# Patient Record
Sex: FEMALE | Race: BLACK OR AFRICAN AMERICAN | NOT HISPANIC OR LATINO | Employment: OTHER | URBAN - METROPOLITAN AREA
[De-identification: names, ages, dates, MRNs, and addresses within clinical notes are randomized per-mention and may not be internally consistent; named-entity substitution may affect disease eponyms.]

---

## 2017-02-18 ENCOUNTER — HOSPITAL ENCOUNTER (EMERGENCY)
Facility: OTHER | Age: 52
Discharge: HOME | End: 2017-02-18
Attending: EMERGENCY MEDICINE
Payer: MEDICARE

## 2017-02-18 VITALS
BODY MASS INDEX: 33.75 KG/M2 | SYSTOLIC BLOOD PRESSURE: 157 MMHG | WEIGHT: 210 LBS | OXYGEN SATURATION: 97 % | HEIGHT: 66 IN | RESPIRATION RATE: 12 BRPM | DIASTOLIC BLOOD PRESSURE: 94 MMHG | TEMPERATURE: 98 F | HEART RATE: 87 BPM

## 2017-02-18 DIAGNOSIS — M54.50 CHRONIC BILATERAL LOW BACK PAIN WITHOUT SCIATICA: ICD-10-CM

## 2017-02-18 DIAGNOSIS — M79.605 PAIN IN BOTH LOWER EXTREMITIES: Primary | ICD-10-CM

## 2017-02-18 DIAGNOSIS — M79.604 PAIN IN BOTH LOWER EXTREMITIES: Primary | ICD-10-CM

## 2017-02-18 DIAGNOSIS — G89.29 CHRONIC BILATERAL LOW BACK PAIN WITHOUT SCIATICA: ICD-10-CM

## 2017-02-18 PROCEDURE — 96372 THER/PROPH/DIAG INJ SC/IM: CPT

## 2017-02-18 PROCEDURE — 99283 EMERGENCY DEPT VISIT LOW MDM: CPT | Mod: 25

## 2017-02-18 PROCEDURE — 63600175 PHARM REV CODE 636 W HCPCS: Performed by: PHYSICIAN ASSISTANT

## 2017-02-18 RX ORDER — KETOROLAC TROMETHAMINE 30 MG/ML
30 INJECTION, SOLUTION INTRAMUSCULAR; INTRAVENOUS
Status: COMPLETED | OUTPATIENT
Start: 2017-02-18 | End: 2017-02-18

## 2017-02-18 RX ADMIN — KETOROLAC TROMETHAMINE 30 MG: 30 INJECTION, SOLUTION INTRAMUSCULAR at 05:02

## 2017-02-18 NOTE — ED AVS SNAPSHOT
[facility]  [address]  [facility] [address]               Trina Marie Collette   2017  4:33 PM   ED    Description:  Female : 1965   Department:  [facility]           Your Care was Coordinated By:     Provider Role From To    Charlie Mccauley MD Attending Provider 17 [phone] --    Courtney Kumari PA-C Physician Assistant 17 [id number] --      Reason for Visit     Leg Pain           Diagnoses this Visit        Comments    Pain in both lower extremities    -  Primary     Chronic bilateral low back pain without sciatica           ED Disposition     None           To Do List           Follow-up Information     Follow up with Bruce Baird MD In 2 days.    Specialty:  Internal Medicine    Contact information:    [address] [facility] [address]  [phone]          Follow up with Lisa Merino MD. Schedule an appointment as soon as possible for a visit in 1 day.    Specialties:  Pain Medicine, Interventional Pain Medicine    Contact information:    [address] [facility]  [address]  [facility] [id number] [phone]          Follow up with [facility].    Specialty:  Emergency Medicine    Why:  If symptoms worsen    Contact information:    [address] [facility] [address] [phone]      [facility]     [facility] Nurse Care Line -  Assistance  Registered nurses in the [facility] provide clinical advisement, health education, appointment booking, and other advisory services.  Call for this free service at [phone].             Medications           Message regarding Medications     Verify the changes and/or additions to your medication regime listed below are the same as discussed with your clinician today.  If any of these changes or additions are incorrect, please notify your healthcare provider.        These medications were administered today        Dose  "Freq    ketorolac injection 30 mg 30 mg ED 1 Time    Sig: Inject 30 mg into the muscle ED 1 Time.    Class: Normal    Route: Intramuscular           Verify that the below list of medications is an accurate representation of the medications you are currently taking.  If none reported, the list may be blank. If incorrect, please contact your healthcare provider. Carry this list with you in case of emergency.           Current Medications     busPIRone (BUSPAR) 30 MG Tab     clotrimazole-betamethasone 1-0.05% (LOTRISONE) cream Apply topically 2 (two) times daily.    cyclobenzaprine (FLEXERIL) 10 MG tablet TAKE 1 TABLET (10 MG TOTAL) BY MOUTH 2 (TWO) TIMES DAILY.    etodolac (LODINE) 500 MG tablet Take 1 tablet (500 mg total) by mouth 2 (two) times daily.    hydrOXYzine pamoate (VISTARIL) 25 MG Cap Take 25 mg by mouth once daily.    sertraline (ZOLOFT) 100 MG tablet Take 100 mg by mouth once daily.    ziprasidone (GEODON) 40 MG Cap Take 40 mg by mouth once daily.    ziprasidone (GEODON) 80 MG capsule Take 80 mg by mouth once daily.    zolpidem (AMBIEN) 10 mg Tab TAKE ONE TABLET BY MOUTH AT BEDTIME AS NEEDED           Clinical Reference Information           Your Vitals Were     BP Pulse Temp Resp Height Weight    157/94 (BP Location: Left arm, Patient Position: Sitting) 87 98 °F (36.7 °C) (Oral) 12 5' 6" (1.676 m) 95.3 kg (210 lb)    Last Period SpO2 BMI          01/18/2017 97% 33.89 kg/m2        Allergies as of 2/18/2017     No Known Allergies      Immunizations Administered on Date of Encounter - 2/18/2017     None      ED Micro, Lab, POCT     None      ED Imaging Orders     None        Discharge Instructions       TAKE YOUR HOME ETODOLAC AS NEEDED FOR PAIN    Discharge References/Attachments     BACK PAIN (ACUTE OR CHRONIC) (ENGLISH)    BACK PAIN (LOW): SELF-CARE (ENGLISH)    MYALGIAS (ENGLISH)      Smoking Cessation     If you would like to quit smoking:   You may be eligible for free services if you are a " [place] resident and started smoking cigarettes before September 1, 1988.  Call the Smoking Cessation Trust (SCT) toll free at (625) 255-2122 or (448) 427-9761.   Call 1-800-QUIT-NOW if you do not meet the above criteria.             [facility] complies with applicable Federal civil rights laws and does not discriminate on the basis of race, color, national origin, age, disability, or sex.        Language Assistance Services     ATTENTION: Language assistance services are available, free of charge. Please call [phone].      ATENCIÓN: Si habla español, tiene a espinal disposición servicios gratuitos de asistencia lingüística. Llame al [phone].     CHÚ Ý: N?u b?n nói Ti?ng Vi?t, có các d?ch v? h? tr? ngôn ng? mi?n phí dành cho b?n. G?i s? [phone].

## 2017-02-18 NOTE — ED PROVIDER NOTES
Encounter Date: 2/18/2017       History     Chief Complaint   Patient presents with    Leg Pain     Patient c/o bilateral leg pain for 3 days.  Patient denies any trauma.  Patient stated it is worse at night.      Review of patient's allergies indicates:  No Known Allergies  HPI Comments: Patient is a 52 y.o. female with a past medical history of depression, anxiety, bipolar 1, presenting to the emergency department with complaints of generalized body pain, back pain and bilateral leg pain.  The patient reports that yesterday her symptoms worsened in severity, and she now rates them at a 10/10.  She states that she has generalized body aches, and pain on her back that radiates into her legs.  She denies numbness, tingling, weakness.  She denies loss of urinary bowel control.  She denies recent injury or trauma.  She does admit that she has been seen by pain management in the past for similar pain, but states that her pain has never been this severe.  She states that she typically takes Flexeril for, reports her last dose was yesterday evening.  She denies taking any other medication for symptoms today thus far.  She denies recent surgery, cancer, hormone use, SOB, CP, IVDU.     The history is provided by the patient.     Past Medical History   Diagnosis Date    Anxiety     Bipolar 1 disorder     Depression      No past medical history pertinent negatives.  History reviewed. No pertinent past surgical history.  History reviewed. No pertinent family history.  Social History   Substance Use Topics    Smoking status: Current Every Day Smoker    Smokeless tobacco: None    Alcohol use Yes     Review of Systems   Constitutional: Negative for activity change, appetite change, chills, fatigue and fever.   HENT: Negative for congestion, rhinorrhea, sinus pressure, sneezing, sore throat and trouble swallowing.    Eyes: Negative for photophobia and visual disturbance.   Respiratory: Negative for cough, chest tightness,  shortness of breath and wheezing.    Cardiovascular: Negative for chest pain and palpitations.   Gastrointestinal: Negative for abdominal pain, constipation, diarrhea, nausea and vomiting.   Genitourinary: Negative for dysuria, hematuria and urgency.   Musculoskeletal: Positive for back pain and myalgias. Negative for neck pain and neck stiffness.   Skin: Negative for color change and wound.   Neurological: Negative for dizziness, weakness, light-headedness, numbness and headaches.   Psychiatric/Behavioral: Negative for agitation and confusion.       Physical Exam   Initial Vitals   BP Pulse Resp Temp SpO2   02/18/17 1514 02/18/17 1514 02/18/17 1514 02/18/17 1514 02/18/17 1514   157/94 87 12 98 °F (36.7 °C) 97 %     Physical Exam    Nursing note and vitals reviewed.  Constitutional: She appears well-developed and well-nourished. She is not diaphoretic. She is cooperative.  Non-toxic appearance. She does not have a sickly appearance. She does not appear ill. No distress.   Well appearing,  female, accompanied by her sister who is also a patient in the ED. She is speaking in clear and full sentences, moving all extremities, ambulates without difficulty.    HENT:   Head: Normocephalic and atraumatic.   Right Ear: External ear normal.   Left Ear: External ear normal.   Nose: Nose normal.   Mouth/Throat: Oropharynx is clear and moist.   Eyes: Conjunctivae and EOM are normal.   Neck: Normal range of motion. Neck supple.   Cardiovascular: Normal rate, regular rhythm and normal heart sounds.   Pulmonary/Chest: Breath sounds normal. No respiratory distress. She has no wheezes. She has no rhonchi. She has no rales.   Abdominal: Soft. Bowel sounds are normal. She exhibits no distension. There is no tenderness. There is no rebound and no guarding.   Musculoskeletal: Normal range of motion.   Generalized tenderness to palpation the bilateral paraspinal muscles of the cervical, thoracic, lumbar spine.  Increased  tenderness to palpation of bilateral SI joints.  No palpable deformity, crepitus, step-off.  Ambulatory with normal gait.  Normal range of motion, strength, sensation.  Negative straight leg test bilaterally.  Tenderness to palpation of the bilateral lower extremities with no palpable deformity, crepitus, step-off.  No edema, erythema, warmth, overlying skin changes.   Neurological: She is alert and oriented to person, place, and time. GCS eye subscore is 4. GCS verbal subscore is 5. GCS motor subscore is 6.   Skin: Skin is warm.   Psychiatric: She has a normal mood and affect. Her behavior is normal. Judgment and thought content normal.         ED Course   Procedures  Labs Reviewed - No data to display          Medical Decision Making:   History:   Old Medical Records: I decided to obtain old medical records.  Old Records Summarized: other records.       <> Summary of Records: Reviewed medical records in Epic including previous office visit with Dr. Merino for trigger injections on 11/17/16.  Other:   I have discussed this case with another health care provider.       <> Summary of the Discussion: Dr. Medrano  This note was created using Dragon Medical Dictation. There may be typographical errors secondary to dictation.     Urgent evaluation of a 52 y.o. female with a past medical history of anxiety, bipolar 1, and depression, presenting to the emergency department complaining of acute on chronic back and leg pain. Patient is afebrile, nontoxic appearing and hemodynamically stable.  Physical exam reveals regular rate and rhythm, lungs are clear to auscultation bilaterally.  Tenderness to palpation of the cervical, thoracic, and lumbar spine at the bilateral paraspinal muscles with no palpable deformity, crepitus, step-off.  Generalized tenderness palpation of bilateral lower extremities with no palpable deformity, crepitus, step-off.  Weightbearing and ambulatory. There are no signs of saddle anesthesia,  incontinence, neurologic deficits, fevers, or trauma on history or physical to suggest cauda equina, infectious process, fracture or subluxation. She has no risk factors for DVT, negative Wells criteria. I do believe the patient's back pain musculoskeletal in nature.  The patient will be administered Toradol me emergency department.  I did explain to her the importance of scheduling a follow-up appointment with her pain management provider for further evaluation and management.  I advised the patient to take her home etodolac as needed for her pain. The patient was instructed to follow up with a primary care provider in 2 days or to return to the emergency department for worsening symptoms. The treatment plan was discussed with the patient who demonstrated understanding and comfort with plan. The patient's history, physical exam, and plan of care was discussed with and agreed upon with my supervising physician.     Past Medical History   Diagnosis Date    Anxiety     Bipolar 1 disorder     Depression                      ED Course     Clinical Impression:     1. Pain in both lower extremities    2. Chronic bilateral low back pain without sciatica         Disposition:   Disposition: Discharged  Condition: Stable       Courtney Kumari PA-C  02/18/17 0557

## 2017-02-18 NOTE — ED TRIAGE NOTES
Patient c/o bilateral leg and back pain for 3 days.  Patient denies any trauma.  Patient stated it is worse at night.

## 2017-03-01 ENCOUNTER — TELEPHONE (OUTPATIENT)
Dept: PAIN MEDICINE | Facility: CLINIC | Age: 52
End: 2017-03-01

## 2017-03-01 DIAGNOSIS — M54.9 BACK PAIN, UNSPECIFIED BACK LOCATION, UNSPECIFIED BACK PAIN LATERALITY, UNSPECIFIED CHRONICITY: Primary | ICD-10-CM

## 2017-03-01 NOTE — TELEPHONE ENCOUNTER
----- Message from Gadiel Smith sent at 3/1/2017 10:53 AM CST -----  _x  1st Request  _  2nd Request  _  3rd Request        Who: Elizabeth    Why: Pt stating she needs a referral sent over to [facility] [phone] so she can begin therapy.She's stating when the original referral was sent over she didn't have transportation so they need another one. Please call to discuss if needed.    What Number to Call Back:[phone]    When to Expect a call back: (Before the end of the day)   -- if the call is after 12:00, the call back will be tomorrow.

## 2017-03-17 DIAGNOSIS — M79.10 MYALGIA: ICD-10-CM

## 2017-03-17 DIAGNOSIS — R53.81 PHYSICAL DECONDITIONING: ICD-10-CM

## 2017-03-17 RX ORDER — TIZANIDINE HYDROCHLORIDE 4 MG/1
CAPSULE, GELATIN COATED ORAL
Qty: 45 CAPSULE | Refills: 0 | Status: SHIPPED | OUTPATIENT
Start: 2017-03-17 | End: 2017-04-05 | Stop reason: SDUPTHER

## 2017-03-21 DIAGNOSIS — I87.2 VENOUS INSUFFICIENCY: Primary | ICD-10-CM

## 2017-03-23 ENCOUNTER — HOSPITAL ENCOUNTER (OUTPATIENT)
Dept: VASCULAR SURGERY | Facility: CLINIC | Age: 52
Discharge: HOME | End: 2017-03-23
Attending: SURGERY
Payer: MEDICARE

## 2017-03-23 ENCOUNTER — OFFICE VISIT (OUTPATIENT)
Dept: VASCULAR SURGERY | Facility: CLINIC | Age: 52
End: 2017-03-23
Payer: MEDICARE

## 2017-03-23 VITALS
WEIGHT: 214 LBS | BODY MASS INDEX: 34.39 KG/M2 | HEIGHT: 66 IN | HEART RATE: 72 BPM | SYSTOLIC BLOOD PRESSURE: 130 MMHG | TEMPERATURE: 97 F | DIASTOLIC BLOOD PRESSURE: 87 MMHG

## 2017-03-23 DIAGNOSIS — M25.561 CHRONIC PAIN OF BOTH KNEES: Primary | ICD-10-CM

## 2017-03-23 DIAGNOSIS — M25.562 CHRONIC PAIN OF BOTH KNEES: Primary | ICD-10-CM

## 2017-03-23 DIAGNOSIS — I87.2 VENOUS INSUFFICIENCY: ICD-10-CM

## 2017-03-23 DIAGNOSIS — G89.29 CHRONIC PAIN OF BOTH KNEES: Primary | ICD-10-CM

## 2017-03-23 DIAGNOSIS — M54.50 LUMBAR SPINE PAIN: ICD-10-CM

## 2017-03-23 PROCEDURE — 93970 EXTREMITY STUDY: CPT | Mod: S$GLB,,, | Performed by: SURGERY

## 2017-03-23 PROCEDURE — 99203 OFFICE O/P NEW LOW 30 MIN: CPT | Mod: S$GLB,,, | Performed by: SURGERY

## 2017-03-23 PROCEDURE — 99999 PR PBB SHADOW E&M-EST. PATIENT-LVL III: CPT | Mod: PBBFAC,,, | Performed by: SURGERY

## 2017-03-23 PROCEDURE — 1160F RVW MEDS BY RX/DR IN RCRD: CPT | Mod: S$GLB,,, | Performed by: SURGERY

## 2017-03-24 NOTE — PROGRESS NOTES
Patient ID: Trina Marie Collette is a 52 y.o. female.    I. HISTORY     Chief Complaint: No chief complaint on file.      HPI Trina Marie Collette is a 52 y.o. female here for evaluation of bilateral leg pain. Has been present for 2-3 weeks. Hurts worse at night and when she sleeps. Described as aching, cramping and dull. Also gets cramps in her legs when she walks but she doesn't have to stop and has no limitations of activities. Denies any recent trauma or injury. No leg swelling. Nothing makes the pain better.    No history of DVT or clotting disorders  No prior arterial or venous procedures    Review of Systems   Constitution: Negative.   Eyes: Negative.    Cardiovascular: Negative for chest pain, claudication and leg swelling.   Respiratory: Negative.    Endocrine: Negative.    Hematologic/Lymphatic: Negative.    Skin: Negative for color change and nail changes.   Musculoskeletal: Positive for muscle cramps, myalgias and stiffness.       II. PHYSICAL EXAM     Physical Exam   Constitutional: She is oriented to person, place, and time. She appears well-developed and well-nourished.   HENT:   Head: Normocephalic and atraumatic.   Eyes: Conjunctivae and EOM are normal.   Cardiovascular: Normal rate.    Pulses:       Radial pulses are 2+ on the right side, and 2+ on the left side.        Dorsalis pedis pulses are 2+ on the right side, and 2+ on the left side.   Pulmonary/Chest: Effort normal. No respiratory distress.   Musculoskeletal: Normal range of motion. She exhibits no edema.   Neurological: She is alert and oriented to person, place, and time. No cranial nerve deficit.   Skin: Skin is warm and dry. No rash noted. No erythema.   Psychiatric: She has a normal mood and affect.   Vitals reviewed.    Scattered spider veins on bilateral legs.  No hyperpigmentation or inflammation to suggest chronic venous insufficiency    III. ASSESSMENT & PLAN (MEDICAL DECISION MAKING)     1. Chronic pain of both knees    2.  Lumbar spine pain        Imaging Results:  Venous duplex - No DVT or superficial venous reflux    Assessment/Diagnosis and Plan:  Trina Marie Collette is a 52 y.o. female with chronic bilateral leg pain. She has no venous reflux but duplex and has a normal pulse exam.   Recommend continue light exercise and follow up with PCP if symptoms persist.    See me back as needed.    Christina Cormier MD FACS [facility]  Vascular & Endovascular Surgery

## 2017-04-05 DIAGNOSIS — R53.81 PHYSICAL DECONDITIONING: ICD-10-CM

## 2017-04-05 DIAGNOSIS — M79.10 MYALGIA: ICD-10-CM

## 2017-04-10 RX ORDER — TIZANIDINE HYDROCHLORIDE 4 MG/1
CAPSULE, GELATIN COATED ORAL
Qty: 45 CAPSULE | Refills: 0 | Status: SHIPPED | OUTPATIENT
Start: 2017-04-10 | End: 2017-05-08 | Stop reason: SDUPTHER

## 2017-05-05 ENCOUNTER — TELEPHONE (OUTPATIENT)
Dept: PAIN MEDICINE | Facility: CLINIC | Age: 52
End: 2017-05-05

## 2017-05-05 NOTE — TELEPHONE ENCOUNTER
----- Message from Mayda SANDEPE Isaac sent at 5/5/2017 10:22 AM CDT -----  Contact: pt   _X  1st Request  _  2nd Request  _  3rd Request        Who: COLLETTE,TRINA MARIE [[id number]]    Why: pt is calling in regards to her appt pt states can she still be seen today     What Number to Call Back: [phone].    When to Expect a call back: (Before the end of the day)   -- if the call is after 12:00, the call back will be tomorrow.                    '

## 2017-05-05 NOTE — TELEPHONE ENCOUNTER
----- Message from Mayda SANDEEP Isaac sent at 5/5/2017 10:22 AM CDT -----  Contact: pt   _X  1st Request  _  2nd Request  _  3rd Request        Who: COLLETTE,TRINA MARIE [[id number]]    Why: pt is calling in regards to her appt pt states can she still be seen today     What Number to Call Back: [phone].    When to Expect a call back: (Before the end of the day)   -- if the call is after 12:00, the call back will be tomorrow.                    '

## 2017-05-05 NOTE — TELEPHONE ENCOUNTER
Contacted and spoke to patient, unfortunately unable to accommodate her today as she wanted to see Dr. Merino as he is only in clinic this morning. Patient declined seeing a NP was scheduled for next available.

## 2017-05-08 DIAGNOSIS — M79.10 MYALGIA: ICD-10-CM

## 2017-05-08 DIAGNOSIS — R53.81 PHYSICAL DECONDITIONING: ICD-10-CM

## 2017-05-08 RX ORDER — TIZANIDINE HYDROCHLORIDE 4 MG/1
CAPSULE, GELATIN COATED ORAL
Qty: 45 CAPSULE | Refills: 0 | Status: SHIPPED | OUTPATIENT
Start: 2017-05-08 | End: 2017-08-17

## 2017-05-18 ENCOUNTER — OFFICE VISIT (OUTPATIENT)
Dept: SPINE | Facility: CLINIC | Age: 52
End: 2017-05-18
Attending: ANESTHESIOLOGY
Payer: MEDICARE

## 2017-05-18 VITALS
HEIGHT: 66 IN | SYSTOLIC BLOOD PRESSURE: 132 MMHG | WEIGHT: 214 LBS | HEART RATE: 82 BPM | BODY MASS INDEX: 34.39 KG/M2 | DIASTOLIC BLOOD PRESSURE: 89 MMHG

## 2017-05-18 DIAGNOSIS — R53.81 PHYSICAL DECONDITIONING: Primary | ICD-10-CM

## 2017-05-18 DIAGNOSIS — M79.10 MYALGIA: ICD-10-CM

## 2017-05-18 DIAGNOSIS — M47.816 FACET ARTHROPATHY, LUMBAR: ICD-10-CM

## 2017-05-18 DIAGNOSIS — M25.511 ACUTE PAIN OF RIGHT SHOULDER: ICD-10-CM

## 2017-05-18 PROCEDURE — 99213 OFFICE O/P EST LOW 20 MIN: CPT | Mod: S$GLB,,, | Performed by: ANESTHESIOLOGY

## 2017-05-18 PROCEDURE — 99999 PR PBB SHADOW E&M-EST. PATIENT-LVL III: CPT | Mod: PBBFAC,,, | Performed by: ANESTHESIOLOGY

## 2017-05-18 PROCEDURE — 1160F RVW MEDS BY RX/DR IN RCRD: CPT | Mod: S$GLB,,, | Performed by: ANESTHESIOLOGY

## 2017-05-18 RX ORDER — DICLOFENAC SODIUM 10 MG/G
2 GEL TOPICAL 4 TIMES DAILY
Qty: 1 TUBE | Refills: 2 | Status: SHIPPED | OUTPATIENT
Start: 2017-05-18 | End: 2018-01-18 | Stop reason: SDUPTHER

## 2017-05-18 RX ORDER — CELECOXIB 200 MG/1
CAPSULE ORAL
Qty: 60 CAPSULE | Refills: 1 | Status: SHIPPED | OUTPATIENT
Start: 2017-05-18 | End: 2017-07-16 | Stop reason: SDUPTHER

## 2017-05-18 NOTE — MR AVS SNAPSHOT
[facility]  [address], [address]  [facility] [address]  Phone: [phone]  Fax: [phone]                  Trina Marie Collette   2017 2:00 PM   Office Visit    Description:  Female : 1965   Provider:  Lisa Merino MD   Department:  [facility]           Reason for Visit     Low-back Pain                To Do List           Goals (5 Years of Data)     None       These Medications        Disp Refills Start End    diclofenac sodium 1 % Gel 1 Tube 2 2017    Apply 2 g topically 4 (four) times daily. - Topical    Pharmacy: [facility]/pharmacy #[id number] [place], [place] - [address] [facility] #: [phone]       celecoxib (CELEBREX) 200 MG capsule 60 capsule 1 2017     Take 1 per day for 1 week, if not helpful increase to twice a day, take with food.    Pharmacy: [facility]/pharmacy #[id number] [place], [place] - [address] [facility] Ph #: [phone]         [facility]     [facility] On Call Nurse Care Line -  Assistance  Unless otherwise directed by your provider, please contact [facility], our nurse care line that is available for  assistance.     Registered nurses in the [facility] provide: appointment scheduling, clinical advisement, health education, and other advisory services.  Call: [phone] (toll free)               Medications           Message regarding Medications     Verify the changes and/or additions to your medication regime listed below are the same as discussed with your clinician today.  If any of these changes or additions are incorrect, please notify your healthcare provider.        START taking these NEW medications        Refills    diclofenac sodium 1 % Gel 2    Sig: Apply 2 g topically 4 (four) times daily.    Class: Normal    Route: Topical    celecoxib (CELEBREX) 200 MG capsule 1    Sig: Take 1 per day for 1 week, if not helpful increase to twice a day, take with food.    Class: Normal          "  Verify that the below list of medications is an accurate representation of the medications you are currently taking.  If none reported, the list may be blank. If incorrect, please contact your healthcare provider. Carry this list with you in case of emergency.           Current Medications     amoxicillin (AMOXIL) 500 MG capsule Take 500 mg by mouth 3 (three) times daily.    busPIRone (BUSPAR) 30 MG Tab     clotrimazole-betamethasone 1-0.05% (LOTRISONE) cream Apply topically 2 (two) times daily.    cyclobenzaprine (FLEXERIL) 10 MG tablet Take 1 tablet (10 mg total) by mouth 2 (two) times daily as needed for Muscle spasms.    etodolac (LODINE) 500 MG tablet Take 1 tablet (500 mg total) by mouth 2 (two) times daily.    hydrOXYzine pamoate (VISTARIL) 25 MG Cap Take 25 mg by mouth once daily.    sertraline (ZOLOFT) 100 MG tablet Take 100 mg by mouth once daily.    tizanidine 4 mg Cap TAKE 1 CAPSULE BY MOUTH EVERY 8 (EIGHT) HOURS AS NEEDED.    ziprasidone (GEODON) 40 MG Cap Take 40 mg by mouth once daily.    ziprasidone (GEODON) 80 MG capsule Take 80 mg by mouth once daily.    zolpidem (AMBIEN) 10 mg Tab Take 1 tablet (10 mg total) by mouth nightly as needed.    celecoxib (CELEBREX) 200 MG capsule Take 1 per day for 1 week, if not helpful increase to twice a day, take with food.    diclofenac sodium 1 % Gel Apply 2 g topically 4 (four) times daily.           Clinical Reference Information           Your Vitals Were     BP Pulse Height Weight BMI    132/89 82 5' 6" (1.676 m) 97.1 kg (214 lb) 34.54 kg/m2      Blood Pressure          Most Recent Value    BP  132/89      Allergies as of 5/18/2017     No Known Allergies      Immunizations Administered on Date of Encounter - 5/18/2017     None      Smoking Cessation     If you would like to quit smoking:   You may be eligible for free services if you are a [place] resident and started smoking cigarettes before September 1, 1988.  Call the Smoking Cessation Trust (SCT) " toll free at (252) 913-6635 or (627) 704-0986.   Call 1-800-QUIT-NOW if you do not meet the above criteria.   Contact us via email: [email or web address]   View our website for more information: [email or web address]        Language Assistance Services     ATTENTION: Language assistance services are available, free of charge. Please call [phone].      ATENCIÓN: Si habla griseldaañol, tiene a espinal disposición servicios gratuitos de asistencia lingüística. Llame al [phone].     CHÚ Ý: N?u b?n nói Ti?ng Vi?t, có các d?ch v? h? tr? ngôn ng? mi?n phí dành cho b?n. G?i s? [phone].         [facility] complies with applicable Federal civil rights laws and does not discriminate on the basis of race, color, national origin, age, disability, or sex.

## 2017-05-18 NOTE — PROGRESS NOTES
Subjective:      Patient ID: Trina Marie Collette is a 52 y.o. female.    Chief Complaint: Low-back Pain    Referred by: Self, Aaareferral     Pain Scales  Best: 7/10  Worst: 9/10  Usually: 8/10  Today: 9/10    Low-back Pain         Interventional Pain History  ***    ROS        Objective:      ***    Ortho/SPM Exam      Assessment:       No diagnosis found.      Plan:       There are no diagnoses linked to this encounter.     ***

## 2017-05-18 NOTE — PROGRESS NOTES
Chronic Pain - Follow Up    Referring Physician: Oanh Pierce    Chief Complaint:   Chief Complaint   Patient presents with    Low-back Pain        SUBJECTIVE: Disclaimer: This note has been generated using voice-recognition software. There may be typographical errors that have been missed during proof-reading  Interval History 5/18/2017  Since previous encounter the patient has been doing physical therapy but states that it's been bothering her lower back and she is developing pain in the right shoulder.  She continues to take tizanidine which isn't helpful, also Flexeril hasn't been significant helpful.  She has had lumbar trigger point injections in the past with some relief for both her lower back and pain into her legs.  No other health changes since previous encounter.  Interval history 11/17/2016:  Ms. Collette returns to the clinic today for follow up.  Since previous encounter patient reports low back pain radiating down both her legs has continued.  Patient stated that the Zanaflex did not relieve any pain.  She reports that her pain is worsening.  She reports pain with prolonged sitting.  She does complain of numbness and tingling to both feet.  Since the last visit, she has not been able to start physical therapy, stating she is waiting for insurance approval, she states that she starts next week.  Patient reports no other health changes since her last visit.  Patient reports her pain 9/10 today.     Initial encounter:    Trina Marie Collette presents to the clinic for the evaluation of neck and low back pain. The pain started 2 years ago and symptoms have been worsening.    Brief history:    Pain Description:    The pain is located in the neck area area and radiates to down the back both legs.  The pain appears diffuse in nature    At BEST  9/10     At WORST  9/10 on the WORST day.      On average pain is rated as 9/10.     Today the pain is rated as 9/10    The pain is described as aching,  sharp, shooting, stabbing, throbbing and tingling      Symptoms interfere with daily activity and sleeping.     Exacerbating factors: Sitting, Standing, Laying, Bending, Walking, Night Time, Morning, Lifting and Getting out of bed/chair.      Mitigating factors nothing.     Patient denies night fever/night sweats, urinary incontinence, bowel incontinence, significant weight loss, significant motor weakness and loss of sensations.  Patient denies any suicidal or homicidal ideations    Pain Medications:  Current:  Flexeril  tizanidine    Tried in Past:  NSAIDs -Never  TCA -Never  SNRI -Never  Anti-convulsants -Never the patient is on Geodon for history of schizophrenia  Muscle Relaxants -Flexeril  Opioids-Never    Physical Therapy/Home Exercise: no       report:  Reviewed and consistent with medication use as prescribed.    Pain Procedures: TPI in lower back with improvement    Chiropractor -never  Acupuncture - never  TENS unit -never  Spinal decompression -never  Joint replacement -never    Imaging: X-Ray Lumbar Spine Complete 5 View  Narrative   Procedure: Lumbar spine series.  This is interpreted without the benefit of similar prior studies for comparison.    Findings: AP, lateral, flexion and extension, obliqueand coned down lateral radiographs of the lumbar spine reveal 5 non-rib bearing lumbar vertebral bodies with normal vertebral body heights, pedicles and disk spaces.  There is no malalignment, spondylolysis or spondylolisthesis.  No instability on flexion and extension views.  There is no significant facet arthropathy.  The surrounding soft tissues are normal.   Impression    Normal lumbar spine series          Past Medical History:   Diagnosis Date    Anxiety     Bipolar 1 disorder     Depression      History reviewed. No pertinent surgical history.  Social History     Social History    Marital status:      Spouse name: N/A    Number of children: N/A    Years of education: N/A      Occupational History    Not on file.     Social History Main Topics    Smoking status: Current Every Day Smoker    Smokeless tobacco: Not on file    Alcohol use Yes    Drug use: No    Sexual activity: Yes     Partners: Male     Other Topics Concern    Not on file     Social History Narrative     History reviewed. No pertinent family history.    Review of patient's allergies indicates:  No Known Allergies    Current Outpatient Prescriptions   Medication Sig    amoxicillin (AMOXIL) 500 MG capsule Take 500 mg by mouth 3 (three) times daily.    busPIRone (BUSPAR) 30 MG Tab     clotrimazole-betamethasone 1-0.05% (LOTRISONE) cream Apply topically 2 (two) times daily.    cyclobenzaprine (FLEXERIL) 10 MG tablet Take 1 tablet (10 mg total) by mouth 2 (two) times daily as needed for Muscle spasms.    etodolac (LODINE) 500 MG tablet Take 1 tablet (500 mg total) by mouth 2 (two) times daily.    hydrOXYzine pamoate (VISTARIL) 25 MG Cap Take 25 mg by mouth once daily.    sertraline (ZOLOFT) 100 MG tablet Take 100 mg by mouth once daily.    tizanidine 4 mg Cap TAKE 1 CAPSULE BY MOUTH EVERY 8 (EIGHT) HOURS AS NEEDED.    ziprasidone (GEODON) 40 MG Cap Take 40 mg by mouth once daily.    ziprasidone (GEODON) 80 MG capsule Take 80 mg by mouth once daily.    zolpidem (AMBIEN) 10 mg Tab Take 1 tablet (10 mg total) by mouth nightly as needed.     No current facility-administered medications for this visit.        REVIEW OF SYSTEMS:    GENERAL:  No weight loss, malaise or fevers.  RESPIRATORY:  Negative for cough, wheezing or shortness of breath, patient denies any recent URI.  CARDIOVASCULAR:  Negative for chest pain, leg swelling or palpitations.  GI:  Negative for abdominal discomfort, blood in stools or black stools or change in bowel habits.  MUSCULOSKELETAL:  See HPI.  SKIN:  Negative for lesions, rash, and itching.  PSYCH:  History of schizophrenia on Geodon.  Patient's sleep is disturbed secondary to  "pain.  HEMATOLOGY/LYMPHOLOGY:  Negative for prolonged bleeding, bruising easily or swollen nodes.  Patient is not currently taking any anti-coagulants  ENDO: No history of diabetes or thyroid dysfunction  NEURO:   No history of headaches, syncope, paralysis, seizures or tremors.  All other reviewed and negative other than HPI.    OBJECTIVE:    /89  Pulse 82  Ht 5' 6" (1.676 m)  Wt 97.1 kg (214 lb)  BMI 34.54 kg/m2    PHYSICAL EXAMINATION:    GENERAL: Well appearing, in no acute distress, alert and oriented x3.  PSYCH:  Mood and affect appropriate.  SKIN: Skin color, texture, turgor normal, no rashes or lesions.  HEAD/FACE:  Normocephalic, atraumatic. Cranial nerves grossly intact.  CV: RRR with palpation of the radial artery.  PULM: No evidence of respiratory difficulty, symmetric chest rise.  BACK: Straight leg raising in the sitting position is negative to radicular pain. There is pain with palpation over the facet joints of the lumbar spine bilaterally. There is no pain with flexion.  There is decreased range of motion with extension to 15 degrees, and facet loading maneuvers cause reproducible pain bilaterally.  There is diffuse pain over the paraspinal muscles of the cervical, thoracic, and lumbar spine.  EXTREMITIES: Peripheral joint ROM is full and pain free without obvious instability or laxity in all four extremities. No deformities, edema, or skin discoloration. Good capillary refill.  MUSCULOSKELETAL: No decrease in range of motion of the right shoulder, there is pain with internal and external rotation and pain with palpation over the subacromial bursa.  There is pain with palpation over the sacroiliac joints bilaterally.  There is no pain to palpation over the greater trochanteric bursa bilaterally.  FABERs test is negative.  FADIRs test is negative.   Bilateral lower extremity strength is normal and symmetric.  No atrophy or tone abnormalities are noted.  NEURO: Bilateral lower extremity " coordination and muscle stretch reflexes are physiologic and symmetric.  Plantar response are downgoing. No clonus.  No loss of sensation is noted.  GAIT:  Antalgic, ambulates without assistance       ASSESSMENT: 52 y.o. year old female with low back pain, consistent with     Encounter Diagnoses   Name Primary?    Physical deconditioning Yes    Myalgia     Acute pain of right shoulder     Facet arthropathy, lumbar        PLAN:     -Voltaren gel to apply to lower back and right shoulder  Celebrex 200 mg per day try this for 1 week if the dose is insufficient increase to 200 mg twice a day.    Schedule patient for bilateral facet joint injections in 2 weeks at L4-5 L5-S1, some boxes helping patient can defer injections.    - Theracane for home use    - Continue physical therapy for physical reconditioning.     - follow-up in one month with [facility]    - I encouraged her to discuss with her psychiatrist the possibility of adding Cymbalta for nerve pain.     The above plan and management options were discussed at length with patient. Patient is in agreement with the above and verbalized understanding. It will be communicated with the referring physician via electronic record, fax, or mail.    Lisa Merino  05/18/2017

## 2017-05-22 ENCOUNTER — TELEPHONE (OUTPATIENT)
Dept: PAIN MEDICINE | Facility: CLINIC | Age: 52
End: 2017-05-22

## 2017-05-22 NOTE — TELEPHONE ENCOUNTER
----- Message from Amanda Woods sent at 5/22/2017 11:30 AM CDT -----  Pt. States medication working , she's holding off scheduling procedure at the moment.

## 2017-06-23 ENCOUNTER — TELEPHONE (OUTPATIENT)
Dept: PAIN MEDICINE | Facility: CLINIC | Age: 52
End: 2017-06-23

## 2017-06-23 NOTE — TELEPHONE ENCOUNTER
----- Message from Meenakshi Caballero sent at 6/23/2017 12:22 PM CDT -----  Contact: pt  _  1st Request  _  2nd Request  _  3rd Request        Who: pt    Why: pt feel the need to be seen today for her severe lower back pain. Please call the pt     What Number to Call Back:[phone]    When to Expect a call back: (Before the end of the day)   -- if the call is after 12:00, the call back will be tomorrow.

## 2017-07-17 RX ORDER — CELECOXIB 200 MG/1
CAPSULE ORAL
Qty: 60 CAPSULE | Refills: 1 | Status: SHIPPED | OUTPATIENT
Start: 2017-07-17 | End: 2017-11-18 | Stop reason: SDUPTHER

## 2017-08-17 PROBLEM — E78.5 HYPERLIPIDEMIA: Status: ACTIVE | Noted: 2017-08-17

## 2017-08-21 ENCOUNTER — TELEPHONE (OUTPATIENT)
Dept: PAIN MEDICINE | Facility: CLINIC | Age: 52
End: 2017-08-21

## 2017-08-21 NOTE — TELEPHONE ENCOUNTER
----- Message from Janell Laguerre sent at 8/21/2017  3:49 PM CDT -----  _x  1st Request  _  2nd Request  _  3rd Request        Who: birdie solorio     Why:birdie calling to checking status for orthopedic brace.please call to discuss    What Number to Call Back:[phone]    When to Expect a call back: (With in 24 hours)    Returned call to Birdie, staff stated that she was not available at this time

## 2017-10-05 ENCOUNTER — OFFICE VISIT (OUTPATIENT)
Dept: PAIN MEDICINE | Facility: CLINIC | Age: 52
End: 2017-10-05
Payer: MEDICARE

## 2017-10-05 ENCOUNTER — HOSPITAL ENCOUNTER (OUTPATIENT)
Dept: RADIOLOGY | Facility: OTHER | Age: 52
Discharge: HOME | End: 2017-10-05
Attending: INTERNAL MEDICINE
Payer: MEDICARE

## 2017-10-05 ENCOUNTER — HOSPITAL ENCOUNTER (OUTPATIENT)
Dept: RADIOLOGY | Facility: OTHER | Age: 52
Discharge: HOME | End: 2017-10-05
Attending: NURSE PRACTITIONER
Payer: MEDICARE

## 2017-10-05 VITALS
HEIGHT: 66 IN | DIASTOLIC BLOOD PRESSURE: 95 MMHG | BODY MASS INDEX: 37.21 KG/M2 | SYSTOLIC BLOOD PRESSURE: 134 MMHG | TEMPERATURE: 99 F | HEART RATE: 90 BPM | WEIGHT: 231.5 LBS | RESPIRATION RATE: 16 BRPM

## 2017-10-05 DIAGNOSIS — Z12.31 VISIT FOR SCREENING MAMMOGRAM: ICD-10-CM

## 2017-10-05 DIAGNOSIS — N64.4 BREAST TENDERNESS IN FEMALE: ICD-10-CM

## 2017-10-05 DIAGNOSIS — M47.816 FACET ARTHRITIS, DEGENERATIVE, LUMBAR SPINE: Primary | ICD-10-CM

## 2017-10-05 DIAGNOSIS — M25.511 CHRONIC RIGHT SHOULDER PAIN: ICD-10-CM

## 2017-10-05 DIAGNOSIS — G89.29 CHRONIC RIGHT SHOULDER PAIN: ICD-10-CM

## 2017-10-05 DIAGNOSIS — R53.81 PHYSICAL DECONDITIONING: ICD-10-CM

## 2017-10-05 DIAGNOSIS — M79.10 MYALGIA: ICD-10-CM

## 2017-10-05 PROCEDURE — 73030 X-RAY EXAM OF SHOULDER: CPT | Mod: 26,RT,, | Performed by: RADIOLOGY

## 2017-10-05 PROCEDURE — 73030 X-RAY EXAM OF SHOULDER: CPT | Mod: TC,RT

## 2017-10-05 PROCEDURE — 99214 OFFICE O/P EST MOD 30 MIN: CPT | Mod: S$GLB,,, | Performed by: NURSE PRACTITIONER

## 2017-10-05 PROCEDURE — 77067 SCR MAMMO BI INCL CAD: CPT | Mod: 26,,, | Performed by: RADIOLOGY

## 2017-10-05 PROCEDURE — 99999 PR PBB SHADOW E&M-EST. PATIENT-LVL III: CPT | Mod: PBBFAC,,, | Performed by: NURSE PRACTITIONER

## 2017-10-05 PROCEDURE — 77067 SCR MAMMO BI INCL CAD: CPT | Mod: TC

## 2017-10-05 PROCEDURE — 77063 BREAST TOMOSYNTHESIS BI: CPT | Mod: 26,,, | Performed by: RADIOLOGY

## 2017-10-05 RX ORDER — MELOXICAM 15 MG/1
15 TABLET ORAL DAILY
Qty: 30 TABLET | Refills: 0 | Status: SHIPPED | OUTPATIENT
Start: 2017-10-05 | End: 2017-11-01 | Stop reason: SDUPTHER

## 2017-10-05 NOTE — PROGRESS NOTES
Chronic Pain - Follow Up    Referring Physician: No ref. provider found    Chief Complaint:   Chief Complaint   Patient presents with    Low-back Pain        SUBJECTIVE: Disclaimer: This note has been generated using voice-recognition software. There may be typographical errors that have been missed during proof-reading    Interval History 10/5/2017:  The patient returns to clinic today for follow up. She reports increased pain to her lower back. She also reports right shoulder pain. She previously had relief with physical therapy, Voltaren gel, and Celebrex. She now reports that these are ineffective. She complains of aching pain across her lower back. Her pain is worse with prolonged standing and walking. She reports sitting and rest relieve her pain. She denies any radiating leg pain today. She describes her right shoulder pain as aching as well. She reports that this increases with laying on that side. She denies any other health changes. She denies any bowel or bladder incontinence. Her pain today is 8/10.    Interval history 11/17/2016:  Ms. Collette returns to the clinic today for follow up.  Since previous encounter patient reports low back pain radiating down both her legs has continued.  Patient stated that the Zanaflex did not relieve any pain.  She reports that her pain is worsening.  She reports pain with prolonged sitting.  She does complain of numbness and tingling to both feet.  Since the last visit, she has not been able to start physical therapy, stating she is waiting for insurance approval, she states that she starts next week.  Patient reports no other health changes since her last visit.  Patient reports her pain 9/10 today.     Initial encounter:    Trina Marie Collette presents to the clinic for the evaluation of neck and low back pain. The pain started 2 years ago and symptoms have been worsening.    Brief history:    Pain Description:    The pain is located in the neck area area and  radiates to down the back both legs.  The pain appears diffuse in nature    At BEST  9/10     At WORST  9/10 on the WORST day.      On average pain is rated as 9/10.     Today the pain is rated as 9/10    The pain is described as aching, sharp, shooting, stabbing, throbbing and tingling      Symptoms interfere with daily activity and sleeping.     Exacerbating factors: Sitting, Standing, Laying, Bending, Walking, Night Time, Morning, Lifting and Getting out of bed/chair.      Mitigating factors nothing.     Patient denies night fever/night sweats, urinary incontinence, bowel incontinence, significant weight loss, significant motor weakness and loss of sensations.  Patient denies any suicidal or homicidal ideations    Pain Medications:  Current:  Flexeril    Tried in Past:  NSAIDs -Never  TCA -Never  SNRI -Never  Anti-convulsants -Never the patient is on Geodon for history of schizophrenia  Muscle Relaxants -Flexeril  Opioids-Never    Physical Therapy/Home Exercise: no       report:  Reviewed and consistent with medication use as prescribed.    Pain Procedures: NONE    Chiropractor -never  Acupuncture - never  TENS unit -never  Spinal decompression -never  Joint replacement -never    Imaging:   X-Ray Lumbar Spine Complete 5 View  Narrative   Procedure: Lumbar spine series.  This is interpreted without the benefit of similar prior studies for comparison.    Findings: AP, lateral, flexion and extension, obliqueand coned down lateral radiographs of the lumbar spine reveal 5 non-rib bearing lumbar vertebral bodies with normal vertebral body heights, pedicles and disk spaces.  There is no malalignment, spondylolysis or spondylolisthesis.  No instability on flexion and extension views.  There is no significant facet arthropathy.  The surrounding soft tissues are normal.   Impression    Normal lumbar spine series              CMP  Sodium   Date Value Ref Range Status   08/09/2017 140 135 - 146 mmol/L Final     Potassium    Date Value Ref Range Status   08/09/2017 3.8 3.5 - 5.3 mmol/L Final     Chloride   Date Value Ref Range Status   08/09/2017 105 98 - 110 mmol/L Final     CO2   Date Value Ref Range Status   08/09/2017 26 20 - 31 mmol/L Final     Glucose   Date Value Ref Range Status   08/09/2017 98 65 - 99 mg/dL Final     Comment:                   Fasting reference interval          BUN, Bld   Date Value Ref Range Status   08/09/2017 11 7 - 25 mg/dL Final     Creatinine   Date Value Ref Range Status   08/09/2017 0.50 0.50 - 1.05 mg/dL Final     Comment:     For patients >49 years of age, the reference limit  for Creatinine is approximately 13% higher for people  identified as -American.          Calcium   Date Value Ref Range Status   08/09/2017 9.1 8.6 - 10.4 mg/dL Final     Total Protein   Date Value Ref Range Status   08/09/2017 5.8 (L) 6.1 - 8.1 g/dL Final     Albumin   Date Value Ref Range Status   08/09/2017 3.6 3.6 - 5.1 g/dL Final     Total Bilirubin   Date Value Ref Range Status   08/09/2017 0.3 0.2 - 1.2 mg/dL Final     Alkaline Phosphatase   Date Value Ref Range Status   08/09/2017 73 33 - 130 U/L Final     AST   Date Value Ref Range Status   08/09/2017 12 10 - 35 U/L Final     ALT   Date Value Ref Range Status   08/09/2017 8 6 - 29 U/L Final     eGFR if    Date Value Ref Range Status   08/09/2017 131 > OR = 60 mL/min/1.73m2 Final     eGFR if non    Date Value Ref Range Status   08/09/2017 113 > OR = 60 mL/min/1.73m2 Final         Past Medical History:   Diagnosis Date    Anxiety     Bipolar 1 disorder     Depression      History reviewed. No pertinent surgical history.  Social History     Social History    Marital status:      Spouse name: N/A    Number of children: N/A    Years of education: N/A     Occupational History    Not on file.     Social History Main Topics    Smoking status: Current Every Day Smoker    Smokeless tobacco: Never Used    Alcohol use Yes     Drug use: No    Sexual activity: Yes     Partners: Male     Other Topics Concern    Not on file     Social History Narrative    No narrative on file     History reviewed. No pertinent family history.    No Known Allergies    Current Outpatient Prescriptions   Medication Sig    amoxicillin (AMOXIL) 500 MG capsule Take 500 mg by mouth 3 (three) times daily.    busPIRone (BUSPAR) 30 MG Tab     celecoxib (CELEBREX) 200 MG capsule TAKE 1 CAPSULE BY MOUTH ONCE DAILY FOR 1WEEK, IF NOT HELPFUL INCREASE TO 1 CAP TWICE A DAY WITH FOOD    clotrimazole-betamethasone 1-0.05% (LOTRISONE) cream APPLY TOPICALLY 2 (TWO) TIMES DAILY.    cyclobenzaprine (FLEXERIL) 10 MG tablet Take 1 tablet (10 mg total) by mouth 2 (two) times daily as needed for Muscle spasms.    etodolac (LODINE) 500 MG tablet Take 1 tablet (500 mg total) by mouth 2 (two) times daily.    hydrOXYzine pamoate (VISTARIL) 25 MG Cap Take 25 mg by mouth once daily.    rosuvastatin (CRESTOR) 5 MG tablet Take 1 tablet (5 mg total) by mouth once daily.    sertraline (ZOLOFT) 100 MG tablet Take 100 mg by mouth once daily.    ziprasidone (GEODON) 40 MG Cap Take 40 mg by mouth once daily.    ziprasidone (GEODON) 80 MG capsule Take 80 mg by mouth once daily.    zolpidem (AMBIEN) 10 mg Tab Take 1 tablet (10 mg total) by mouth nightly as needed.    diclofenac sodium 1 % Gel Apply 2 g topically 4 (four) times daily.     No current facility-administered medications for this visit.        REVIEW OF SYSTEMS:    GENERAL:  No weight loss, malaise or fevers.  HEENT:   No recent changes in vision or hearing  NECK:  Negative for lumps, no difficulty with swallowing.  RESPIRATORY:  Negative for cough, wheezing or shortness of breath, patient denies any recent URI.  CARDIOVASCULAR:  Negative for chest pain, leg swelling or palpitations.  GI:  Negative for abdominal discomfort, blood in stools or black stools or change in bowel habits.  MUSCULOSKELETAL:  See HPI.  SKIN:   "Negative for lesions, rash, and itching.  PSYCH:  History of schizophrenia on Geodon.  Patient's sleep is disturbed secondary to pain.  HEMATOLOGY/LYMPHOLOGY:  Negative for prolonged bleeding, bruising easily or swollen nodes.  Patient is not currently taking any anti-coagulants  ENDO: No history of diabetes or thyroid dysfunction  NEURO:   No history of headaches, syncope, paralysis, seizures or tremors.  All other reviewed and negative other than HPI.    OBJECTIVE:    BP (!) 134/95   Pulse 90   Temp 98.5 °F (36.9 °C) (Oral)   Resp 16   Ht 5' 6" (1.676 m)   Wt 105 kg (231 lb 7.7 oz)   LMP 09/25/2017   BMI 37.36 kg/m²     PHYSICAL EXAMINATION:    GENERAL: Well appearing, in no acute distress, alert and oriented x3.  PSYCH:  Mood and affect appropriate.  SKIN: Skin color, texture, turgor normal, no rashes or lesions.  HEAD/FACE:  Normocephalic, atraumatic. Cranial nerves grossly intact.  CV: RRR with palpation of the radial artery.  PULM: No evidence of respiratory difficulty, symmetric chest rise.  BACK: Straight leg raising in the sitting position is negative to radicular pain. There is pain with palpation over the facet joints of the lumbar spine bilaterally. There is no pain with flexion.  Limited extension with pain. There is diffuse pain over the paraspinal muscles of the cervical, thoracic, and lumbar spine.  EXTREMITIES: Peripheral joint ROM is full and pain free without obvious instability or laxity in all four extremities. There is pain with palpation over right AC joint. There is pain with internal rotation of right shoulder. Positive Hawkin's test. No deformities, edema, or skin discoloration. Good capillary refill.  MUSCULOSKELETAL: Hip, and knee provocative maneuvers are negative.  There is pain with palpation over the sacroiliac joints bilaterally.  There is no pain to palpation over the greater trochanteric bursa bilaterally.  FABERs test is negative.  FADIRs test is negative.   Bilateral lower " extremity strength is normal and symmetric.  No atrophy or tone abnormalities are noted.  NEURO: Bilateral lower extremity coordination and muscle stretch reflexes are physiologic and symmetric.  Plantar response are downgoing. No clonus.  No loss of sensation is noted.  GAIT:  Antalgic, ambulates without assistance       ASSESSMENT: 52 y.o. year old female with low back pain, consistent with     Encounter Diagnoses   Name Primary?    Facet arthritis, degenerative, lumbar spine Yes    Chronic right shoulder pain     Physical deconditioning     Myalgia        PLAN:     - Previous imaging was reviewed and discussed with the patient today.    - Schedule for bilateral L4-5 and L5-S1 facet joint injections. The procedure, risks, benefits and options were discussed with patient. There are no contraindications to the procedure. The patient expressed understanding and agreed to proceed.  Consent obtained today.    - Trial Mobic 15 mg daily for pain. Discontinue Celebrex. I encouraged the patient to take with food to prevent GI upset.     - Obtain right shoulder x-ray.     - Depending on imaging, we can consider right glenohumeral injection.     - Counseled patient on the importance of physical activity and constant sleeping habits.     - RTC 2 weeks after above procedure.     The above plan and management options were discussed at length with patient. Patient is in agreement with the above and verbalized understanding.     Cleopatra Hooper NP  10/05/2017

## 2017-10-09 ENCOUNTER — TELEPHONE (OUTPATIENT)
Dept: PAIN MEDICINE | Facility: CLINIC | Age: 52
End: 2017-10-09

## 2017-10-09 NOTE — TELEPHONE ENCOUNTER
Staff requested that the patient give our office a call  to the the time of her prescription needing a refill.    Patient verbalized understanding.

## 2017-10-09 NOTE — TELEPHONE ENCOUNTER
----- Message from Gadiel Smith sent at 10/9/2017  1:07 PM CDT -----      X_  1st Request  _  2nd Request  _  3rd Request    Please refill the medication(s) listed below. Please call the patient when the prescription(s) is ready for  at this phone number  [phone].         Medication #1  meloxicam (MOBIC) 15 MG tablet 30 tablet 0 10/5/2017 11/4/2017 --  Sig - Route: Take 1 tablet (15 mg total) by mouth once daily. - Oral  Class: Normal

## 2017-10-09 NOTE — TELEPHONE ENCOUNTER
----- Message from Stephanie Renee sent at 10/9/2017  2:03 PM CDT -----  Contact: self  Patient states the medication is working, cancelled procedure for 10/18/17 but requested a refill of the last medication prescribed by Ms. Hooper. Patient could not remember the name of the medication.    Please call patient @ [phone].

## 2017-10-09 NOTE — TELEPHONE ENCOUNTER
Patient was contacted by staff to verify the name of the medication in which she was placing a refill request for.    Patient states that her prescription for Meloxicam.    Staff let the patient know that her concerns would be presented to Ms. Cleopatra Hooper

## 2017-11-01 DIAGNOSIS — G89.29 CHRONIC RIGHT SHOULDER PAIN: ICD-10-CM

## 2017-11-01 DIAGNOSIS — M25.511 CHRONIC RIGHT SHOULDER PAIN: ICD-10-CM

## 2017-11-01 DIAGNOSIS — M47.816 FACET ARTHRITIS, DEGENERATIVE, LUMBAR SPINE: ICD-10-CM

## 2017-11-01 RX ORDER — MELOXICAM 15 MG/1
15 TABLET ORAL DAILY
Qty: 30 TABLET | Refills: 0 | Status: SHIPPED | OUTPATIENT
Start: 2017-11-01 | End: 2017-11-30 | Stop reason: SDUPTHER

## 2017-11-20 RX ORDER — CELECOXIB 200 MG/1
CAPSULE ORAL
Qty: 60 CAPSULE | Refills: 0 | Status: SHIPPED | OUTPATIENT
Start: 2017-11-20 | End: 2017-12-28 | Stop reason: SDUPTHER

## 2017-11-30 DIAGNOSIS — G89.29 CHRONIC RIGHT SHOULDER PAIN: ICD-10-CM

## 2017-11-30 DIAGNOSIS — M47.816 FACET ARTHRITIS, DEGENERATIVE, LUMBAR SPINE: ICD-10-CM

## 2017-11-30 DIAGNOSIS — M25.511 CHRONIC RIGHT SHOULDER PAIN: ICD-10-CM

## 2017-11-30 RX ORDER — MELOXICAM 15 MG/1
15 TABLET ORAL DAILY
Qty: 30 TABLET | Refills: 0 | Status: SHIPPED | OUTPATIENT
Start: 2017-11-30 | End: 2017-12-28 | Stop reason: SDUPTHER

## 2017-12-21 PROBLEM — H93.19 TINNITUS: Status: ACTIVE | Noted: 2017-12-21

## 2017-12-21 PROBLEM — T17.308A CHOKING: Status: ACTIVE | Noted: 2017-12-21

## 2017-12-28 DIAGNOSIS — G89.29 CHRONIC RIGHT SHOULDER PAIN: ICD-10-CM

## 2017-12-28 DIAGNOSIS — M47.816 FACET ARTHRITIS, DEGENERATIVE, LUMBAR SPINE: ICD-10-CM

## 2017-12-28 DIAGNOSIS — M25.511 CHRONIC RIGHT SHOULDER PAIN: ICD-10-CM

## 2017-12-29 RX ORDER — CELECOXIB 200 MG/1
CAPSULE ORAL
Qty: 60 CAPSULE | Refills: 0 | Status: SHIPPED | OUTPATIENT
Start: 2017-12-29 | End: 2018-01-30 | Stop reason: SDUPTHER

## 2017-12-29 RX ORDER — MELOXICAM 15 MG/1
15 TABLET ORAL DAILY
Qty: 30 TABLET | Refills: 2 | Status: SHIPPED | OUTPATIENT
Start: 2017-12-29 | End: 2018-03-31 | Stop reason: SDUPTHER

## 2018-01-18 RX ORDER — DICLOFENAC SODIUM 10 MG/G
GEL TOPICAL
Qty: 100 G | Refills: 0 | Status: SHIPPED | OUTPATIENT
Start: 2018-01-18 | End: 2018-02-16 | Stop reason: SDUPTHER

## 2018-01-30 RX ORDER — CELECOXIB 200 MG/1
CAPSULE ORAL
Qty: 60 CAPSULE | Refills: 0 | Status: SHIPPED | OUTPATIENT
Start: 2018-01-30 | End: 2018-03-01 | Stop reason: SDUPTHER

## 2018-02-16 RX ORDER — DICLOFENAC SODIUM 10 MG/G
GEL TOPICAL
Qty: 100 G | Refills: 0 | Status: SHIPPED | OUTPATIENT
Start: 2018-02-16 | End: 2018-03-08 | Stop reason: SDUPTHER

## 2018-03-01 RX ORDER — CELECOXIB 200 MG/1
CAPSULE ORAL
Qty: 60 CAPSULE | Refills: 0 | Status: SHIPPED | OUTPATIENT
Start: 2018-03-01 | End: 2018-04-02 | Stop reason: SDUPTHER

## 2018-03-09 RX ORDER — DICLOFENAC SODIUM 10 MG/G
GEL TOPICAL
Qty: 100 G | Refills: 0 | Status: SHIPPED | OUTPATIENT
Start: 2018-03-09 | End: 2018-04-11 | Stop reason: SDUPTHER

## 2018-03-31 DIAGNOSIS — G89.29 CHRONIC RIGHT SHOULDER PAIN: ICD-10-CM

## 2018-03-31 DIAGNOSIS — M47.816 FACET ARTHRITIS, DEGENERATIVE, LUMBAR SPINE: ICD-10-CM

## 2018-03-31 DIAGNOSIS — M25.511 CHRONIC RIGHT SHOULDER PAIN: ICD-10-CM

## 2018-04-02 RX ORDER — MELOXICAM 15 MG/1
15 TABLET ORAL DAILY
Qty: 30 TABLET | Refills: 2 | Status: SHIPPED | OUTPATIENT
Start: 2018-04-02 | End: 2018-06-27 | Stop reason: SDUPTHER

## 2018-04-03 RX ORDER — CELECOXIB 200 MG/1
CAPSULE ORAL
Qty: 60 CAPSULE | Refills: 0 | Status: SHIPPED | OUTPATIENT
Start: 2018-04-03 | End: 2018-04-30 | Stop reason: SDUPTHER

## 2018-04-30 RX ORDER — CELECOXIB 200 MG/1
CAPSULE ORAL
Qty: 60 CAPSULE | Refills: 0 | Status: SHIPPED | OUTPATIENT
Start: 2018-04-30 | End: 2018-06-01 | Stop reason: SDUPTHER

## 2018-04-30 NOTE — TELEPHONE ENCOUNTER
Staff contacted [facility] regarding the message received. Staff spoke to representative Rema trying to get some clarification on their request.     Rema reported to staff that patient reached out to them and requested a knee, neck and back brace.     She was informed that it has been over 6 month since the office has seen the patient, we will be unable to order these items for the patient.     She would have to come in to discuss the possibilities of these items, otherwise she has to request from her PCP.     Ms. Hodgson verbalized understanding.

## 2018-04-30 NOTE — TELEPHONE ENCOUNTER
----- Message from Ruthann Isaac sent at 4/30/2018  8:46 AM CDT -----  Name of Who is Calling: Ashley ([facility])      What is the request in detail: Ashley states they are still waiting on orders, clinicals and a medical necessities form  for a back, shoulder and knee brace for the pt. Fax to:[phone]      Can the clinic reply by [facility]:   No       What Number [facility] Back [facility]: [phone]

## 2018-05-02 RX ORDER — DICLOFENAC SODIUM 10 MG/G
GEL TOPICAL
Qty: 100 G | Refills: 0 | Status: SHIPPED | OUTPATIENT
Start: 2018-05-02 | End: 2018-06-08 | Stop reason: SDUPTHER

## 2018-06-01 RX ORDER — CELECOXIB 200 MG/1
CAPSULE ORAL
Qty: 60 CAPSULE | Refills: 0 | Status: SHIPPED | OUTPATIENT
Start: 2018-06-01 | End: 2018-06-08

## 2018-06-08 ENCOUNTER — OFFICE VISIT (OUTPATIENT)
Dept: PRIMARY CARE CLINIC | Facility: CLINIC | Age: 53
End: 2018-06-08
Payer: MEDICARE

## 2018-06-08 VITALS
OXYGEN SATURATION: 100 % | BODY MASS INDEX: 39.05 KG/M2 | DIASTOLIC BLOOD PRESSURE: 83 MMHG | RESPIRATION RATE: 18 BRPM | WEIGHT: 243 LBS | SYSTOLIC BLOOD PRESSURE: 132 MMHG | TEMPERATURE: 98 F | HEART RATE: 75 BPM | HEIGHT: 66 IN

## 2018-06-08 DIAGNOSIS — E66.9 OBESITY, UNSPECIFIED CLASSIFICATION, UNSPECIFIED OBESITY TYPE, UNSPECIFIED WHETHER SERIOUS COMORBIDITY PRESENT: ICD-10-CM

## 2018-06-08 DIAGNOSIS — M25.511 CHRONIC RIGHT SHOULDER PAIN: ICD-10-CM

## 2018-06-08 DIAGNOSIS — L98.9 SKIN LESION: ICD-10-CM

## 2018-06-08 DIAGNOSIS — G89.29 CHRONIC RIGHT SHOULDER PAIN: ICD-10-CM

## 2018-06-08 DIAGNOSIS — J98.01 BRONCHOSPASM: ICD-10-CM

## 2018-06-08 DIAGNOSIS — M54.50 LUMBAR SPINE PAIN: ICD-10-CM

## 2018-06-08 DIAGNOSIS — J32.9 CHRONIC SINUSITIS, UNSPECIFIED LOCATION: ICD-10-CM

## 2018-06-08 DIAGNOSIS — F31.9 BIPOLAR 1 DISORDER: ICD-10-CM

## 2018-06-08 DIAGNOSIS — Z72.0 TOBACCO ABUSE: Primary | ICD-10-CM

## 2018-06-08 DIAGNOSIS — F20.2: ICD-10-CM

## 2018-06-08 DIAGNOSIS — Z12.31 VISIT FOR SCREENING MAMMOGRAM: ICD-10-CM

## 2018-06-08 DIAGNOSIS — E78.5 HYPERLIPIDEMIA, UNSPECIFIED HYPERLIPIDEMIA TYPE: ICD-10-CM

## 2018-06-08 DIAGNOSIS — E07.9 THYROID MASS: ICD-10-CM

## 2018-06-08 DIAGNOSIS — H93.19 TINNITUS, UNSPECIFIED LATERALITY: ICD-10-CM

## 2018-06-08 PROCEDURE — 99214 OFFICE O/P EST MOD 30 MIN: CPT | Mod: S$GLB,,, | Performed by: FAMILY MEDICINE

## 2018-06-08 PROCEDURE — 3008F BODY MASS INDEX DOCD: CPT | Mod: CPTII,S$GLB,, | Performed by: FAMILY MEDICINE

## 2018-06-08 PROCEDURE — 99999 PR PBB SHADOW E&M-EST. PATIENT-LVL V: CPT | Mod: PBBFAC,,, | Performed by: FAMILY MEDICINE

## 2018-06-08 RX ORDER — ROSUVASTATIN CALCIUM 5 MG/1
5 TABLET, COATED ORAL DAILY
Qty: 90 TABLET | Refills: 0 | Status: SHIPPED | OUTPATIENT
Start: 2018-06-08 | End: 2018-09-08 | Stop reason: SDUPTHER

## 2018-06-08 RX ORDER — CEPHALEXIN 500 MG/1
500 CAPSULE ORAL EVERY 6 HOURS
Qty: 40 CAPSULE | Refills: 0 | Status: SHIPPED | OUTPATIENT
Start: 2018-06-08 | End: 2018-06-22

## 2018-06-08 RX ORDER — ZOLPIDEM TARTRATE 5 MG/1
5 TABLET ORAL NIGHTLY PRN
Qty: 30 TABLET | Refills: 1 | Status: SHIPPED | OUTPATIENT
Start: 2018-06-08 | End: 2018-08-22 | Stop reason: SDUPTHER

## 2018-06-08 RX ORDER — LORATADINE 10 MG/1
10 TABLET ORAL DAILY
Refills: 0 | COMMUNITY
Start: 2018-06-08 | End: 2018-10-25 | Stop reason: SDUPTHER

## 2018-06-08 RX ORDER — ZOLPIDEM TARTRATE 10 MG/1
TABLET ORAL
Qty: 30 TABLET | Refills: 0 | Status: CANCELLED | OUTPATIENT
Start: 2018-06-08

## 2018-06-08 RX ORDER — IBUPROFEN 600 MG/1
TABLET ORAL
Qty: 100 TABLET | Refills: 5 | Status: SHIPPED | OUTPATIENT
Start: 2018-06-08 | End: 2018-06-22 | Stop reason: ALTCHOICE

## 2018-06-08 RX ORDER — DICLOFENAC SODIUM 10 MG/G
GEL TOPICAL
Qty: 100 G | Refills: 0 | Status: SHIPPED | OUTPATIENT
Start: 2018-06-08 | End: 2018-06-15 | Stop reason: SDUPTHER

## 2018-06-08 RX ORDER — MELOXICAM 15 MG/1
15 TABLET ORAL DAILY
COMMUNITY
End: 2018-06-08

## 2018-06-08 RX ORDER — ALBUTEROL SULFATE 90 UG/1
2 AEROSOL, METERED RESPIRATORY (INHALATION) EVERY 4 HOURS PRN
Qty: 1 INHALER | Refills: 5 | Status: SHIPPED | OUTPATIENT
Start: 2018-06-08 | End: 2018-08-22

## 2018-06-08 NOTE — PROGRESS NOTES
Subjective:       Patient ID: Trina Marie Collette is a 53 y.o. female.    Chief Complaint: Establish Care; Medication Refill; and Sinus Problem    HPI: 53-year-old female needs new PCP and medication refills.  Concerned had a 50 pound weight gain that some of her medications may be causing the weight gain- also needs mammogram.       Having back pain and shoulder pain--no relief with Celebrex.  Back pain last few years--had x-rays--told really bad arthritis--no MRI .  Right shoulder pain started 3 years ago--no specific trauma.  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  Stop doing that 4-5 years ago.  On disability now due to bipolar and schizophrenia    ROS:  Skin: no psoriasis, eczema, skin cancer + skin lesions on face freckling  HEENT: No headache, ocular pain, blurred vision, diplopia, epistaxis, hoarseness change in voice, thyroid trouble --a lot of sinus problems--had nasal spray no early-prefers tablet 2 spray + tinnitus in right ear  Lung: No pneumonia, asthma, Tb, wheezing, SOB, smoking 1/4-1/3 pack per day + occasional wheezing at 90  Heart: No chest pain, ankle edema, palpitations, MI, river murmur, hypertension,+  hyperlipidemia  Abdomen: No nausea, vomiting, diarrhea, constipation, ulcers, hepatitis, gallbladder disease, melena, hematochezia, hematemesis  : no UTI, renal disease, stones   GYN no female problems needs mammogram no Pap smear ×2 years--patient does not want one  MS: no fractures, O/A, lupus, rheumatoid, gout--see history of present illness shoulder and back pain  Neuro: No dizziness, LOC, seizures   No diabetes, no anemia, + anxiety, +Depression, + schizophrenia, + bipolar   Single, 1 child, disabled, lives with son    Objective:   Physical Exam:  General: Well nourished, well developed, no acute distress + overweight  Skin: Freckling on the cheeks  HEENT: Eyes PERRLA, EOM intact, nose patent, throat non-erythematous ears TM clear  NECK: Supple, no  bruits, No JVD, right anterior cervical lymph node  Lungs: Clear, no rales, rhonchi, wheezing slightly decreased breath sounds  Heart: Regular rate and rhythm, no murmurs, gallops, or rubs  Abdomen: flat, bowel sounds positive, no tenderness, or organomegaly  MS: Tenderness in the lumbar spine L1 S1 anterior flexion 20° extension 10° lateral flexion and rotation 10° with tenderness in the right shoulder pain with rotation able abduct adduct the arm well but some discomfort with full range of motion muscle strength intact  Neuro: Alert, CN intact, oriented X 3  Extremities: No cyanosis, clubbing, or edema         Assessment:       1. Tobacco abuse    2. Lumbar spine pain    3. Hyperlipidemia, unspecified hyperlipidemia type    4. Tinnitus, unspecified laterality    5. Thyroid mass    6. Chronic sinusitis, unspecified location    7. Chronic right shoulder pain    8. Obesity, unspecified classification, unspecified obesity type, unspecified whether serious comorbidity present    9. Bronchospasm    10. Skin lesion    11. 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 Visit for screening mammogram        Plan:       Patient needs Pap smear but refuses  Mammogram scheduled  Skin rash on face sent to Dr. Harden dermatology appear to be freckles  History of wheezing at night given albuterol inhaler 2 puffs every 6  Overweight notes sent to psychiatrist consider switching off of Zoloft to another antidepressant also on Geodon has bipolar and schizophrenia weight gain is very likely due to some of these medications  Smoking one third to one fourth pack per day sent to smoking cessation program  Claritin for chronic sinusitis patient does not want no spray and no relief with Flonase did not want to try Nasonex  History of tinnitus told to use background music  Hyperlipidemia needs labs every 6 months  Needs a physical CBC CMP lipid T4 TSH UA chest x-ray EKG as physical see me 2 weeks later may due in 3 months  Refill  six-month supply all noncontrolled substances but DC Celebrex DC Lodine DC Flonase  Trial of ibuprofen 600 3 times a day stop it gets abdominal pain  Swelling right side of the neck probably lymphadenitis treat with Keflex 500 3 times a day and swelling of the neck not better needs to see ENT to evaluate right lymphadenopathy  Talia Ambien 5 mg #30 with 1 refill should last at least 3 months will not refill these over the phone

## 2018-06-11 ENCOUNTER — TELEPHONE (OUTPATIENT)
Dept: PRIMARY CARE CLINIC | Facility: CLINIC | Age: 53
End: 2018-06-11

## 2018-06-11 DIAGNOSIS — E78.5 HYPERLIPIDEMIA, UNSPECIFIED HYPERLIPIDEMIA TYPE: Primary | ICD-10-CM

## 2018-06-11 NOTE — TELEPHONE ENCOUNTER
----- Message from Diane Calvert sent at 6/11/2018  3:05 PM CDT -----  Type: Needs Medical Advice    Who Called:  [facility]  Best Call Back Number: [phone]  Additional Information: Patient is there right now to get some labs but they do not have any orders. Please call to advise.

## 2018-06-11 NOTE — TELEPHONE ENCOUNTER
----- Message from Mike Blank sent at 6/11/2018  9:14 AM CDT -----  Contact: self  Patient need orders for blood work, any questions please call back at [phone] (home)

## 2018-06-15 ENCOUNTER — OFFICE VISIT (OUTPATIENT)
Dept: OTOLARYNGOLOGY | Facility: CLINIC | Age: 53
End: 2018-06-15
Payer: MEDICARE

## 2018-06-15 ENCOUNTER — OFFICE VISIT (OUTPATIENT)
Dept: PAIN MEDICINE | Facility: CLINIC | Age: 53
End: 2018-06-15
Payer: MEDICARE

## 2018-06-15 ENCOUNTER — LAB VISIT (OUTPATIENT)
Dept: LAB | Facility: OTHER | Age: 53
End: 2018-06-15
Attending: SPECIALIST
Payer: MEDICARE

## 2018-06-15 VITALS
DIASTOLIC BLOOD PRESSURE: 84 MMHG | SYSTOLIC BLOOD PRESSURE: 137 MMHG | HEIGHT: 66 IN | HEART RATE: 75 BPM | TEMPERATURE: 98 F | WEIGHT: 241.38 LBS | BODY MASS INDEX: 38.79 KG/M2 | RESPIRATION RATE: 18 BRPM

## 2018-06-15 VITALS — HEIGHT: 66 IN | WEIGHT: 241 LBS | BODY MASS INDEX: 38.73 KG/M2

## 2018-06-15 DIAGNOSIS — J34.2 NASAL SEPTAL DEVIATION: ICD-10-CM

## 2018-06-15 DIAGNOSIS — G89.4 CHRONIC PAIN DISORDER: ICD-10-CM

## 2018-06-15 DIAGNOSIS — H93.A1 PULSATILE TINNITUS OF RIGHT EAR: ICD-10-CM

## 2018-06-15 DIAGNOSIS — M47.816 LUMBAR SPONDYLOSIS: ICD-10-CM

## 2018-06-15 DIAGNOSIS — M79.10 MYALGIA: Primary | ICD-10-CM

## 2018-06-15 DIAGNOSIS — Z13.9 SCREENING FOR CONDITION: ICD-10-CM

## 2018-06-15 DIAGNOSIS — Z13.9 SCREENING FOR CONDITION: Primary | ICD-10-CM

## 2018-06-15 DIAGNOSIS — R51.9 NONINTRACTABLE EPISODIC HEADACHE, UNSPECIFIED HEADACHE TYPE: ICD-10-CM

## 2018-06-15 DIAGNOSIS — M47.816 FACET ARTHRITIS, DEGENERATIVE, LUMBAR SPINE: ICD-10-CM

## 2018-06-15 DIAGNOSIS — R09.82 PND (POST-NASAL DRIP): ICD-10-CM

## 2018-06-15 DIAGNOSIS — R05.9 COUGH: ICD-10-CM

## 2018-06-15 DIAGNOSIS — J30.9 ALLERGIC RHINITIS, UNSPECIFIED SEASONALITY, UNSPECIFIED TRIGGER: ICD-10-CM

## 2018-06-15 DIAGNOSIS — R29.818 NEUROLOGICAL DEFICIT PRESENT: ICD-10-CM

## 2018-06-15 LAB
BUN SERPL-MCNC: 14 MG/DL
CREAT SERPL-MCNC: 0.7 MG/DL
EST. GFR  (AFRICAN AMERICAN): >60 ML/MIN/1.73 M^2
EST. GFR  (NON AFRICAN AMERICAN): >60 ML/MIN/1.73 M^2

## 2018-06-15 PROCEDURE — 84520 ASSAY OF UREA NITROGEN: CPT

## 2018-06-15 PROCEDURE — 99999 PR PBB SHADOW E&M-EST. PATIENT-LVL III: CPT | Mod: PBBFAC,,, | Performed by: NURSE PRACTITIONER

## 2018-06-15 PROCEDURE — 20553 NJX 1/MLT TRIGGER POINTS 3/>: CPT | Mod: S$GLB,,, | Performed by: NURSE PRACTITIONER

## 2018-06-15 PROCEDURE — 36415 COLL VENOUS BLD VENIPUNCTURE: CPT

## 2018-06-15 PROCEDURE — 99204 OFFICE O/P NEW MOD 45 MIN: CPT | Mod: S$GLB,,, | Performed by: SPECIALIST

## 2018-06-15 PROCEDURE — 99213 OFFICE O/P EST LOW 20 MIN: CPT | Mod: 25,S$GLB,, | Performed by: NURSE PRACTITIONER

## 2018-06-15 PROCEDURE — 82565 ASSAY OF CREATININE: CPT

## 2018-06-15 PROCEDURE — 3008F BODY MASS INDEX DOCD: CPT | Mod: CPTII,S$GLB,, | Performed by: SPECIALIST

## 2018-06-15 PROCEDURE — 3008F BODY MASS INDEX DOCD: CPT | Mod: CPTII,S$GLB,, | Performed by: NURSE PRACTITIONER

## 2018-06-15 RX ORDER — GABAPENTIN 300 MG/1
300 CAPSULE ORAL 3 TIMES DAILY
Qty: 90 CAPSULE | Refills: 11 | Status: SHIPPED | OUTPATIENT
Start: 2018-06-15 | End: 2019-07-12

## 2018-06-15 RX ORDER — BUPIVACAINE HYDROCHLORIDE 2.5 MG/ML
9 INJECTION, SOLUTION EPIDURAL; INFILTRATION; INTRACAUDAL
Status: COMPLETED | OUTPATIENT
Start: 2018-06-15 | End: 2018-06-15

## 2018-06-15 RX ORDER — METHYLPREDNISOLONE ACETATE 40 MG/ML
40 INJECTION, SUSPENSION INTRA-ARTICULAR; INTRALESIONAL; INTRAMUSCULAR; SOFT TISSUE ONCE
Status: COMPLETED | OUTPATIENT
Start: 2018-06-15 | End: 2018-06-15

## 2018-06-15 RX ORDER — DICLOFENAC SODIUM 10 MG/G
GEL TOPICAL 3 TIMES DAILY
Qty: 100 G | Refills: 3 | Status: SHIPPED | OUTPATIENT
Start: 2018-06-15 | End: 2018-09-20

## 2018-06-15 RX ORDER — MONTELUKAST SODIUM 10 MG/1
10 TABLET ORAL NIGHTLY
Qty: 30 TABLET | Refills: 11 | Status: SHIPPED | OUTPATIENT
Start: 2018-06-15 | End: 2018-07-15

## 2018-06-15 RX ADMIN — BUPIVACAINE HYDROCHLORIDE 22.5 MG: 2.5 INJECTION, SOLUTION EPIDURAL; INFILTRATION; INTRACAUDAL at 01:06

## 2018-06-15 RX ADMIN — METHYLPREDNISOLONE ACETATE 40 MG: 40 INJECTION, SUSPENSION INTRA-ARTICULAR; INTRALESIONAL; INTRAMUSCULAR; SOFT TISSUE at 01:06

## 2018-06-15 NOTE — PROGRESS NOTES
Chronic Pain - Follow Up    Referring Physician: No ref. provider found    Chief Complaint:   Chief Complaint   Patient presents with    Low-back Pain    Shoulder Pain     Right side        SUBJECTIVE: Disclaimer: This note has been generated using voice-recognition software. There may be typographical errors that have been missed during proof-reading    Interval History 6/15/2018:  The patient returns to clinic today for follow up. She reports increased low back pain that is tight, aching, and constant in nature. She denies any radiating leg pain. Her pain is worse with prolonged activity or walking. She reports that Mobic is no longer providing relief. She continues to report benefit with voltaren gel and would like a refill. She denies any other health changes. Her pain today is 9/10.    Interval History 10/5/2017:  The patient returns to clinic today for follow up. She reports increased pain to her lower back. She also reports right shoulder pain. She previously had relief with physical therapy, Voltaren gel, and Celebrex. She now reports that these are ineffective. She complains of aching pain across her lower back. Her pain is worse with prolonged standing and walking. She reports sitting and rest relieve her pain. She denies any radiating leg pain today. She describes her right shoulder pain as aching as well. She reports that this increases with laying on that side. She denies any other health changes. She denies any bowel or bladder incontinence. Her pain today is 8/10.    Interval history 11/17/2016:  Ms. Collette returns to the clinic today for follow up.  Since previous encounter patient reports low back pain radiating down both her legs has continued.  Patient stated that the Zanaflex did not relieve any pain.  She reports that her pain is worsening.  She reports pain with prolonged sitting.  She does complain of numbness and tingling to both feet.  Since the last visit, she has not been able to  start physical therapy, stating she is waiting for insurance approval, she states that she starts next week.  Patient reports no other health changes since her last visit.  Patient reports her pain 9/10 today.     Initial encounter:    Trina Marie Collette presents to the clinic for the evaluation of neck and low back pain. The pain started 2 years ago and symptoms have been worsening.    Brief history:    Pain Description:    The pain is located in the neck area area and radiates to down the back both legs.  The pain appears diffuse in nature    At BEST  9/10     At WORST  9/10 on the WORST day.      On average pain is rated as 9/10.     Today the pain is rated as 9/10    The pain is described as aching, sharp, shooting, stabbing, throbbing and tingling      Symptoms interfere with daily activity and sleeping.     Exacerbating factors: Sitting, Standing, Laying, Bending, Walking, Night Time, Morning, Lifting and Getting out of bed/chair.      Mitigating factors nothing.     Patient denies night fever/night sweats, urinary incontinence, bowel incontinence, significant weight loss, significant motor weakness and loss of sensations.  Patient denies any suicidal or homicidal ideations    Pain Medications:  Current:  Flexeril  Mobic  Voltaren      Tried in Past:  NSAIDs -Never  TCA -Never  SNRI -Never  Anti-convulsants -Never the patient is on Geodon for history of schizophrenia  Muscle Relaxants -Flexeril  Opioids-Never    Physical Therapy/Home Exercise: no       report:  Reviewed and consistent with medication use as prescribed.    Pain Procedures: NONE    Chiropractor -never  Acupuncture - never  TENS unit -never  Spinal decompression -never  Joint replacement -never    Imaging:   X-Ray Lumbar Spine Complete 5 View  Narrative   Procedure: Lumbar spine series.  This is interpreted without the benefit of similar prior studies for comparison.    Findings: AP, lateral, flexion and extension, obliqueand coned down  lateral radiographs of the lumbar spine reveal 5 non-rib bearing lumbar vertebral bodies with normal vertebral body heights, pedicles and disk spaces.  There is no malalignment, spondylolysis or spondylolisthesis.  No instability on flexion and extension views.  There is no significant facet arthropathy.  The surrounding soft tissues are normal.   Impression    Normal lumbar spine series              CMP  Sodium   Date Value Ref Range Status   08/09/2017 140 135 - 146 mmol/L Final     Potassium   Date Value Ref Range Status   08/09/2017 3.8 3.5 - 5.3 mmol/L Final     Chloride   Date Value Ref Range Status   08/09/2017 105 98 - 110 mmol/L Final     CO2   Date Value Ref Range Status   08/09/2017 26 20 - 31 mmol/L Final     Glucose   Date Value Ref Range Status   08/09/2017 98 65 - 99 mg/dL Final     Comment:                   Fasting reference interval          BUN, Bld   Date Value Ref Range Status   08/09/2017 11 7 - 25 mg/dL Final     Creatinine   Date Value Ref Range Status   08/09/2017 0.50 0.50 - 1.05 mg/dL Final     Comment:     For patients >49 years of age, the reference limit  for Creatinine is approximately 13% higher for people  identified as -American.          Calcium   Date Value Ref Range Status   08/09/2017 9.1 8.6 - 10.4 mg/dL Final     Total Protein   Date Value Ref Range Status   08/09/2017 5.8 (L) 6.1 - 8.1 g/dL Final     Albumin   Date Value Ref Range Status   08/09/2017 3.6 3.6 - 5.1 g/dL Final     Total Bilirubin   Date Value Ref Range Status   08/09/2017 0.3 0.2 - 1.2 mg/dL Final     Alkaline Phosphatase   Date Value Ref Range Status   08/09/2017 73 33 - 130 U/L Final     AST   Date Value Ref Range Status   08/09/2017 12 10 - 35 U/L Final     ALT   Date Value Ref Range Status   08/09/2017 8 6 - 29 U/L Final     eGFR if    Date Value Ref Range Status   08/09/2017 131 > OR = 60 mL/min/1.73m2 Final     eGFR if non    Date Value Ref Range Status   08/09/2017  113 > OR = 60 mL/min/1.73m2 Final         Past Medical History:   Diagnosis Date    Anxiety     Bipolar 1 disorder     Depression      History reviewed. No pertinent surgical history.  Social History     Social History    Marital status:      Spouse name: N/A    Number of children: N/A    Years of education: N/A     Occupational History    Not on file.     Social History Main Topics    Smoking status: Current Every Day Smoker    Smokeless tobacco: Never Used    Alcohol use Yes    Drug use: No    Sexual activity: Yes     Partners: Male     Other Topics Concern    Not on file     Social History Narrative    No narrative on file     History reviewed. No pertinent family history.    No Known Allergies    Current Outpatient Prescriptions   Medication Sig    albuterol 90 mcg/actuation inhaler Inhale 2 puffs into the lungs every 4 (four) hours as needed for Wheezing or Shortness of Breath. Rescue    cephALEXin (KEFLEX) 500 MG capsule Take 1 capsule (500 mg total) by mouth every 6 (six) hours.    clotrimazole-betamethasone 1-0.05% (LOTRISONE) cream APPLY TOPICALLY 2 (TWO) TIMES DAILY.    cyclobenzaprine (FLEXERIL) 10 MG tablet TAKE 1 TABLET (10 MG TOTAL) BY MOUTH 2 (TWO) TIMES DAILY AS NEEDED FOR MUSCLE SPASMS.    diclofenac sodium 1 % Gel APPLY 2 GRAMS TOPICALLY 4 (FOUR) TIMES DAILY.    fluticasone (FLONASE) 50 mcg/actuation nasal spray INSTILL 1 SPRAY INTO EACH NOSTRIL 2 (TWO) TIMES DAILY AS NEEDED.    ibuprofen (ADVIL,MOTRIN) 600 MG tablet 1 by mouth 3 times a day may increase to 4 times a day if needed no other NSAIDs    loratadine (CLARITIN) 10 mg tablet Take 1 tablet (10 mg total) by mouth once daily.    pantoprazole (PROTONIX) 40 MG tablet Take 1 tablet (40 mg total) by mouth once daily.    rosuvastatin (CRESTOR) 5 MG tablet Take 1 tablet (5 mg total) by mouth once daily.    sertraline (ZOLOFT) 100 MG tablet Take 100 mg by mouth once daily.    ziprasidone (GEODON) 40 MG Cap Take 40  "mg by mouth once daily.    ziprasidone (GEODON) 80 MG capsule Take 80 mg by mouth once daily.    zolpidem (AMBIEN) 5 MG Tab Take 1 tablet (5 mg total) by mouth nightly as needed.     No current facility-administered medications for this visit.        REVIEW OF SYSTEMS:    GENERAL:  No weight loss, malaise or fevers.  HEENT:   No recent changes in vision or hearing  NECK:  Negative for lumps, no difficulty with swallowing.  RESPIRATORY:  Negative for cough, wheezing or shortness of breath, patient denies any recent URI.  CARDIOVASCULAR:  Negative for chest pain, leg swelling or palpitations.  GI:  Negative for abdominal discomfort, blood in stools or black stools or change in bowel habits.  MUSCULOSKELETAL:  See HPI.  SKIN:  Negative for lesions, rash, and itching.  PSYCH:  History of schizophrenia on Geodon.  Patient's sleep is disturbed secondary to pain.  HEMATOLOGY/LYMPHOLOGY:  Negative for prolonged bleeding, bruising easily or swollen nodes.  Patient is not currently taking any anti-coagulants  ENDO: No history of diabetes or thyroid dysfunction  NEURO:   No history of headaches, syncope, paralysis, seizures or tremors.  All other reviewed and negative other than HPI.    OBJECTIVE:    /84   Pulse 75   Temp 97.6 °F (36.4 °C)   Resp 18   Ht 5' 6" (1.676 m)   Wt 109.5 kg (241 lb 6.5 oz)   BMI 38.96 kg/m²     PHYSICAL EXAMINATION:    GENERAL: Well appearing, in no acute distress, alert and oriented x3.  PSYCH:  Mood and affect appropriate.  SKIN: Skin color, texture, turgor normal, no rashes or lesions.  HEAD/FACE:  Normocephalic, atraumatic. Cranial nerves grossly intact.  CV: RRR with palpation of the radial artery.  PULM: No evidence of respiratory difficulty, symmetric chest rise.  BACK: Straight leg raising in the sitting position is negative to radicular pain. There is pain with palpation over the facet joints of the lumbar spine bilaterally. There is diffuse pain over the paraspinal muscles of " the lumbar spine. Full ROM with pain on flexion.   EXTREMITIES: Peripheral joint ROM is full and pain free without obvious instability or laxity in all four extremities. There is pain with palpation over right AC joint. There is pain with internal rotation of right shoulder. Positive Hawkin's test. No deformities, edema, or skin discoloration. Good capillary refill.  MUSCULOSKELETAL: Hip, and knee provocative maneuvers are negative.  There is pain with palpation over the sacroiliac joints bilaterally.  There is no pain to palpation over the greater trochanteric bursa bilaterally.  FABERs test is negative.  FADIRs test is negative.   Bilateral lower extremity strength is normal and symmetric.  No atrophy or tone abnormalities are noted.  NEURO: Bilateral lower extremity coordination and muscle stretch reflexes are physiologic and symmetric.  Plantar response are downgoing. No clonus.  No loss of sensation is noted.  GAIT:  Antalgic, ambulates without assistance       ASSESSMENT: 53 y.o. year old female with low back pain, consistent with     Encounter Diagnoses   Name Primary?    Myalgia Yes    Facet arthritis, degenerative, lumbar spine     Lumbar spondylosis     Chronic pain disorder        PLAN:     - Previous imaging was reviewed and discussed with the patient today.    - Trigger point injections as per below.     - If limited relief, consider lumbar facet joint injections.     - Continue voltaren gel. Refill provided today.     - Trial Gabapentin 300 mg TID. Titration instruction provided.     - We discussed physical therapy. She is not interested today.     - Counseled patient on the importance of physical activity and constant sleeping habits.     - RTC in 2 weeks.     The above plan and management options were discussed at length with patient. Patient is in agreement with the above and verbalized understanding.     Cleopatra Hooper NP  06/15/2018     Trigger Point Injection:   The procedure was discussed  with the patient including complications of nerve damage,  bleeding, infection, and failure of pain relief.   Trigger points were identified by palpation and marked. Alcohol prep of sites done. A mixture of 9mL 0.25% bupivacaine +40mg Depo-Medrol was prepared (10 mL total).   A 27-gauge needle was advanced to the point of maximal tenderness, and  1 mL  was injected after negative aspiration. All sites done in the same manner. Patient tolerated the procedure well and without complications. Sites injected included: lumbar paraspinal x3 on the left, lumbar paraspinal x3 on the right (6 total).

## 2018-06-17 NOTE — PROGRESS NOTES
Subjective:       Patient ID: Trina Marie Collette is a 53 y.o. female.    Chief Complaint: Ear Fullness; Neck Swelling; Headache; and Sinus Problem    The patient is referred for evaluation of this several different problems:  1.  She has chronic nasal and sinus congestion, postnasal drip, sore throat and headaches.  Her headaches occur primarily in the forehead and mid brow area.  Typically nasal secretions are clear.  She is not having fever.  She has been taking Flonase and Zyrtec.  In the past she has used Singulair with improvement of symptoms.     2.  Patient does have coughing and paroxysms.  She has a history of asthma for which she uses an albuterol inhaler.  She does not have wheezing.  The cough is producing white or clear sputum.  3.  The patient has developed pulsatile tinnitus in her right ear only over the last few months.  The tinnitus will decrease in intensity or resolve completely when she holds her head in certain positions.  She has not having vertigo.  She does not feel that she is having a hearing loss.  The tinnitus is extremely distracting to her.      Review of Systems   Constitutional: Positive for fatigue. Negative for activity change, appetite change, chills, fever and unexpected weight change.   HENT: Positive for congestion, postnasal drip, rhinorrhea, sinus pain, sinus pressure, sore throat and tinnitus. Negative for ear discharge, ear pain, facial swelling, hearing loss, mouth sores, sneezing, trouble swallowing and voice change.    Eyes: Negative for photophobia, pain, discharge, redness, itching and visual disturbance.   Respiratory: Positive for cough, shortness of breath and wheezing. Negative for apnea and choking.    Cardiovascular: Negative for chest pain and palpitations.   Gastrointestinal: Negative for abdominal distention, abdominal pain, nausea and vomiting.   Musculoskeletal: Negative for arthralgias, myalgias, neck pain and neck stiffness.   Skin: Negative.  Negative  for color change, pallor and rash.   Allergic/Immunologic: Negative for environmental allergies, food allergies and immunocompromised state.   Neurological: Positive for headaches. Negative for dizziness, facial asymmetry, speech difficulty, weakness, light-headedness and numbness.   Hematological: Negative for adenopathy. Does not bruise/bleed easily.   Psychiatric/Behavioral: Negative for confusion, decreased concentration and sleep disturbance.       Objective:      Physical Exam   Constitutional: She is oriented to person, place, and time. She appears well-developed and well-nourished. She is cooperative.   HENT:   Head: Normocephalic.   Right Ear: External ear and ear canal normal. Tympanic membrane is retracted.   Left Ear: External ear and ear canal normal. Tympanic membrane is retracted.   Nose: Mucosal edema (cyanotic, boggy inferior turbinates bilaterally), rhinorrhea (clear mucus bilaterally) and septal deviation ( to the left) present.   Mouth/Throat: Uvula is midline, oropharynx is clear and moist and mucous membranes are normal. No oral lesions.   Right pulsatile tinnitus evaluation-tinnitus is suppressed by turning the head to the right, compression on the right vascular sheath causes complete resolution of tinnitus that returned immediately after release of the compression, no change in tinnitus with head rotated to left or with neck extended or flexes   Eyes: EOM and lids are normal. Pupils are equal, round, and reactive to light. Right eye exhibits no discharge and no exudate. Left eye exhibits no discharge and no exudate. Right conjunctiva is injected. Left conjunctiva is injected.   Neck: Trachea normal and normal range of motion. No muscular tenderness present. Carotid bruit is not present. No tracheal deviation present. No thyroid mass and no thyromegaly present.   Cardiovascular: Normal rate, regular rhythm, normal heart sounds and normal pulses.    Pulmonary/Chest: Effort normal and breath  sounds normal. No stridor. She has no decreased breath sounds. She has no wheezes. She has no rhonchi. She has no rales.   Abdominal: Soft. Bowel sounds are normal. There is no tenderness.   Musculoskeletal: Normal range of motion.   Lymphadenopathy:        Head (right side): No submental, no submandibular, no preauricular, no posterior auricular and no occipital adenopathy present.        Head (left side): No submental, no submandibular, no preauricular, no posterior auricular and no occipital adenopathy present.     She has no cervical adenopathy.   Neurological: She is alert and oriented to person, place, and time. She has normal strength. No cranial nerve deficit or sensory deficit. Gait normal.   Skin: Skin is warm and dry. No petechiae and no rash noted. No cyanosis. Nails show no clubbing.   Psychiatric: She has a normal mood and affect. Her speech is normal and behavior is normal. Judgment and thought content normal. Cognition and memory are normal.       I had a telephone consultation with the radiologist regarding evaluation of the patient's tinnitus.  Clinically it is vascular in nature.  It was decided to pursue a CT angiogram of the head and neck as the best means of evaluating this problem.  The radiologist place the order for the examination  Assessment:       1. Screening for condition    2. Pulsatile tinnitus of right ear    3. Allergic rhinitis, unspecified seasonality, unspecified trigger    4. PND (post-nasal drip)    5. Cough    6. Nasal septal deviation    7. Nonintractable episodic headache, unspecified headache type        Plan:       I will schedule the patient for complete audiological evaluation.  I have ordered a BUN/creatinine creatinine.  I'm advising that she use her Flonase twice daily in addition to her Zyrtec and Singulair.  I will recheck her in 4 weeks, or sooner if there are significant abnormalities on CT angiogram of the neck

## 2018-06-18 ENCOUNTER — TELEPHONE (OUTPATIENT)
Dept: PRIMARY CARE CLINIC | Facility: CLINIC | Age: 53
End: 2018-06-18

## 2018-06-18 NOTE — TELEPHONE ENCOUNTER
----- Message from Ana Luisa Montana sent at 6/18/2018  2:34 PM CDT -----  Contact: Patient  Type: Needs Medical Advice    Who Called:  Elizabeth, patient  Symptoms (please be specific):  N/A  How long has patient had these symptoms:  N/A  Pharmacy name and phone #:    [id number] - [place] - [address]  [address] [address]  Phone: [phone] Fax: [phone]    Best Call Back Number: [phone]  Additional Information: Calling because Rx zolpidem (AMBIEN) 5 MG Tab Is suppose to be 10 mg not 5 mg. Please advise. Thanks.

## 2018-06-27 ENCOUNTER — TELEPHONE (OUTPATIENT)
Dept: OTOLARYNGOLOGY | Facility: CLINIC | Age: 53
End: 2018-06-27

## 2018-06-27 DIAGNOSIS — M25.511 CHRONIC RIGHT SHOULDER PAIN: ICD-10-CM

## 2018-06-27 DIAGNOSIS — G89.29 CHRONIC RIGHT SHOULDER PAIN: ICD-10-CM

## 2018-06-27 DIAGNOSIS — M47.816 FACET ARTHRITIS, DEGENERATIVE, LUMBAR SPINE: ICD-10-CM

## 2018-06-27 RX ORDER — MELOXICAM 15 MG/1
15 TABLET ORAL DAILY
Qty: 30 TABLET | Refills: 2 | Status: SHIPPED | OUTPATIENT
Start: 2018-06-27 | End: 2018-10-13 | Stop reason: SDUPTHER

## 2018-06-27 NOTE — TELEPHONE ENCOUNTER
Called and spoke with pt today an schedule   complete audiological evaluation with Cleopatra Cormier for 7/13/18.

## 2018-06-27 NOTE — TELEPHONE ENCOUNTER
----- Message from Yadira Rodriguez sent at 6/27/2018  8:26 AM CDT -----  Contact: pt            Name of Who is Calling: COLLETTE,TRINA MARIE [[id number]]      What is the request in detail: pt calling to speak with a nurse in regards to test Dr Rg wanted to have set up.. Please advise      Can the clinic reply by [facility]: no      What Number to Call Back if not [facility]: [phone]

## 2018-06-28 ENCOUNTER — TELEPHONE (OUTPATIENT)
Dept: OTOLARYNGOLOGY | Facility: CLINIC | Age: 53
End: 2018-06-28

## 2018-06-28 RX ORDER — CELECOXIB 200 MG/1
CAPSULE ORAL
Qty: 60 CAPSULE | Refills: 0 | Status: SHIPPED | OUTPATIENT
Start: 2018-06-28 | End: 2018-07-26 | Stop reason: SDUPTHER

## 2018-06-28 NOTE — TELEPHONE ENCOUNTER
Spoke with Laura w/ [employer]. Pt auth for CTA still pending. Pt would like to have test done at [facility] at a later date due to transportation issues. Laura will update us on status change.

## 2018-07-10 ENCOUNTER — TELEPHONE (OUTPATIENT)
Dept: OTOLARYNGOLOGY | Facility: CLINIC | Age: 53
End: 2018-07-10

## 2018-07-10 NOTE — TELEPHONE ENCOUNTER
----- Message from Eunice Thompson sent at 7/10/2018 11:44 AM CDT -----            Name of Who is Calling: COLLETTE,TRINA MARIE [[id number]]    What is the request in detail: pt Is calling for the results of her CT and XR taken on 7/9/18    Can the clinic reply by [facility]: no    What Number to Call Back if not in [facility]: [phone]

## 2018-07-12 ENCOUNTER — TELEPHONE (OUTPATIENT)
Dept: OTOLARYNGOLOGY | Facility: CLINIC | Age: 53
End: 2018-07-12

## 2018-07-12 NOTE — TELEPHONE ENCOUNTER
----- Message from DWAYNE Ricardo MD sent at 7/12/2018  1:13 PM CDT -----  Please notify patient that CT findings were mildly abnormal and do not show a cause of the pulsatile tinnitus in her right ear.  I will discuss the results with them at their next visit, which she could move op if she desires to do so.  Send copy of CT scan to PCP

## 2018-07-13 ENCOUNTER — CLINICAL SUPPORT (OUTPATIENT)
Dept: OTOLARYNGOLOGY | Facility: CLINIC | Age: 53
End: 2018-07-13
Payer: MEDICARE

## 2018-07-13 ENCOUNTER — OFFICE VISIT (OUTPATIENT)
Dept: OTOLARYNGOLOGY | Facility: CLINIC | Age: 53
End: 2018-07-13
Payer: MEDICARE

## 2018-07-13 VITALS
BODY MASS INDEX: 39.05 KG/M2 | DIASTOLIC BLOOD PRESSURE: 79 MMHG | HEART RATE: 79 BPM | WEIGHT: 243 LBS | HEIGHT: 66 IN | SYSTOLIC BLOOD PRESSURE: 124 MMHG | TEMPERATURE: 99 F

## 2018-07-13 DIAGNOSIS — H93.11 TINNITUS, RIGHT EAR: ICD-10-CM

## 2018-07-13 DIAGNOSIS — H90.A31 MIXED CONDUCTIVE AND SENSORINEURAL HEARING LOSS OF RIGHT EAR WITH RESTRICTED HEARING OF LEFT EAR: Primary | ICD-10-CM

## 2018-07-13 DIAGNOSIS — H90.A22 SENSORINEURAL HEARING LOSS (SNHL) OF LEFT EAR WITH RESTRICTED HEARING OF RIGHT EAR: ICD-10-CM

## 2018-07-13 DIAGNOSIS — H90.A31 MIXED CONDUCTIVE AND SENSORINEURAL HEARING LOSS OF RIGHT EAR WITH RESTRICTED HEARING OF LEFT EAR: ICD-10-CM

## 2018-07-13 DIAGNOSIS — R51.9 NONINTRACTABLE EPISODIC HEADACHE, UNSPECIFIED HEADACHE TYPE: ICD-10-CM

## 2018-07-13 DIAGNOSIS — J34.2 NASAL SEPTAL DEVIATION: ICD-10-CM

## 2018-07-13 DIAGNOSIS — H93.A1 PULSATILE TINNITUS OF RIGHT EAR: Primary | ICD-10-CM

## 2018-07-13 DIAGNOSIS — J30.9 ALLERGIC RHINITIS, UNSPECIFIED SEASONALITY, UNSPECIFIED TRIGGER: ICD-10-CM

## 2018-07-13 PROCEDURE — 92550 TYMPANOMETRY & REFLEX THRESH: CPT | Mod: S$GLB,,, | Performed by: AUDIOLOGIST-HEARING AID FITTER

## 2018-07-13 PROCEDURE — 3008F BODY MASS INDEX DOCD: CPT | Mod: CPTII,S$GLB,, | Performed by: SPECIALIST

## 2018-07-13 PROCEDURE — 92557 COMPREHENSIVE HEARING TEST: CPT | Mod: S$GLB,,, | Performed by: AUDIOLOGIST-HEARING AID FITTER

## 2018-07-13 PROCEDURE — 99214 OFFICE O/P EST MOD 30 MIN: CPT | Mod: S$GLB,,, | Performed by: SPECIALIST

## 2018-07-13 RX ORDER — BENZOCAINE .13; .15; .5; 2 G/100G; G/100G; G/100G; G/100G
GEL ORAL
Qty: 8.6 G | Refills: 11 | Status: SHIPPED | OUTPATIENT
Start: 2018-07-13 | End: 2019-09-03 | Stop reason: ALTCHOICE

## 2018-07-13 RX ORDER — BENZONATATE 200 MG/1
200 CAPSULE ORAL 3 TIMES DAILY PRN
Qty: 60 CAPSULE | Refills: 5 | Status: SHIPPED | OUTPATIENT
Start: 2018-07-13 | End: 2018-07-23

## 2018-07-13 RX ORDER — AZELASTINE 1 MG/ML
2 SPRAY, METERED NASAL 2 TIMES DAILY
Qty: 30 ML | Refills: 11 | Status: SHIPPED | OUTPATIENT
Start: 2018-07-13 | End: 2018-08-22

## 2018-07-13 RX ORDER — PROMETHAZINE HYDROCHLORIDE AND CODEINE PHOSPHATE 6.25; 1 MG/5ML; MG/5ML
5 SOLUTION ORAL EVERY 4 HOURS PRN
Qty: 240 ML | Refills: 0 | Status: SHIPPED | OUTPATIENT
Start: 2018-07-13 | End: 2018-07-23

## 2018-07-15 NOTE — PROGRESS NOTES
Subjective:       Patient ID: Trina Marie Collette is a 53 y.o. female.    Chief Complaint: Follow-up (with hearing test)    The patient is returning for follow-up visit.  There has been no change in her pulsatile tinnitus in the right ear.  She continues to have headaches, congestion and postnasal drip and coughing.  Nasal secretions are clear.  In the past she has used Flonase, which caused problems regarding its both its taste and odor, Zyrtec and Singulair.  The combination of those 3 has not resulted in significant improvement in her allergy symptoms.  She continues having headaches and blockage in her ears as well as a nonproductive cough.  Paroxysms of coughing have resulted in pain in her right shoulder and upper extremity that have 4th her to go to the emergency room on 2 occasions since her last visit..  Nasal secretions are clear.      Review of Systems   Constitutional: Positive for fatigue. Negative for activity change, appetite change, chills and fever.   HENT: Positive for congestion, postnasal drip, rhinorrhea, sinus pain, sinus pressure and sore throat. Negative for ear discharge, ear pain, facial swelling, hearing loss, mouth sores, sneezing, tinnitus, trouble swallowing and voice change.    Eyes: Negative for photophobia, pain, discharge, redness, itching and visual disturbance.   Respiratory: Positive for cough and shortness of breath. Negative for apnea, choking and wheezing.    Cardiovascular: Negative for chest pain and palpitations.   Gastrointestinal: Negative for abdominal distention, abdominal pain, nausea and vomiting.   Musculoskeletal: Negative for arthralgias, myalgias, neck pain and neck stiffness.   Skin: Negative.  Negative for color change, pallor and rash.   Allergic/Immunologic: Negative for environmental allergies, food allergies and immunocompromised state.   Neurological: Positive for headaches. Negative for dizziness, speech difficulty, weakness and light-headedness.    Hematological: Negative for adenopathy. Does not bruise/bleed easily.   Psychiatric/Behavioral: Negative for agitation, confusion, decreased concentration and sleep disturbance.             Objective:      Physical Exam   Constitutional: She is oriented to person, place, and time. She appears well-developed and well-nourished. She is cooperative.   HENT:   Head: Normocephalic.   Right Ear: External ear and ear canal normal. Tympanic membrane is retracted.   Left Ear: External ear and ear canal normal. Tympanic membrane is retracted.   Nose: Mucosal edema (cyanotic, boggy inferior turbinates bilaterally), rhinorrhea (clear mucus bilaterally) and septal deviation (To the left) present.   Mouth/Throat: Uvula is midline, oropharynx is clear and moist and mucous membranes are normal. No oral lesions.   Eyes: EOM and lids are normal. Pupils are equal, round, and reactive to light. Right eye exhibits no discharge and no exudate. Left eye exhibits no discharge and no exudate. Right conjunctiva is injected. Left conjunctiva is injected.   Neck: Trachea normal and normal range of motion. No muscular tenderness present. No tracheal deviation present. No thyroid mass and no thyromegaly present.   Cardiovascular: Normal rate, regular rhythm, normal heart sounds and normal pulses.    Pulmonary/Chest: Effort normal. No stridor. She has decreased breath sounds. She has no wheezes. She has no rhonchi. She has no rales.   Abdominal: Soft. Bowel sounds are normal. There is no tenderness.   Musculoskeletal: Normal range of motion.   Lymphadenopathy:        Head (right side): No submental, no submandibular, no preauricular, no posterior auricular and no occipital adenopathy present.        Head (left side): No submental, no submandibular, no preauricular, no posterior auricular and no occipital adenopathy present.     She has no cervical adenopathy.   Neurological: She is alert and oriented to person, place, and time. She has normal  strength. No cranial nerve deficit or sensory deficit. Gait normal.   Skin: Skin is warm and dry. No petechiae and no rash noted. No cyanosis. Nails show no clubbing.   Psychiatric: She has a normal mood and affect. Her speech is normal and behavior is normal. Judgment and thought content normal. Cognition and memory are normal.       CT angiogram-no vascular lesions noted on the right side.  Assessment:       1. Pulsatile tinnitus of right ear    2. Mixed conductive and sensorineural hearing loss of right ear with restricted hearing of left ear    3. Allergic rhinitis, unspecified seasonality, unspecified trigger    4. Nasal septal deviation    5. Nonintractable episodic headache, unspecified headache type        Plan:       I am placing the patient on nasal cords an Astelin.  She will use Tessalon Perles and and Phenergan with codeine night to control coughing.  I am referring her to the [facility] for an otology evaluation regarding her pulsatile tinnitus.  I will recheck her in 2 weeks regarding the other issues.

## 2018-07-16 ENCOUNTER — TELEPHONE (OUTPATIENT)
Dept: PAIN MEDICINE | Facility: CLINIC | Age: 53
End: 2018-07-16

## 2018-07-16 DIAGNOSIS — M47.816 FACET ARTHRITIS, DEGENERATIVE, LUMBAR SPINE: Primary | ICD-10-CM

## 2018-07-16 DIAGNOSIS — M47.816 LUMBAR SPONDYLOSIS: ICD-10-CM

## 2018-07-16 DIAGNOSIS — M51.36 DDD (DEGENERATIVE DISC DISEASE), LUMBAR: ICD-10-CM

## 2018-07-16 NOTE — TELEPHONE ENCOUNTER
----- Message from Star Still sent at 7/16/2018  2:34 PM CDT -----  Contact: Trina Collette            Name of Who is Calling: Trina Collette    What is the request in detail: Patient called requesting an order for a back brace      Can the clinic reply by [facility]: Yes      What [facility]: [phone]

## 2018-07-16 NOTE — TELEPHONE ENCOUNTER
Hello, this is staff I've received your request I'm returning your call from the  clinic at [facility]. I just wanted to let you know that I'm working on getting an answer for you by . ( Please add all other notes here if needed.)    Spoke with patient regarding order for back brace, order paced by Cleopatra Hooper at this time

## 2018-07-18 ENCOUNTER — TELEPHONE (OUTPATIENT)
Dept: PAIN MEDICINE | Facility: CLINIC | Age: 53
End: 2018-07-18

## 2018-07-18 NOTE — TELEPHONE ENCOUNTER
----- Message from Gadiel Smith sent at 7/18/2018  3:37 PM CDT -----            Name of Who is Calling:COLLETTE,TRINA MARIE [[id number]]      What is the request in detail: Pt states [facility] never received the request for her back  Brace. [facility] to [facility] for [facility].      Can the clinic reply by [facility]: yes      What Number to Call Back if not [facility][phone]

## 2018-07-18 NOTE — TELEPHONE ENCOUNTER
Contacted patient regarding message.    Patient stated she contacted [employer] and they told her they never received the order for her back brace.    Staff advised patient to contact pre-service department at [phone] regarding this matter.    Patient acknowledged information given and expressed understanding.

## 2018-07-18 NOTE — TELEPHONE ENCOUNTER
Contacted patient regarding message.    Patient stated she would like to know if the order for her brace is in, when she would receive it, and whom she can contact to check status.    Patient was informed the order for brace is already in the system, that staff is unable to give her a time frame when she will be receiving it and is pending authorization from her insurance. Staff also provided patient with [employer] phone # [phone].    Patient requested that staff confirmed status with [employer].   [employer] confirmed status is pending. Staff relayed status information to patient.     Patient acknowledged information given and expressed understanding.

## 2018-07-18 NOTE — TELEPHONE ENCOUNTER
----- Message from Jessica Beavers sent at 7/18/2018  1:35 PM CDT -----  Contact: pt            Name of Who is Calling: pt      What is the request in detail: calling to verify if her brace was ordered and if so, when will she be receiving it or how to get in touch with the company      Can the clinic reply by [facility]: no      What Number to Call Back if not in [facility]: [phone]

## 2018-07-19 ENCOUNTER — TELEPHONE (OUTPATIENT)
Dept: PAIN MEDICINE | Facility: CLINIC | Age: 53
End: 2018-07-19

## 2018-07-19 NOTE — TELEPHONE ENCOUNTER
----- Message from Gadiel Smith sent at 7/19/2018  3:05 PM CDT -----            Name of Who is Calling: COLLETTE,TRINA MARIE [[id number]]      What is the request in detail: Pt states [employer] still hasn't received anything regarding her back brace. Please fax authorization to [employer] @ [phone]. Please call pt to discuss.      Can the clinic reply by [facility]: no      What Number to Call Back if not in [facility]: [phone]

## 2018-07-19 NOTE — TELEPHONE ENCOUNTER
Staff contacted patient and was informed that one o f the nurses spoke to DME rep [employer], who informed them he is just waiting on insurance to approve and once he receives that he will contact her.     Ms. Collette, reports that she just spoke to [facility] and they don't have anything.     She was informed that it does take a while for paperwork to be entered in [employer] system. She may have to wait until kendra contact her.

## 2018-07-23 ENCOUNTER — TELEPHONE (OUTPATIENT)
Dept: PAIN MEDICINE | Facility: CLINIC | Age: 53
End: 2018-07-23

## 2018-07-23 NOTE — TELEPHONE ENCOUNTER
----- Message from Yadira Rodriguez sent at 7/23/2018  1:35 PM CDT -----  Contact: pt  Name of Who is Calling: COLLETTE,TRINA MARIE [[id number]]        What is the request in detail: Pt states [employer] still hasn't received anything regarding her back brace. Please fax authorization to [employer] @ [phone]. Please call pt to discuss.        Can the clinic reply by [facility]: no        What Number to Call Back if not in [facility]: [phone]

## 2018-07-23 NOTE — TELEPHONE ENCOUNTER
Contacted patient regarding message.    Patient was informed that once the order is placed, pre-service handles the authorization process and she may contact them for additional questions about the authorization.    Patient acknowledged information given and expressed understanding.

## 2018-07-24 NOTE — PROGRESS NOTES
"  Lashawn Cabrera, CCC-A  Audiologist - [facility] [address] [address]  [email or web address]  [phone]    Patient: Trina Marie Collette   MRN: [id number]  : 1965  MINOR: 2018       AUDIOLOGICAL EVALUATION    CASE HISTORY:    Trina Marie Collette, 53 y.o., was seen on the above date for an audiological evaluation, per Dr. Ricardo's orders on 6/15/18.  Patient reported a constant "whooshing" type tinnitus in the right ear only that has been present for approximately one year.  She noted that lately she must turn up the volume on the TV.  She denied any family history of hearing loss, history of exposure to loud noise, past hearing evaluations or previous hearing aid use.  No further history significant to hearing loss was reported.    TEST RESULTS:    -Otoscopy was unremarkable for both ears.   -Immittance testing revealed normal middle ear compliance (Type A tympanograms) in both ears.  -Speech reception thresholds (SRT) were obtained at 15 dB HL and 10 dB HL, in the right and left ears, respectively, which was inconsistent with puretone results.   -A word recognition (WR) score of 92% was obtained in the right ear using a presentation level of 55 dB HL.  A left ear word recognition score of 100% correct was obtained using a presentation level of 50 dB HL.     IMPRESSION:   Audiological testing indicated that Trina Marie Collette has a moderate mixed hearing loss in the right ear.  The left ear revealed normal hearing in the low and middle frequencies sloping to a mild high frequency sensorineural hearing loss.    RECOMMENDATIONS:   It is recommended that she:  Follow up medically with a physician to assess the asymmetrical hearing loss and tinnitus.  Receive binaural hearing aids to improve speech understanding.  Continue to receive audiological monitoring annually.  Use precaution and/or hearing protection in noisy environments.    If you should have any " questions or concerns regarding the above information, please do not hesitate to contact me at [phone].      _______________________________  Lashawn Cabrera, CCC-A  Audiologist    enclosure: audiogram

## 2018-07-26 RX ORDER — CELECOXIB 200 MG/1
CAPSULE ORAL
Qty: 60 CAPSULE | Refills: 0 | Status: SHIPPED | OUTPATIENT
Start: 2018-07-26 | End: 2018-08-22

## 2018-07-27 RX ORDER — PROMETHAZINE HYDROCHLORIDE AND CODEINE PHOSPHATE 6.25; 1 MG/5ML; MG/5ML
SOLUTION ORAL
Qty: 240 ML | Refills: 0 | Status: SHIPPED | OUTPATIENT
Start: 2018-07-27 | End: 2018-07-30 | Stop reason: SDUPTHER

## 2018-07-30 ENCOUNTER — OFFICE VISIT (OUTPATIENT)
Dept: OTOLARYNGOLOGY | Facility: CLINIC | Age: 53
End: 2018-07-30
Payer: MEDICARE

## 2018-07-30 ENCOUNTER — TELEPHONE (OUTPATIENT)
Dept: OTOLARYNGOLOGY | Facility: CLINIC | Age: 53
End: 2018-07-30

## 2018-07-30 VITALS
DIASTOLIC BLOOD PRESSURE: 86 MMHG | HEIGHT: 66 IN | TEMPERATURE: 98 F | SYSTOLIC BLOOD PRESSURE: 132 MMHG | BODY MASS INDEX: 39.05 KG/M2 | WEIGHT: 243 LBS | HEART RATE: 83 BPM

## 2018-07-30 DIAGNOSIS — H90.A31 MIXED CONDUCTIVE AND SENSORINEURAL HEARING LOSS OF RIGHT EAR WITH RESTRICTED HEARING OF LEFT EAR: ICD-10-CM

## 2018-07-30 DIAGNOSIS — R05.9 COUGH: ICD-10-CM

## 2018-07-30 DIAGNOSIS — H93.A1 PULSATILE TINNITUS OF RIGHT EAR: ICD-10-CM

## 2018-07-30 DIAGNOSIS — D37.05: ICD-10-CM

## 2018-07-30 DIAGNOSIS — J34.2 NASAL SEPTAL DEVIATION: ICD-10-CM

## 2018-07-30 DIAGNOSIS — R13.10 DYSPHAGIA, UNSPECIFIED TYPE: ICD-10-CM

## 2018-07-30 DIAGNOSIS — J30.9 ALLERGIC RHINITIS, UNSPECIFIED SEASONALITY, UNSPECIFIED TRIGGER: Primary | ICD-10-CM

## 2018-07-30 PROCEDURE — 31575 DIAGNOSTIC LARYNGOSCOPY: CPT | Mod: S$GLB,,, | Performed by: SPECIALIST

## 2018-07-30 PROCEDURE — 99214 OFFICE O/P EST MOD 30 MIN: CPT | Mod: 25,S$GLB,, | Performed by: SPECIALIST

## 2018-07-30 PROCEDURE — 3008F BODY MASS INDEX DOCD: CPT | Mod: CPTII,S$GLB,, | Performed by: SPECIALIST

## 2018-07-30 RX ORDER — IPRATROPIUM BROMIDE 21 UG/1
2 SPRAY, METERED NASAL 2 TIMES DAILY
Qty: 30 ML | Refills: 11 | Status: SHIPPED | OUTPATIENT
Start: 2018-07-30 | End: 2019-11-14 | Stop reason: SDUPTHER

## 2018-07-30 RX ORDER — PROMETHAZINE HYDROCHLORIDE AND CODEINE PHOSPHATE 6.25; 1 MG/5ML; MG/5ML
SOLUTION ORAL
Qty: 240 ML | Refills: 0 | Status: SHIPPED | OUTPATIENT
Start: 2018-07-30 | End: 2018-08-22

## 2018-07-30 NOTE — PROCEDURES
"Laryngoscopy  Date/Time: 7/30/2018 11:46 AM  Performed by: DWAYNE ARMSTRONG  Authorized by: DWAYNE ARMSTRONG     Time out: Immediately prior to procedure a "time out" was called to verify the correct patient, procedure, equipment, support staff and site/side marked as required.    Consent Done?:  Yes (Verbal)  Anesthesia:     Local anesthetic:  Lidocaine 2% and Yan-Synephrine 1/2% (Topical aerosol)    Patient tolerance:  Patient tolerated the procedure well with no immediate complications    Decongestion performed?: Yes    Laryngoscopy:     Areas examined:  Nasal cavities, nasopharynx, oropharynx, hypopharynx, larynx and vocal cords    Laryngoscope size:  4 mm (Flexible nasopharyngoscopy)  Nose External:      No external nasal deformity  Nose Intranasal:      Mucosa no polyps     Mucosa ulcers not present     No mucosa lesions present (Clear mucus in the nasal passages bilaterally)     Septum gross deformity ( deviated to the left)     Enlarged turbinates ( inferior turbinates enlarged bilaterally)  Nasopharynx:      Mucosa lesions ( cystic mass from central roof of nasopharynx filled with white fluid)     Adenoids not present     Posterior choanae patent     Eustachian tube patent  Larynx/hypopharynx:      No epiglottis lesions     No epiglottis edema     No AE folds lesions     No vocal cord polyps     Equal and normal bilateral ( vocal cords move symmetrically, no mucosal lesions noted)     No hypopharynx lesions     No piriform sinus pooling     No piriform sinus lesions     No post cricoid edema     No post cricoid erythema     Dysphagia and cough secondary to postnasal drip, cystic mass nasopharynx        "

## 2018-07-31 NOTE — PROGRESS NOTES
Subjective:       Patient ID: Trina Marie Collette is a 53 y.o. female.    Chief Complaint: Follow-up (with cough and ear check)    The patient is returning for follow-up visit.  They are multiple issues that need discussion::  1.  The hearing loss and tinnitus have not significantly changed.  2.  She is having congestion, postnasal drip and a tickle in her throat that is leading constant coughing.  She has been using Astelin was partial relief.  Tessalon Perles did not help at all.  In the past she has used Singulair with no significant change of symptoms.      Review of Systems   Constitutional: Positive for fatigue. Negative for activity change, appetite change, chills, fever and unexpected weight change.   HENT: Positive for congestion, ear pain, hearing loss, postnasal drip, rhinorrhea, sore throat and tinnitus. Negative for ear discharge, facial swelling, mouth sores, sinus pain, sinus pressure, sneezing, trouble swallowing and voice change.         Constant tickle in the throat   Eyes: Negative for photophobia, pain, discharge, redness, itching and visual disturbance.   Respiratory: Positive for cough. Negative for apnea, choking, shortness of breath and wheezing.    Cardiovascular: Negative for chest pain and palpitations.   Gastrointestinal: Negative for abdominal distention, abdominal pain, nausea and vomiting.   Musculoskeletal: Negative for arthralgias, myalgias, neck pain and neck stiffness.   Skin: Negative.  Negative for color change, pallor and rash.   Allergic/Immunologic: Negative for environmental allergies, food allergies and immunocompromised state.   Neurological: Positive for headaches. Negative for dizziness, facial asymmetry, speech difficulty, weakness, light-headedness and numbness.   Hematological: Negative for adenopathy. Does not bruise/bleed easily.   Psychiatric/Behavioral: Negative for confusion, decreased concentration and sleep disturbance.       Objective:      Physical Exam    Constitutional: She is oriented to person, place, and time. She appears well-developed and well-nourished. She is cooperative.   HENT:   Head: Normocephalic.   Right Ear: External ear and ear canal normal. Tympanic membrane is retracted.   Left Ear: External ear and ear canal normal. Tympanic membrane is retracted.   Nose: Mucosal edema (cyanotic, boggy inferior turbinates bilaterally), rhinorrhea (clear mucus bilaterally) and septal deviation (To the left) present.   Mouth/Throat: Uvula is midline, oropharynx is clear and moist and mucous membranes are normal. No oral lesions.   Eyes: EOM and lids are normal. Pupils are equal, round, and reactive to light. Right eye exhibits no discharge and no exudate. Left eye exhibits no discharge and no exudate. Right conjunctiva is injected. Left conjunctiva is injected.   Neck: Trachea normal and normal range of motion. No muscular tenderness present. No tracheal deviation present. No thyroid mass and no thyromegaly present.   Cardiovascular: Normal rate, regular rhythm, normal heart sounds and normal pulses.    Pulmonary/Chest: Effort normal. No stridor. She has decreased breath sounds. She has no wheezes. She has no rhonchi. She has no rales.   Abdominal: Soft. Bowel sounds are normal. There is no tenderness.   Musculoskeletal: Normal range of motion.   Lymphadenopathy:        Head (right side): No submental, no submandibular, no preauricular, no posterior auricular and no occipital adenopathy present.        Head (left side): No submental, no submandibular, no preauricular, no posterior auricular and no occipital adenopathy present.     She has no cervical adenopathy.   Neurological: She is alert and oriented to person, place, and time. She has normal strength. No cranial nerve deficit or sensory deficit. Gait normal.   Skin: Skin is warm and dry. No petechiae and no rash noted. No cyanosis. Nails show no clubbing.   Psychiatric: She has a normal mood and affect. Her  speech is normal and behavior is normal. Judgment and thought content normal. Cognition and memory are normal.       Assessment:       1. Allergic rhinitis, unspecified seasonality, unspecified trigger    2. Mixed conductive and sensorineural hearing loss of right ear with restricted hearing of left ear    3. Pulsatile tinnitus of right ear    4. Nasal septal deviation    5. 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 Dysphagia, unspecified type    7. Cough        Plan:       I will recheck the patient in 4 weeks.  I am placing on ipratropium bromide spray to assist in management postnasal driip.  I will schedule her for a CT scan of the nasopharynx and temporal bones, if her nasopharyngeal mass persists.

## 2018-08-08 ENCOUNTER — CLINICAL SUPPORT (OUTPATIENT)
Dept: OTOLARYNGOLOGY | Facility: CLINIC | Age: 53
End: 2018-08-08
Payer: MEDICARE

## 2018-08-08 DIAGNOSIS — Z71.89 ENCOUNTER FOR HEARING AID CONSULTATION: Primary | ICD-10-CM

## 2018-08-08 PROCEDURE — 99499 NO LOS: ICD-10-PCS | Mod: S$GLB,,, | Performed by: AUDIOLOGIST-HEARING AID FITTER

## 2018-08-08 PROCEDURE — 99499 UNLISTED E&M SERVICE: CPT | Mod: S$GLB,,, | Performed by: AUDIOLOGIST-HEARING AID FITTER

## 2018-08-15 RX ORDER — ZOLPIDEM TARTRATE 5 MG/1
TABLET ORAL
Qty: 30 TABLET | Refills: 1 | OUTPATIENT
Start: 2018-08-15

## 2018-08-20 ENCOUNTER — TELEPHONE (OUTPATIENT)
Dept: PRIMARY CARE CLINIC | Facility: CLINIC | Age: 53
End: 2018-08-20

## 2018-08-20 NOTE — TELEPHONE ENCOUNTER
----- Message from Autumnelpidio Ferrell sent at 8/20/2018 11:40 AM CDT -----  Patient states that she need to speak to you in reference to recommending a mental doctor for her that will except her insurance.  Please call patient at [phone].

## 2018-08-22 ENCOUNTER — OFFICE VISIT (OUTPATIENT)
Dept: PRIMARY CARE CLINIC | Facility: CLINIC | Age: 53
End: 2018-08-22
Payer: MEDICARE

## 2018-08-22 VITALS
HEART RATE: 76 BPM | RESPIRATION RATE: 18 BRPM | SYSTOLIC BLOOD PRESSURE: 126 MMHG | BODY MASS INDEX: 38.57 KG/M2 | OXYGEN SATURATION: 99 % | WEIGHT: 240 LBS | HEIGHT: 66 IN | DIASTOLIC BLOOD PRESSURE: 82 MMHG

## 2018-08-22 DIAGNOSIS — M47.816 FACET ARTHROPATHY, LUMBAR: ICD-10-CM

## 2018-08-22 DIAGNOSIS — E66.9 OBESITY, UNSPECIFIED CLASSIFICATION, UNSPECIFIED OBESITY TYPE, UNSPECIFIED WHETHER SERIOUS COMORBIDITY PRESENT: ICD-10-CM

## 2018-08-22 DIAGNOSIS — E66.9 OBESITY (BMI 35.0-39.9 WITHOUT COMORBIDITY): ICD-10-CM

## 2018-08-22 DIAGNOSIS — M54.50 LUMBAR SPINE PAIN: ICD-10-CM

## 2018-08-22 DIAGNOSIS — J98.01 BRONCHOSPASM: ICD-10-CM

## 2018-08-22 DIAGNOSIS — R05.3 CHRONIC COUGH: Primary | ICD-10-CM

## 2018-08-22 DIAGNOSIS — H90.A31 MIXED CONDUCTIVE AND SENSORINEURAL HEARING LOSS OF RIGHT EAR WITH RESTRICTED HEARING OF LEFT EAR: ICD-10-CM

## 2018-08-22 DIAGNOSIS — M25.569 CHRONIC KNEE PAIN, UNSPECIFIED LATERALITY: ICD-10-CM

## 2018-08-22 DIAGNOSIS — Z72.0 TOBACCO ABUSE: ICD-10-CM

## 2018-08-22 DIAGNOSIS — Z12.31 VISIT FOR SCREENING MAMMOGRAM: ICD-10-CM

## 2018-08-22 DIAGNOSIS — F31.9 BIPOLAR 1 DISORDER: ICD-10-CM

## 2018-08-22 DIAGNOSIS — G89.29 CHRONIC KNEE PAIN, UNSPECIFIED LATERALITY: ICD-10-CM

## 2018-08-22 DIAGNOSIS — G47.00 INSOMNIA, UNSPECIFIED TYPE: ICD-10-CM

## 2018-08-22 DIAGNOSIS — E78.5 HYPERLIPIDEMIA, UNSPECIFIED HYPERLIPIDEMIA TYPE: ICD-10-CM

## 2018-08-22 DIAGNOSIS — H93.19 TINNITUS, UNSPECIFIED LATERALITY: ICD-10-CM

## 2018-08-22 DIAGNOSIS — N64.4 BREAST TENDERNESS IN FEMALE: ICD-10-CM

## 2018-08-22 DIAGNOSIS — F20.9 SCHIZOPHRENIA, UNSPECIFIED TYPE: ICD-10-CM

## 2018-08-22 PROBLEM — L98.9 SKIN LESION: Status: RESOLVED | Noted: 2018-06-08 | Resolved: 2018-08-22

## 2018-08-22 PROBLEM — T17.308A CHOKING: Status: RESOLVED | Noted: 2017-12-21 | Resolved: 2018-08-22

## 2018-08-22 PROBLEM — J30.9 ALLERGIC RHINITIS: Status: RESOLVED | Noted: 2018-07-13 | Resolved: 2018-08-22

## 2018-08-22 PROBLEM — H93.A1 PULSATILE TINNITUS OF RIGHT EAR: Status: RESOLVED | Noted: 2018-07-13 | Resolved: 2018-08-22

## 2018-08-22 PROCEDURE — 96372 THER/PROPH/DIAG INJ SC/IM: CPT | Mod: S$GLB,,, | Performed by: FAMILY MEDICINE

## 2018-08-22 PROCEDURE — 3008F BODY MASS INDEX DOCD: CPT | Mod: CPTII,S$GLB,, | Performed by: FAMILY MEDICINE

## 2018-08-22 PROCEDURE — 99213 OFFICE O/P EST LOW 20 MIN: CPT | Mod: 25,S$GLB,, | Performed by: FAMILY MEDICINE

## 2018-08-22 PROCEDURE — 99999 PR PBB SHADOW E&M-EST. PATIENT-LVL V: CPT | Mod: PBBFAC,,, | Performed by: FAMILY MEDICINE

## 2018-08-22 PROCEDURE — 99499 UNLISTED E&M SERVICE: CPT | Mod: S$GLB,,, | Performed by: FAMILY MEDICINE

## 2018-08-22 RX ORDER — BETAMETHASONE SODIUM PHOSPHATE AND BETAMETHASONE ACETATE 3; 3 MG/ML; MG/ML
12 INJECTION, SUSPENSION INTRA-ARTICULAR; INTRALESIONAL; INTRAMUSCULAR; SOFT TISSUE
Status: COMPLETED | OUTPATIENT
Start: 2018-08-22 | End: 2018-08-22

## 2018-08-22 RX ORDER — ZOLPIDEM TARTRATE 5 MG/1
5 TABLET ORAL NIGHTLY PRN
Qty: 30 TABLET | Refills: 1 | Status: SHIPPED | OUTPATIENT
Start: 2018-08-22 | End: 2018-09-20 | Stop reason: SDUPTHER

## 2018-08-22 RX ORDER — TRAMADOL HYDROCHLORIDE 50 MG/1
50 TABLET ORAL EVERY 6 HOURS PRN
Qty: 30 TABLET | Refills: 0 | Status: SHIPPED | OUTPATIENT
Start: 2018-08-22 | End: 2018-09-01

## 2018-08-22 RX ORDER — LEVOFLOXACIN 500 MG/1
500 TABLET, FILM COATED ORAL DAILY
Qty: 7 TABLET | Refills: 0 | Status: SHIPPED | OUTPATIENT
Start: 2018-08-22 | End: 2018-08-29

## 2018-08-22 RX ORDER — PROMETHAZINE HYDROCHLORIDE AND CODEINE PHOSPHATE 6.25; 1 MG/5ML; MG/5ML
5 SOLUTION ORAL EVERY 6 HOURS PRN
Qty: 180 ML | Refills: 0 | Status: SHIPPED | OUTPATIENT
Start: 2018-08-22 | End: 2018-09-17 | Stop reason: SDUPTHER

## 2018-08-22 RX ORDER — CYCLOBENZAPRINE HCL 10 MG
10 TABLET ORAL 2 TIMES DAILY PRN
Qty: 60 TABLET | Refills: 0 | Status: SHIPPED | OUTPATIENT
Start: 2018-08-22 | End: 2018-09-19 | Stop reason: SDUPTHER

## 2018-08-22 RX ADMIN — BETAMETHASONE SODIUM PHOSPHATE AND BETAMETHASONE ACETATE 12 MG: 3; 3 INJECTION, SUSPENSION INTRA-ARTICULAR; INTRALESIONAL; INTRAMUSCULAR; SOFT TISSUE at 12:08

## 2018-08-22 NOTE — PROGRESS NOTES
Subjective:       Patient ID: Trina Marie Collette is a 53 y.o. female.    Chief Complaint: Back Pain; Cough; and Medication Refill    HPI: 53-year-old female seen Dr Ricardo --on the [facility] --ENT.  Told that nothing could do about the tinnitus, told was losing her hearing in both ear, patient had a chronic cough and he said he was not sure what was from.  No fever, no runny nose, no sore throat, +cough--when cough shows on the mucous--clear.  Patient using inhaler does help occasionally.  No pneumonia asthma TB--smokes quarter of a pack per day       Back pain--history of back pain times 3-4 years--saw Dr Pedro--on [facility] also--Xray did MRI.  Patient had injections in her back in the office.  They wanted to do same day surgery but patient did not want surgery --he thinks was for a injection in the back.  Was given gabapentin not working and no relief with ibuprofen.       Needs medication refilled       [facility] --no longer taking patient's insurance--on geodan, zoloft,--bipolar schizophrenic     ROS:  Skin: no psoriasis, eczema, skin cancer   HEENT: No headache, ocular pain, blurred vision, diplopia, epistaxis, hoarseness change in voice, thyroid trouble -- tinnitus in right ear, decreased hearing bilaterally  Lung: No pneumonia, asthma, Tb, wheezing, SOB, smoking 1/4-1/3 pack per day + occasional wheezing   Heart: No chest pain, ankle edema, palpitations, MI, river murmur, hypertension,+  hyperlipidemia  Abdomen: No nausea, vomiting, diarrhea, constipation, ulcers, hepatitis, gallbladder disease, melena, hematochezia, hematemesis  : no UTI, renal disease, stones   GYN no female problems patient agrees to mammogram thinks has a lump in her breast--bilat need pelvic  MS: no fractures, O/A, lupus, rheumatoid, gout--pain in the lower back history of facet arthropathy history of right shoulder pain  Neuro: No dizziness, LOC, seizures   No diabetes, no anemia, + anxiety,  +Depression, + schizophrenia, + bipolar   Single, 1 child, disabled, lives with son    Objective:   Physical Exam:  General: Well nourished, well developed, no acute distress + overweight  Skin: Freckling on the cheeks  HEENT: Eyes PERRLA, EOM intact, nose patent, throat non-erythematous ears TM clear  NECK: Supple, no bruits, No JVD, right anterior cervical lymph node  Lungs: Clear, no rales, rhonchi, wheezing slightly decreased breath sounds  Heart: Regular rate and rhythm, no murmurs, gallops, or rubs  Abdomen: flat, bowel sounds positive, no tenderness, or organomegaly  MS: Tenderness in the lumbar spine L1 S1 anterior flexion 20° extension 10° lateral flexion and rotation 10° with tenderness in the right shoulder pain with rotation able abduct adduct the arm well but some discomfort with full range of motion muscle strength intact  Neuro: Alert, CN intact, oriented X 3  Extremities: No cyanosis, clubbing, or edema         Assessment:       1. Chronic cough    2. Lumbar spine pain    3. Chronic knee pain, unspecified laterality    4. Breast tenderness in female    5. Hyperlipidemia, unspecified hyperlipidemia type    6. Tinnitus, unspecified laterality    7. Mixed conductive and sensorineural hearing loss of right ear with restricted hearing of left ear    8. Bronchospasm    9. Obesity, unspecified classification, unspecified obesity type, unspecified whether serious comorbidity present    10. Facet arthropathy, lumbar    11. Insomnia, unspecified type    12. Tobacco abuse    13. Obesity (BMI 35.0-39.9 without comorbidity)    14. Visit for screening mammogram    15. Bipolar 1 disorder    16. Schizophrenia, unspecified type        Plan:       Mammogram scheduled  See Dr Robert pulmonary MD for chronic cough--for treat with  own/Medrol/Levaquin/fitting with codeine History of wheezing at night given albuterol inhaler 2 puffs every 6  Patient needs a new psychiatrist--whole psychiatrist not take people  cell--Needs Zoloft/Geodon told okay Zoloft I do not write Geodon  Smoking one third to one fourth pack per day sent to smoking cessation program  History of tinnitus told to use background music-saw watch ENT told nothing he can do about  Hyperlipidemia needs labs every 6 months  Needs a physical CBC CMP lipid T4 TSH UA chest x-ray EKG as physical see me 2 weeks later may due in 3 months  Back pain stays no relief with ibuprofen/gabapentin--will give  own/Medrol Ultram given chronic pain clinic referral as patient goes to an orthopedist says has injected but no relief  Okay Ambien 5 mg #30 with 1 refill should last at least 3 months will not refill these over the phone     Moist heat, Theragesic Keep appt orgtho but also pain clinic as I do not write chronic  Pain meds

## 2018-09-04 RX ORDER — CELECOXIB 200 MG/1
CAPSULE ORAL
Qty: 60 CAPSULE | Refills: 0 | Status: SHIPPED | OUTPATIENT
Start: 2018-09-04 | End: 2018-10-15 | Stop reason: SDUPTHER

## 2018-09-06 ENCOUNTER — PATIENT MESSAGE (OUTPATIENT)
Dept: OTOLARYNGOLOGY | Facility: CLINIC | Age: 53
End: 2018-09-06

## 2018-09-06 ENCOUNTER — TELEPHONE (OUTPATIENT)
Dept: OTOLARYNGOLOGY | Facility: CLINIC | Age: 53
End: 2018-09-06

## 2018-09-06 ENCOUNTER — TELEPHONE (OUTPATIENT)
Dept: PRIMARY CARE CLINIC | Facility: CLINIC | Age: 53
End: 2018-09-06

## 2018-09-06 NOTE — TELEPHONE ENCOUNTER
----- Message from Yohannes Arvizu sent at 9/6/2018 10:33 AM CDT -----  Hello,    Pt called earlier, calling to schedule an appt from referral from Dr. Brock. 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 Pt said that she was told there is a doctor in the [facility], however I do not know of a Physical Medicine doctor that works at that clinic. Just confirming if the pt needs to schedule with us or if they need to schedule with another dept in [place].    Thank you,    Yohannes Arvizu

## 2018-09-08 DIAGNOSIS — M54.50 LUMBAR SPINE PAIN: ICD-10-CM

## 2018-09-08 DIAGNOSIS — H93.19 TINNITUS, UNSPECIFIED LATERALITY: ICD-10-CM

## 2018-09-08 DIAGNOSIS — E78.5 HYPERLIPIDEMIA, UNSPECIFIED HYPERLIPIDEMIA TYPE: ICD-10-CM

## 2018-09-08 DIAGNOSIS — E07.9 THYROID MASS: ICD-10-CM

## 2018-09-08 RX ORDER — ROSUVASTATIN CALCIUM 5 MG/1
TABLET, COATED ORAL
Qty: 90 TABLET | Refills: 0 | Status: SHIPPED | OUTPATIENT
Start: 2018-09-08 | End: 2018-10-25 | Stop reason: SDUPTHER

## 2018-09-10 ENCOUNTER — TELEPHONE (OUTPATIENT)
Dept: PAIN MEDICINE | Facility: CLINIC | Age: 53
End: 2018-09-10

## 2018-09-10 DIAGNOSIS — G89.29 CHRONIC RIGHT SHOULDER PAIN: ICD-10-CM

## 2018-09-10 DIAGNOSIS — M54.50 LUMBAR SPINE PAIN: Primary | ICD-10-CM

## 2018-09-10 DIAGNOSIS — M25.511 CHRONIC RIGHT SHOULDER PAIN: ICD-10-CM

## 2018-09-10 NOTE — TELEPHONE ENCOUNTER
Spoke with patient regarding her coming appointment. Patient has been seen in pain management at [facility]. Stated that she is currently having continued chronic pain to her lower back and right shoulder. Patient is familiar with IPM and practice. Informed patient that new imaging was being ordered and needed to be obtained prior to her appointment. Patient indicated understanding and will have her imaging done the morning of her appointment. No further issues discussed.

## 2018-09-11 ENCOUNTER — TELEPHONE (OUTPATIENT)
Dept: PAIN MEDICINE | Facility: CLINIC | Age: 53
End: 2018-09-11

## 2018-09-11 NOTE — TELEPHONE ENCOUNTER
Spoke with patient, informed her that there are 2 orders in the system for imaging to be done. Patient  Indicated understanding. No further issues discussed.

## 2018-09-11 NOTE — TELEPHONE ENCOUNTER
R/t pt's call and informed her that orders were put in on yesterday when she spoke with the nurse Oscar. Pt verbalized understanding.

## 2018-09-11 NOTE — TELEPHONE ENCOUNTER
----- Message from Autumnelpidio Ferrell sent at 9/11/2018  1:05 PM CDT -----  Patient states that she has an appointment on tomorrow and need x-ray orders.  Please call patient at [phone].

## 2018-09-13 ENCOUNTER — OFFICE VISIT (OUTPATIENT)
Dept: PAIN MEDICINE | Facility: CLINIC | Age: 53
End: 2018-09-13
Attending: ANESTHESIOLOGY
Payer: MEDICARE

## 2018-09-13 VITALS
DIASTOLIC BLOOD PRESSURE: 82 MMHG | HEART RATE: 81 BPM | WEIGHT: 240.88 LBS | SYSTOLIC BLOOD PRESSURE: 125 MMHG | HEIGHT: 66 IN | BODY MASS INDEX: 38.71 KG/M2

## 2018-09-13 DIAGNOSIS — E66.9 OBESITY (BMI 30-39.9): ICD-10-CM

## 2018-09-13 DIAGNOSIS — M54.50 CHRONIC BILATERAL LOW BACK PAIN WITHOUT SCIATICA: ICD-10-CM

## 2018-09-13 DIAGNOSIS — M47.816 FACET ARTHRITIS, DEGENERATIVE, LUMBAR SPINE: ICD-10-CM

## 2018-09-13 DIAGNOSIS — M79.18 MYOFASCIAL PAIN: Primary | ICD-10-CM

## 2018-09-13 DIAGNOSIS — G89.29 CHRONIC BILATERAL LOW BACK PAIN WITHOUT SCIATICA: ICD-10-CM

## 2018-09-13 DIAGNOSIS — R53.81 PHYSICAL DECONDITIONING: ICD-10-CM

## 2018-09-13 PROCEDURE — 20553 NJX 1/MLT TRIGGER POINTS 3/>: CPT | Mod: S$PBB,,, | Performed by: ANESTHESIOLOGY

## 2018-09-13 PROCEDURE — 3008F BODY MASS INDEX DOCD: CPT | Mod: CPTII,,, | Performed by: ANESTHESIOLOGY

## 2018-09-13 PROCEDURE — 99214 OFFICE O/P EST MOD 30 MIN: CPT | Mod: 25,S$PBB,, | Performed by: ANESTHESIOLOGY

## 2018-09-13 PROCEDURE — 20553 NJX 1/MLT TRIGGER POINTS 3/>: CPT | Mod: PBBFAC,PN | Performed by: ANESTHESIOLOGY

## 2018-09-13 PROCEDURE — 99999 PR PBB SHADOW E&M-EST. PATIENT-LVL III: CPT | Mod: PBBFAC,,, | Performed by: ANESTHESIOLOGY

## 2018-09-13 PROCEDURE — 99213 OFFICE O/P EST LOW 20 MIN: CPT | Mod: PBBFAC,PN | Performed by: ANESTHESIOLOGY

## 2018-09-13 RX ORDER — BUPIVACAINE HYDROCHLORIDE 2.5 MG/ML
9 INJECTION, SOLUTION EPIDURAL; INFILTRATION; INTRACAUDAL ONCE
Status: COMPLETED | OUTPATIENT
Start: 2018-09-13 | End: 2018-09-13

## 2018-09-13 RX ORDER — TRIAMCINOLONE ACETONIDE 40 MG/ML
40 INJECTION, SUSPENSION INTRA-ARTICULAR; INTRAMUSCULAR
Status: COMPLETED | OUTPATIENT
Start: 2018-09-13 | End: 2018-09-13

## 2018-09-13 RX ADMIN — BUPIVACAINE HYDROCHLORIDE 22.5 MG: 2.5 INJECTION, SOLUTION EPIDURAL; INFILTRATION; INTRACAUDAL at 11:09

## 2018-09-13 RX ADMIN — TRIAMCINOLONE ACETONIDE 40 MG: 40 INJECTION, SUSPENSION INTRA-ARTICULAR; INTRAMUSCULAR at 11:09

## 2018-09-13 NOTE — LETTER
September 13, 2018      Davon Brock MD  [address] [place] [address]           [facility]  [address], [facility] [address]  [place] [address]  Phone: [phone]  Fax: [phone]          Patient: Trina Marie Collette   MR Number: [id number]   YOB: 1965   Date of Visit: 9/13/2018       Dear Dr. Davon Brock:    Thank you for referring Trina Collette to me for evaluation. Attached you will find relevant portions of my assessment and plan of care.    If you have questions, please do not hesitate to call me. I look forward to following Trina Collette along with you.    Sincerely,    Holger Perea III, MD    Enclosure  CC:  No Recipients    If you would like to receive this communication electronically, please contact [email or web address] or [phone] to request more information on [employer] Link access.    For providers and/or their staff who would like to refer a patient to [facility], please contact us through our one-stop-shop provider referral line, [facility], at [phone].    If you feel you have received this communication in error or would no longer like to receive these types of communications, please e-mail [email or web address]

## 2018-09-13 NOTE — PROGRESS NOTES
Chronic Pain - New Consult    Referring Physician: Davon Brock MD    Chief Complaint   Patient presents with    Back Pain        SUBJECTIVE:    Trina Marie Collette is a 54 y/o female who presents to the clinic for the evaluation of low back pain. She is an established patient of my colleague Dr. Merino but is new to me. The pain started 3 years ago and symptoms have been worsening. No recent  trauma or inciting event. The pain is located in the bilateral lumbar paraspinal area and radiates up the back. She denies radicular pain. The pain is described as burning, sharp, stabbing, throbbing and tight band and is rated as 10/10. The pain is rated with a score of  8/10 on the BEST day and a score of 10/10 on the WORST day.  Symptoms interfere with daily activity and sleeping. The pain is exacerbated by sitting, standing, bending, walking, getting out of bed/chair, twisting and activity. She has tried heat, ice, Gabapentin, Voltaren gel, Celebrex, and PT which did not help. She had lumbar trigger point injections at [facility] pain clinic which she reports brought moderate relief.  She was also offered lumbar facet injections but declined out of fear for paralysis. The patient reports 4 hours of uninterrupted sleep per night.    Patient denies bowel/bladder incontinence and significant motor weakness.    Physical Therapy/Home Exercise: Yes, tried in the past (over 1 year ago) at [facility]    Pain Disability Index Review:  Last 3 PDI Scores 9/13/2018 6/15/2018 10/5/2017   Pain Disability Index (PDI) 60 63 49       Pain Medications:    - Flexeril 10 mg as needed  - Tramadol 50 mg as needed  - Gabapentin 300 mg as needed     report: Reviewed    Pain Procedures:   Lumbar TPI (6/2018)    Imaging:     Lumbar X-ray (9/13/2018):    FINDINGS:  The alignment is intact.  There is no evidence of lumbar instability with flexion or extension.  Sclerosis is seen in the lower lumbar facet joints.  There is no  gross acute vertebral abnormality.    Right Shoulder X-ray (9/13/2018):    FINDINGS:  There is mild sclerosis noted about the superolateral humeral cortex.  The acromioclavicular joint is intact.  There is no fracture or dislocations.    Past Medical History:   Diagnosis Date    Anxiety     Bipolar 1 disorder     Depression      History reviewed. No pertinent surgical history.  Social History     Socioeconomic History    Marital status:      Spouse name: Not on file    Number of children: Not on file    Years of education: Not on file    Highest education level: Not on file   Social Needs    Financial resource strain: Not on file    Food insecurity - worry: Not on file    Food insecurity - inability: Not on file    Transportation needs - medical: Not on file    Transportation needs - non-medical: Not on file   Occupational History    Not on file   Tobacco Use    Smoking status: Current Every Day Smoker    Smokeless tobacco: Never Used   Substance and Sexual Activity    Alcohol use: Yes    Drug use: No    Sexual activity: Yes     Partners: Male   Other Topics Concern    Not on file   Social History Narrative    Not on file     History reviewed. No pertinent family history.    Review of patient's allergies indicates:  No Known Allergies    Current Outpatient Medications   Medication Sig    albuterol 90 mcg/actuation inhaler Inhale 1-2 puffs into the lungs every 6 (six) hours as needed for Wheezing. Rescue    budesonide (RINOCORT AQUA) 32 mcg/actuation nasal spray ONE SPRAY IN EACH NOSTRIL TWICE DAILY AFTER USING AZELASTIN NASAL SPRAY    celecoxib (CELEBREX) 200 MG capsule TAKE ONE CAPSULE BY MOUTH EVERY DAY X 1 WEEK - MAY INCREASE TO 1 CAP TWICE DAILY WITH FOOD IF NEEDED    clotrimazole-betamethasone 1-0.05% (LOTRISONE) cream APPLY TOPICALLY 2 (TWO) TIMES DAILY.    cyclobenzaprine (FLEXERIL) 10 MG tablet Take 1 tablet (10 mg total) by mouth 2 (two) times daily as needed for Muscle  "spasms.    gabapentin (NEURONTIN) 300 MG capsule Take 1 capsule (300 mg total) by mouth 3 (three) times daily.    ipratropium (ATROVENT) 0.03 % nasal spray 2 sprays by Nasal route 2 (two) times daily. May be used every 6 hr if needed    loratadine (CLARITIN) 10 mg tablet Take 1 tablet (10 mg total) by mouth once daily.    pantoprazole (PROTONIX) 40 MG tablet Take 1 tablet (40 mg total) by mouth once daily.    promethazine-codeine 6.25-10 mg/5 ml (PHENERGAN WITH CODEINE) 6.25-10 mg/5 mL syrup Take 5 mLs by mouth every 6 (six) hours as needed for Cough.    rosuvastatin (CRESTOR) 5 MG tablet TAKE 1 TABLET BY MOUTH EVERY DAY    sertraline (ZOLOFT) 100 MG tablet Take 100 mg by mouth once daily.    ziprasidone (GEODON) 40 MG Cap Take 40 mg by mouth once daily.    ziprasidone (GEODON) 80 MG capsule Take 80 mg by mouth once daily.    zolpidem (AMBIEN) 5 MG Tab Take 1 tablet (5 mg total) by mouth nightly as needed.    diclofenac sodium 1 % Gel Apply topically 3 (three) times daily.     No current facility-administered medications for this visit.        REVIEW OF SYSTEMS:    GENERAL:  No weight loss, malaise or fevers.  HEENT:  Negative for frequent or significant headaches.  NECK:  Negative for pain and significant neck swelling.  RESPIRATORY:  Negative for wheezing or shortness of breath.  CARDIOVASCULAR:  Negative for chest pain or palpitations.  GI:  Negative for abdominal discomfort, blood in stools or black stools or change in bowel habits.  MUSCULOSKELETAL:  See HPI.  SKIN:  Negative for lesions, rash, and itching.  PSYCH:  + sleep disturbance and hx of schizophrenia  HEMATOLOGY/LYMPHOLOGY:  Negative for prolonged bleeding, bruising easily or swollen nodes.  NEURO:   No history of  paralysis, seizures or tremors.  All other reviewed and negative other than HPI.    OBJECTIVE:    /82 (BP Location: Left arm, Patient Position: Sitting)   Pulse 81   Ht 5' 6" (1.676 m)   Wt 109.3 kg (240 lb 14.4 oz)   " BMI 38.88 kg/m²     PHYSICAL EXAMINATION:    General appearance: Well appearing, in no acute distress, alert and oriented x3. Obese.  Psych:  Mood and affect appropriate.  Skin: Skin color, texture, turgor normal, no rashes or lesions, in both upper and lower body.  Head/face:  Normocephalic, atraumatic.   Neck: No pain to palpation over the cervical paraspinous muscles. No pain with neck flexion, extension, or lateral flexion.   Cor: RRR  Pulm: Breathing unlabored.  GI:  Soft and non-tender.  Back: BACK: Straight leg raising in the sitting position is negative to radicular pain. There is pain with palpation over the facet joints of the lumbar spine bilaterally. There is no pain with flexion.  Limited extension with pain elicted. There is diffuse pain over the paraspinal muscles of the thoracic and lumbar spine.  Extremities: No deformities, edema, or skin discoloration.   Musculoskeletal: No atrophy or tone abnormalities are noted.  Neuro: Bilateral  lower extremity coordination and muscle stretch reflexes are physiologic and symmetric. No loss of sensation is noted.  Strength testing:    Right hip flexion: 5/5  Left hip flexion: 5/5  Right knee extension: 5/5  Left knee extension: 5/5  Right knee flexion: 5/5  Left knee flexion: 5/5  Right ankle dorsiflexion: 5/5  Left ankle dorsiflexion: 5/5    Gait: normal.    ASSESSMENT: 53 y.o.  female with chronic axial low back pain, consistent with myofascial pain. Also a likely component of facet arthropathy.     1. Myofascial pain    2. Facet arthritis, degenerative, lumbar spine    3. Chronic bilateral low back pain without sciatica    4. Physical deconditioning    5. Obesity (BMI 30-39.9)          PLAN:     - I have stressed the importance of physical activity and a home exercise plan to help with pain and improve health.  - I believe the patient would benefit from additional physical therapy for her low back pain. She is not interested at this time.  - Lumbar trigger  point injections in clinic today.  - In the future I will consider lumbar facet joint injections.  - Counseled patient regarding the importance of activity modification and weight loss.  - Return to clinic with any new or worsening symptoms, or if symptoms persist.      The above plan and management options were discussed at length with patient. Patient is in agreement with the above and verbalized understanding. It will be communicated with the referring physician via electronic record, fax, or mail.    Holger Perea III  09/13/2018     INFORMED CONSENT: The procedure, risks, benefits and options were discussed with patient. There are no contraindications to the procedure. The patient expressed understanding and agreed to proceed. The personnel performing the procedure was discussed. I verify that I personally obtained consent prior to the start of the procedure and the signed consent can be found on the patient's chart.      PROCEDURE: BILATERAL LUMBAR PARASPINAL MUSCLE TRIGGER POINT INJECTION  The patient was placed in a seated position. The site of pain and procedure were confirmed with the patient prior to starting the procedure. The patient's trigger points were identified and marked. The skin was prepped with alcohol three times.  A 25-gauge 1.5 inch  needle was advanced through the skin and subcutaneous tissues.  Aspiration for blood, air and CSF was negative.  A total of 9 ml of Bupivacaine 0.25% and 40 mg Kenalog was injected throughout all trigger points.  No complications were evident. No specimens collected.    Holger Perea III  09/13/2018

## 2018-09-17 ENCOUNTER — TELEPHONE (OUTPATIENT)
Dept: OTOLARYNGOLOGY | Facility: CLINIC | Age: 53
End: 2018-09-17

## 2018-09-17 ENCOUNTER — PATIENT MESSAGE (OUTPATIENT)
Dept: OTOLARYNGOLOGY | Facility: CLINIC | Age: 53
End: 2018-09-17

## 2018-09-17 RX ORDER — PROMETHAZINE HYDROCHLORIDE AND CODEINE PHOSPHATE 6.25; 1 MG/5ML; MG/5ML
5 SOLUTION ORAL EVERY 6 HOURS PRN
Qty: 240 ML | Refills: 0 | Status: SHIPPED | OUTPATIENT
Start: 2018-09-17 | End: 2018-09-20 | Stop reason: SDUPTHER

## 2018-09-17 NOTE — TELEPHONE ENCOUNTER
This message has been sent to Dr. Ricardo for refill on promethazine-codeine 6.25-10 mg/5 ml (PHENERGAN WITH CODEINE) 6.25-10 mg/5 mL syrup. Per pt states she's out and her cough is back and would like a refill please.

## 2018-09-17 NOTE — TELEPHONE ENCOUNTER
Called pt today letting her know per Dr. Ricardo approved her refill request for promethazine-codeine 6.25-10 mg/5 ml (PHENERGAN WITH CODEINE) 6.25-10 mg/5 mL syrup and faxed to [facility] pharmacy [phone] on file.

## 2018-09-18 ENCOUNTER — TELEPHONE (OUTPATIENT)
Dept: OTOLARYNGOLOGY | Facility: CLINIC | Age: 53
End: 2018-09-18

## 2018-09-18 NOTE — TELEPHONE ENCOUNTER
----- Message from Ruthann Isaac sent at 9/18/2018  9:49 AM CDT -----  Name of Who is Calling: [facility] ([facility])    What is the request in detail: Needs to verify the prescription for promethazine-codeine 6.25-10 mg/5 ml (PHENERGAN WITH CODEINE) 6.25-10 mg/5 mL syrup      Can the clinic reply by [facility]:    No       What Number to Call Back if not in [facility]: [phone]

## 2018-09-19 DIAGNOSIS — E78.5 HYPERLIPIDEMIA, UNSPECIFIED HYPERLIPIDEMIA TYPE: ICD-10-CM

## 2018-09-19 DIAGNOSIS — G89.29 CHRONIC KNEE PAIN, UNSPECIFIED LATERALITY: ICD-10-CM

## 2018-09-19 DIAGNOSIS — N64.4 BREAST TENDERNESS IN FEMALE: ICD-10-CM

## 2018-09-19 DIAGNOSIS — M54.50 LUMBAR SPINE PAIN: ICD-10-CM

## 2018-09-19 DIAGNOSIS — M25.569 CHRONIC KNEE PAIN, UNSPECIFIED LATERALITY: ICD-10-CM

## 2018-09-20 ENCOUNTER — TELEPHONE (OUTPATIENT)
Dept: PRIMARY CARE CLINIC | Facility: CLINIC | Age: 53
End: 2018-09-20

## 2018-09-20 ENCOUNTER — OFFICE VISIT (OUTPATIENT)
Dept: PRIMARY CARE CLINIC | Facility: CLINIC | Age: 53
End: 2018-09-20
Payer: MEDICARE

## 2018-09-20 VITALS
WEIGHT: 238.19 LBS | HEIGHT: 66 IN | SYSTOLIC BLOOD PRESSURE: 139 MMHG | TEMPERATURE: 98 F | BODY MASS INDEX: 38.28 KG/M2 | HEART RATE: 67 BPM | OXYGEN SATURATION: 100 % | DIASTOLIC BLOOD PRESSURE: 89 MMHG | RESPIRATION RATE: 18 BRPM

## 2018-09-20 DIAGNOSIS — R05.9 COUGH: Primary | ICD-10-CM

## 2018-09-20 DIAGNOSIS — M54.50 LUMBAR SPINE PAIN: ICD-10-CM

## 2018-09-20 DIAGNOSIS — G89.29 CHRONIC KNEE PAIN, UNSPECIFIED LATERALITY: ICD-10-CM

## 2018-09-20 DIAGNOSIS — E66.9 OBESITY (BMI 35.0-39.9 WITHOUT COMORBIDITY): ICD-10-CM

## 2018-09-20 DIAGNOSIS — E78.5 HYPERLIPIDEMIA, UNSPECIFIED HYPERLIPIDEMIA TYPE: ICD-10-CM

## 2018-09-20 DIAGNOSIS — M25.569 CHRONIC KNEE PAIN, UNSPECIFIED LATERALITY: ICD-10-CM

## 2018-09-20 DIAGNOSIS — N64.4 BREAST TENDERNESS IN FEMALE: ICD-10-CM

## 2018-09-20 DIAGNOSIS — G47.00 INSOMNIA, UNSPECIFIED TYPE: ICD-10-CM

## 2018-09-20 DIAGNOSIS — Z72.0 TOBACCO ABUSE: ICD-10-CM

## 2018-09-20 PROCEDURE — 99999 PR PBB SHADOW E&M-EST. PATIENT-LVL III: CPT | Mod: PBBFAC,,, | Performed by: FAMILY MEDICINE

## 2018-09-20 PROCEDURE — 3008F BODY MASS INDEX DOCD: CPT | Mod: CPTII,S$PBB,, | Performed by: FAMILY MEDICINE

## 2018-09-20 PROCEDURE — 99213 OFFICE O/P EST LOW 20 MIN: CPT | Mod: PBBFAC,PN | Performed by: FAMILY MEDICINE

## 2018-09-20 PROCEDURE — 99213 OFFICE O/P EST LOW 20 MIN: CPT | Mod: S$PBB,,, | Performed by: FAMILY MEDICINE

## 2018-09-20 RX ORDER — TRAMADOL HYDROCHLORIDE 50 MG/1
50 TABLET ORAL DAILY PRN
Qty: 30 TABLET | Refills: 1 | Status: SHIPPED | OUTPATIENT
Start: 2018-09-20 | End: 2018-12-04 | Stop reason: SDUPTHER

## 2018-09-20 RX ORDER — CYCLOBENZAPRINE HCL 10 MG
10 TABLET ORAL 2 TIMES DAILY PRN
Qty: 60 TABLET | Refills: 5 | Status: SHIPPED | OUTPATIENT
Start: 2018-09-20 | End: 2020-02-17 | Stop reason: SDUPTHER

## 2018-09-20 RX ORDER — ZOLPIDEM TARTRATE 5 MG/1
TABLET ORAL
Qty: 30 TABLET | Refills: 1 | Status: SHIPPED | OUTPATIENT
Start: 2018-09-20 | End: 2018-12-04 | Stop reason: SDUPTHER

## 2018-09-20 RX ORDER — TRAZODONE HYDROCHLORIDE 50 MG/1
50 TABLET ORAL NIGHTLY
Qty: 30 TABLET | Refills: 11 | Status: SHIPPED | OUTPATIENT
Start: 2018-09-20 | End: 2019-03-18

## 2018-09-20 RX ORDER — PROMETHAZINE HYDROCHLORIDE AND CODEINE PHOSPHATE 6.25; 1 MG/5ML; MG/5ML
5 SOLUTION ORAL EVERY 6 HOURS PRN
Qty: 240 ML | Refills: 0 | Status: SHIPPED | OUTPATIENT
Start: 2018-09-20 | End: 2018-10-24 | Stop reason: ALTCHOICE

## 2018-09-20 RX ORDER — TRAMADOL HYDROCHLORIDE 50 MG/1
50 TABLET ORAL DAILY PRN
COMMUNITY
End: 2018-09-20 | Stop reason: SDUPTHER

## 2018-09-20 RX ORDER — CYCLOBENZAPRINE HCL 10 MG
10 TABLET ORAL 2 TIMES DAILY PRN
Qty: 60 TABLET | Refills: 5 | Status: SHIPPED | OUTPATIENT
Start: 2018-09-20 | End: 2018-09-20 | Stop reason: SDUPTHER

## 2018-09-20 NOTE — TELEPHONE ENCOUNTER
----- Message from Ana Luisa Montana sent at 9/20/2018 10:28 AM CDT -----  Contact: Patient  Type:  RX Refill Request    Who Called:  Elizabeth, patient  Refill or New Rx:  Refill  RX Name and Strength:  traMADol (ULTRAM) 50 mg tablet  How is the patient currently taking it? (ex. 1XDay):  Take 1 tablet (50 mg total) by mouth every 6 (six) hours as needed for Pain  Is this a 30 day or 90 day RX:  30  Preferred Pharmacy with phone number:    [facility]/pharmacy #[id number] - [place], [place] - [address]  [address] [address]  Phone: [phone] Fax: [phone]  Local or Mail Order:  Local  Ordering Provider:  Dr Delmy Canas Call Back Number:  [phone]  Additional Information:  Please advise. Thanks.

## 2018-09-20 NOTE — PROGRESS NOTES
Subjective:       Patient ID: Trina Marie Collette is a 53 y.o. female.    Chief Complaint: Medication Refill    HPI:  53-year-old female in for refills--states going out of tail Needs tramadol/Flexeril/Ambien/cough syrup.  No fever, no runny nose, +sore th, +cough, +clear phlegm.  No pneumonia asthma TB.  Patient having difficulty sleeping was on Ambien 10 mg but due to go for mental restrictions row decreased Ambien 5 mg.  Patient told should alternate trazodone with Ambien so effectiveness of the Ambien does not wear off.  Patient has back problems had problems for years--hurts to been lift squat--if stands or walks too long on Ultram and Flexeril.  Not on NSAIDs.      Office visit 08/22/2018-- 53-year-old female seen Dr Ricardo --on [facility] --[facility].  Told that nothing could do about the tinnitus, told was losing her hearing in both ear, patient had a chronic cough and he said he was not sure what was from.  No fever, no runny nose, no sore throat, +cough--when cough shows on the mucous--clear.  Patient using inhaler does help occasionally.  No pneumonia asthma TB--smokes quarter of a pack per day       Back pain--history of back pain times 3-4 years--saw Dr Pedro--on [place] also--Xray did MRI.  Patient had injections in her back in the office.  They wanted to do same day surgery but patient did not want surgery --he thinks was for a injection in the back.  Was given gabapentin not working and no relief with ibuprofen.       Needs medication refilled       [facility] --no longer taking patient's insurance--on geodan, zoloft,--bipolar schizophrenic     ROS:  No significant change                                                                                                                                                                                                                                                                                                                                                                                                                                                                                                                                                                    Skin: no psoriasis, eczema, skin cancer   HEENT: No headache, ocular pain, blurred vision, diplopia, epistaxis, hoarseness change in voice, thyroid trouble -- tinnitus in right ear, decreased hearing bilaterally  Lung: No pneumonia, asthma, Tb, wheezing, SOB, smoking 1/4-1/3 pack per day + occasional wheezing   Heart: No chest pain, ankle edema, palpitations, MI, river murmur, hypertension,+  hyperlipidemia  Abdomen: No nausea, vomiting, diarrhea, constipation, ulcers, hepatitis, gallbladder disease, melena, hematochezia, hematemesis  : no UTI, renal disease, stones   GYN no female problems patient agrees to mammogram thinks has a lump in her breast--bilat need pelvic  MS: no fractures, O/A, lupus, rheumatoid, gout--pain in the lower back history of facet arthropathy history of right shoulder pain  Neuro: No dizziness, LOC, seizures   No diabetes, no anemia, + anxiety, +Depression, + schizophrenia, + bipolar   Single, 1 child, disabled, lives with son    Objective:   Physical Exam:  General: Well nourished, well developed, no acute distress + overweight  Skin: Freckling on the cheeks  HEENT: Eyes PERRLA, EOM intact, nose patent, throat non-erythematous ears TM clear  NECK: Supple, no bruits, No JVD, right anterior cervical lymph node  Lungs: Clear, no rales, rhonchi, wheezing slightly decreased breath sounds  Heart: Regular rate and rhythm, no murmurs, gallops, or rubs  Abdomen: flat, bowel sounds positive, no tenderness, or organomegaly  MS: Tenderness in the lumbar spine L1 S1 anterior flexion 20° extension 10° lateral flexion and rotation 10° with tenderness in the right shoulder pain with rotation able abduct adduct the arm well but some discomfort with full range  of motion muscle strength intact  Neuro: Alert, CN intact, oriented X 3  Extremities: No cyanosis, clubbing, or edema         Assessment:       1. Cough    2. Chronic knee pain, unspecified laterality    3. Lumbar spine pain    4. Breast tenderness in female    5. Hyperlipidemia, unspecified hyperlipidemia type    6. Tobacco abuse    7. Insomnia, unspecified type    8. Obesity (BMI 35.0-39.9 without comorbidity)        Plan:       Mammogram scheduled  See Dr Robert pulmonary MD for chronic cough--Phenergan with codeine History of wheezing at night given albuterol inhaler 2 puffs every 6 p.r.n. wheezing a add Symbicort Advair Breo but needs to see Dr. Robert due to chronic cough possible bronchoscopy  Patient needs a new psychiatrist--whole psychiatrist not take people cell--Needs Zoloft/Geodon told okay Zoloft I do not write Geodon  Smoking one third to one fourth pack per day sent to smoking cessation program  History of tinnitus told to use background music-saw watch ENT told nothing he can do about  Hyperlipidemia needs labs every 6 months  Needs a physical CBC CMP lipid T4 TSH UA chest x-ray EKG as physical see me 2 weeks later may do  in 3 months  Back pain stays no relief with ibuprofen/gabapentin-patient needs to go to chronic pain clinic will give him Ultram ibuprofen Flexeril  Okay Ambien 5 mg #30 with 1 refill told to alternate Ambien with does rule 50 mg q.h.s. to avoid tolerated  Due to chronic pain needs to go to chronic pain clinic for her Ultram and possibly for her nerve medications if not able to get into the guidance center    Moist heat, Theragesic Keep appt orgtho but also pain clinic as I do not write chronic  Pain meds

## 2018-09-20 NOTE — TELEPHONE ENCOUNTER
Spoke with patient notified her that she needs to come in for a visit in order to get a refill. States she will call back to make an appointment. States she needs to see if she can get a ride. Verbally states understanding

## 2018-09-20 NOTE — TELEPHONE ENCOUNTER
----- Message from Cristine Aragon sent at 9/20/2018  4:25 PM CDT -----  Type:  Pharmacy Calling to Clarify an RX    Name of Caller: Lesa  Pharmacy Name:    [facility]pharm[facility] #[id number] - [place] - [address]  [address]  [place] [address]  Phone: [phone] Fax: [phone]  Prescription Name:  traMADol (ULTRAM) 50 mg tablet  What do they need to clarify?:  Diagnosis code for the medication  Best Call Back Number:  [phone]  Additional Information:  Na

## 2018-09-20 NOTE — TELEPHONE ENCOUNTER
Call tell patient labs last done December needs to do lab is in computer okay 6 months refills flexeril

## 2018-09-25 ENCOUNTER — TELEPHONE (OUTPATIENT)
Dept: PRIMARY CARE CLINIC | Facility: CLINIC | Age: 53
End: 2018-09-25

## 2018-09-25 DIAGNOSIS — N64.9 BREAST LESION: Primary | ICD-10-CM

## 2018-09-25 NOTE — TELEPHONE ENCOUNTER
----- Message from Mike Blank sent at 9/25/2018  9:12 AM CDT -----  Contact: self   Patient want to inform office she want to have ultra sound done as well on breast today, any questions please call back at [phone] (home)

## 2018-10-12 ENCOUNTER — PATIENT MESSAGE (OUTPATIENT)
Dept: OTOLARYNGOLOGY | Facility: CLINIC | Age: 53
End: 2018-10-12

## 2018-10-12 ENCOUNTER — PATIENT MESSAGE (OUTPATIENT)
Dept: PRIMARY CARE CLINIC | Facility: CLINIC | Age: 53
End: 2018-10-12

## 2018-10-13 DIAGNOSIS — M25.511 CHRONIC RIGHT SHOULDER PAIN: ICD-10-CM

## 2018-10-13 DIAGNOSIS — G89.29 CHRONIC RIGHT SHOULDER PAIN: ICD-10-CM

## 2018-10-13 DIAGNOSIS — M47.816 FACET ARTHRITIS, DEGENERATIVE, LUMBAR SPINE: ICD-10-CM

## 2018-10-13 RX ORDER — PROMETHAZINE HYDROCHLORIDE AND CODEINE PHOSPHATE 6.25; 1 MG/5ML; MG/5ML
5 SOLUTION ORAL EVERY 6 HOURS PRN
Qty: 240 ML | Refills: 0 | OUTPATIENT
Start: 2018-10-13

## 2018-10-15 ENCOUNTER — OFFICE VISIT (OUTPATIENT)
Dept: PRIMARY CARE CLINIC | Facility: CLINIC | Age: 53
End: 2018-10-15
Payer: MEDICARE

## 2018-10-15 VITALS
TEMPERATURE: 98 F | DIASTOLIC BLOOD PRESSURE: 81 MMHG | WEIGHT: 241 LBS | RESPIRATION RATE: 18 BRPM | OXYGEN SATURATION: 100 % | HEART RATE: 83 BPM | HEIGHT: 66 IN | SYSTOLIC BLOOD PRESSURE: 136 MMHG | BODY MASS INDEX: 38.73 KG/M2

## 2018-10-15 DIAGNOSIS — M25.511 CHRONIC RIGHT SHOULDER PAIN: ICD-10-CM

## 2018-10-15 DIAGNOSIS — E78.5 HYPERLIPIDEMIA, UNSPECIFIED HYPERLIPIDEMIA TYPE: ICD-10-CM

## 2018-10-15 DIAGNOSIS — H93.19 TINNITUS, UNSPECIFIED LATERALITY: ICD-10-CM

## 2018-10-15 DIAGNOSIS — Z72.0 TOBACCO ABUSE: ICD-10-CM

## 2018-10-15 DIAGNOSIS — M79.602 ARM PAIN, ANTERIOR, LEFT: Primary | ICD-10-CM

## 2018-10-15 DIAGNOSIS — F20.9 SCHIZOPHRENIA, UNSPECIFIED TYPE: ICD-10-CM

## 2018-10-15 DIAGNOSIS — J32.9 CHRONIC SINUSITIS, UNSPECIFIED LOCATION: ICD-10-CM

## 2018-10-15 DIAGNOSIS — J98.01 BRONCHOSPASM: ICD-10-CM

## 2018-10-15 DIAGNOSIS — G54.2 CERVICAL NEUROPATHY: ICD-10-CM

## 2018-10-15 DIAGNOSIS — G47.00 INSOMNIA, UNSPECIFIED TYPE: ICD-10-CM

## 2018-10-15 DIAGNOSIS — G89.29 CHRONIC RIGHT SHOULDER PAIN: ICD-10-CM

## 2018-10-15 DIAGNOSIS — E66.9 OBESITY, UNSPECIFIED CLASSIFICATION, UNSPECIFIED OBESITY TYPE, UNSPECIFIED WHETHER SERIOUS COMORBIDITY PRESENT: ICD-10-CM

## 2018-10-15 PROCEDURE — 93010 ELECTROCARDIOGRAM REPORT: CPT | Mod: S$PBB,,, | Performed by: INTERNAL MEDICINE

## 2018-10-15 PROCEDURE — 99215 OFFICE O/P EST HI 40 MIN: CPT | Mod: PBBFAC,25,PN | Performed by: FAMILY MEDICINE

## 2018-10-15 PROCEDURE — 99999 PR PBB SHADOW E&M-EST. PATIENT-LVL V: CPT | Mod: PBBFAC,,, | Performed by: FAMILY MEDICINE

## 2018-10-15 PROCEDURE — 93005 ELECTROCARDIOGRAM TRACING: CPT | Mod: PBBFAC,PN | Performed by: INTERNAL MEDICINE

## 2018-10-15 PROCEDURE — 3008F BODY MASS INDEX DOCD: CPT | Mod: CPTII,,, | Performed by: FAMILY MEDICINE

## 2018-10-15 PROCEDURE — 99213 OFFICE O/P EST LOW 20 MIN: CPT | Mod: S$PBB,,, | Performed by: FAMILY MEDICINE

## 2018-10-15 RX ORDER — PROMETHAZINE HYDROCHLORIDE AND CODEINE PHOSPHATE 6.25; 1 MG/5ML; MG/5ML
5 SOLUTION ORAL EVERY 6 HOURS PRN
Qty: 240 ML | Refills: 0 | Status: CANCELLED | OUTPATIENT
Start: 2018-10-15

## 2018-10-15 RX ORDER — HYDROCODONE BITARTRATE AND ACETAMINOPHEN 5; 325 MG/1; MG/1
1 TABLET ORAL EVERY 6 HOURS PRN
Qty: 30 TABLET | Refills: 0 | Status: SHIPPED | OUTPATIENT
Start: 2018-10-15 | End: 2018-10-24 | Stop reason: ALTCHOICE

## 2018-10-15 RX ORDER — CODEINE PHOSPHATE AND GUAIFENESIN 10; 100 MG/5ML; MG/5ML
5 SOLUTION ORAL EVERY 6 HOURS PRN
Qty: 180 ML | Refills: 0 | Status: SHIPPED | OUTPATIENT
Start: 2018-10-15 | End: 2018-10-24 | Stop reason: ALTCHOICE

## 2018-10-15 RX ORDER — MELOXICAM 15 MG/1
TABLET ORAL
Qty: 30 TABLET | Refills: 2 | Status: SHIPPED | OUTPATIENT
Start: 2018-10-15 | End: 2019-02-20

## 2018-10-15 RX ORDER — CELECOXIB 200 MG/1
CAPSULE ORAL
Qty: 60 CAPSULE | Refills: 0 | Status: SHIPPED | OUTPATIENT
Start: 2018-10-15 | End: 2019-02-20

## 2018-10-15 NOTE — PROGRESS NOTES
Call tell control meds not refilled over the phone Call tell control meds not refilled over the phone   Subjective:       Patient ID: Trina Marie Collette is a 53 y.o. female.    Chief Complaint: Arm Pain (Pt. reports left arm pain x 1.5 week. Denies injury.) and Hospital Follow Up (Went to the ER on 10/7/18. )    HPI:  53-year-old female in for prescription refill and left arm pain that started 1 and half weeks ago.  Patient was seen in the emergency room x2.  Told to see her PCP and check EKG results.  Patient states told that the heart looked okay--the 1st patient had a swelling in the left forearm size of a silver dollar just distal to the antecubital fossa then pain when into the left upper arm biceps and triceps areas and then into the left upper trapezius area. .  No trauma, no excessive activity.  No lupus rheumatoid gout no fracture afraid to use the left arm because it hurts--occasionally numb occasional burning.  Tends to whole left arm next to the side of her body flexed at 90° at the elbow      53-year-old female in for refills--states going out of town  Needs tramadol/Flexeril/Ambien/cough syrup.  No fever, no runny nose, +sore th, +cough, +clear phlegm.  No pneumonia asthma TB.  Patient having difficulty sleeping was on Ambien 10 mg but due to go for mental restrictions row decreased Ambien 5 mg.  Patient told should alternate trazodone with Ambien so effectiveness of the Ambien does not wear off.  Patient has back problems had problems for years--hurts to been lift squat--if stands or walks too long on Ultram and Flexeril.  Not on NSAIDs.      Office visit 08/22/2018-- 53-year-old female seen Dr Ricardo --[facility] --[facility].  Told that nothing could do about the tinnitus, told was losing her hearing in both ear, patient had a chronic cough and he said he was not sure what was from.  No fever, no runny nose, no sore throat, +cough--when cough shows on the mucous--clear.  Patient using inhaler does  help occasionally.  No pneumonia asthma TB--smokes quarter of a pack per day       Back pain--history of back pain times 3-4 years--saw Dr Pedro--on [facility] also--Xray did MRI.  Patient had injections in her back in the office.  They wanted to do same day surgery but patient did not want surgery --he thinks was for a injection in the back.  Was given gabapentin not working and no relief with ibuprofen.       Needs medication refilled       Patient was going to [facility]    ROS:  No significant change                                                                                                                                                                                                                                                                                                                                                                                                                                                                                                                                                                   Skin: no psoriasis, eczema, skin cancer   HEENT: No headache, ocular pain, blurred vision, diplopia, epistaxis, hoarseness change in voice, thyroid trouble -- tinnitus in right ear--same, decreased hearing bilaterally-same  Lung: No pneumonia, asthma, Tb, wheezing, SOB, smoking 1/7  pack per day + occasional wheezing   Heart: No chest pain, ankle edema, palpitations, MI, river murmur, hypertension,+  hyperlipidemia  Abdomen: No nausea, vomiting, diarrhea, constipation, ulcers, hepatitis, gallbladder disease, melena, hematochezia, hematemesis  : no UTI, renal disease, stones   GYN no female problems patient agrees to mammogram just had 1 beginning of September--- needs pelvic--not done in years patient is menopausal  MS: no fractures, O/A, lupus, rheumatoid, gout--pain in the lower back history of facet arthropathy history of right shoulder pain  Neuro: No dizziness, LOC,  seizures   No diabetes, no anemia, + anxiety, +Depression, + schizophrenia, + bipolar --controlled  Single, 1 child, disabled, lives with son    Objective:   Physical Exam:  General: Well nourished, well developed, no acute distress + overweight  Skin: Freckling on the cheeks  HEENT: Eyes PERRLA, EOM intact, nose patent, throat non-erythematous ears TM clear  NECK: Supple, no bruits, No JVD, right anterior cervical lymph node  Lungs: Clear, no rales, rhonchi, wheezing slightly decreased breath sounds  Heart: Regular rate and rhythm, no murmurs, gallops, or rubs  Abdomen: flat, bowel sounds positive, no tenderness, or organomegaly  MS: Tenderness --left forearm tender on the lateral aspect just distal to the antecubital fossa and--also painful in the triceps biceps area and up into the upper trapezius left arm pain with lateral flexion rotation bilaterally  Neuro: Alert, CN intact, oriented X 3  Extremities: No cyanosis, clubbing, or edema         Assessment:       1. Arm pain, anterior, left    2. Tobacco abuse    3. Insomnia, unspecified type    4. Chronic right shoulder pain    5. Obesity, unspecified classification, unspecified obesity type, unspecified whether serious comorbidity present    6. Schizophrenia, unspecified type    7. Tinnitus, unspecified laterality    8. Chronic sinusitis, unspecified location    9. Bronchospasm    10. Hyperlipidemia, unspecified hyperlipidemia type        Plan:       With left forearm, left upper arm, left trapezius pain--venous Doppler left arm/x-ray cervical spine/EKG  Old EKGs shows left atrial enlargement, prolonged QT interval, nonspecific ST wave changes--needs to see Cardiology--carotid ultrasound/echocardiogram/24 hr Holter/possible stress test  See Dr Robert pulmonary MD for chronic cough--Cheratussin AC  History of wheezing at night given albuterol inhaler 2 puffs every 6 p.r.n. wheezing a add Symbicort Advair Breo but needs to see Dr. Robert due to chronic cough possible  bronchoscopy  Patient needs a new psychiatrist--whole psychiatrist not take people cell--Needs Zoloft/Geodon told okay Zoloft I do not write Geodon  History of tinnitus told to use background music-saw watch ENT told nothing he can do about  Hyperlipidemia needs labs every 6 months  Needs a physical CBC CMP lipid T4 TSH UA chest x-ray EKG as physical see me 2 weeks later may do  in 6 months  Back pain stays no relief with ibuprofen/gabapentin-patient needs to go to chronic pain clinic will give him Ultram ibuprofen Flexeril  Okay Ambien 5 mg #30 with 1 refill told to alternate Ambien with does rule 50 mg q.h.s. to avoid tolerated  Due to chronic pain needs to go to chronic pain clinic for her Ultram and possibly for her nerve medications if not able to get into the guidance center    Moist heat, Theragesic Keep appt ortho but also pain clinic as I do not write chronic  Pain meds

## 2018-10-16 ENCOUNTER — TELEPHONE (OUTPATIENT)
Dept: PRIMARY CARE CLINIC | Facility: CLINIC | Age: 53
End: 2018-10-16

## 2018-10-16 DIAGNOSIS — G62.9 NEUROPATHY: Primary | ICD-10-CM

## 2018-10-17 NOTE — TELEPHONE ENCOUNTER
----- Message from Ana Luisa Montana sent at 10/17/2018  1:32 PM CDT -----  Contact: Patient  Type:  Patient Returning Call    Who Called:  Elizabeth, patient  Who Left Message for Patient:  Char  Does the patient know what this is regarding?:  Results  Best Call Back Number:  [phone]  Additional Information:  Missed your call, please call her back. Thanks.

## 2018-10-17 NOTE — TELEPHONE ENCOUNTER
----- Message from Ana Luisa Montana sent at 10/17/2018 10:28 AM CDT -----  Contact: Patient  Type:  Patient Returning Call    Who Called:  Elizabeth, patient  Who Left Message for Patient:  ?  Does the patient know what this is regarding?:  Results  Best Call Back Number:  [phone]  Additional Information:  Missed your call, please call her back. Thanks.

## 2018-10-22 ENCOUNTER — TELEPHONE (OUTPATIENT)
Dept: PRIMARY CARE CLINIC | Facility: CLINIC | Age: 53
End: 2018-10-22

## 2018-10-22 NOTE — TELEPHONE ENCOUNTER
----- Message from Ana Luisa Montana sent at 10/22/2018  9:04 AM CDT -----  Contact: Patient  Type: Needs Medical Advice    Who Called:  Elizabeth, patient  Symptoms (please be specific):  Left arm pain  How long has patient had these symptoms:  2 weeks  Pharmacy name and phone #:    [id number] - [place] - [address]  [address]  [place] [address]  Phone: [phone] Fax: [phone]  Best Call Back Number: [phone]  Additional Information: Calling to inquire about MRI and also the cough medicine she was given is making her nauseated. Please call her. Thanks.

## 2018-10-24 ENCOUNTER — TELEPHONE (OUTPATIENT)
Dept: PAIN MEDICINE | Facility: CLINIC | Age: 53
End: 2018-10-24

## 2018-10-24 ENCOUNTER — TELEPHONE (OUTPATIENT)
Dept: PRIMARY CARE CLINIC | Facility: CLINIC | Age: 53
End: 2018-10-24

## 2018-10-24 ENCOUNTER — OFFICE VISIT (OUTPATIENT)
Dept: PRIMARY CARE CLINIC | Facility: CLINIC | Age: 53
End: 2018-10-24
Payer: MEDICARE

## 2018-10-24 VITALS
WEIGHT: 242 LBS | TEMPERATURE: 98 F | DIASTOLIC BLOOD PRESSURE: 92 MMHG | HEART RATE: 87 BPM | RESPIRATION RATE: 18 BRPM | SYSTOLIC BLOOD PRESSURE: 138 MMHG | HEIGHT: 66 IN | OXYGEN SATURATION: 100 % | BODY MASS INDEX: 38.89 KG/M2

## 2018-10-24 DIAGNOSIS — J32.9 CHRONIC SINUSITIS, UNSPECIFIED LOCATION: ICD-10-CM

## 2018-10-24 DIAGNOSIS — M25.569 CHRONIC KNEE PAIN, UNSPECIFIED LATERALITY: ICD-10-CM

## 2018-10-24 DIAGNOSIS — M54.50 LUMBAR SPINE PAIN: ICD-10-CM

## 2018-10-24 DIAGNOSIS — E07.9 THYROID MASS: ICD-10-CM

## 2018-10-24 DIAGNOSIS — Z00.00 REGULAR CHECK-UP: ICD-10-CM

## 2018-10-24 DIAGNOSIS — E66.9 OBESITY, UNSPECIFIED CLASSIFICATION, UNSPECIFIED OBESITY TYPE, UNSPECIFIED WHETHER SERIOUS COMORBIDITY PRESENT: ICD-10-CM

## 2018-10-24 DIAGNOSIS — G89.4 CHRONIC PAIN DISORDER: ICD-10-CM

## 2018-10-24 DIAGNOSIS — G47.00 INSOMNIA, UNSPECIFIED TYPE: ICD-10-CM

## 2018-10-24 DIAGNOSIS — Z72.0 TOBACCO ABUSE: ICD-10-CM

## 2018-10-24 DIAGNOSIS — E78.5 HYPERLIPIDEMIA, UNSPECIFIED HYPERLIPIDEMIA TYPE: ICD-10-CM

## 2018-10-24 DIAGNOSIS — G62.9 NEUROPATHY: Primary | ICD-10-CM

## 2018-10-24 DIAGNOSIS — F31.9 BIPOLAR 1 DISORDER: ICD-10-CM

## 2018-10-24 DIAGNOSIS — N64.4 BREAST TENDERNESS IN FEMALE: ICD-10-CM

## 2018-10-24 DIAGNOSIS — R05.3 CHRONIC COUGH: ICD-10-CM

## 2018-10-24 DIAGNOSIS — M47.816 FACET ARTHRITIS, DEGENERATIVE, LUMBAR SPINE: ICD-10-CM

## 2018-10-24 DIAGNOSIS — H93.19 TINNITUS, UNSPECIFIED LATERALITY: ICD-10-CM

## 2018-10-24 DIAGNOSIS — R79.89 LFT ELEVATION: ICD-10-CM

## 2018-10-24 DIAGNOSIS — M79.602 LEFT ARM PAIN: ICD-10-CM

## 2018-10-24 DIAGNOSIS — M47.816 LUMBAR SPONDYLOSIS: ICD-10-CM

## 2018-10-24 DIAGNOSIS — T17.308A CHOKING, INITIAL ENCOUNTER: ICD-10-CM

## 2018-10-24 DIAGNOSIS — F20.9 SCHIZOPHRENIA, UNSPECIFIED TYPE: ICD-10-CM

## 2018-10-24 DIAGNOSIS — J98.01 BRONCHOSPASM: ICD-10-CM

## 2018-10-24 DIAGNOSIS — M79.10 MYALGIA: ICD-10-CM

## 2018-10-24 DIAGNOSIS — G89.29 CHRONIC KNEE PAIN, UNSPECIFIED LATERALITY: ICD-10-CM

## 2018-10-24 PROCEDURE — 99213 OFFICE O/P EST LOW 20 MIN: CPT | Mod: PBBFAC,PN | Performed by: FAMILY MEDICINE

## 2018-10-24 PROCEDURE — 3008F BODY MASS INDEX DOCD: CPT | Mod: CPTII,,, | Performed by: FAMILY MEDICINE

## 2018-10-24 PROCEDURE — 99214 OFFICE O/P EST MOD 30 MIN: CPT | Mod: S$PBB,,, | Performed by: FAMILY MEDICINE

## 2018-10-24 PROCEDURE — 99999 PR PBB SHADOW E&M-EST. PATIENT-LVL III: CPT | Mod: PBBFAC,,, | Performed by: FAMILY MEDICINE

## 2018-10-24 NOTE — TELEPHONE ENCOUNTER
----- Message from Ana Luisa Montana sent at 10/24/2018  2:24 PM CDT -----  Contact: Patient  Type:  Patient Returning Call    Who Called:  Elizabeth, patient  Who Left Message for Patient:  ?  Does the patient know what this is regarding?:  ?  Best Call Back Number:  [phone]  Additional Information:  Missed your call, please call her back. Thanks.

## 2018-10-24 NOTE — TELEPHONE ENCOUNTER
----- Message from Ana Luisa Pond sent at 10/24/2018  8:54 AM CDT -----  Calling to inquire about MRI and also the cough medicine she was given is making her nauseated. She is still coughing / sent message on Monday / no response  Please call her [phone] (home)  Thanks.

## 2018-10-24 NOTE — TELEPHONE ENCOUNTER
----- Message from Ana Luisa Montana sent at 10/24/2018  3:51 PM CDT -----  Contact: Patient  Type: Needs Medical Advice    Who Called:  Elizabeth, patient  Symptoms (please be specific):  N/A  How long has patient had these symptoms:  N/A  Pharmacy name and phone #:  N/A  Best Call Back Number: [phone]  Additional Information: Needs to talk to Oscar again. Thanks.

## 2018-10-24 NOTE — TELEPHONE ENCOUNTER
Patient asking for some on her medications to be sent to the mail order ([email or web address]). Patient unsure on which ones when she came in for her visit on 10/24/18. Patient would like us to call [facility] to confirm her medications. Please advise.

## 2018-10-24 NOTE — PROGRESS NOTES
Call tell control meds not refilled over the phone Call tell control meds not refilled over the phone   Subjective:       Patient ID: Trina Marie Collette is a 53 y.o. female.    Chief Complaint: Shoulder Pain and Cough    HPI:  53-year-old female has appt orthopedist -- Dr Brownlee on [address] --patient with left shoulder pain--started 2 or 3 weeks ago, no trauma, no excessive activity--has pain especially at night--has tingling down the whole left arm had a not the left forearm the left upper arm.         Patient had an EKG showing possible left atrial enlargement and ST and T-wave abnormalities consider anterior ischemia when compared to EKG 10/15/2018 no significant change note was sent patient to see --cardiologist for possible echocardiogram and possible stress test and had none in the past       Pt with chronic cough asking for phenergan with xi.        Pt has seen Dr Rg ENT for hoarseness.        Pt goes to pain clinic -- here Dr MARCELLA lundberg gives pt shots if needed.     ROS:  No significant change                                                                                                                                                                                                                                                                                                                                                                                                                                                                                                                                                                   Skin: no psoriasis, eczema, skin cancer   HEENT: No headache, ocular pain, blurred vision, diplopia, epistaxis, hoarseness change in voice, thyroid trouble -- tinnitus in right ear--same, decreased hearing bilaterally-same  Lung: No pneumonia, asthma, Tb, wheezing, SOB, smoking 1/7  pack per day + occasional wheezing   Heart: No chest pain, ankle edema, palpitations,  MI, river murmur, hypertension,+  hyperlipidemia  Abdomen: No nausea, vomiting, diarrhea, constipation, ulcers, hepatitis, gallbladder disease, melena, hematochezia, hematemesis  : no UTI, renal disease, stones   GYN no female problems patient agrees to mammogram just had 1 beginning of September--- needs pelvic--not done in years patient is menopausal  MS: no fractures, O/A, lupus, rheumatoid, gout--pain in the lower back history of facet arthropathy history of right shoulder pain  Neuro: No dizziness, LOC, seizures   No diabetes, no anemia, + anxiety, +Depression, + schizophrenia, + bipolar --controlled  Single, 1 child, disabled, lives with son    Objective:   Physical Exam:  General: Well nourished, well developed, no acute distress + overweight  Skin: Freckling on the cheeks  HEENT: Eyes PERRLA, EOM intact, nose patent, throat non-erythematous ears TM clear  NECK: Supple, no bruits, No JVD, right anterior cervical lymph node  Lungs: Clear, no rales, rhonchi, wheezing slightly decreased breath sounds  Heart: Regular rate and rhythm, no murmurs, gallops, or rubs  Abdomen: flat, bowel sounds positive, no tenderness, or organomegaly  MS: Tenderness --left forearm tender on the lateral aspect just distal to the antecubital fossa and--also painful in the triceps biceps area and up into the upper trapezius left arm pain with lateral flexion rotation bilaterally  Neuro: Alert, CN intact, oriented X 3  Extremities: No cyanosis, clubbing, or edema         Assessment:       1. Neuropathy    2. Left arm pain    3. Chronic cough    4. Tobacco abuse    5. Insomnia, unspecified type    6. Obesity, unspecified classification, unspecified obesity type, unspecified whether serious comorbidity present    7. Bipolar 1 disorder    8. Schizophrenia, unspecified type    9. Chronic sinusitis, unspecified location    10. Bronchospasm    11. Hyperlipidemia, unspecified hyperlipidemia type        Plan:       With left forearm, left  upper arm, left trapezius pain--ultrasound of the arm shows no clot--x-ray of the cervical spine shows DDD C3-4, C4-5, C5-6, moderate facet stenosis right C5-6 left C2-3 see 3-4 C4-5--MRI of the cervical spine was pending into my phone calls--was okay today  Old EKGs shows left atrial enlargement, prolonged QT interval, nonspecific ST wave changes--needs to see Cardiology--carotid ultrasound/echocardiogram/24 hr Holter/possible stress test  See Dr Robert pulmonary MD for chronic cough--Cheratussin AC  History of wheezing at night given albuterol inhaler 2 puffs every 6 p.r.n. wheezing a add Symbicort Advair Breo but needs to see Dr. Robert due to chronic cough possible bronchoscopy  Patient needs a new psychiatrist--whole psychiatrist not take [employer] --Needs Zoloft/Geodon told okay Zoloft I do not write Geodon   Needs new chronic pain doctor who writes pain medication--because I do not right long-term pain medication this is the last time I will refill any pain medication  Needs a physical CBC CMP lipid T4 TSH UA chest x-ray EKG as physical see me 2 weeks later may do  in 6 months   Moist heat, Theragesic Keep appt ortho but also pain clinic as I do not write chronic  Pain meds    After MRI patient may need to see neurosurgeon or may need EMG by neurologist to rule out cervical neuropathy S cause of pain

## 2018-10-25 NOTE — TELEPHONE ENCOUNTER
----- Message from Cristine Aragon sent at 10/25/2018 12:32 PM CDT -----  Type: Needs Medical Advice    Who Called:  Patient    Best Call Back Number: [phone] (home)     Additional Information:[facility], phone # [phone], fax # [phone]

## 2018-10-26 ENCOUNTER — TELEPHONE (OUTPATIENT)
Dept: PRIMARY CARE CLINIC | Facility: CLINIC | Age: 53
End: 2018-10-26

## 2018-10-26 RX ORDER — PANTOPRAZOLE SODIUM 40 MG/1
40 TABLET, DELAYED RELEASE ORAL DAILY
Qty: 90 TABLET | Refills: 1 | Status: SHIPPED | OUTPATIENT
Start: 2018-10-26 | End: 2019-01-07 | Stop reason: SDUPTHER

## 2018-10-26 RX ORDER — SERTRALINE HYDROCHLORIDE 100 MG/1
100 TABLET, FILM COATED ORAL DAILY
Qty: 90 TABLET | Refills: 1 | Status: SHIPPED | OUTPATIENT
Start: 2018-10-26 | End: 2019-05-14 | Stop reason: SDUPTHER

## 2018-10-26 RX ORDER — ROSUVASTATIN CALCIUM 5 MG/1
5 TABLET, COATED ORAL DAILY
Qty: 90 TABLET | Refills: 1 | Status: SHIPPED | OUTPATIENT
Start: 2018-10-26 | End: 2019-01-24

## 2018-10-26 RX ORDER — ALBUTEROL SULFATE 90 UG/1
1-2 AEROSOL, METERED RESPIRATORY (INHALATION) EVERY 6 HOURS PRN
Qty: 3 INHALER | Refills: 1 | Status: SHIPPED | OUTPATIENT
Start: 2018-10-26 | End: 2019-01-07 | Stop reason: SDUPTHER

## 2018-10-26 RX ORDER — LORATADINE 10 MG/1
10 TABLET ORAL DAILY
Qty: 90 TABLET | Refills: 1 | COMMUNITY
Start: 2018-10-26 | End: 2019-04-15

## 2018-10-30 ENCOUNTER — TELEPHONE (OUTPATIENT)
Dept: PRIMARY CARE CLINIC | Facility: CLINIC | Age: 53
End: 2018-10-30

## 2018-10-30 NOTE — TELEPHONE ENCOUNTER
----- Message from Evette Yeboah sent at 10/30/2018  8:15 AM CDT -----  Patient came in states she went to the hospital today for her MRI and was unable to complete. Wants an order for an open MRI

## 2018-10-30 NOTE — TELEPHONE ENCOUNTER
----- Message from Huang Wong sent at 10/30/2018  4:09 PM CDT -----  Contact: pt  Pt is requesting to reschedule their appointment.    Date of current appointment: 10.30.18  Reason for reschedule: pt cant do closed MRI  Additional information: needs to be open MRI    Call back: [phone]   thanks

## 2018-11-08 ENCOUNTER — TELEPHONE (OUTPATIENT)
Dept: PRIMARY CARE CLINIC | Facility: CLINIC | Age: 53
End: 2018-11-08

## 2018-11-08 NOTE — TELEPHONE ENCOUNTER
Called [facility] open [facility] and states exam was done today will take up to 48 hours for results notified patient states understanding

## 2018-11-08 NOTE — TELEPHONE ENCOUNTER
----- Message from Ana Luisa Pond sent at 11/8/2018 12:30 PM CST -----  Pt is requesting results for mri today ... At [facility] / ordered by Dr Brock / call [phone] (home)

## 2018-11-09 ENCOUNTER — TELEPHONE (OUTPATIENT)
Dept: PRIMARY CARE CLINIC | Facility: CLINIC | Age: 53
End: 2018-11-09

## 2018-11-09 NOTE — TELEPHONE ENCOUNTER
Spoke with patient notified her that is takes 48 hours to have MRI read and then faxed to us. Advised her we should have it by Monday. States her understanding

## 2018-11-09 NOTE — PROGRESS NOTES
Patient, Trina Marie Collette (MRN #[id number]), presented with a recorded BMI of 39.06 kg/m^2 and a documented comorbidity(s):  - Hyperlipidemia  to which the severe obesity is a contributing factor. This is consistent with the definition of severe obesity (BMI 35.0-35.9) with comorbidity (ICD-10 E66.01, Z68.35). The patient's severe obesity was monitored, evaluated, addressed and/or treated. This addendum to the medical record is made on 11/08/2018.

## 2018-11-09 NOTE — TELEPHONE ENCOUNTER
----- Message from Ana Luisa Montana sent at 11/9/2018  2:38 PM CST -----  Contact: Patient  Type:  Test Results    Who Called:  Elizabeth patient  Name of Test (Lab/Mammo/Etc):  MRI  Date of Test:  11/08/2018  Ordering Provider:  Dr Brock  Where the test was performed:  [facility]  Best Call Back Number:  [phone]  Additional Information:  Please call her. Thanks.

## 2018-11-12 ENCOUNTER — TELEPHONE (OUTPATIENT)
Dept: PRIMARY CARE CLINIC | Facility: CLINIC | Age: 53
End: 2018-11-12

## 2018-11-12 NOTE — TELEPHONE ENCOUNTER
----- Message from Ana Luisa Montana sent at 11/12/2018  9:51 AM CST -----  Contact: Patient  Type:  Test Results    Who Called:  Elizabeth patient  Name of Test (Lab/Mammo/Etc):  MRI  Date of Test:  11/08/2018  Ordering Provider:  Dr Brock  Where the test was performed:  [facility]  Best Call Back Number:  [phone]  Additional Information:  Please call her. Thanks.

## 2018-11-13 ENCOUNTER — PATIENT MESSAGE (OUTPATIENT)
Dept: PRIMARY CARE CLINIC | Facility: CLINIC | Age: 53
End: 2018-11-13

## 2018-11-13 NOTE — TELEPHONE ENCOUNTER
----- Message from Margret Marquez sent at 11/13/2018 10:21 AM CST -----  Contact: self   Patient has been calling since last week for her MRI results, please call back asap at [phone] (home)

## 2018-11-14 ENCOUNTER — PATIENT MESSAGE (OUTPATIENT)
Dept: PRIMARY CARE CLINIC | Facility: CLINIC | Age: 53
End: 2018-11-14

## 2018-11-15 ENCOUNTER — PATIENT MESSAGE (OUTPATIENT)
Dept: PRIMARY CARE CLINIC | Facility: CLINIC | Age: 53
End: 2018-11-15

## 2018-11-15 ENCOUNTER — TELEPHONE (OUTPATIENT)
Dept: PRIMARY CARE CLINIC | Facility: CLINIC | Age: 53
End: 2018-11-15

## 2018-11-15 NOTE — TELEPHONE ENCOUNTER
----- Message from Margret Marquez sent at 11/15/2018  3:53 PM CST -----  Contact: self   Patient need test results, please call back at [phone] (home)

## 2018-11-16 ENCOUNTER — PATIENT MESSAGE (OUTPATIENT)
Dept: PRIMARY CARE CLINIC | Facility: CLINIC | Age: 53
End: 2018-11-16

## 2018-11-19 ENCOUNTER — TELEPHONE (OUTPATIENT)
Dept: PRIMARY CARE CLINIC | Facility: CLINIC | Age: 53
End: 2018-11-19

## 2018-11-19 DIAGNOSIS — M50.30 DDD (DEGENERATIVE DISC DISEASE), CERVICAL: ICD-10-CM

## 2018-11-19 DIAGNOSIS — M48.02 CERVICAL SPINAL STENOSIS: ICD-10-CM

## 2018-11-20 PROBLEM — M48.02 CERVICAL SPINAL STENOSIS: Status: ACTIVE | Noted: 2018-11-20

## 2018-11-20 PROBLEM — M50.30 DDD (DEGENERATIVE DISC DISEASE), CERVICAL: Status: ACTIVE | Noted: 2018-11-20

## 2018-11-27 ENCOUNTER — TELEPHONE (OUTPATIENT)
Dept: PRIMARY CARE CLINIC | Facility: CLINIC | Age: 53
End: 2018-11-27

## 2018-11-27 DIAGNOSIS — M19.90 ARTHRITIS: Primary | ICD-10-CM

## 2018-11-27 NOTE — TELEPHONE ENCOUNTER
----- Message from Juany Saucedo sent at 11/27/2018  1:37 PM CST -----  Contact: [phone]  Type:  Patient Requesting Referral    Who Called: COLLETTE,TRINA MARIE [[id number]]  Does the patient already have the specialty appointment scheduled?:no  Referral to What Specialty:  rheumatology   Reason for Referral: lower back pain   Does the patient want the referral with a specific physician?:  Chip Pryor  Is the specialist [facility]   Patient Requesting a Call Back?: yes at [phone]

## 2018-12-03 ENCOUNTER — TELEPHONE (OUTPATIENT)
Dept: PRIMARY CARE CLINIC | Facility: CLINIC | Age: 53
End: 2018-12-03

## 2018-12-03 RX ORDER — TRAMADOL HYDROCHLORIDE 50 MG/1
TABLET ORAL
Qty: 30 TABLET | Refills: 1 | OUTPATIENT
Start: 2018-12-03

## 2018-12-03 NOTE — TELEPHONE ENCOUNTER
Called patient notified that medications are not refilled over phone that she would need an appointment fco states understanding and will call back to schedule

## 2018-12-03 NOTE — TELEPHONE ENCOUNTER
----- Message from Ana Luisa Montana sent at 12/3/2018  1:26 PM CST -----  Contact: Radha with [facility] pharmacy phone [phone]  Type:  RX Refill Request    Who Called:  Radha with [facility]  Refill or New Rx:  Refill  RX Name and Strength:  traMADol (ULTRAM) 50 mg tablet  How is the patient currently taking it? (ex. 1XDay):  Take 1 tablet (50 mg total) by mouth daily as needed for Pain  Is this a 30 day or 90 day RX:  30  Preferred Pharmacy with phone number:    [facility] - [place], [place] - [address] AT Portal to Registered [facility] Sites  [address]  [facility] [address]  Phone: [phone] Fax: [phone]  Local or Mail Order:  Local  Ordering Provider:  Dr Delmy Canas Call Back Number:  [phone]  Additional Information:  Please see second request.    Type:  RX Refill Request    Refill or New Rx:  Refill  RX Name and Strength:  zolpidem (AMBIEN) 5 MG Tab  How is the patient currently taking it? (ex. 1XDay):   po qohs alternate with trazodone 50 mg as needed for sleep  Is this a 30 day or 90 day RX:  30  Additional Information:  Please advise. Thanks.

## 2018-12-04 ENCOUNTER — TELEPHONE (OUTPATIENT)
Dept: PRIMARY CARE CLINIC | Facility: CLINIC | Age: 53
End: 2018-12-04

## 2018-12-04 ENCOUNTER — OFFICE VISIT (OUTPATIENT)
Dept: PRIMARY CARE CLINIC | Facility: CLINIC | Age: 53
End: 2018-12-04
Payer: MEDICARE

## 2018-12-04 VITALS
RESPIRATION RATE: 18 BRPM | OXYGEN SATURATION: 100 % | BODY MASS INDEX: 39.21 KG/M2 | SYSTOLIC BLOOD PRESSURE: 148 MMHG | DIASTOLIC BLOOD PRESSURE: 93 MMHG | HEIGHT: 66 IN | WEIGHT: 244 LBS | HEART RATE: 76 BPM

## 2018-12-04 DIAGNOSIS — M50.30 DDD (DEGENERATIVE DISC DISEASE), CERVICAL: ICD-10-CM

## 2018-12-04 DIAGNOSIS — E78.5 HYPERLIPIDEMIA, UNSPECIFIED HYPERLIPIDEMIA TYPE: ICD-10-CM

## 2018-12-04 DIAGNOSIS — J98.01 BRONCHOSPASM: ICD-10-CM

## 2018-12-04 DIAGNOSIS — N95.1 MENOPAUSAL AND FEMALE CLIMACTERIC STATES: ICD-10-CM

## 2018-12-04 DIAGNOSIS — F31.9 BIPOLAR 1 DISORDER: ICD-10-CM

## 2018-12-04 DIAGNOSIS — Z72.0 TOBACCO ABUSE: ICD-10-CM

## 2018-12-04 DIAGNOSIS — J32.9 SINUSITIS, UNSPECIFIED CHRONICITY, UNSPECIFIED LOCATION: Primary | ICD-10-CM

## 2018-12-04 DIAGNOSIS — F20.9 SCHIZOPHRENIA, UNSPECIFIED TYPE: ICD-10-CM

## 2018-12-04 DIAGNOSIS — E66.9 OBESITY (BMI 35.0-39.9 WITHOUT COMORBIDITY): ICD-10-CM

## 2018-12-04 DIAGNOSIS — F32.A DEPRESSION, UNSPECIFIED DEPRESSION TYPE: ICD-10-CM

## 2018-12-04 DIAGNOSIS — F41.9 ANXIETY: ICD-10-CM

## 2018-12-04 DIAGNOSIS — J40 BRONCHITIS: ICD-10-CM

## 2018-12-04 PROCEDURE — 99999 PR PBB SHADOW E&M-EST. PATIENT-LVL III: CPT | Mod: PBBFAC,,, | Performed by: FAMILY MEDICINE

## 2018-12-04 PROCEDURE — 99499 UNLISTED E&M SERVICE: CPT | Mod: S$GLB,,, | Performed by: FAMILY MEDICINE

## 2018-12-04 PROCEDURE — 3008F BODY MASS INDEX DOCD: CPT | Mod: CPTII,S$GLB,, | Performed by: FAMILY MEDICINE

## 2018-12-04 PROCEDURE — 99214 OFFICE O/P EST MOD 30 MIN: CPT | Mod: S$GLB,,, | Performed by: FAMILY MEDICINE

## 2018-12-04 RX ORDER — TRAMADOL HYDROCHLORIDE 50 MG/1
50 TABLET ORAL DAILY PRN
Qty: 30 TABLET | Refills: 1 | Status: SHIPPED | OUTPATIENT
Start: 2018-12-04 | End: 2019-01-24 | Stop reason: SDUPTHER

## 2018-12-04 RX ORDER — METHYLPREDNISOLONE 4 MG/1
TABLET ORAL
Qty: 1 PACKAGE | Refills: 0 | Status: SHIPPED | OUTPATIENT
Start: 2018-12-04 | End: 2018-12-25

## 2018-12-04 RX ORDER — PROMETHAZINE HYDROCHLORIDE AND CODEINE PHOSPHATE 6.25; 1 MG/5ML; MG/5ML
5 SOLUTION ORAL EVERY 4 HOURS PRN
Qty: 180 ML | Refills: 0 | Status: SHIPPED | OUTPATIENT
Start: 2018-12-04 | End: 2018-12-14

## 2018-12-04 RX ORDER — ZOLPIDEM TARTRATE 5 MG/1
TABLET ORAL
Qty: 30 TABLET | Refills: 1 | Status: SHIPPED | OUTPATIENT
Start: 2018-12-04 | End: 2019-01-07 | Stop reason: SDUPTHER

## 2018-12-04 RX ORDER — AZITHROMYCIN 250 MG/1
TABLET, FILM COATED ORAL
Qty: 6 TABLET | Refills: 0 | Status: SHIPPED | OUTPATIENT
Start: 2018-12-04 | End: 2018-12-09

## 2018-12-04 NOTE — TELEPHONE ENCOUNTER
----- Message from Jed Matthews sent at 12/4/2018 10:12 AM CST -----  Type: Needs Medical Advice    Who Called:  Patient    Best Call Back Number: [phone]  Additional Information: Patient calling.  States that she would like an appointment for refills this Friday, 12/07 between 10-11:00am but Epic won't allow me to schedule  Please call to advise

## 2018-12-04 NOTE — PROGRESS NOTES
Subjective:       Patient ID: Trina Marie Collette is a 53 y.o. female.    Chief Complaint: Medication Refill    HPI:  53-year-old female in for medication refills tramadol/Ambien.  Patient wants to discuss MRI results cervical spine--DDD cervical spine with foraminal stenosis and central canal stenosis-- patient could be referred to Dr. Perea for possible epidural steroid injection.  Patient not sure how she injured her neck.  His pain in the cervical spine with occasional radiation numbness down a left shoulder into the left arm into the left hand.       Patient with a cold--no fever, +runny nose, +sore throat, +cough, +phlegm-clear.  No pneumonia asthma TB.  +smoking 1/3 of a pack per day    ROS:  Skin: no psoriasis, eczema, skin cancer  HEENT: No headache, ocular pain, blurred vision, diplopia, epistaxis, hoarseness change in voice, thyroid trouble  Lung: No pneumonia, asthma, Tb, wheezing, SOB, +smoking 1/3 a pack per day  Heart: No chest pain, ankle edema, palpitations, MI, river murmur, hypertension, +hyperlipidemia  Abdomen: No nausea, vomiting, diarrhea, constipation, ulcers, hepatitis, gallbladder disease, melena, hematochezia, hematemesis  : no UTI, renal disease, stones   GYN LMP none in one year then one last month ?  Menopausal, has mammogram routinely  MS: no fractures, O/A, lupus, rheumatoid, gout -see history of present illness cervical DDD with neuropathy  Neuro: No dizziness, LOC, seizures   No diabetes, no anemia, + anxiety, + depression--sees psychiatrist for that get geodon from psych   Single, one son, Disabled due mental , Lives with son  =      Objective:   Physical Exam:  General: Well nourished, well developed, no acute distress +obesity  Skin: No lesions  HEENT: Eyes PERRLA, EOM intact, nose clear d/c , throat +1/4 eryth ears TM clear   NECK: Supple, no bruits, No JVD, no nodes thyroid appears prominent may be due to weight  Lungs: Clear, no rales, rhonchi, wheezing coarse  cough  Heart: Regular rate and rhythm, no murmurs, gallops, or rubs  Abdomen: flat, bowel sounds positive, no tenderness, or organomegaly  MS:  Tenderness cervical spine C3 through 7 bilaterally and left upper trapezius pain occasionally radiates down the left side of the neck to the left upper trapezius down the left arm and left hand good hand grasps good opposition thumb index finger and thumb 5th digit could opposition to flexion extension forearm pain with lateral flexion rotation anterior flexion and extension of the neck and pain with palpation left upper trapezius  Neuro: Alert, CN intact, oriented X 3  Extremities: No cyanosis, clubbing, or edema         Assessment:       1. Sinusitis, unspecified chronicity, unspecified location    2. Bronchitis    3. Bronchospasm    4. Tobacco abuse    5. Hyperlipidemia, unspecified hyperlipidemia type    6. Anxiety    7. Depression, unspecified depression type    8. DDD (degenerative disc disease), cervical    9. Menopausal and female climacteric states    10. Obesity (BMI 35.0-39.9 without comorbidity)    11. Bipolar 1 disorder    12. Schizophrenia, unspecified type        Plan:       Sinusitis, unspecified chronicity, unspecified location    Bronchitis    Bronchospasm    Tobacco abuse    Hyperlipidemia, unspecified hyperlipidemia type    Anxiety    Depression, unspecified depression type    DDD (degenerative disc disease), cervical    Menopausal and female climacteric states    Obesity (BMI 35.0-39.9 without comorbidity)    Bipolar 1 disorder    Schizophrenia, unspecified type    Other orders  -     zolpidem (AMBIEN) 5 MG Tab; 1 po qohs alternate with trazodone 50 mg as needed for sleep  Dispense: 30 tablet; Refill: 1  -     traMADol (ULTRAM) 50 mg tablet; Take 1 tablet (50 mg total) by mouth daily as needed for Pain.  Dispense: 30 tablet; Refill: 1      Celestone /Zithromax/Medrol/Phenergan with codeine  Moist heat /Theragesic,ROM exercises   Patient to see   Brit -pain management--possible epidural steroid injections--may need EMG of the left arm has DDD lumbar spine with foraminal and central canal stenosis  Patient sees Dr. Robert for pulmonary problems intermittently  Patient sees psychiatrist for bipolar schizophrenic gets kourtney   Routine lab in 6 months CBCs CMP lipid T4 TSH  Exercise/decrease weight   Six months refills of all 9 control medications

## 2018-12-09 DIAGNOSIS — M47.816 FACET ARTHRITIS, DEGENERATIVE, LUMBAR SPINE: ICD-10-CM

## 2018-12-09 DIAGNOSIS — G89.29 CHRONIC RIGHT SHOULDER PAIN: ICD-10-CM

## 2018-12-09 DIAGNOSIS — M25.511 CHRONIC RIGHT SHOULDER PAIN: ICD-10-CM

## 2018-12-10 RX ORDER — MELOXICAM 15 MG/1
TABLET ORAL
Qty: 30 TABLET | Refills: 2 | Status: SHIPPED | OUTPATIENT
Start: 2018-12-10 | End: 2019-01-07

## 2018-12-14 DIAGNOSIS — H93.19 TINNITUS, UNSPECIFIED LATERALITY: ICD-10-CM

## 2018-12-14 DIAGNOSIS — E78.5 HYPERLIPIDEMIA, UNSPECIFIED HYPERLIPIDEMIA TYPE: ICD-10-CM

## 2018-12-14 DIAGNOSIS — E07.9 THYROID MASS: ICD-10-CM

## 2018-12-14 DIAGNOSIS — M54.50 LUMBAR SPINE PAIN: ICD-10-CM

## 2018-12-17 RX ORDER — ROSUVASTATIN CALCIUM 5 MG/1
TABLET, COATED ORAL
Qty: 90 TABLET | Refills: 0 | Status: SHIPPED | OUTPATIENT
Start: 2018-12-17 | End: 2019-01-07

## 2018-12-18 NOTE — TELEPHONE ENCOUNTER
Call pt tell did get lab in ER but did not get lipid level OK 3 mo refills give time come in get lab get seen get refills

## 2019-01-02 ENCOUNTER — TELEPHONE (OUTPATIENT)
Dept: PAIN MEDICINE | Facility: CLINIC | Age: 54
End: 2019-01-02

## 2019-01-02 NOTE — TELEPHONE ENCOUNTER
Spoke with patient regarding her appointment with pain management. Patient stated she is experiencing pain to her neck and back. Indicated that she has in her possession a copy of a cervical MRI and the report. Patient was informed of the role and plan care regarding IPM, stated she was not interested in the possibility of having a procedure done to her back due to a fear she has from her mother having a procedure done that left her paralyzed. Patient was provided the contact information for [facility]. Indicated understanding of the information provided to her. No further issues discussed.

## 2019-01-07 ENCOUNTER — OFFICE VISIT (OUTPATIENT)
Dept: PRIMARY CARE CLINIC | Facility: CLINIC | Age: 54
End: 2019-01-07
Payer: MEDICARE

## 2019-01-07 VITALS
RESPIRATION RATE: 18 BRPM | DIASTOLIC BLOOD PRESSURE: 85 MMHG | SYSTOLIC BLOOD PRESSURE: 147 MMHG | BODY MASS INDEX: 39.7 KG/M2 | WEIGHT: 247 LBS | OXYGEN SATURATION: 99 % | HEIGHT: 66 IN | HEART RATE: 84 BPM

## 2019-01-07 DIAGNOSIS — R79.89 LFT ELEVATION: ICD-10-CM

## 2019-01-07 DIAGNOSIS — E78.5 HYPERLIPIDEMIA, UNSPECIFIED HYPERLIPIDEMIA TYPE: ICD-10-CM

## 2019-01-07 DIAGNOSIS — E07.9 THYROID MASS: ICD-10-CM

## 2019-01-07 DIAGNOSIS — M54.50 LUMBAR SPINE PAIN: ICD-10-CM

## 2019-01-07 DIAGNOSIS — M79.10 MYALGIA: ICD-10-CM

## 2019-01-07 DIAGNOSIS — N64.4 BREAST TENDERNESS IN FEMALE: ICD-10-CM

## 2019-01-07 DIAGNOSIS — H93.19 TINNITUS, UNSPECIFIED LATERALITY: ICD-10-CM

## 2019-01-07 DIAGNOSIS — Z00.00 REGULAR CHECK-UP: ICD-10-CM

## 2019-01-07 DIAGNOSIS — G89.29 CHRONIC KNEE PAIN, UNSPECIFIED LATERALITY: ICD-10-CM

## 2019-01-07 DIAGNOSIS — M25.569 CHRONIC KNEE PAIN, UNSPECIFIED LATERALITY: ICD-10-CM

## 2019-01-07 DIAGNOSIS — T17.308A CHOKING, INITIAL ENCOUNTER: ICD-10-CM

## 2019-01-07 DIAGNOSIS — G47.00 INSOMNIA, UNSPECIFIED TYPE: ICD-10-CM

## 2019-01-07 PROCEDURE — 99214 OFFICE O/P EST MOD 30 MIN: CPT | Mod: S$GLB,,, | Performed by: FAMILY MEDICINE

## 2019-01-07 PROCEDURE — 99999 PR PBB SHADOW E&M-EST. PATIENT-LVL IV: CPT | Mod: PBBFAC,,, | Performed by: FAMILY MEDICINE

## 2019-01-07 PROCEDURE — 99499 RISK ADDL DX/OHS AUDIT: ICD-10-PCS | Mod: S$GLB,,, | Performed by: FAMILY MEDICINE

## 2019-01-07 PROCEDURE — 3008F BODY MASS INDEX DOCD: CPT | Mod: CPTII,S$GLB,, | Performed by: FAMILY MEDICINE

## 2019-01-07 PROCEDURE — 99499 UNLISTED E&M SERVICE: CPT | Mod: S$GLB,,, | Performed by: FAMILY MEDICINE

## 2019-01-07 PROCEDURE — 3008F PR BODY MASS INDEX (BMI) DOCUMENTED: ICD-10-PCS | Mod: CPTII,S$GLB,, | Performed by: FAMILY MEDICINE

## 2019-01-07 PROCEDURE — 99214 PR OFFICE/OUTPT VISIT, EST, LEVL IV, 30-39 MIN: ICD-10-PCS | Mod: S$GLB,,, | Performed by: FAMILY MEDICINE

## 2019-01-07 PROCEDURE — 99999 PR PBB SHADOW E&M-EST. PATIENT-LVL IV: ICD-10-PCS | Mod: PBBFAC,,, | Performed by: FAMILY MEDICINE

## 2019-01-07 RX ORDER — PANTOPRAZOLE SODIUM 40 MG/1
40 TABLET, DELAYED RELEASE ORAL DAILY
Qty: 90 TABLET | Refills: 1 | Status: SHIPPED | OUTPATIENT
Start: 2019-01-07 | End: 2019-03-18 | Stop reason: SDUPTHER

## 2019-01-07 RX ORDER — ZOLPIDEM TARTRATE 5 MG/1
TABLET ORAL
Qty: 30 TABLET | Refills: 1 | Status: SHIPPED | OUTPATIENT
Start: 2019-01-07 | End: 2019-03-18 | Stop reason: SDUPTHER

## 2019-01-07 RX ORDER — PROMETHAZINE HYDROCHLORIDE AND CODEINE PHOSPHATE 6.25; 1 MG/5ML; MG/5ML
5 SOLUTION ORAL EVERY 6 HOURS PRN
Qty: 180 ML | Refills: 0 | Status: SHIPPED | OUTPATIENT
Start: 2019-01-07 | End: 2019-02-20

## 2019-01-07 RX ORDER — ALBUTEROL SULFATE 90 UG/1
1-2 AEROSOL, METERED RESPIRATORY (INHALATION) EVERY 6 HOURS PRN
Qty: 3 INHALER | Refills: 1 | Status: SHIPPED | OUTPATIENT
Start: 2019-01-07 | End: 2019-07-12

## 2019-01-07 NOTE — TELEPHONE ENCOUNTER
----- Message from Arianne Parker sent at 1/7/2019 11:29 AM CST -----  Contact: Patient  Type:  Same Day Appointment Request    Caller is requesting a same day appointment.  Caller declined first available appointment listed below.      Name of Caller:  Patient  When is the first available appointment?  01/09/19  Symptoms:  bad cough  Best Call Back Number:  [phone]  Additional Information:   Requesting a call back to confirm appointment

## 2019-01-07 NOTE — TELEPHONE ENCOUNTER
Spoke with patient, appointment scheduled for today with PCP for cough. Patient verbalized understanding.

## 2019-01-07 NOTE — PROGRESS NOTES
Subjective:       Patient ID: Trina Marie Collette is a 53 y.o. female.    Chief Complaint: Nasal Congestion; Medication Refill; and Medication Problem    HPI:  53-year-old female i in for medication refills---/104 repeat blood pressure 147/85.  The patient concerned a lot of family members with pneumonia.  Patient needs cough medicine still coughing.  Needs an asthma inhaler.  Has appoint with Dr. Stahl Friday 1-11-19.     ROS:  Skin: no psoriasis, eczema, skin cancer  HEENT: + headache, ocular pain, blurred vision, diplopia, epistaxis,+ hoarseness change in voice, thyroid trouble  Lung: No pneumonia, asthma, Tb, +wheezing, SOB, +smoking 1/3 a pack per day   Heart: No chest pain, ankle edema, palpitations, MI, river murmur, hypertension, +hyperlipidemia  Abdomen: No nausea, vomiting, diarrhea, constipation, ulcers, hepatitis, gallbladder disease, melena, hematochezia, hematemesis  : no UTI, renal disease, stones   GYN LMP November 2018 last.--1 prior that was almost a year--  Menopausal, has mammogram routinely if bleeding recurs patient may need an ultrasound  MS: no fractures, O/A, lupus, rheumatoid, gout -see history of present illness cervical DDD with neuropathy  Neuro: No dizziness, LOC, seizures   No diabetes, no anemia, + anxiety, + depression--sees psychiatrist for that get geodon from psych   Single, one son, Disabled due mental , Lives with son  =      Objective:   Physical Exam:  General: Well nourished, well developed, no acute distress +obesity  Skin: No lesions  HEENT: Eyes PERRLA, EOM intact, nose clear d/c , throat +1/4 eryth ears TM clear tinnitus in the right ear--patient has some hearing loss working on hearing a  NECK: Supple, no bruits, No JVD, no nodes thyroid appears prominent may be due to weight  Lungs: Clear, no rales, rhonchi, wheezing +coarse cough  Heart: Regular rate and rhythm, no murmurs, gallops, or rubs  Abdomen: flat, bowel sounds positive, no tenderness, or  organomegaly  MS:  Tenderness cervical spine C3 through 7 bilaterally and left upper trapezius pain occasionally radiates down the left side of the neck to the left upper trapezius down the left arm and left hand good hand grasps good opposition thumb index finger and thumb 5th digit could opposition to flexion extension forearm pain with lateral flexion rotation anterior flexion and extension of the neck and pain with palpation left upper trapezius  Neuro: Alert, CN intact, oriented X 3  Extremities: No cyanosis, clubbing, or edema         Assessment:       1. Tinnitus, unspecified laterality    2. Choking, initial encounter    3. Insomnia, unspecified type    4. Thyroid mass    5. Chronic knee pain, unspecified laterality    6. Lumbar spine pain    7. Myalgia    8. Regular check-up    9. Breast tenderness in female    10. LFT elevation    11. Hyperlipidemia, unspecified hyperlipidemia type        Plan:       Tinnitus, unspecified laterality  -     pantoprazole (PROTONIX) 40 MG tablet; Take 1 tablet (40 mg total) by mouth once daily.  Dispense: 90 tablet; Refill: 1    Choking, initial encounter  -     pantoprazole (PROTONIX) 40 MG tablet; Take 1 tablet (40 mg total) by mouth once daily.  Dispense: 90 tablet; Refill: 1    Insomnia, unspecified type  -     pantoprazole (PROTONIX) 40 MG tablet; Take 1 tablet (40 mg total) by mouth once daily.  Dispense: 90 tablet; Refill: 1    Thyroid mass  -     pantoprazole (PROTONIX) 40 MG tablet; Take 1 tablet (40 mg total) by mouth once daily.  Dispense: 90 tablet; Refill: 1    Chronic knee pain, unspecified laterality  -     pantoprazole (PROTONIX) 40 MG tablet; Take 1 tablet (40 mg total) by mouth once daily.  Dispense: 90 tablet; Refill: 1    Lumbar spine pain  -     pantoprazole (PROTONIX) 40 MG tablet; Take 1 tablet (40 mg total) by mouth once daily.  Dispense: 90 tablet; Refill: 1    Myalgia  -     pantoprazole (PROTONIX) 40 MG tablet; Take 1 tablet (40 mg total) by mouth  once daily.  Dispense: 90 tablet; Refill: 1    Regular check-up  -     pantoprazole (PROTONIX) 40 MG tablet; Take 1 tablet (40 mg total) by mouth once daily.  Dispense: 90 tablet; Refill: 1    Breast tenderness in female  -     pantoprazole (PROTONIX) 40 MG tablet; Take 1 tablet (40 mg total) by mouth once daily.  Dispense: 90 tablet; Refill: 1    LFT elevation  -     pantoprazole (PROTONIX) 40 MG tablet; Take 1 tablet (40 mg total) by mouth once daily.  Dispense: 90 tablet; Refill: 1    Hyperlipidemia, unspecified hyperlipidemia type  -     pantoprazole (PROTONIX) 40 MG tablet; Take 1 tablet (40 mg total) by mouth once daily.  Dispense: 90 tablet; Refill: 1    Other orders  -     albuterol (PROVENTIL/VENTOLIN HFA) 90 mcg/actuation inhaler; Inhale 1-2 puffs into the lungs every 6 (six) hours as needed for Wheezing. Rescue  Dispense: 3 Inhaler; Refill: 1      appointment ENT 01/09/2019--evaluate tinnitus in the right ear and hearing loss for possible hearing aids   Phenergan with codeine/albuterol inhalerAmbien 5 mg #30, Tramadol #30---wants patient and pain clinic will get medications from Dr. Stahl  Moist heat /Theragesic,ROM exercises /decrease weight  Patient to see Dr. Senior  -pain management--appointment 01/11/19  Patient sees Dr. Robert for pulmonary problems intermittently  Patient sees psychiatrist for bipolar schizophrenic gets geodan   Routine lab in 6 months CBCs CMP lipid T4 TSH  Exercise/decrease weight

## 2019-01-08 RX ORDER — ROSUVASTATIN CALCIUM 5 MG/1
5 TABLET, COATED ORAL DAILY
Qty: 90 TABLET | Refills: 0 | Status: SHIPPED | OUTPATIENT
Start: 2019-01-08 | End: 2020-02-17 | Stop reason: SDUPTHER

## 2019-01-09 NOTE — TELEPHONE ENCOUNTER
Last true lab  Needs CBC,CMP,lipid,T4,TSH U/A see me 2 weeks later OK 3 mo refills give time get lab get seen get refills no more refil;ls without lab

## 2019-01-23 NOTE — PROGRESS NOTES
Lashawn Cabrera, CCC-A  Audiologist - [facility] [address] [place] [address]  [email or web address]  [phone]    Patient: Trina Marie Collette   MRN: [id number]  : 1965  MINOR: 2018      HEARING AID CONSULT      Hearing aid prices and styles were discussed with patient at length.  She would like to consider her options with her insurance and will call the clinic if she decides to place an order.        _______________________________  Lashawn Cabrera, JACKIE-A  Audiologist

## 2019-01-24 ENCOUNTER — TELEPHONE (OUTPATIENT)
Dept: PRIMARY CARE CLINIC | Facility: CLINIC | Age: 54
End: 2019-01-24

## 2019-01-24 ENCOUNTER — OFFICE VISIT (OUTPATIENT)
Dept: PRIMARY CARE CLINIC | Facility: CLINIC | Age: 54
End: 2019-01-24
Payer: MEDICARE

## 2019-01-24 VITALS
HEIGHT: 63 IN | HEART RATE: 68 BPM | SYSTOLIC BLOOD PRESSURE: 152 MMHG | BODY MASS INDEX: 43.94 KG/M2 | RESPIRATION RATE: 18 BRPM | WEIGHT: 248 LBS | DIASTOLIC BLOOD PRESSURE: 84 MMHG | OXYGEN SATURATION: 99 %

## 2019-01-24 DIAGNOSIS — F20.9 SCHIZOPHRENIA, UNSPECIFIED TYPE: ICD-10-CM

## 2019-01-24 DIAGNOSIS — M48.02 CERVICAL SPINAL STENOSIS: ICD-10-CM

## 2019-01-24 DIAGNOSIS — E78.5 HYPERLIPIDEMIA, UNSPECIFIED HYPERLIPIDEMIA TYPE: ICD-10-CM

## 2019-01-24 DIAGNOSIS — M54.50 LUMBAR SPINE PAIN: ICD-10-CM

## 2019-01-24 DIAGNOSIS — G89.29 CHRONIC RIGHT SHOULDER PAIN: ICD-10-CM

## 2019-01-24 DIAGNOSIS — G89.29 CHRONIC PAIN OF BOTH KNEES: ICD-10-CM

## 2019-01-24 DIAGNOSIS — J40 BRONCHITIS: Primary | ICD-10-CM

## 2019-01-24 DIAGNOSIS — M25.562 CHRONIC PAIN OF BOTH KNEES: ICD-10-CM

## 2019-01-24 DIAGNOSIS — J34.2 NASAL SEPTAL DEVIATION: ICD-10-CM

## 2019-01-24 DIAGNOSIS — J32.9 SINUSITIS, UNSPECIFIED CHRONICITY, UNSPECIFIED LOCATION: ICD-10-CM

## 2019-01-24 DIAGNOSIS — F31.9 BIPOLAR 1 DISORDER: ICD-10-CM

## 2019-01-24 DIAGNOSIS — M50.30 DDD (DEGENERATIVE DISC DISEASE), CERVICAL: ICD-10-CM

## 2019-01-24 DIAGNOSIS — M25.511 CHRONIC RIGHT SHOULDER PAIN: ICD-10-CM

## 2019-01-24 DIAGNOSIS — Z72.0 TOBACCO ABUSE: ICD-10-CM

## 2019-01-24 DIAGNOSIS — J98.01 BRONCHOSPASM: ICD-10-CM

## 2019-01-24 DIAGNOSIS — M25.561 CHRONIC PAIN OF BOTH KNEES: ICD-10-CM

## 2019-01-24 PROCEDURE — 3008F PR BODY MASS INDEX (BMI) DOCUMENTED: ICD-10-PCS | Mod: CPTII,S$GLB,, | Performed by: FAMILY MEDICINE

## 2019-01-24 PROCEDURE — 99499 UNLISTED E&M SERVICE: CPT | Mod: S$GLB,,, | Performed by: FAMILY MEDICINE

## 2019-01-24 PROCEDURE — 3008F BODY MASS INDEX DOCD: CPT | Mod: CPTII,S$GLB,, | Performed by: FAMILY MEDICINE

## 2019-01-24 PROCEDURE — 99999 PR PBB SHADOW E&M-EST. PATIENT-LVL IV: CPT | Mod: PBBFAC,,, | Performed by: FAMILY MEDICINE

## 2019-01-24 PROCEDURE — 99499 RISK ADDL DX/OHS AUDIT: ICD-10-PCS | Mod: S$GLB,,, | Performed by: FAMILY MEDICINE

## 2019-01-24 PROCEDURE — 99214 OFFICE O/P EST MOD 30 MIN: CPT | Mod: 25,S$GLB,, | Performed by: FAMILY MEDICINE

## 2019-01-24 PROCEDURE — 99214 PR OFFICE/OUTPT VISIT, EST, LEVL IV, 30-39 MIN: ICD-10-PCS | Mod: 25,S$GLB,, | Performed by: FAMILY MEDICINE

## 2019-01-24 PROCEDURE — 99999 PR PBB SHADOW E&M-EST. PATIENT-LVL IV: ICD-10-PCS | Mod: PBBFAC,,, | Performed by: FAMILY MEDICINE

## 2019-01-24 PROCEDURE — 96372 THER/PROPH/DIAG INJ SC/IM: CPT | Mod: S$GLB,,, | Performed by: FAMILY MEDICINE

## 2019-01-24 PROCEDURE — 96372 PR INJECTION,THERAP/PROPH/DIAG2ST, IM OR SUBCUT: ICD-10-PCS | Mod: S$GLB,,, | Performed by: FAMILY MEDICINE

## 2019-01-24 RX ORDER — METHYLPREDNISOLONE 4 MG/1
TABLET ORAL
Qty: 1 PACKAGE | Refills: 0 | Status: SHIPPED | OUTPATIENT
Start: 2019-01-24 | End: 2019-02-20

## 2019-01-24 RX ORDER — PROMETHAZINE HYDROCHLORIDE AND CODEINE PHOSPHATE 6.25; 1 MG/5ML; MG/5ML
5 SOLUTION ORAL EVERY 6 HOURS PRN
Qty: 180 ML | Refills: 0 | Status: SHIPPED | OUTPATIENT
Start: 2019-01-24 | End: 2019-02-20

## 2019-01-24 RX ORDER — BETAMETHASONE SODIUM PHOSPHATE AND BETAMETHASONE ACETATE 3; 3 MG/ML; MG/ML
12 INJECTION, SUSPENSION INTRA-ARTICULAR; INTRALESIONAL; INTRAMUSCULAR; SOFT TISSUE
Status: COMPLETED | OUTPATIENT
Start: 2019-01-24 | End: 2019-01-24

## 2019-01-24 RX ORDER — AZITHROMYCIN 250 MG/1
TABLET, FILM COATED ORAL
Qty: 6 TABLET | Refills: 0 | Status: SHIPPED | OUTPATIENT
Start: 2019-01-24 | End: 2019-01-28

## 2019-01-24 RX ORDER — TRAMADOL HYDROCHLORIDE 50 MG/1
50 TABLET ORAL DAILY PRN
Qty: 30 TABLET | Refills: 1 | Status: SHIPPED | OUTPATIENT
Start: 2019-01-24 | End: 2019-02-20

## 2019-01-24 RX ORDER — PROMETHAZINE HYDROCHLORIDE AND CODEINE PHOSPHATE 6.25; 1 MG/5ML; MG/5ML
5 SOLUTION ORAL EVERY 6 HOURS PRN
Qty: 180 ML | Refills: 0 | Status: CANCELLED | OUTPATIENT
Start: 2019-01-24

## 2019-01-24 RX ORDER — LOSARTAN POTASSIUM 50 MG/1
50 TABLET ORAL DAILY
Qty: 90 TABLET | Refills: 3 | Status: SHIPPED | OUTPATIENT
Start: 2019-01-24 | End: 2019-05-29

## 2019-01-24 RX ADMIN — BETAMETHASONE SODIUM PHOSPHATE AND BETAMETHASONE ACETATE 12 MG: 3; 3 INJECTION, SUSPENSION INTRA-ARTICULAR; INTRALESIONAL; INTRAMUSCULAR; SOFT TISSUE at 11:01

## 2019-01-24 NOTE — PROGRESS NOTES
Subjective:       Patient ID: Trina Marie Collette is a 54 y.o. female.    Chief Complaint: Medication Refill and Hypertension    HPI:  54-year-old female in for medication refills follow-up hypertension blood pressure 152/84 had been checking blood pressure at home and it was high       .Pt with coughing spells hard sleep.  No fever, +runny nose, +sore throat, +cough, positive phlegm-clear.  No pneumonia asthma TB. + smoking quarter pack per day has 1 family member that is sick.  ROS:  Skin: no psoriasis, eczema, skin cancer  HEENT: No headache, ocular pain, blurred vision, diplopia, epistaxis, hoarseness change in voice, thyroid trouble--history of tinnitus/chronic sinus problem/nasal septal deviation/hearing loss  Lung: No pneumonia, asthma, Tb, wheezing, SOB, smoking quarter of a pack history of bronchospasm in the past uses albuterol  Heart: No chest pain, ankle edema, palpitations, MI, river murmur, +hypertension,+ hyperlipidemia-no stent bypass arrhythmia  Abdomen: No nausea, vomiting, diarrhea, constipation, ulcers, hepatitis, gallbladder disease, melena, hematochezia, hematemesis  : no UTI, renal disease, stones  GYN neg last mammogram was in lot October   MS: no fractures, O/A, lupus, rheumatoid, gout--history of chronic pain the back lumbar spine, right knee come right shoulder, history DDD lumbar history cervical spinal stenosis  Neuro: No dizziness, LOC, seizures   No diabetes, no anemia, no anxiety, no depression patient with history of bipolar schizophrenia    Objective:   Physical Exam:  General: Well nourished, well developed, no acute distress +overweight  Skin: No lesions  HEENT: Eyes PERRLA, EOM intact, nose +clear discharge, throat 1/4 erythematous ears TMs clear  NECK: Supple, no bruits, No JVD, no nodes  Lungs: Clear, no rales, rhonchi, wheezing +coarse cough  Heart: Regular rate and rhythm, no murmurs, gallops, or rubs  Abdomen: flat, bowel sounds positive, no tenderness, or  organomegaly  MS:  Tenderness lumbar spine L1-S1 bilaterally anterior flexion 10° extension to degrees lateral flexion rotation 10°, able squat when quarter the way down hard arise due to pain in the right knee some pain in the right shoulder especially with rotation unable raise arm overhead  Neuro: Alert, CN intact, oriented X 3  Extremities: No cyanosis, clubbing, or edema         Assessment:       1. Bronchitis    2. Sinusitis, unspecified chronicity, unspecified location    3. Bronchospasm    4. DDD (degenerative disc disease), cervical    5. Cervical spinal stenosis    6. Bipolar 1 disorder    7. Schizophrenia, unspecified type    8. Nasal septal deviation    9. Hyperlipidemia, unspecified hyperlipidemia type    10. Chronic pain of both knees    11. Chronic right shoulder pain    12. Lumbar spine pain    13. Tobacco abuse        Plan:       Bronchitis    Sinusitis, unspecified chronicity, unspecified location    Bronchospasm    DDD (degenerative disc disease), cervical    Cervical spinal stenosis    Bipolar 1 disorder    Schizophrenia, unspecified type    Nasal septal deviation    Hyperlipidemia, unspecified hyperlipidemia type    Chronic pain of both knees    Chronic right shoulder pain    Lumbar spine pain    Tobacco abuse    Other orders  -     promethazine-codeine 6.25-10 mg/5 ml (PHENERGAN WITH CODEINE) 6.25-10 mg/5 mL syrup; Take 5 mLs by mouth every 6 (six) hours as needed for Cough.  Dispense: 180 mL; Refill: 0  -     traMADol (ULTRAM) 50 mg tablet; Take 1 tablet (50 mg total) by mouth daily as needed for Pain.  Dispense: 30 tablet; Refill: 1      cold-Celestone/Zithromax/Medrol/Phenergan with codeine  Ultram if patient has chronic pain needs to go to chronic pain clinic has problems with DDD lumbar spine/right knee/right shoulder all have chronic pain  Patient needs to decrease weight DC smoking  Patient needs to do lab CBCs CMP lipids T4 TSH UA stool guaiac if not done in over a year and  EKG  Low-sodium diet get arm blood pressure cuff check blood pressure daily return 2 weeks if blood pressure done here to show the blood pressure dropped from 152/84

## 2019-01-24 NOTE — PROGRESS NOTES
Patient, Trina Marie Collette (MRN #[id number]), presented with a recorded BMI of 39.87 kg/m^2 and a documented comorbidity(s):  - Hyperlipidemia  to which the severe obesity is a contributing factor. This is consistent with the definition of severe obesity (BMI 35.0-39.9) with comorbidity (ICD-10 E66.01, Z68.35). The patient's severe obesity was monitored, evaluated, addressed and/or treated. This addendum to the medical record is made on 01/24/2019.

## 2019-01-24 NOTE — TELEPHONE ENCOUNTER
----- Message from Alvarado Olivas sent at 1/24/2019  7:44 AM CST -----  Contact: same  Patient called in and stated she needs to come in now today 1/24/19 for her med refill appt due to transportation.  Patient was offered an appt for 12:30pm today but refused.  Patient call back number is [phone]

## 2019-01-24 NOTE — PROGRESS NOTES
Patient ID verified by name and . NKDA. Celestone 12 mg IM injection given in left ventrogluteal using aseptic technique. Aspirated with no blood noted. Patient tolerated well. Given per physicians order. No adverse reaction noted.

## 2019-01-25 NOTE — PROGRESS NOTES
Patient, Trina Marie Collette (MRN #[id number]), presented with a recorded BMI of 43.93 kg/m^2 consistent with the definition of morbid obesity (ICD-10 E66.01). The patient's morbid obesity was monitored, evaluated, addressed and/or treated. This addendum to the medical record is made on 01/24/2019.

## 2019-02-06 RX ORDER — TRAMADOL HYDROCHLORIDE 50 MG/1
TABLET ORAL
Qty: 30 TABLET | Refills: 1 | OUTPATIENT
Start: 2019-02-06

## 2019-02-20 ENCOUNTER — OFFICE VISIT (OUTPATIENT)
Dept: PRIMARY CARE CLINIC | Facility: CLINIC | Age: 54
End: 2019-02-20
Payer: MEDICARE

## 2019-02-20 VITALS
HEIGHT: 63 IN | BODY MASS INDEX: 43.41 KG/M2 | WEIGHT: 245 LBS | RESPIRATION RATE: 18 BRPM | HEART RATE: 76 BPM | DIASTOLIC BLOOD PRESSURE: 82 MMHG | SYSTOLIC BLOOD PRESSURE: 124 MMHG | OXYGEN SATURATION: 95 %

## 2019-02-20 DIAGNOSIS — K29.70 GASTRITIS, PRESENCE OF BLEEDING UNSPECIFIED, UNSPECIFIED CHRONICITY, UNSPECIFIED GASTRITIS TYPE: ICD-10-CM

## 2019-02-20 DIAGNOSIS — J98.01 BRONCHOSPASM: ICD-10-CM

## 2019-02-20 DIAGNOSIS — E78.5 HYPERLIPIDEMIA, UNSPECIFIED HYPERLIPIDEMIA TYPE: ICD-10-CM

## 2019-02-20 DIAGNOSIS — M48.02 CERVICAL SPINAL STENOSIS: ICD-10-CM

## 2019-02-20 DIAGNOSIS — M25.561 CHRONIC PAIN OF BOTH KNEES: ICD-10-CM

## 2019-02-20 DIAGNOSIS — F20.9 SCHIZOPHRENIA, UNSPECIFIED TYPE: ICD-10-CM

## 2019-02-20 DIAGNOSIS — M50.30 DDD (DEGENERATIVE DISC DISEASE), CERVICAL: ICD-10-CM

## 2019-02-20 DIAGNOSIS — M54.50 LUMBAR SPINE PAIN: ICD-10-CM

## 2019-02-20 DIAGNOSIS — G89.4 CHRONIC PAIN SYNDROME: ICD-10-CM

## 2019-02-20 DIAGNOSIS — M47.816 FACET ARTHROPATHY, LUMBAR: ICD-10-CM

## 2019-02-20 DIAGNOSIS — E66.9 OBESITY (BMI 35.0-39.9 WITHOUT COMORBIDITY): ICD-10-CM

## 2019-02-20 DIAGNOSIS — F31.9 BIPOLAR 1 DISORDER: ICD-10-CM

## 2019-02-20 DIAGNOSIS — M25.562 CHRONIC PAIN OF BOTH KNEES: ICD-10-CM

## 2019-02-20 DIAGNOSIS — J40 BRONCHITIS: Primary | ICD-10-CM

## 2019-02-20 DIAGNOSIS — Z72.0 TOBACCO ABUSE: ICD-10-CM

## 2019-02-20 DIAGNOSIS — G89.29 CHRONIC PAIN OF BOTH KNEES: ICD-10-CM

## 2019-02-20 PROCEDURE — 99499 UNLISTED E&M SERVICE: CPT | Mod: S$GLB,,, | Performed by: FAMILY MEDICINE

## 2019-02-20 PROCEDURE — 99214 PR OFFICE/OUTPT VISIT, EST, LEVL IV, 30-39 MIN: ICD-10-PCS | Mod: S$GLB,,, | Performed by: FAMILY MEDICINE

## 2019-02-20 PROCEDURE — 3008F PR BODY MASS INDEX (BMI) DOCUMENTED: ICD-10-PCS | Mod: CPTII,S$GLB,, | Performed by: FAMILY MEDICINE

## 2019-02-20 PROCEDURE — 3008F BODY MASS INDEX DOCD: CPT | Mod: CPTII,S$GLB,, | Performed by: FAMILY MEDICINE

## 2019-02-20 PROCEDURE — 99999 PR PBB SHADOW E&M-EST. PATIENT-LVL IV: CPT | Mod: PBBFAC,,, | Performed by: FAMILY MEDICINE

## 2019-02-20 PROCEDURE — 99999 PR PBB SHADOW E&M-EST. PATIENT-LVL IV: ICD-10-PCS | Mod: PBBFAC,,, | Performed by: FAMILY MEDICINE

## 2019-02-20 PROCEDURE — 99499 RISK ADDL DX/OHS AUDIT: ICD-10-PCS | Mod: S$GLB,,, | Performed by: FAMILY MEDICINE

## 2019-02-20 PROCEDURE — 99214 OFFICE O/P EST MOD 30 MIN: CPT | Mod: S$GLB,,, | Performed by: FAMILY MEDICINE

## 2019-02-20 RX ORDER — PROMETHAZINE HYDROCHLORIDE AND CODEINE PHOSPHATE 6.25; 1 MG/5ML; MG/5ML
5 SOLUTION ORAL EVERY 6 HOURS PRN
Qty: 180 ML | Refills: 0 | Status: SHIPPED | OUTPATIENT
Start: 2019-02-20 | End: 2019-03-18 | Stop reason: SDUPTHER

## 2019-02-20 RX ORDER — CEFDINIR 300 MG/1
300 CAPSULE ORAL 2 TIMES DAILY
Qty: 20 CAPSULE | Refills: 0 | Status: SHIPPED | OUTPATIENT
Start: 2019-02-20 | End: 2019-03-02

## 2019-02-20 RX ORDER — HYDROCODONE BITARTRATE AND ACETAMINOPHEN 5; 325 MG/1; MG/1
1 TABLET ORAL EVERY 6 HOURS PRN
Qty: 30 TABLET | Refills: 0 | Status: SHIPPED | OUTPATIENT
Start: 2019-02-20 | End: 2019-03-18

## 2019-02-20 NOTE — PROGRESS NOTES
Subjective:       Patient ID: Trina Marie Collette is a 54 y.o. female.    Chief Complaint: Medication Refill    HPI:  54-year-old female needs refills cough medication, Narcos, nexium. Pt on narco for pain neck, back. . Had neck and back pain awhile. Pt was in pain clinic end month Dr Senior-- still goes to pain clinic        Nexium uses for stomach --every times eats food won't go down.      Wheezes alot has inhaler    ROS:  Skin: no psoriasis, eczema, skin cancer  HEENT: No headache, ocular pain, blurred vision, diplopia, epistaxis, hoarseness change in voice, thyroid trouble--history of tinnitus/chronic sinus problem/nasal septal deviation/hearing loss  Lung: No pneumonia, asthma, Tb, wheezing, SOB, smoking quarter of a pack history of bronchospasm in the past uses albuterol  Heart: No chest pain, ankle edema, palpitations, MI, river murmur, +hypertension,+ hyperlipidemia-no stent bypass arrhythmia  Abdomen: No nausea, vomiting, diarrhea, constipation, ulcers, hepatitis, gallbladder disease, melena, hematochezia, hematemesis  : no UTI, renal disease, stones  GYN neg last mammogram was in lot October   MS: no fractures, O/A, lupus, rheumatoid, gout--history of chronic pain the back lumbar spine, right knee come right shoulder, history DDD lumbar history cervical spinal stenosis  Neuro: No dizziness, LOC, seizures   No diabetes, no anemia, no anxiety, no depression patient with history of bipolar schizophrenia    Objective:   Physical Exam:  General: Well nourished, well developed, no acute distress +overweight  Skin: No lesions  HEENT: Eyes PERRLA, EOM intact, nose +clear discharge, throat 1/4 erythematous ears TMs clear  NECK: Supple, no bruits, No JVD, no nodes  Lungs: Clear, no rales, rhonchi, wheezing +coarse cough occas wheeze   Heart: Regular rate and rhythm, no murmurs, gallops, or rubs  Abdomen: flat, bowel sounds positive, no tenderness, or organomegaly  MS:  Tenderness lumbar spine L1-S1 bilaterally  anterior flexion 10° extension to degrees lateral flexion rotation 10°, able squat when quarter the way down hard arise due to pain in the right knee some pain in the right shoulder especially with rotation unable raise arm overhead--no change   Neuro: Alert, CN intact, oriented X 3  Extremities: No cyanosis, clubbing, or edema         Assessment:       1. Bronchitis    2. Gastritis, presence of bleeding unspecified, unspecified chronicity, unspecified gastritis type    3. Chronic pain syndrome    4. Tobacco abuse    5. Lumbar spine pain    6. Chronic pain of both knees    7. Facet arthropathy, lumbar    8. Obesity (BMI 35.0-39.9 without comorbidity)    9. Hyperlipidemia, unspecified hyperlipidemia type    10. Bronchospasm    11. Schizophrenia, unspecified type    12. Bipolar 1 disorder    13. DDD (degenerative disc disease), cervical    14. Cervical spinal stenosis        Plan:       Bronchitis    Gastritis, presence of bleeding unspecified, unspecified chronicity, unspecified gastritis type    Chronic pain syndrome    Tobacco abuse    Lumbar spine pain    Chronic pain of both knees    Facet arthropathy, lumbar    Obesity (BMI 35.0-39.9 without comorbidity)    Hyperlipidemia, unspecified hyperlipidemia type    Bronchospasm    Schizophrenia, unspecified type    Bipolar 1 disorder    DDD (degenerative disc disease), cervical    Cervical spinal stenosis      Omnicef/Phenergan with codeine  Norco/NSAIDs/Flexeril--told will not refill pain medications any longer patient is to see Dr. Stahl and pain clinic or other Pain Clinic of her choice  Patient needs to decrease weight DC smoking--avoid caffeine/NSAIDs/alcohol/stressed/carbonated drinks/smoking--Nexium--if gastritis flares can add Zantac 150 q.a.m. Nexium q.p.m. or Tums 2 p.o. p.c. breakfast lunch and supper--could also had Carafate if needed--appointment Dr. Galeana for gastroscopy  Patient needs to do lab CBCs CMP lipids T4 TSH UA stool guaiac if not done in over a  year and EKG

## 2019-03-18 ENCOUNTER — OFFICE VISIT (OUTPATIENT)
Dept: PRIMARY CARE CLINIC | Facility: CLINIC | Age: 54
End: 2019-03-18
Payer: MEDICARE

## 2019-03-18 VITALS
BODY MASS INDEX: 39.05 KG/M2 | DIASTOLIC BLOOD PRESSURE: 83 MMHG | OXYGEN SATURATION: 100 % | SYSTOLIC BLOOD PRESSURE: 128 MMHG | RESPIRATION RATE: 18 BRPM | HEIGHT: 66 IN | HEART RATE: 60 BPM | WEIGHT: 243 LBS

## 2019-03-18 DIAGNOSIS — F20.9 SCHIZOPHRENIA, UNSPECIFIED TYPE: ICD-10-CM

## 2019-03-18 DIAGNOSIS — E66.9 OBESITY, UNSPECIFIED CLASSIFICATION, UNSPECIFIED OBESITY TYPE, UNSPECIFIED WHETHER SERIOUS COMORBIDITY PRESENT: ICD-10-CM

## 2019-03-18 DIAGNOSIS — J98.01 BRONCHOSPASM: Primary | ICD-10-CM

## 2019-03-18 DIAGNOSIS — E78.5 HYPERLIPIDEMIA, UNSPECIFIED HYPERLIPIDEMIA TYPE: ICD-10-CM

## 2019-03-18 DIAGNOSIS — E07.9 THYROID MASS: ICD-10-CM

## 2019-03-18 DIAGNOSIS — G47.00 INSOMNIA, UNSPECIFIED TYPE: ICD-10-CM

## 2019-03-18 DIAGNOSIS — M25.569 CHRONIC KNEE PAIN, UNSPECIFIED LATERALITY: ICD-10-CM

## 2019-03-18 DIAGNOSIS — F31.9 BIPOLAR 1 DISORDER: ICD-10-CM

## 2019-03-18 DIAGNOSIS — N64.4 BREAST TENDERNESS IN FEMALE: ICD-10-CM

## 2019-03-18 DIAGNOSIS — J40 BRONCHITIS: ICD-10-CM

## 2019-03-18 DIAGNOSIS — M54.50 LUMBAR SPINE PAIN: ICD-10-CM

## 2019-03-18 DIAGNOSIS — Z72.0 TOBACCO ABUSE: ICD-10-CM

## 2019-03-18 DIAGNOSIS — G89.29 CHRONIC KNEE PAIN, UNSPECIFIED LATERALITY: ICD-10-CM

## 2019-03-18 DIAGNOSIS — M50.30 DDD (DEGENERATIVE DISC DISEASE), CERVICAL: ICD-10-CM

## 2019-03-18 PROCEDURE — 99999 PR PBB SHADOW E&M-EST. PATIENT-LVL III: ICD-10-PCS | Mod: PBBFAC,,, | Performed by: FAMILY MEDICINE

## 2019-03-18 PROCEDURE — 3008F PR BODY MASS INDEX (BMI) DOCUMENTED: ICD-10-PCS | Mod: CPTII,S$GLB,, | Performed by: FAMILY MEDICINE

## 2019-03-18 PROCEDURE — 99214 PR OFFICE/OUTPT VISIT, EST, LEVL IV, 30-39 MIN: ICD-10-PCS | Mod: S$GLB,,, | Performed by: FAMILY MEDICINE

## 2019-03-18 PROCEDURE — 99214 OFFICE O/P EST MOD 30 MIN: CPT | Mod: S$GLB,,, | Performed by: FAMILY MEDICINE

## 2019-03-18 PROCEDURE — 99999 PR PBB SHADOW E&M-EST. PATIENT-LVL III: CPT | Mod: PBBFAC,,, | Performed by: FAMILY MEDICINE

## 2019-03-18 PROCEDURE — 3008F BODY MASS INDEX DOCD: CPT | Mod: CPTII,S$GLB,, | Performed by: FAMILY MEDICINE

## 2019-03-18 RX ORDER — PANTOPRAZOLE SODIUM 40 MG/1
40 TABLET, DELAYED RELEASE ORAL DAILY
Qty: 90 TABLET | Refills: 1 | Status: SHIPPED | OUTPATIENT
Start: 2019-03-18 | End: 2020-01-17 | Stop reason: ALTCHOICE

## 2019-03-18 RX ORDER — ZOLPIDEM TARTRATE 5 MG/1
TABLET ORAL
Qty: 30 TABLET | Refills: 1 | Status: SHIPPED | OUTPATIENT
Start: 2019-03-18 | End: 2019-04-15 | Stop reason: SDUPTHER

## 2019-03-18 RX ORDER — PROMETHAZINE HYDROCHLORIDE AND CODEINE PHOSPHATE 6.25; 1 MG/5ML; MG/5ML
5 SOLUTION ORAL EVERY 6 HOURS PRN
Qty: 180 ML | Refills: 0 | Status: SHIPPED | OUTPATIENT
Start: 2019-03-18 | End: 2019-04-15 | Stop reason: SDUPTHER

## 2019-03-18 RX ORDER — AZITHROMYCIN 250 MG/1
TABLET, FILM COATED ORAL
Qty: 6 TABLET | Refills: 0 | Status: SHIPPED | OUTPATIENT
Start: 2019-03-18 | End: 2019-03-22

## 2019-03-18 NOTE — PROGRESS NOTES
Subjective:       Patient ID: Trina Marie Collette is a 54 y.o. female.    Chief Complaint: Insomnia (wants to stop trazodone) and Cough    HPI:  54-year-old female --patient wants to stop trazodone only use Ambien.  Had decreased Ambien from 10 mg to 5 mg.  Patient also has a cough --off and on seen ER had virus --no fever, +runny nose, +sore throat, +cough, +phlegm-clear.  No pneumonia asthma TB + smoking -- not daily --has albuterol uses for bronchospasm p.r.n. usually occurs with cold  ROS:  Skin: no psoriasis, eczema, skin cancer  HEENT: No headache, ocular pain, blurred vision, diplopia, epistaxis, hoarseness change in voice, thyroid trouble--history of tinnitus/chronic sinus problem/nasal septal deviation/hearing loss  Lung: No pneumonia, asthma, Tb, wheezing, SOB, smoking quarter of a pack history of bronchospasm in the past uses albuterol  Heart: No chest pain, ankle edema, palpitations, MI, river murmur, +hypertension,+ hyperlipidemia-no stent bypass arrhythmia  Abdomen: No nausea, vomiting, diarrhea, constipation, ulcers, hepatitis, gallbladder disease, melena, hematochezia, hematemesis  : no UTI, renal disease, stones  GYN neg last mammogram was in October   MS: no fractures, O/A, lupus, rheumatoid, gout--history of chronic pain the back lumbar spine, right knee come right shoulder, history DDD lumbar history cervical spinal stenosis  Neuro: No dizziness, LOC, seizures   No diabetes, no anemia, no anxiety, no depression patient with history of bipolar schizophrenia    Objective:   Physical Exam:  General: Well nourished, well developed, no acute distress +overweight  Skin: No lesions  HEENT: Eyes PERRLA, EOM intact, nose +clear discharge, throat 1/4 erythematous ears TMs clear  NECK: Supple, no bruits, No JVD, no nodes  Lungs: Clear, no rales, rhonchi, wheezing +coarse cough  Heart: Regular rate and rhythm, no murmurs, gallops, or rubs  Abdomen: flat, bowel sounds positive, no tenderness, or  organomegaly  MS:  Tenderness lumbar spine L1-S1 bilaterally anterior flexion 10° extension to degrees lateral flexion rotation 10°, able squat when quarter the way down hard arise due to pain in the right knee some pain in the right shoulder especially with rotation unable raise arm overhead  Neuro: Alert, CN intact, oriented X 3  Extremities: No cyanosis, clubbing, or edema         Assessment:       1. Bronchospasm    2. Bronchitis    3. Insomnia, unspecified type    4. Thyroid mass    5. Lumbar spine pain    6. Chronic knee pain, unspecified laterality    7. Breast tenderness in female    8. Hyperlipidemia, unspecified hyperlipidemia type    9. DDD (degenerative disc disease), cervical    10. Bipolar 1 disorder    11. Schizophrenia, unspecified type    12. Obesity, unspecified classification, unspecified obesity type, unspecified whether serious comorbidity present    13. Tobacco abuse        Plan:       Bronchospasm    Bronchitis    Insomnia, unspecified type  -     pantoprazole (PROTONIX) 40 MG tablet; Take 1 tablet (40 mg total) by mouth once daily.  Dispense: 90 tablet; Refill: 1    Thyroid mass  -     pantoprazole (PROTONIX) 40 MG tablet; Take 1 tablet (40 mg total) by mouth once daily.  Dispense: 90 tablet; Refill: 1    Lumbar spine pain  -     pantoprazole (PROTONIX) 40 MG tablet; Take 1 tablet (40 mg total) by mouth once daily.  Dispense: 90 tablet; Refill: 1    Chronic knee pain, unspecified laterality  -     pantoprazole (PROTONIX) 40 MG tablet; Take 1 tablet (40 mg total) by mouth once daily.  Dispense: 90 tablet; Refill: 1    Breast tenderness in female  -     pantoprazole (PROTONIX) 40 MG tablet; Take 1 tablet (40 mg total) by mouth once daily.  Dispense: 90 tablet; Refill: 1    Hyperlipidemia, unspecified hyperlipidemia type  -     pantoprazole (PROTONIX) 40 MG tablet; Take 1 tablet (40 mg total) by mouth once daily.  Dispense: 90 tablet; Refill: 1    DDD (degenerative disc disease),  cervical    Bipolar 1 disorder    Schizophrenia, unspecified type    Obesity, unspecified classification, unspecified obesity type, unspecified whether serious comorbidity present    Tobacco abuse    Other orders  -     zolpidem (AMBIEN) 5 MG Tab; 1 po qhs  as needed for sleep  Dispense: 30 tablet; Refill: 1  -     azithromycin (Z-LUIS) 250 MG tablet; 2 tabs by mouth day 1, then 1 tab by mouth daily x 4 days  Dispense: 6 tablet; Refill: 0  -     promethazine-codeine 6.25-10 mg/5 ml (PHENERGAN WITH CODEINE) 6.25-10 mg/5 mL syrup; Take 5 mLs by mouth every 6 (six) hours as needed for Cough.  Dispense: 180 mL; Refill: 0      cold-Zithromax/Phenergan with codeine  Appt Dr Galeana for GI evaluation Needs colonoscopy and EGD --history dysphagia  Patient needs to decrease weight DC smoking  OK ambien told take only prn not nightly --is addictive If needs every night will need see psych for help alternative sleeping medication

## 2019-03-20 DIAGNOSIS — Z12.11 COLON CANCER SCREENING: ICD-10-CM

## 2019-04-15 ENCOUNTER — OFFICE VISIT (OUTPATIENT)
Dept: PRIMARY CARE CLINIC | Facility: CLINIC | Age: 54
End: 2019-04-15
Payer: MEDICARE

## 2019-04-15 VITALS
DIASTOLIC BLOOD PRESSURE: 88 MMHG | HEIGHT: 66 IN | OXYGEN SATURATION: 99 % | RESPIRATION RATE: 18 BRPM | SYSTOLIC BLOOD PRESSURE: 137 MMHG | WEIGHT: 242 LBS | BODY MASS INDEX: 38.89 KG/M2 | HEART RATE: 61 BPM

## 2019-04-15 DIAGNOSIS — M50.30 DDD (DEGENERATIVE DISC DISEASE), CERVICAL: Primary | ICD-10-CM

## 2019-04-15 DIAGNOSIS — Z72.0 TOBACCO ABUSE: ICD-10-CM

## 2019-04-15 DIAGNOSIS — F31.9 BIPOLAR 1 DISORDER: ICD-10-CM

## 2019-04-15 DIAGNOSIS — E66.9 OBESITY (BMI 35.0-39.9 WITHOUT COMORBIDITY): ICD-10-CM

## 2019-04-15 DIAGNOSIS — F20.9 SCHIZOPHRENIA, UNSPECIFIED TYPE: ICD-10-CM

## 2019-04-15 DIAGNOSIS — M48.02 CERVICAL SPINAL STENOSIS: ICD-10-CM

## 2019-04-15 DIAGNOSIS — G47.00 INSOMNIA, UNSPECIFIED TYPE: ICD-10-CM

## 2019-04-15 PROCEDURE — 3008F BODY MASS INDEX DOCD: CPT | Mod: CPTII,S$GLB,, | Performed by: FAMILY MEDICINE

## 2019-04-15 PROCEDURE — 3008F PR BODY MASS INDEX (BMI) DOCUMENTED: ICD-10-PCS | Mod: CPTII,S$GLB,, | Performed by: FAMILY MEDICINE

## 2019-04-15 PROCEDURE — 99213 PR OFFICE/OUTPT VISIT, EST, LEVL III, 20-29 MIN: ICD-10-PCS | Mod: S$GLB,,, | Performed by: FAMILY MEDICINE

## 2019-04-15 PROCEDURE — 99999 PR PBB SHADOW E&M-EST. PATIENT-LVL IV: CPT | Mod: PBBFAC,,, | Performed by: FAMILY MEDICINE

## 2019-04-15 PROCEDURE — 99999 PR PBB SHADOW E&M-EST. PATIENT-LVL IV: ICD-10-PCS | Mod: PBBFAC,,, | Performed by: FAMILY MEDICINE

## 2019-04-15 PROCEDURE — 99213 OFFICE O/P EST LOW 20 MIN: CPT | Mod: S$GLB,,, | Performed by: FAMILY MEDICINE

## 2019-04-15 RX ORDER — MONTELUKAST SODIUM 10 MG/1
10 TABLET ORAL DAILY
Qty: 30 TABLET | Refills: 5 | Status: SHIPPED | OUTPATIENT
Start: 2019-04-15 | End: 2019-07-17 | Stop reason: SDUPTHER

## 2019-04-15 RX ORDER — MONTELUKAST SODIUM 10 MG/1
TABLET ORAL
COMMUNITY
Start: 2019-03-09 | End: 2019-04-15 | Stop reason: SDUPTHER

## 2019-04-15 RX ORDER — ZOLPIDEM TARTRATE 5 MG/1
TABLET ORAL
Qty: 30 TABLET | Refills: 1 | Status: SHIPPED | OUTPATIENT
Start: 2019-04-15 | End: 2019-05-14 | Stop reason: SDUPTHER

## 2019-04-15 RX ORDER — PROMETHAZINE HYDROCHLORIDE AND CODEINE PHOSPHATE 6.25; 1 MG/5ML; MG/5ML
5 SOLUTION ORAL EVERY 6 HOURS PRN
Qty: 180 ML | Refills: 0 | Status: SHIPPED | OUTPATIENT
Start: 2019-04-15 | End: 2019-05-14 | Stop reason: SDUPTHER

## 2019-04-15 NOTE — PROGRESS NOTES
Subjective:       Patient ID: Trina Marie Collette is a 54 y.o. female.    Chief Complaint: Medication Refill    HPI:  54-year-old female --patient is still going to pain management--had 2 injections in still in excruciating pain--giving pt Narco 5 mg --Pt states needs refills ambien, promethazine with codiene , singulair . Ambien for sleep not taking trazadone --pt stopped the trazadone . Coughing due to change in weather but it helps .   ROS:  Skin: no psoriasis, eczema, skin cancer  HEENT: No headache, ocular pain, blurred vision, diplopia, epistaxis, hoarseness change in voice, thyroid trouble--history of tinnitus/chronic sinus problem/nasal septal deviation/hearing loss  Lung: No pneumonia, asthma, Tb, wheezing, SOB, smoking quarter of a pack history of bronchospasm in the past uses albuterol  Heart: No chest pain, ankle edema, palpitations, MI, river murmur, +hypertension,+ hyperlipidemia-no stent bypass arrhythmia  Abdomen: No nausea, vomiting, diarrhea, constipation, ulcers, hepatitis, gallbladder disease, melena, hematochezia, hematemesis  : no UTI, renal disease, stones  GYN neg last mammogram was in October   MS: no fractures, O/A, lupus, rheumatoid, gout--history of chronic pain the back lumbar spine, right knee come right shoulder, history DDD lumbar history cervical spinal stenosis  Neuro: No dizziness, LOC, seizures   No diabetes, no anemia, no anxiety, no depression patient with history of bipolar schizophrenia    Objective:   Physical Exam:  General: Well nourished, well developed, no acute distress +overweight  Skin: No lesions  HEENT: Eyes PERRLA, EOM intact, nose +clear discharge, throat 1/4 erythematous ears TMs clear  NECK: Supple, no bruits, No JVD, no nodes  Lungs: Clear, no rales, rhonchi, wheezing +coarse cough  Heart: Regular rate and rhythm, no murmurs, gallops, or rubs  Abdomen: flat, bowel sounds positive, no tenderness, or organomegaly  MS:  Tenderness lumbar spine L1-S1  bilaterally anterior flexion 10° extension to degrees lateral flexion rotation 10°, able squat when quarter the way down hard arise due to pain in the right knee some pain in the right shoulder especially with rotation unable raise arm overhead--no significant change  Neuro: Alert, CN intact, oriented X 3  Extremities: No cyanosis, clubbing, or edema         Assessment:       1. DDD (degenerative disc disease), cervical    2. Cervical spinal stenosis    3. Bipolar 1 disorder    4. Schizophrenia, unspecified type    5. Obesity (BMI 35.0-39.9 without comorbidity)    6. Insomnia, unspecified type    7. Tobacco abuse        Plan:       DDD (degenerative disc disease), cervical  -     Ambulatory Referral to Neurosurgery    Cervical spinal stenosis  -     Ambulatory Referral to Neurosurgery    Bipolar 1 disorder  -     Ambulatory referral to Psychiatry    Schizophrenia, unspecified type  -     Ambulatory referral to Psychiatry    Obesity (BMI 35.0-39.9 without comorbidity)    Insomnia, unspecified type  -     Ambulatory referral to Psychiatry    Tobacco abuse    Other orders  -     zolpidem (AMBIEN) 5 MG Tab; 1 po qhs  as needed for sleep  Dispense: 30 tablet; Refill: 1  -     montelukast (SINGULAIR) 10 mg tablet; Take 1 tablet (10 mg total) by mouth once daily.  Dispense: 30 tablet; Refill: 5  -     promethazine-codeine 6.25-10 mg/5 ml (PHENERGAN WITH CODEINE) 6.25-10 mg/5 mL syrup; Take 5 mLs by mouth every 6 (six) hours as needed for Cough.  Dispense: 180 mL; Refill: 0      continue going to the Pain Clinic--patient was getting steroid injections in the back---states getting no relief--will refer to [facility] for insomnia patient wants Ambien q.h.s.--told if needs an medications sleep every night needs to see psychiatrist and get an alternative medication--tried alternate trazodone with Ambien but states trazodone gives no with  Phenergan with codeine--told if patient can pain  clinic may get kicked out of takes other pain medications--last refill of a codeine cough syrup patient can get Tessalon Perles--Mucinex DM--albuterol inhaler  Appt Dr Galeana for GI evaluation Needs colonoscopy and EGD --history dysphagia  Patient needs to decrease weight DC smoking  OK ambien told take only prn not nightly --is addictive If needs every night will need see psych for help alternative sleeping medication

## 2019-05-14 ENCOUNTER — OFFICE VISIT (OUTPATIENT)
Dept: PRIMARY CARE CLINIC | Facility: CLINIC | Age: 54
End: 2019-05-14
Payer: MEDICARE

## 2019-05-14 VITALS
DIASTOLIC BLOOD PRESSURE: 90 MMHG | OXYGEN SATURATION: 99 % | HEART RATE: 65 BPM | WEIGHT: 242 LBS | BODY MASS INDEX: 38.89 KG/M2 | HEIGHT: 66 IN | RESPIRATION RATE: 18 BRPM | SYSTOLIC BLOOD PRESSURE: 147 MMHG

## 2019-05-14 DIAGNOSIS — Z72.0 TOBACCO ABUSE: Primary | ICD-10-CM

## 2019-05-14 PROCEDURE — 99999 PR PBB SHADOW E&M-EST. PATIENT-LVL III: CPT | Mod: PBBFAC,,, | Performed by: FAMILY MEDICINE

## 2019-05-14 PROCEDURE — 3008F BODY MASS INDEX DOCD: CPT | Mod: CPTII,S$GLB,, | Performed by: FAMILY MEDICINE

## 2019-05-14 PROCEDURE — 99999 PR PBB SHADOW E&M-EST. PATIENT-LVL III: ICD-10-PCS | Mod: PBBFAC,,, | Performed by: FAMILY MEDICINE

## 2019-05-14 PROCEDURE — 3008F PR BODY MASS INDEX (BMI) DOCUMENTED: ICD-10-PCS | Mod: CPTII,S$GLB,, | Performed by: FAMILY MEDICINE

## 2019-05-14 PROCEDURE — 99214 OFFICE O/P EST MOD 30 MIN: CPT | Mod: S$GLB,,, | Performed by: FAMILY MEDICINE

## 2019-05-14 PROCEDURE — 99214 PR OFFICE/OUTPT VISIT, EST, LEVL IV, 30-39 MIN: ICD-10-PCS | Mod: S$GLB,,, | Performed by: FAMILY MEDICINE

## 2019-05-14 RX ORDER — TRAZODONE HYDROCHLORIDE 50 MG/1
TABLET ORAL
Qty: 30 TABLET | Refills: 11 | Status: SHIPPED | OUTPATIENT
Start: 2019-05-14 | End: 2019-09-12 | Stop reason: SDUPTHER

## 2019-05-14 RX ORDER — ZOLPIDEM TARTRATE 5 MG/1
TABLET ORAL
Qty: 30 TABLET | Refills: 1 | Status: SHIPPED | OUTPATIENT
Start: 2019-05-14 | End: 2019-06-14 | Stop reason: SDUPTHER

## 2019-05-14 RX ORDER — SERTRALINE HYDROCHLORIDE 100 MG/1
100 TABLET, FILM COATED ORAL DAILY
Qty: 90 TABLET | Refills: 1 | Status: SHIPPED | OUTPATIENT
Start: 2019-05-14 | End: 2019-11-04 | Stop reason: SDUPTHER

## 2019-05-14 RX ORDER — PROMETHAZINE HYDROCHLORIDE AND CODEINE PHOSPHATE 6.25; 1 MG/5ML; MG/5ML
5 SOLUTION ORAL EVERY 6 HOURS PRN
Qty: 180 ML | Refills: 0 | Status: SHIPPED | OUTPATIENT
Start: 2019-05-14 | End: 2019-05-29

## 2019-05-14 NOTE — PROGRESS NOTES
Subjective:       Patient ID: Trina Marie Collette is a 54 y.o. female.    Chief Complaint: Medication Refill    HPI:  54-year-old female --patient is still going to pain management--patient in for refill.  Blood pressure 147/90 but forgot to take her medication this morning.  Needs Zoloft 100 mg q.d.,Ambien, and cough syrup.       Pt still at pain clinic as needed on Narco 5 mg --in lower back.     ROS:  Skin: no psoriasis, eczema, skin cancer  HEENT: No headache, ocular pain, blurred vision, diplopia, epistaxis, hoarseness change in voice, thyroid trouble--history of tinnitus/chronic sinus problem/nasal septal deviation/hearing loss  Lung: No pneumonia, asthma, Tb, wheezing, SOB, smoking 1/4 ppd  history of bronchospasm in the past uses albuterol  Heart: No chest pain, ankle edema, palpitations, MI, river murmur, +hypertension,+ hyperlipidemia-no stent bypass arrhythmia  Abdomen: No nausea, vomiting, diarrhea, constipation, ulcers, hepatitis, gallbladder disease, melena, hematochezia, hematemesis  : no UTI, renal disease, stones  GYN neg last mammogram was in October   MS: no fractures, O/A, lupus, rheumatoid, gout--history of chronic pain the back lumbar spine, right knee come right shoulder, history DDD lumbar history cervical spinal stenosis  Neuro: No dizziness, LOC, seizures   No diabetes, no anemia, no anxiety, no depression patient with history of bipolar schizophrenia    Objective:   Physical Exam:  General: Well nourished, well developed, no acute distress +overweight  Skin: No lesions  HEENT: Eyes PERRLA, EOM intact, nose +clear discharge, throat 1/4 erythematous ears TMs clear  NECK: Supple, no bruits, No JVD, no nodes  Lungs: Clear, no rales, rhonchi, wheezing +coarse cough  Heart: Regular rate and rhythm, no murmurs, gallops, or rubs  Abdomen: flat, bowel sounds positive, no tenderness, or organomegaly  MS:  Tenderness lumbar spine L1-S1 bilaterally anterior flexion 10° extension to degrees  lateral flexion rotation 10°, able squat when quarter the way down hard arise due to pain in the right knee some pain in the right shoulder especially with rotation unable raise arm overhead--no significant change  Neuro: Alert, CN intact, oriented X 3  Extremities: No cyanosis, clubbing, or edema         Assessment:       1. Tobacco abuse        Plan:       Tobacco abuse  -     Fecal Immunochemical Test (iFOBT); Future; Expected date: 05/14/2019    Other orders  -     zolpidem (AMBIEN) 5 MG Tab; 1 po Q OHS p.r.n. sleep alternate with trazodone so 1 of the other taken every other night as needed for sleep  Dispense: 30 tablet; Refill: 1  -     sertraline (ZOLOFT) 100 MG tablet; Take 1 tablet (100 mg total) by mouth once daily.  Dispense: 90 tablet; Refill: 1  -     promethazine-codeine 6.25-10 mg/5 ml (PHENERGAN WITH CODEINE) 6.25-10 mg/5 mL syrup; Take 5 mLs by mouth every 6 (six) hours as needed for Cough.  Dispense: 180 mL; Refill: 0  -     traZODone (DESYREL) 50 MG tablet; One p.o. Q 0 HS p.r.n. sleep alternate with Ambien so 1 of the other taken every other night  Dispense: 30 tablet; Refill: 11      continue going to the Pain Clinic-for back pain  Hypertension patient needs to come back in 2 weeks on her blood pressure medication be sure blood pressure decreases to 140/90 presently 147/90  Needs to DC smoking/exercise/decrease weight  [facility] patient wants Ambien q.h.s.--told if needs an medications sleep every night needs to see psychiatrist and get an alternative medication--tried alternate trazodone with Ambien but states trazodone gives no with  Phenergan with codeine- for cough   Appt Dr Galeana for GI evaluation Needs colonoscopy and EGD --history dysphagia

## 2019-05-29 ENCOUNTER — OFFICE VISIT (OUTPATIENT)
Dept: PRIMARY CARE CLINIC | Facility: CLINIC | Age: 54
End: 2019-05-29
Payer: MEDICARE

## 2019-05-29 ENCOUNTER — TELEPHONE (OUTPATIENT)
Dept: PRIMARY CARE CLINIC | Facility: CLINIC | Age: 54
End: 2019-05-29

## 2019-05-29 VITALS
BODY MASS INDEX: 38.41 KG/M2 | HEART RATE: 58 BPM | DIASTOLIC BLOOD PRESSURE: 85 MMHG | RESPIRATION RATE: 18 BRPM | HEIGHT: 66 IN | OXYGEN SATURATION: 99 % | SYSTOLIC BLOOD PRESSURE: 164 MMHG | WEIGHT: 239 LBS

## 2019-05-29 DIAGNOSIS — F31.9 BIPOLAR 1 DISORDER: ICD-10-CM

## 2019-05-29 DIAGNOSIS — R13.10 DYSPHAGIA, UNSPECIFIED TYPE: ICD-10-CM

## 2019-05-29 DIAGNOSIS — M89.9 DISORDER OF BONE: ICD-10-CM

## 2019-05-29 DIAGNOSIS — I10 HYPERTENSION, UNSPECIFIED TYPE: Primary | ICD-10-CM

## 2019-05-29 DIAGNOSIS — R05.3 CHRONIC COUGH: ICD-10-CM

## 2019-05-29 DIAGNOSIS — F20.9 SCHIZOPHRENIA, UNSPECIFIED TYPE: ICD-10-CM

## 2019-05-29 DIAGNOSIS — E78.5 HYPERLIPIDEMIA, UNSPECIFIED HYPERLIPIDEMIA TYPE: ICD-10-CM

## 2019-05-29 DIAGNOSIS — Z72.0 TOBACCO ABUSE: ICD-10-CM

## 2019-05-29 DIAGNOSIS — J32.9 CHRONIC SINUSITIS, UNSPECIFIED LOCATION: ICD-10-CM

## 2019-05-29 DIAGNOSIS — K21.9 GASTROESOPHAGEAL REFLUX DISEASE, ESOPHAGITIS PRESENCE NOT SPECIFIED: ICD-10-CM

## 2019-05-29 PROBLEM — E66.9 OBESITY: Status: RESOLVED | Noted: 2018-06-08 | Resolved: 2019-05-29

## 2019-05-29 PROCEDURE — 99499 UNLISTED E&M SERVICE: CPT | Mod: S$GLB,,, | Performed by: FAMILY MEDICINE

## 2019-05-29 PROCEDURE — 99214 OFFICE O/P EST MOD 30 MIN: CPT | Mod: S$GLB,,, | Performed by: FAMILY MEDICINE

## 2019-05-29 PROCEDURE — 99499 RISK ADDL DX/OHS AUDIT: ICD-10-PCS | Mod: S$GLB,,, | Performed by: FAMILY MEDICINE

## 2019-05-29 PROCEDURE — 3008F PR BODY MASS INDEX (BMI) DOCUMENTED: ICD-10-PCS | Mod: CPTII,S$GLB,, | Performed by: FAMILY MEDICINE

## 2019-05-29 PROCEDURE — 99999 PR PBB SHADOW E&M-EST. PATIENT-LVL V: CPT | Mod: PBBFAC,,, | Performed by: FAMILY MEDICINE

## 2019-05-29 PROCEDURE — 99999 PR PBB SHADOW E&M-EST. PATIENT-LVL V: ICD-10-PCS | Mod: PBBFAC,,, | Performed by: FAMILY MEDICINE

## 2019-05-29 PROCEDURE — 3077F SYST BP >= 140 MM HG: CPT | Mod: CPTII,S$GLB,, | Performed by: FAMILY MEDICINE

## 2019-05-29 PROCEDURE — 3077F PR MOST RECENT SYSTOLIC BLOOD PRESSURE >= 140 MM HG: ICD-10-PCS | Mod: CPTII,S$GLB,, | Performed by: FAMILY MEDICINE

## 2019-05-29 PROCEDURE — 3079F PR MOST RECENT DIASTOLIC BLOOD PRESSURE 80-89 MM HG: ICD-10-PCS | Mod: CPTII,S$GLB,, | Performed by: FAMILY MEDICINE

## 2019-05-29 PROCEDURE — 3008F BODY MASS INDEX DOCD: CPT | Mod: CPTII,S$GLB,, | Performed by: FAMILY MEDICINE

## 2019-05-29 PROCEDURE — 3079F DIAST BP 80-89 MM HG: CPT | Mod: CPTII,S$GLB,, | Performed by: FAMILY MEDICINE

## 2019-05-29 PROCEDURE — 99214 PR OFFICE/OUTPT VISIT, EST, LEVL IV, 30-39 MIN: ICD-10-PCS | Mod: S$GLB,,, | Performed by: FAMILY MEDICINE

## 2019-05-29 RX ORDER — PROMETHAZINE HYDROCHLORIDE AND CODEINE PHOSPHATE 6.25; 1 MG/5ML; MG/5ML
5 SOLUTION ORAL EVERY 6 HOURS PRN
Qty: 180 ML | Refills: 0 | Status: SHIPPED | OUTPATIENT
Start: 2019-05-29 | End: 2019-06-28 | Stop reason: SDUPTHER

## 2019-05-29 RX ORDER — OMEPRAZOLE 40 MG/1
40 CAPSULE, DELAYED RELEASE ORAL DAILY
Qty: 30 CAPSULE | Refills: 5 | Status: SHIPPED | OUTPATIENT
Start: 2019-05-29 | End: 2019-10-04 | Stop reason: SDUPTHER

## 2019-05-29 RX ORDER — LOSARTAN POTASSIUM 100 MG/1
100 TABLET ORAL DAILY
Qty: 90 TABLET | Refills: 3 | Status: SHIPPED | OUTPATIENT
Start: 2019-05-29 | End: 2020-05-28

## 2019-05-29 RX ORDER — ZIPRASIDONE HYDROCHLORIDE 80 MG/1
80 CAPSULE ORAL DAILY
Qty: 30 CAPSULE | Refills: 5 | Status: SHIPPED | OUTPATIENT
Start: 2019-05-29 | End: 2022-02-02 | Stop reason: SDUPTHER

## 2019-05-29 RX ORDER — ZIPRASIDONE HYDROCHLORIDE 40 MG/1
40 CAPSULE ORAL DAILY
Qty: 30 CAPSULE | Refills: 5 | Status: SHIPPED | OUTPATIENT
Start: 2019-05-29 | End: 2021-12-21

## 2019-05-29 NOTE — TELEPHONE ENCOUNTER
----- Message from Allison Sullivan sent at 5/29/2019  4:11 PM CDT -----  Contact: [place]  Type:  Pharmacy Calling to Clarify an RX    Name of Caller:  H[place]  Pharmacy Name:  [facility]  Prescription Name:  Ziprasidone  What do they need to clarify?:  Possible interaction with trazadone and tizanidine  Best Call Back Number:  [phone]  Additional Information:

## 2019-05-29 NOTE — PROGRESS NOTES
Subjective:       Patient ID: Trina Marie Collette is a 54 y.o. female.    Chief Complaint: Follow-up    HPI:  54-year-old female --patient is still going to pain management--in for refills --in because pressure will not go down--blood pressure today 164/85.  Patient having difficulty seeing psychiatrist Needs Geodon needs  80 mg no morning 40 mg  in the afternoon.  Patient bipolar--pt not sure if also schizophrenia--pt does hear voices     ROS:  Skin: no psoriasis, eczema, skin cancer  HEENT: No headache, ocular pain, blurred vision, diplopia, epistaxis, hoarseness change in voice, thyroid trouble--history of tinnitus/chronic sinus problem/nasal septal deviation/hearing loss  Lung: No pneumonia, asthma, Tb, wheezing, SOB, smoking 1/4 ppd  history of bronchospasm in the past uses albuterol  Heart: No chest pain, ankle edema, palpitations, MI, river murmur, +hypertension,+ hyperlipidemia-no stent bypass arrhythmia  Abdomen: No nausea, vomiting, diarrhea, constipation, ulcers, hepatitis, gallbladder disease, melena, hematochezia, hematemesis patient complaining of heartburn  : no UTI, renal disease, stones  GYN neg last mammogram was in October   MS: no fractures, O/A, lupus, rheumatoid, gout--history of chronic pain the back lumbar spine, right knee come right shoulder, history DDD lumbar history cervical spinal stenosis  Neuro: No dizziness, LOC, seizures   No diabetes, no anemia, no anxiety, no depression patient with history of bipolar schizophrenia    Objective:   Physical Exam:  General: Well nourished, well developed, no acute distress +overweight  Skin: No lesions  HEENT: Eyes PERRLA, EOM intact, nose +clear , throat nonerythematous ears TMs clear  NECK: Supple, no bruits, No JVD, no nodes  Lungs: Clear, no rales, rhonchi, wheezing +coarse cough  Heart: Regular rate and rhythm, no murmurs, gallops, or rubs  Abdomen: flat, bowel sounds positive, no tenderness, or organomegaly  MS:  Tenderness lumbar spine  L1-S1 bilaterally anterior flexion 10° extension to degrees lateral flexion rotation 10°, able squat when quarter the way down hard arise due to pain in the right knee some pain in the right shoulder especially in the in for scapular area on the right  Neuro: Alert, CN intact, oriented X 3  Extremities: No cyanosis, clubbing, or edema         Assessment:       1. Hypertension, unspecified type    2. Bipolar 1 disorder    3. Schizophrenia, unspecified type    4. Chronic sinusitis, unspecified location    5. Hyperlipidemia, unspecified hyperlipidemia type    6. Tobacco abuse    7. Gastroesophageal reflux disease, esophagitis presence not specified    8. Chronic cough    9. Dysphagia, unspecified type        Plan:       Hypertension, unspecified type    Bipolar 1 disorder  -     Ambulatory Referral to Psychiatry    Schizophrenia, unspecified type  -     Ambulatory Referral to Psychiatry    Chronic sinusitis, unspecified location    Hyperlipidemia, unspecified hyperlipidemia type    Tobacco abuse    Gastroesophageal reflux disease, esophagitis presence not specified  -     Ambulatory Referral to Gastroenterology    Chronic cough    Dysphagia, unspecified type  -     Ambulatory Referral to Gastroenterology    Other orders  -     ziprasidone (GEODON) 40 MG Cap; Take 1 capsule (40 mg total) by mouth once daily.  Dispense: 30 capsule; Refill: 5  -     ziprasidone (GEODON) 80 MG capsule; Take 1 capsule (80 mg total) by mouth once daily.  Dispense: 30 capsule; Refill: 5  -     promethazine-codeine 6.25-10 mg/5 ml (PHENERGAN WITH CODEINE) 6.25-10 mg/5 mL syrup; Take 5 mLs by mouth every 6 (six) hours as needed for Cough.  Dispense: 180 mL; Refill: 0  -     losartan (COZAAR) 100 MG tablet; Take 1 tablet (100 mg total) by mouth once daily.  Dispense: 90 tablet; Refill: 3  -     omeprazole (PRILOSEC) 40 MG capsule; Take 1 capsule (40 mg total) by mouth once daily.  Dispense: 30 capsule; Refill: 5      continue going to the Pain  Clinic-for back pain  Patient unable to get into psychiatrist--bipolar and schizophrenic--told will give 6 months supply of Geodon 80 mg a.m. 40 mg p.m. for 6 months give time to see psychiatrist total I do not routinely right this drug and do not treat bipolar and schizophrenia  Hypertension patient needs to come back in 2 weeks on her blood pressure medication be sure blood pressure decreases to 140/90 presently 164/85--increase Cozaar from 50 mg to 100 mg--return 2 weeks if still high will consider adding HCTZ 12.5 or amlodipine 2.5  Needs to DC smoking/exercise/decrease weight/low-sodium low-fat die  Phenergan with codeine- for cough   Appt Dr Galeana for GI evaluation Needs colonoscopy and EGD --history dysphagia--had omeprazole 40 q.h.s  Needs physical in October CBCs CMP lipid T4 TSH stool guaiac UA chest x-ray EKG is total physical--if EKG abnormal consider cardiac workup  Can do Pap smear next visit--or see OBGYN

## 2019-05-29 NOTE — TELEPHONE ENCOUNTER
Called pharmacy states that Geodon is interacting with trazodone and Zanaflex per Dr Delmy cid Zanaflex notified pharm states understanding

## 2019-06-14 ENCOUNTER — OFFICE VISIT (OUTPATIENT)
Dept: PRIMARY CARE CLINIC | Facility: CLINIC | Age: 54
End: 2019-06-14
Payer: MEDICARE

## 2019-06-14 VITALS
HEIGHT: 66 IN | BODY MASS INDEX: 38.73 KG/M2 | SYSTOLIC BLOOD PRESSURE: 147 MMHG | OXYGEN SATURATION: 100 % | WEIGHT: 241 LBS | RESPIRATION RATE: 18 BRPM | HEART RATE: 73 BPM | DIASTOLIC BLOOD PRESSURE: 90 MMHG

## 2019-06-14 DIAGNOSIS — E78.5 HYPERLIPIDEMIA, UNSPECIFIED HYPERLIPIDEMIA TYPE: ICD-10-CM

## 2019-06-14 DIAGNOSIS — M50.30 DDD (DEGENERATIVE DISC DISEASE), CERVICAL: ICD-10-CM

## 2019-06-14 DIAGNOSIS — Z12.72 VAGINAL PAP SMEAR: Primary | ICD-10-CM

## 2019-06-14 DIAGNOSIS — F20.9 SCHIZOPHRENIA, UNSPECIFIED TYPE: ICD-10-CM

## 2019-06-14 DIAGNOSIS — F31.9 BIPOLAR 1 DISORDER: ICD-10-CM

## 2019-06-14 DIAGNOSIS — Z72.0 TOBACCO ABUSE: ICD-10-CM

## 2019-06-14 DIAGNOSIS — J98.01 BRONCHOSPASM: ICD-10-CM

## 2019-06-14 DIAGNOSIS — I10 ESSENTIAL HYPERTENSION: ICD-10-CM

## 2019-06-14 DIAGNOSIS — G89.4 CHRONIC PAIN SYNDROME: ICD-10-CM

## 2019-06-14 DIAGNOSIS — M54.50 LUMBAR SPINE PAIN: ICD-10-CM

## 2019-06-14 PROCEDURE — 3077F PR MOST RECENT SYSTOLIC BLOOD PRESSURE >= 140 MM HG: ICD-10-PCS | Mod: CPTII,S$GLB,, | Performed by: FAMILY MEDICINE

## 2019-06-14 PROCEDURE — 99214 PR OFFICE/OUTPT VISIT, EST, LEVL IV, 30-39 MIN: ICD-10-PCS | Mod: S$GLB,,, | Performed by: FAMILY MEDICINE

## 2019-06-14 PROCEDURE — 99999 PR PBB SHADOW E&M-EST. PATIENT-LVL III: ICD-10-PCS | Mod: PBBFAC,,, | Performed by: FAMILY MEDICINE

## 2019-06-14 PROCEDURE — 3008F BODY MASS INDEX DOCD: CPT | Mod: CPTII,S$GLB,, | Performed by: FAMILY MEDICINE

## 2019-06-14 PROCEDURE — 99999 PR PBB SHADOW E&M-EST. PATIENT-LVL III: CPT | Mod: PBBFAC,,, | Performed by: FAMILY MEDICINE

## 2019-06-14 PROCEDURE — 99214 OFFICE O/P EST MOD 30 MIN: CPT | Mod: S$GLB,,, | Performed by: FAMILY MEDICINE

## 2019-06-14 PROCEDURE — 3080F DIAST BP >= 90 MM HG: CPT | Mod: CPTII,S$GLB,, | Performed by: FAMILY MEDICINE

## 2019-06-14 PROCEDURE — 3077F SYST BP >= 140 MM HG: CPT | Mod: CPTII,S$GLB,, | Performed by: FAMILY MEDICINE

## 2019-06-14 PROCEDURE — 3008F PR BODY MASS INDEX (BMI) DOCUMENTED: ICD-10-PCS | Mod: CPTII,S$GLB,, | Performed by: FAMILY MEDICINE

## 2019-06-14 PROCEDURE — 3080F PR MOST RECENT DIASTOLIC BLOOD PRESSURE >= 90 MM HG: ICD-10-PCS | Mod: CPTII,S$GLB,, | Performed by: FAMILY MEDICINE

## 2019-06-14 RX ORDER — PROMETHAZINE HYDROCHLORIDE AND CODEINE PHOSPHATE 6.25; 1 MG/5ML; MG/5ML
5 SOLUTION ORAL EVERY 6 HOURS PRN
Qty: 180 ML | Refills: 0 | Status: CANCELLED | OUTPATIENT
Start: 2019-06-14

## 2019-06-14 RX ORDER — CODEINE PHOSPHATE AND GUAIFENESIN 10; 100 MG/5ML; MG/5ML
5 SOLUTION ORAL EVERY 6 HOURS PRN
Qty: 180 ML | Refills: 0 | Status: SHIPPED | OUTPATIENT
Start: 2019-06-14 | End: 2019-06-28

## 2019-06-14 RX ORDER — ZOLPIDEM TARTRATE 5 MG/1
TABLET ORAL
Qty: 30 TABLET | Refills: 1 | Status: SHIPPED | OUTPATIENT
Start: 2019-06-14 | End: 2019-07-25 | Stop reason: SDUPTHER

## 2019-06-14 NOTE — PROGRESS NOTES
Subjective:       Patient ID: Trina Marie Collette is a 54 y.o. female.    Chief Complaint: Medication Refill    HPI:  54-year-old female --in for refills-- needs ambien and cough syrup. Has rash over eyes hyrocortisone cream 1 %.  Blood pressure 147/90--patient states blood pressure medications just adjust patient on Cozaar 100 mg on it for less than a month .  Patient does not have an arm blood pressure cuff at home to check blood pressures daily.        No new soaps or detergents, no new medicines, no new foods, no new clothes , not working in the garden--thinks skin irritation eyelids allergy related       Patient still sees Dr. Stahl in chronic pain clinic for back pain   . ROS:  Skin: no psoriasis, eczema, skin cancer--?  Allergies eyelids   HEENT: No headache, ocular pain, blurred vision, diplopia, epistaxis, hoarseness change in voice, thyroid trouble--history of tinnitus/chronic sinus problem/nasal septal deviation/hearing loss  Lung: No pneumonia, asthma, Tb, wheezing, SOB, smoking 1/4 ppd  history of bronchospasm in the past uses albuterol  Heart: No chest pain, ankle edema, palpitations, MI, river murmur, +hypertension,+ hyperlipidemia-no stent bypass arrhythmia  Abdomen: No nausea, vomiting, diarrhea, constipation, ulcers, hepatitis, gallbladder disease, melena, hematochezia, hematemesis patient complaining of heartburn  : no UTI, renal disease, stones  GYN neg last mammogram was in October   MS: no fractures, O/A, lupus, rheumatoid, gout--history of chronic pain the back lumbar spine, right knee come right shoulder, history DDD lumbar history cervical spinal stenosis  Neuro: No dizziness, LOC, seizures   No diabetes, no anemia, no anxiety, no depression patient with history of bipolar schizophrenia  Single, one child, disabled, lives alone     Objective:   Physical Exam:  General: Well nourished, well developed, no acute distress +overweight  Skin: No lesions  HEENT: Eyes PERRLA, EOM intact, nose  +clear , throat nonerythematous ears TMs clear  NECK: Supple, no bruits, No JVD, no nodes  Lungs: Clear, no rales, rhonchi, wheezing +coarse cough  Heart: Regular rate and rhythm, no murmurs, gallops, or rubs  Abdomen: flat, bowel sounds positive, no tenderness, or organomegaly  MS:  Tenderness lumbar spine L1-S1 bilaterally anterior flexion 10° extension to degrees lateral flexion rotation 10°, able squat when quarter the way down hard arise due to pain in the right knee some pain in the right shoulder especially in the in for scapular area on the right  Neuro: Alert, CN intact, oriented X 3  Extremities: No cyanosis, clubbing, or edema         Assessment:       1. Vaginal Pap smear    2. Essential hypertension    3. Hyperlipidemia, unspecified hyperlipidemia type    4. Bronchospasm    5. Schizophrenia, unspecified type    6. Bipolar 1 disorder    7. Tobacco abuse    8. Lumbar spine pain    9. DDD (degenerative disc disease), cervical    10. Chronic pain syndrome        Plan:       Vaginal Pap smear  -     Ambulatory Referral to Obstetrics / Gynecology    Essential hypertension    Hyperlipidemia, unspecified hyperlipidemia type    Bronchospasm    Schizophrenia, unspecified type    Bipolar 1 disorder    Tobacco abuse    Lumbar spine pain    DDD (degenerative disc disease), cervical    Chronic pain syndrome    Other orders  -     zolpidem (AMBIEN) 5 MG Tab; 1 po Q O[facility] p.r.n. sleep alternate with trazodone so 1 of the other taken every other night as needed for sleep  Dispense: 30 tablet; Refill: 1  -     guaifenesin-codeine 100-10 mg/5 ml (TUSSI-ORGANIDIN NR)  mg/5 mL syrup; Take 5 mLs by mouth every 6 (six) hours as needed.  Dispense: 180 mL; Refill: 0  -     umeclidinium-vilanterol (ANORO ELLIPTA) 62.5-25 mcg/actuation DsDv; Inhale 1 puff into the lungs once daily. Controller  Dispense: 60 each; Refill: 5      continue going to the Pain Clinic-for back pain sees Dr Senior  Hydrocortisone cream apply to the  rash in the periorbital areas b.i.d. p.r.n. Itching-can a add Claritin 10 mg 1 p.o. q.d. over-the-counter  Patient unable to get into psychiatrist--bipolar and schizophrenic--told will give 6 months supply of Geodon 80 mg a.m. 40 mg p.m. for 6 months give time to see psychiatrist total I do not routinely right this drug and do not treat bipolar and schizophrenia  Hypertension patient needs to come back in 2 weeks blood pressure 147/90--just started on Cozaar 1 month ago--if pressure reminds high will change to Hyzaar 100/12.5  Low-sodium diet/exercise/decrease weight  Patient wheezing at night add anoro use this weight about 15 min prior to using Ventolin to decrease use of rescue inhaler  Needs to DC smoking  Cheratussin AC and Ambien   Appt Dr Galeana for GI evaluation Needs colonoscopy and EGD --history dysphagia--had omeprazole 40 q.h.s  Needs physical in October CBCs CMP lipid T4 TSH stool guaiac UA chest x-ray EKG is total physical--if EKG abnormal consider cardiac workup  Can do Pap smear next visit--or see OBGYN

## 2019-06-28 ENCOUNTER — OFFICE VISIT (OUTPATIENT)
Dept: PRIMARY CARE CLINIC | Facility: CLINIC | Age: 54
End: 2019-06-28
Payer: MEDICARE

## 2019-06-28 VITALS
HEART RATE: 70 BPM | SYSTOLIC BLOOD PRESSURE: 140 MMHG | WEIGHT: 239 LBS | OXYGEN SATURATION: 99 % | DIASTOLIC BLOOD PRESSURE: 83 MMHG | RESPIRATION RATE: 18 BRPM | TEMPERATURE: 98 F | BODY MASS INDEX: 38.41 KG/M2 | HEIGHT: 66 IN

## 2019-06-28 DIAGNOSIS — E66.9 OBESITY (BMI 35.0-39.9 WITHOUT COMORBIDITY): ICD-10-CM

## 2019-06-28 DIAGNOSIS — J98.01 BRONCHOSPASM: ICD-10-CM

## 2019-06-28 DIAGNOSIS — E78.5 HYPERLIPIDEMIA, UNSPECIFIED HYPERLIPIDEMIA TYPE: ICD-10-CM

## 2019-06-28 DIAGNOSIS — I10 ESSENTIAL HYPERTENSION: ICD-10-CM

## 2019-06-28 DIAGNOSIS — J40 BRONCHITIS: Primary | ICD-10-CM

## 2019-06-28 DIAGNOSIS — Z72.0 TOBACCO ABUSE: ICD-10-CM

## 2019-06-28 PROCEDURE — 3077F SYST BP >= 140 MM HG: CPT | Mod: CPTII,S$GLB,, | Performed by: FAMILY MEDICINE

## 2019-06-28 PROCEDURE — 3077F PR MOST RECENT SYSTOLIC BLOOD PRESSURE >= 140 MM HG: ICD-10-PCS | Mod: CPTII,S$GLB,, | Performed by: FAMILY MEDICINE

## 2019-06-28 PROCEDURE — 99213 PR OFFICE/OUTPT VISIT, EST, LEVL III, 20-29 MIN: ICD-10-PCS | Mod: S$GLB,,, | Performed by: FAMILY MEDICINE

## 2019-06-28 PROCEDURE — 99999 PR PBB SHADOW E&M-EST. PATIENT-LVL IV: ICD-10-PCS | Mod: PBBFAC,,, | Performed by: FAMILY MEDICINE

## 2019-06-28 PROCEDURE — 3079F DIAST BP 80-89 MM HG: CPT | Mod: CPTII,S$GLB,, | Performed by: FAMILY MEDICINE

## 2019-06-28 PROCEDURE — 3079F PR MOST RECENT DIASTOLIC BLOOD PRESSURE 80-89 MM HG: ICD-10-PCS | Mod: CPTII,S$GLB,, | Performed by: FAMILY MEDICINE

## 2019-06-28 PROCEDURE — 3008F BODY MASS INDEX DOCD: CPT | Mod: CPTII,S$GLB,, | Performed by: FAMILY MEDICINE

## 2019-06-28 PROCEDURE — 99999 PR PBB SHADOW E&M-EST. PATIENT-LVL IV: CPT | Mod: PBBFAC,,, | Performed by: FAMILY MEDICINE

## 2019-06-28 PROCEDURE — 3008F PR BODY MASS INDEX (BMI) DOCUMENTED: ICD-10-PCS | Mod: CPTII,S$GLB,, | Performed by: FAMILY MEDICINE

## 2019-06-28 PROCEDURE — 99213 OFFICE O/P EST LOW 20 MIN: CPT | Mod: S$GLB,,, | Performed by: FAMILY MEDICINE

## 2019-06-28 RX ORDER — PROMETHAZINE HYDROCHLORIDE AND CODEINE PHOSPHATE 6.25; 1 MG/5ML; MG/5ML
5 SOLUTION ORAL EVERY 6 HOURS PRN
Qty: 180 ML | Refills: 0 | Status: SHIPPED | OUTPATIENT
Start: 2019-06-28 | End: 2019-07-12 | Stop reason: SDUPTHER

## 2019-06-28 RX ORDER — AZITHROMYCIN 250 MG/1
TABLET, FILM COATED ORAL
Qty: 6 TABLET | Refills: 0 | Status: SHIPPED | OUTPATIENT
Start: 2019-06-28 | End: 2019-07-02

## 2019-06-28 NOTE — PROGRESS NOTES
Subjective:       Patient ID: Trina Marie Collette is a 54 y.o. female.    Chief Complaint: Medication Refill    HPI:  54-year-old female --needs refill of cough medication--patient getting ready to go to [place] for vacation--no fever, +runny nose, +scratchy itchy throat, +cough, +phlegm-clear.  No pneumonia asthma TB.  Social smoking  . ROS:  Skin: no psoriasis, eczema, skin cancer  HEENT: No headache, ocular pain, blurred vision, diplopia, epistaxis, hoarseness change in voice, thyroid trouble--history of tinnitus/chronic sinus problem/nasal septal deviation/hearing loss  Lung: No pneumonia, asthma, Tb, wheezing, SOB, smoking 1/4 ppd  history of bronchospasm in the past uses albuterol  Heart: No chest pain, ankle edema, palpitations, MI, river murmur, +hypertension blood pressure 140/83 + hyperlipidemia-no stent bypass arrhythmia  Abdomen: No nausea, vomiting, diarrhea, constipation, ulcers, hepatitis, gallbladder disease, melena, hematochezia, hematemesis patient complaining of heartburn  : no UTI, renal disease, stones  GYN neg last mammogram was in October   MS: no fractures, O/A, lupus, rheumatoid, gout--history of chronic pain the back lumbar spine, right knee come right shoulder, history DDD lumbar history cervical spinal stenosis  Neuro: No dizziness, LOC, seizures   No diabetes, no anemia, no anxiety, no depression patient with history of bipolar schizophrenia--doing well with medication goes to Mental Health   Single, one child, disabled, lives alone     Objective:   Physical Exam:  General: Well nourished, well developed, no acute distress +overweight  Skin: No lesions  HEENT: Eyes PERRLA, EOM intact, nose +clear , throat nonerythematous ears TMs clear  NECK: Supple, no bruits, No JVD, no nodes  Lungs: Clear, no rales, rhonchi, wheezing +coarse cough  Heart: Regular rate and rhythm, no murmurs, gallops, or rubs  Abdomen: flat, bowel sounds positive, no tenderness, or organomegaly  MS:  Tenderness  lumbar spine L1-S1 bilaterally anterior flexion 10° extension to degrees lateral flexion rotation 10°, able squat when quarter the way down hard arise due to pain in the right knee some pain in the right shoulder especially in the in for scapular area on the right --no significant change  Neuro: Alert, CN intact, oriented X 3  Extremities: No cyanosis, clubbing, or edema         Assessment:       1. Bronchitis    2. Essential hypertension    3. Hyperlipidemia, unspecified hyperlipidemia type    4. Bronchospasm    5. Obesity (BMI 35.0-39.9 without comorbidity)    6. Tobacco abuse        Plan:       Bronchitis    Essential hypertension    Hyperlipidemia, unspecified hyperlipidemia type    Bronchospasm    Obesity (BMI 35.0-39.9 without comorbidity)    Tobacco abuse      continue going to the Pain Clinic-for back pain sees Dr Nadeen Babcock--Phenergan with codeine--no need for steroids at this time--told to hold antibiotic until sputum changes color is yellow green  Goes to mental health--bipolar and schizophrenic--told will give 6 months supply of Geodon 80 mg a.m. 40 mg p.m. for 6 months give time to see psychiatrist total I do not routinely right this drug and do not treat bipolar and schizophrenia  Hypertension blood pressure well controlled BP  140/83  Low-sodium diet/exercise/decrease weight  Patient wheezing at night add anoro use this weight about 15 min prior to using Ventolin to decrease use of rescue inhaler  Needs to DC smoking  Appt Dr Galeana for GI evaluation Needs colonoscopy and EGD --history dysphagia--had omeprazole 40 q.h.s  Needs physical in October CBCs CMP lipid T4 TSH stool guaiac UA chest x-ray EKG is total physical--if EKG abnormal consider cardiac workup  Can do Pap smear next visit--or see OBGYN

## 2019-07-12 ENCOUNTER — OFFICE VISIT (OUTPATIENT)
Dept: PRIMARY CARE CLINIC | Facility: CLINIC | Age: 54
End: 2019-07-12
Payer: MEDICARE

## 2019-07-12 VITALS
BODY MASS INDEX: 38.33 KG/M2 | HEIGHT: 66 IN | OXYGEN SATURATION: 99 % | WEIGHT: 238.5 LBS | TEMPERATURE: 98 F | SYSTOLIC BLOOD PRESSURE: 143 MMHG | DIASTOLIC BLOOD PRESSURE: 88 MMHG | RESPIRATION RATE: 18 BRPM | HEART RATE: 65 BPM

## 2019-07-12 DIAGNOSIS — J40 BRONCHITIS: Primary | ICD-10-CM

## 2019-07-12 DIAGNOSIS — R05.9 COUGHING: ICD-10-CM

## 2019-07-12 PROCEDURE — 99213 PR OFFICE/OUTPT VISIT, EST, LEVL III, 20-29 MIN: ICD-10-PCS | Mod: S$GLB,,, | Performed by: INTERNAL MEDICINE

## 2019-07-12 PROCEDURE — 3008F PR BODY MASS INDEX (BMI) DOCUMENTED: ICD-10-PCS | Mod: CPTII,S$GLB,, | Performed by: INTERNAL MEDICINE

## 2019-07-12 PROCEDURE — 3077F PR MOST RECENT SYSTOLIC BLOOD PRESSURE >= 140 MM HG: ICD-10-PCS | Mod: CPTII,S$GLB,, | Performed by: INTERNAL MEDICINE

## 2019-07-12 PROCEDURE — 99213 OFFICE O/P EST LOW 20 MIN: CPT | Mod: S$GLB,,, | Performed by: INTERNAL MEDICINE

## 2019-07-12 PROCEDURE — 3008F BODY MASS INDEX DOCD: CPT | Mod: CPTII,S$GLB,, | Performed by: INTERNAL MEDICINE

## 2019-07-12 PROCEDURE — 99999 PR PBB SHADOW E&M-EST. PATIENT-LVL IV: ICD-10-PCS | Mod: PBBFAC,,, | Performed by: INTERNAL MEDICINE

## 2019-07-12 PROCEDURE — 99999 PR PBB SHADOW E&M-EST. PATIENT-LVL IV: CPT | Mod: PBBFAC,,, | Performed by: INTERNAL MEDICINE

## 2019-07-12 PROCEDURE — 3079F PR MOST RECENT DIASTOLIC BLOOD PRESSURE 80-89 MM HG: ICD-10-PCS | Mod: CPTII,S$GLB,, | Performed by: INTERNAL MEDICINE

## 2019-07-12 PROCEDURE — 3079F DIAST BP 80-89 MM HG: CPT | Mod: CPTII,S$GLB,, | Performed by: INTERNAL MEDICINE

## 2019-07-12 PROCEDURE — 3077F SYST BP >= 140 MM HG: CPT | Mod: CPTII,S$GLB,, | Performed by: INTERNAL MEDICINE

## 2019-07-12 RX ORDER — PROMETHAZINE HYDROCHLORIDE AND CODEINE PHOSPHATE 6.25; 1 MG/5ML; MG/5ML
5 SOLUTION ORAL EVERY 6 HOURS PRN
Qty: 180 ML | Refills: 0 | Status: SHIPPED | OUTPATIENT
Start: 2019-07-12 | End: 2019-08-13 | Stop reason: ALTCHOICE

## 2019-07-12 NOTE — PROGRESS NOTES
Subjective:       Patient ID: Trina Marie Collette is a 54 y.o. female.    Chief Complaint: Medication Refill    HPI  Pt is here to refill medications for coughing she has been coughing congestion for several days and barbara Traore coming request refill still has allergy meds at home no fever chill no n/v/d cough nonproductive no sob cp   Review of Systems    Objective:      Physical Exam   Constitutional: She is oriented to person, place, and time. She appears well-developed and well-nourished. No distress.   overweigh   HENT:   Head: Normocephalic and atraumatic.   Right Ear: External ear normal.   Left Ear: External ear normal.   Mouth/Throat: Oropharynx is clear and moist. No oropharyngeal exudate.   Mild nasal congestion   Eyes: Pupils are equal, round, and reactive to light. Conjunctivae and EOM are normal. Right eye exhibits no discharge. Left eye exhibits no discharge.   Neck: Normal range of motion. Neck supple. No thyromegaly present.   Cardiovascular: Normal rate, regular rhythm, normal heart sounds and intact distal pulses. Exam reveals no gallop and no friction rub.   No murmur heard.  Pulmonary/Chest: Effort normal. No respiratory distress. She has no wheezes. She has rales (few rhochi bilaterally). She exhibits no tenderness.   Abdominal: Soft. Bowel sounds are normal. She exhibits no distension. There is no tenderness. There is no rebound and no guarding.   Musculoskeletal: Normal range of motion. She exhibits no edema, tenderness or deformity.   Lymphadenopathy:     She has no cervical adenopathy.   Neurological: She is alert and oriented to person, place, and time.   Skin: Skin is warm and dry. Capillary refill takes less than 2 seconds. No rash noted. No erythema.   Psychiatric: She has a normal mood and affect. Judgment and thought content normal.   Nursing note and vitals reviewed.      Assessment:       1. Bronchitis    2. Coughing        Plan:       Bronchitis  Comments:  continue with other  meds at home  Orders:  -     promethazine-codeine 6.25-10 mg/5 ml (PHENERGAN WITH CODEINE) 6.25-10 mg/5 mL syrup; Take 5 mLs by mouth every 6 (six) hours as needed for Cough.  Dispense: 180 mL; Refill: 0    Coughing  -     promethazine-codeine 6.25-10 mg/5 ml (PHENERGAN WITH CODEINE) 6.25-10 mg/5 mL syrup; Take 5 mLs by mouth every 6 (six) hours as needed for Cough.  Dispense: 180 mL; Refill: 0

## 2019-07-18 RX ORDER — MONTELUKAST SODIUM 10 MG/1
TABLET ORAL
Qty: 90 TABLET | Refills: 1 | Status: SHIPPED | OUTPATIENT
Start: 2019-07-18 | End: 2020-03-24

## 2019-07-25 ENCOUNTER — OFFICE VISIT (OUTPATIENT)
Dept: PRIMARY CARE CLINIC | Facility: CLINIC | Age: 54
End: 2019-07-25
Payer: MEDICARE

## 2019-07-25 ENCOUNTER — OFFICE VISIT (OUTPATIENT)
Dept: PAIN MEDICINE | Facility: CLINIC | Age: 54
End: 2019-07-25
Payer: MEDICARE

## 2019-07-25 VITALS
HEART RATE: 62 BPM | SYSTOLIC BLOOD PRESSURE: 152 MMHG | DIASTOLIC BLOOD PRESSURE: 90 MMHG | BODY MASS INDEX: 38.09 KG/M2 | HEIGHT: 66 IN | RESPIRATION RATE: 19 BRPM | OXYGEN SATURATION: 100 % | TEMPERATURE: 98 F | WEIGHT: 237 LBS

## 2019-07-25 VITALS
SYSTOLIC BLOOD PRESSURE: 139 MMHG | HEART RATE: 67 BPM | WEIGHT: 238.44 LBS | BODY MASS INDEX: 38.32 KG/M2 | DIASTOLIC BLOOD PRESSURE: 87 MMHG | HEIGHT: 66 IN

## 2019-07-25 DIAGNOSIS — I10 ESSENTIAL HYPERTENSION: ICD-10-CM

## 2019-07-25 DIAGNOSIS — E78.5 HYPERLIPIDEMIA, UNSPECIFIED HYPERLIPIDEMIA TYPE: ICD-10-CM

## 2019-07-25 DIAGNOSIS — M51.36 DDD (DEGENERATIVE DISC DISEASE), LUMBAR: Primary | ICD-10-CM

## 2019-07-25 DIAGNOSIS — J40 BRONCHITIS: ICD-10-CM

## 2019-07-25 DIAGNOSIS — F20.9 SCHIZOPHRENIA, UNSPECIFIED TYPE: Primary | ICD-10-CM

## 2019-07-25 DIAGNOSIS — M50.30 DDD (DEGENERATIVE DISC DISEASE), CERVICAL: ICD-10-CM

## 2019-07-25 DIAGNOSIS — G89.4 CHRONIC PAIN SYNDROME: ICD-10-CM

## 2019-07-25 DIAGNOSIS — F31.9 BIPOLAR 1 DISORDER: ICD-10-CM

## 2019-07-25 DIAGNOSIS — M47.816 LUMBAR SPONDYLOSIS: ICD-10-CM

## 2019-07-25 DIAGNOSIS — R05.9 COUGHING: ICD-10-CM

## 2019-07-25 PROCEDURE — 3074F PR MOST RECENT SYSTOLIC BLOOD PRESSURE < 130 MM HG: ICD-10-PCS | Mod: CPTII,S$GLB,, | Performed by: FAMILY MEDICINE

## 2019-07-25 PROCEDURE — 99999 PR PBB SHADOW E&M-EST. PATIENT-LVL III: ICD-10-PCS | Mod: PBBFAC,,, | Performed by: PAIN MEDICINE

## 2019-07-25 PROCEDURE — 99213 OFFICE O/P EST LOW 20 MIN: CPT | Mod: S$GLB,,, | Performed by: FAMILY MEDICINE

## 2019-07-25 PROCEDURE — 3078F DIAST BP <80 MM HG: CPT | Mod: CPTII,S$GLB,, | Performed by: FAMILY MEDICINE

## 2019-07-25 PROCEDURE — 3079F PR MOST RECENT DIASTOLIC BLOOD PRESSURE 80-89 MM HG: ICD-10-PCS | Mod: CPTII,S$GLB,, | Performed by: PAIN MEDICINE

## 2019-07-25 PROCEDURE — 3074F SYST BP LT 130 MM HG: CPT | Mod: CPTII,S$GLB,, | Performed by: FAMILY MEDICINE

## 2019-07-25 PROCEDURE — 3008F PR BODY MASS INDEX (BMI) DOCUMENTED: ICD-10-PCS | Mod: CPTII,S$GLB,, | Performed by: PAIN MEDICINE

## 2019-07-25 PROCEDURE — 99213 PR OFFICE/OUTPT VISIT, EST, LEVL III, 20-29 MIN: ICD-10-PCS | Mod: S$GLB,,, | Performed by: FAMILY MEDICINE

## 2019-07-25 PROCEDURE — 3075F SYST BP GE 130 - 139MM HG: CPT | Mod: CPTII,S$GLB,, | Performed by: PAIN MEDICINE

## 2019-07-25 PROCEDURE — 99214 PR OFFICE/OUTPT VISIT, EST, LEVL IV, 30-39 MIN: ICD-10-PCS | Mod: S$GLB,,, | Performed by: PAIN MEDICINE

## 2019-07-25 PROCEDURE — 99999 PR PBB SHADOW E&M-EST. PATIENT-LVL IV: CPT | Mod: PBBFAC,,, | Performed by: FAMILY MEDICINE

## 2019-07-25 PROCEDURE — 99999 PR PBB SHADOW E&M-EST. PATIENT-LVL III: CPT | Mod: PBBFAC,,, | Performed by: PAIN MEDICINE

## 2019-07-25 PROCEDURE — 3078F PR MOST RECENT DIASTOLIC BLOOD PRESSURE < 80 MM HG: ICD-10-PCS | Mod: CPTII,S$GLB,, | Performed by: FAMILY MEDICINE

## 2019-07-25 PROCEDURE — 3008F BODY MASS INDEX DOCD: CPT | Mod: CPTII,S$GLB,, | Performed by: PAIN MEDICINE

## 2019-07-25 PROCEDURE — 3008F PR BODY MASS INDEX (BMI) DOCUMENTED: ICD-10-PCS | Mod: CPTII,S$GLB,, | Performed by: FAMILY MEDICINE

## 2019-07-25 PROCEDURE — 3079F DIAST BP 80-89 MM HG: CPT | Mod: CPTII,S$GLB,, | Performed by: PAIN MEDICINE

## 2019-07-25 PROCEDURE — 3008F BODY MASS INDEX DOCD: CPT | Mod: CPTII,S$GLB,, | Performed by: FAMILY MEDICINE

## 2019-07-25 PROCEDURE — 3075F PR MOST RECENT SYSTOLIC BLOOD PRESS GE 130-139MM HG: ICD-10-PCS | Mod: CPTII,S$GLB,, | Performed by: PAIN MEDICINE

## 2019-07-25 PROCEDURE — 99999 PR PBB SHADOW E&M-EST. PATIENT-LVL IV: ICD-10-PCS | Mod: PBBFAC,,, | Performed by: FAMILY MEDICINE

## 2019-07-25 PROCEDURE — 99214 OFFICE O/P EST MOD 30 MIN: CPT | Mod: S$GLB,,, | Performed by: PAIN MEDICINE

## 2019-07-25 RX ORDER — ZOLPIDEM TARTRATE 5 MG/1
TABLET ORAL
Qty: 30 TABLET | Refills: 2 | Status: SHIPPED | OUTPATIENT
Start: 2019-07-25 | End: 2019-10-18 | Stop reason: SDUPTHER

## 2019-07-25 RX ORDER — HYDROCODONE BITARTRATE AND ACETAMINOPHEN 5; 325 MG/1; MG/1
1 TABLET ORAL DAILY PRN
Refills: 0 | COMMUNITY
Start: 2019-07-15 | End: 2019-12-10

## 2019-07-25 RX ORDER — PROMETHAZINE HYDROCHLORIDE AND CODEINE PHOSPHATE 6.25; 1 MG/5ML; MG/5ML
5 SOLUTION ORAL EVERY 6 HOURS PRN
Qty: 180 ML | Refills: 0 | Status: CANCELLED | OUTPATIENT
Start: 2019-07-25

## 2019-07-25 RX ORDER — GABAPENTIN 800 MG/1
800 TABLET ORAL 3 TIMES DAILY
Refills: 1 | COMMUNITY
Start: 2019-07-12 | End: 2021-04-06 | Stop reason: SDUPTHER

## 2019-07-25 RX ORDER — CODEINE PHOSPHATE AND GUAIFENESIN 10; 100 MG/5ML; MG/5ML
5 SOLUTION ORAL EVERY 6 HOURS PRN
Qty: 180 ML | Refills: 0 | Status: SHIPPED | OUTPATIENT
Start: 2019-07-25 | End: 2019-09-27 | Stop reason: SDUPTHER

## 2019-07-25 NOTE — PROGRESS NOTES
Subjective:     Patient ID: Trina Marie Collette is a 54 y.o. female    Chief Complaint: Back Pain      Referred by: Self, Aaareferral      HPI:    Initial Encounter (7/25/19):  Trina Marie Collette is a 54 y.o. female who presents today with chronic bilateral low back pain. Pain has been present for years.  No specific inciting event or injury noted.  The pain is located across the lower lumbar region.  The pain does radiate to the bilateral lower extremities in a nondermatomal pattern but does not cross the knee.  She does report some numbness in the lower extremities in a nondermatomal pattern.  She denies any focal weakness or bowel bladder dysfunction.  The pain is constant and worsened with activity.  Patient is still being seen by another pain medicine physician, Dr. Senior.  He has performed 2 epidural steroid injections in the past according to patient. She states that these provided some relief but for only a short period of time.  He has also prescribing her Norco.   This pain is described in detail below.    Physical Therapy:  Yes.  Does not perform home exercise program.    Non-pharmacologic Treatment:  Rest helps         · TENS?  No    Pain Medications:         · Currently taking:  Norco, Flexeril, gabapentin    · Has tried in the past:  NSAIDs, Tylenol    · Has not tried:  TCAs, SNRIs, , topical creams    Blood thinners:  None    Interventional Therapies:    2 previous epidurals done by Dr. Senior    Relevant Surgeries:  None    Affecting sleep?  Yes    Affecting daily activities? yes    Depressive symptoms? no          · SI/HI? No    Work status: Disabled    Pain Scores:    Best:       7/10  Worst:     10/10  Usually:   9/10  Today:    10/10    Review of Systems   Constitutional: Negative for activity change, appetite change, chills, fatigue, fever and unexpected weight change.   HENT: Negative for hearing loss.    Eyes: Negative for visual disturbance.   Respiratory: Negative for chest tightness and  shortness of breath.    Cardiovascular: Negative for chest pain.   Gastrointestinal: Negative for abdominal pain, constipation, diarrhea, nausea and vomiting.   Genitourinary: Negative for difficulty urinating.   Musculoskeletal: Positive for back pain, gait problem and myalgias. Negative for neck pain.   Skin: Negative for rash.   Neurological: Positive for numbness. Negative for dizziness, weakness, light-headedness and headaches.   Psychiatric/Behavioral: Positive for sleep disturbance. Negative for hallucinations and suicidal ideas. The patient is not nervous/anxious.        Past Medical History:   Diagnosis Date    Anxiety     Bipolar 1 disorder     Depression        History reviewed. No pertinent surgical history.    Social History     Socioeconomic History    Marital status:      Spouse name: Not on file    Number of children: Not on file    Years of education: Not on file    Highest education level: Not on file   Occupational History    Not on file   Social Needs    Financial resource strain: Not on file    Food insecurity:     Worry: Not on file     Inability: Not on file    Transportation needs:     Medical: Not on file     Non-medical: Not on file   Tobacco Use    Smoking status: Current Every Day Smoker    Smokeless tobacco: Never Used   Substance and Sexual Activity    Alcohol use: Yes    Drug use: No    Sexual activity: Yes     Partners: Male   Lifestyle    Physical activity:     Days per week: Not on file     Minutes per session: Not on file    Stress: Not on file   Relationships    Social connections:     Talks on phone: Not on file     Gets together: Not on file     Attends Religion service: Not on file     Active member of club or organization: Not on file     Attends meetings of clubs or organizations: Not on file     Relationship status: Not on file   Other Topics Concern    Not on file   Social History Narrative    Not on file       Review of patient's allergies  indicates:  No Known Allergies    Current Outpatient Medications on File Prior to Visit   Medication Sig Dispense Refill    budesonide (RINOCORT AQUA) 32 mcg/actuation nasal spray ONE SPRAY IN EACH NOSTRIL TWICE DAILY AFTER USING AZELASTIN NASAL SPRAY 8.6 g 11    cyclobenzaprine (FLEXERIL) 10 MG tablet Take 1 tablet (10 mg total) by mouth 2 (two) times daily as needed for Muscle spasms. 60 tablet 5    gabapentin (NEURONTIN) 800 MG tablet Take 800 mg by mouth 3 (three) times daily.  1    HYDROcodone-acetaminophen (NORCO) 5-325 mg per tablet Take 1 tablet by mouth daily as needed.  0    ipratropium (ATROVENT) 0.03 % nasal spray 2 sprays by Nasal route 2 (two) times daily. May be used every 6 hr if needed 30 mL 11    losartan (COZAAR) 100 MG tablet Take 1 tablet (100 mg total) by mouth once daily. 90 tablet 3    montelukast (SINGULAIR) 10 mg tablet TAKE 1 TABLET BY MOUTH EVERY DAY 90 tablet 1    omeprazole (PRILOSEC) 40 MG capsule Take 1 capsule (40 mg total) by mouth once daily. 30 capsule 5    pantoprazole (PROTONIX) 40 MG tablet Take 1 tablet (40 mg total) by mouth once daily. 90 tablet 1    promethazine-codeine 6.25-10 mg/5 ml (PHENERGAN WITH CODEINE) 6.25-10 mg/5 mL syrup Take 5 mLs by mouth every 6 (six) hours as needed for Cough. 180 mL 0    rosuvastatin (CRESTOR) 5 MG tablet Take 1 tablet (5 mg total) by mouth once daily. 90 tablet 0    sertraline (ZOLOFT) 100 MG tablet Take 1 tablet (100 mg total) by mouth once daily. 90 tablet 1    traZODone (DESYREL) 50 MG tablet One p.o. Q 0 HS p.r.n. sleep alternate with Ambien so 1 of the other taken every other night 30 tablet 11    umeclidinium-vilanterol (ANORO ELLIPTA) 62.5-25 mcg/actuation DsDv Inhale 1 puff into the lungs once daily. Controller 60 each 5    ziprasidone (GEODON) 40 MG Cap Take 1 capsule (40 mg total) by mouth once daily. 30 capsule 5    ziprasidone (GEODON) 80 MG capsule Take 1 capsule (80 mg total) by mouth once daily. 30 capsule 5  "   zolpidem (AMBIEN) 5 MG Tab 1 po Q [facility] p.r.n. sleep alternate with trazodone so 1 of the other taken every other night as needed for sleep 30 tablet 1    albuterol (PROVENTIL/VENTOLIN HFA) 90 mcg/actuation inhaler Inhale 1-2 puffs into the lungs every 6 (six) hours as needed for Wheezing. Rescue 3 Inhaler 1     No current facility-administered medications on file prior to visit.        Objective:      /87 (BP Location: Left arm, Patient Position: Sitting, BP Method: Medium (Automatic))   Pulse 67   Ht 5' 6" (1.676 m)   Wt 108.2 kg (238 lb 6.8 oz)   LMP 09/25/2017   BMI 38.48 kg/m²     Exam:  GEN:  Well developed, well nourished.  No acute distress.  Normal pain behavior.  HEENT:  No trauma.  Mucous membranes moist.  Nares patent bilaterally.  PSYCH: Normal affect. Thought content appropriate.  CHEST:  Breathing symmetric.  No audible wheezing.  ABD: Soft, non-distended.  SKIN:  Warm, pink, dry.  No rash on exposed areas.    EXT:  No cyanosis, clubbing, or edema.  No color change or changes in nail or hair growth.  NEURO/MUSCULOSKELETAL:  Fully alert, oriented, and appropriate. Speech normal mile. No cranial nerve deficits.   Gait:  Antalgic.  No trendelenburg sign bilaterally.   Motor Strength: 5/5 motor strength throughout lower extremities.   Sensory:  No sensory deficit in the lower extremities.   Reflexes:  2 + and symmetric throughout.  Downgoing Babinski's bilaterally.  No clonus or spasticity.  L-Spine:  Full ROM with pain on flexion and extension. Positive pain with axial/facet loading bilaterally.  Negative SLR bilaterally.    SI Joint/Hip:  Negative SERVANDO bilaterally.  Negative FADIR bilaterally.  Diffusely TTP over lumbar paraspinals        Imaging:  Narrative     EXAMINATION:  XR LUMBAR SPINE AP AND LAT WITH FLEX/EXT    CLINICAL HISTORY:  Low back pain    TECHNIQUE:  AP and lateral views as well as lateral flexion and extension images are performed through the lumbar spine.  The x-rays " were severely underpenetrated due to patient's body habitus.    COMPARISON:  None    FINDINGS:  The alignment is intact.  There is no evidence of lumbar instability with flexion or extension.  Sclerosis is seen in the lower lumbar facet joints.  There is no gross acute vertebral abnormality.   Impression       Limited study.  No gross lumbar abnormality.      Electronically signed by: Jazmyn Hernández MD  Date: 09/13/2018  Time: 09:21         Assessment:       Encounter Diagnoses   Name Primary?    DDD (degenerative disc disease), lumbar Yes    Lumbar spondylosis          Plan:       Elizabeth was seen today for back pain.    Diagnoses and all orders for this visit:    DDD (degenerative disc disease), lumbar    Lumbar spondylosis        Trina Marie Collette is a 54 y.o. female with chronic bilateral low back pain.  Pain does radiate to lower extremities but a nondermatomal pattern with nondermatomal numbness.  No focal neurologic deficits noted on exam.  Some indications of lower lumbar facet pain noted on exam..    1.  Pertinent imaging studies reviewed by me. Imaging results were discussed with patient.  2.  We discussed potentially performing bilateral L3, L4 and L5 medial branch blocks x2 with subsequent RFA if diagnostic.  Patient will decide if she wants to have this procedure done with near if she would prefer for her other pain medicine physician to perform these procedures.  She was consented for these procedures today will call if she decides to proceed.  3.  I advised patient to continue daily home exercise program based off of time spent physical therapy.  4.  We discussed that I do not utilize opioid medications for the treatment of chronic low back pain.  Patient's best understanding and agreement.  5.  Return to clinic p.r.n..

## 2019-07-25 NOTE — PROGRESS NOTES
Subjective:       Patient ID: Trina Marie Collette is a 54 y.o. female.    Chief Complaint: Medication Refill    HPI:  54-year-old female --patient needs medication refills Ambien and cough syrup--Insomnia--OK with ambien--takes qohs alternates with trazadone. Pt wants cough medication. No fever , no runny nose, + sore th due coughing so much. Sratchy noises . Still with tinnitus told nothing he can do   . ROS:  Skin: no psoriasis, eczema, skin cancer  HEENT: No headache, ocular pain, blurred vision, diplopia, epistaxis, hoarseness change in voice, thyroid trouble--history of tinnitus/chronic sinus problem/nasal septal deviation/hearing loss  Lung: No pneumonia, asthma, Tb, wheezing, SOB, smoking 1/4 ppd trying to quit  --history of bronchospasm in the past uses albuterol  Heart: No chest pain, ankle edema, palpitations, MI, river murmur, +hypertension blood pressure 152/90  + hyperlipidemia-no stent bypass arrhythmia  Abdomen: No nausea, vomiting, diarrhea, constipation, ulcers, hepatitis, gallbladder disease, melena, hematochezia, hematemesis patient complaining of heartburn  : no UTI, renal disease, stones  GYN neg last mammogram was in October   MS: no fractures, O/A, lupus, rheumatoid, gout--history of chronic pain the back lumbar spine, right knee come right shoulder, history DDD lumbar history cervical spinal stenosis  Neuro: No dizziness, LOC, seizures   No diabetes, no anemia, no anxiety, no depression patient with history of bipolar schizophrenia--doing well with medication goes to Mental Health   Single, one child, disabled, lives alone     Objective:   Physical Exam:  General: Well nourished, well developed, no acute distress +overweight  Skin: No lesions  HEENT: Eyes PERRLA, EOM intact, nose +clear , throat nonerythematous ears TMs clear  NECK: Supple, no bruits, No JVD, no nodes  Lungs: Clear, no rales, rhonchi, wheezing +coarse cough  Heart: Regular rate and rhythm, no murmurs, gallops, or  rubs  Abdomen: flat, bowel sounds positive, no tenderness, or organomegaly  MS:  Tenderness lumbar spine L1-S1 bilaterally anterior flexion 10° extension to degrees lateral flexion rotation 10°, able squat when quarter the way down hard arise due to pain in the right knee some pain in the right shoulder especially in the in for scapular area on the right --no significant change  Neuro: Alert, CN intact, oriented X 3  Extremities: No cyanosis, clubbing, or edema         Assessment:       1. Schizophrenia, unspecified type    2. Bronchitis    3. Coughing    4. Bipolar 1 disorder    5. Chronic pain syndrome    6. DDD (degenerative disc disease), cervical    7. Essential hypertension    8. Hyperlipidemia, unspecified hyperlipidemia type        Plan:       Schizophrenia, unspecified type    Bronchitis  Comments:  continue with other meds at home    Coughing    Bipolar 1 disorder    Chronic pain syndrome    DDD (degenerative disc disease), cervical    Essential hypertension    Hyperlipidemia, unspecified hyperlipidemia type      [facility]-for back pain sees Dr Senior  Patient with insomnia--using Ambien Q 0 HS alternating with trazodone q.0h.s. p.r.n. Sleep  Patient with chronic cough cold appears to be resolving will switch to cherry Tussin AC from Phenergan   Goes to mental health--bipolar and schizophrenic--told will give 6 months supply of Geodon 80 mg a.m. 40 mg p.m. for 6 months give time to see psychiatrist total I do not routinely right this drug and do not treat bipolar and schizophrenia  Hypertension blood pressure elevated 152/90--nurse to recheck blood pressure now--if still elevated patient needs to have blood pressure recheck in 2 weeks if still high may need to adjust blood pressure medication  Low-sodium diet/exercise/decrease weight /exercise/decrease weight  Patient wheezing at night add anoro use this weight about 15 min prior to using Ventolin to decrease use of rescue  inhaler  Needs to DC smoking  Appt Dr Galeana for GI evaluation Needs colonoscopy and EGD --history dysphagia--had omeprazole 40 q.h.s  Needs physical in October CBCs CMP lipid T4 TSH stool guaiac UA chest x-ray EKG is total physical--if EKG abnormal consider cardiac workup  Can do Pap smear next visit--or see OBGYN  Health maintenance tetanus/Pneumovax Pap smear had mammogram ready needs stool for blood--ordered in October with lab

## 2019-08-01 ENCOUNTER — PATIENT OUTREACH (OUTPATIENT)
Dept: ADMINISTRATIVE | Facility: OTHER | Age: 54
End: 2019-08-01

## 2019-08-07 ENCOUNTER — TELEPHONE (OUTPATIENT)
Dept: ADMINISTRATIVE | Facility: OTHER | Age: 54
End: 2019-08-07

## 2019-08-07 NOTE — TELEPHONE ENCOUNTER
Received return fax from [facility] and was informed that Ms. Collette does not have a colonoscopy on file

## 2019-08-08 ENCOUNTER — OFFICE VISIT (OUTPATIENT)
Dept: PRIMARY CARE CLINIC | Facility: CLINIC | Age: 54
End: 2019-08-08
Payer: MEDICARE

## 2019-08-08 VITALS
SYSTOLIC BLOOD PRESSURE: 151 MMHG | WEIGHT: 238 LBS | HEIGHT: 66 IN | OXYGEN SATURATION: 99 % | BODY MASS INDEX: 38.25 KG/M2 | RESPIRATION RATE: 19 BRPM | TEMPERATURE: 98 F | DIASTOLIC BLOOD PRESSURE: 85 MMHG | HEART RATE: 70 BPM

## 2019-08-08 DIAGNOSIS — E66.9 OBESITY (BMI 35.0-39.9 WITHOUT COMORBIDITY): ICD-10-CM

## 2019-08-08 DIAGNOSIS — E78.5 HYPERLIPIDEMIA, UNSPECIFIED HYPERLIPIDEMIA TYPE: ICD-10-CM

## 2019-08-08 DIAGNOSIS — R05.3 CHRONIC COUGH: Primary | ICD-10-CM

## 2019-08-08 DIAGNOSIS — M48.02 CERVICAL SPINAL STENOSIS: ICD-10-CM

## 2019-08-08 DIAGNOSIS — M50.30 DDD (DEGENERATIVE DISC DISEASE), CERVICAL: ICD-10-CM

## 2019-08-08 DIAGNOSIS — F20.9 SCHIZOPHRENIA, UNSPECIFIED TYPE: ICD-10-CM

## 2019-08-08 DIAGNOSIS — I10 ESSENTIAL HYPERTENSION: ICD-10-CM

## 2019-08-08 DIAGNOSIS — J34.2 NASAL SEPTAL DEVIATION: ICD-10-CM

## 2019-08-08 DIAGNOSIS — G89.4 CHRONIC PAIN SYNDROME: ICD-10-CM

## 2019-08-08 DIAGNOSIS — F31.9 BIPOLAR 1 DISORDER: ICD-10-CM

## 2019-08-08 PROCEDURE — 99213 PR OFFICE/OUTPT VISIT, EST, LEVL III, 20-29 MIN: ICD-10-PCS | Mod: S$GLB,,, | Performed by: FAMILY MEDICINE

## 2019-08-08 PROCEDURE — 3077F PR MOST RECENT SYSTOLIC BLOOD PRESSURE >= 140 MM HG: ICD-10-PCS | Mod: CPTII,S$GLB,, | Performed by: FAMILY MEDICINE

## 2019-08-08 PROCEDURE — 3079F PR MOST RECENT DIASTOLIC BLOOD PRESSURE 80-89 MM HG: ICD-10-PCS | Mod: CPTII,S$GLB,, | Performed by: FAMILY MEDICINE

## 2019-08-08 PROCEDURE — 99213 OFFICE O/P EST LOW 20 MIN: CPT | Mod: S$GLB,,, | Performed by: FAMILY MEDICINE

## 2019-08-08 PROCEDURE — 3079F DIAST BP 80-89 MM HG: CPT | Mod: CPTII,S$GLB,, | Performed by: FAMILY MEDICINE

## 2019-08-08 PROCEDURE — 99999 PR PBB SHADOW E&M-EST. PATIENT-LVL V: CPT | Mod: PBBFAC,,, | Performed by: FAMILY MEDICINE

## 2019-08-08 PROCEDURE — 3077F SYST BP >= 140 MM HG: CPT | Mod: CPTII,S$GLB,, | Performed by: FAMILY MEDICINE

## 2019-08-08 PROCEDURE — 99999 PR PBB SHADOW E&M-EST. PATIENT-LVL V: ICD-10-PCS | Mod: PBBFAC,,, | Performed by: FAMILY MEDICINE

## 2019-08-08 PROCEDURE — 3008F BODY MASS INDEX DOCD: CPT | Mod: CPTII,S$GLB,, | Performed by: FAMILY MEDICINE

## 2019-08-08 PROCEDURE — 3008F PR BODY MASS INDEX (BMI) DOCUMENTED: ICD-10-PCS | Mod: CPTII,S$GLB,, | Performed by: FAMILY MEDICINE

## 2019-08-08 NOTE — PROGRESS NOTES
Subjective:       Patient ID: Trina Marie Collette is a 54 y.o. female.    Chief Complaint: Medication Refill (wants promethazine with codeine)    HPI:  54-year-old female --patient states cheratussin AC was to strong --has tried tessalon pearles, albuterol--c/o coughing so much affecting her chest. . Saw  ENT on napolean     ROS:  Skin: no psoriasis, eczema, skin cancer  HEENT: No headache, ocular pain, blurred vision, diplopia, epistaxis, hoarseness change in voice, thyroid trouble--history of tinnitus/chronic sinus problem/nasal septal deviation/hearing loss  Lung: No pneumonia, asthma, Tb, wheezing, SOB, smoking 1/4 ppd trying to quit  --history of bronchospasm in the past uses albuterol  Heart: No chest pain, ankle edema, palpitations, MI, river murmur, +hypertension blood pressure 151/85 did not take BP meds   + hyperlipidemia-no stent bypass arrhythmia  Abdomen: No nausea, vomiting, diarrhea, constipation, ulcers, hepatitis, gallbladder disease, melena, hematochezia, hematemesis patient complaining of heartburn  : no UTI, renal disease, stones  GYN neg last mammogram was in October   MS: no fractures, O/A, lupus, rheumatoid, gout--history of chronic pain the back lumbar spine, right knee come right shoulder, history DDD lumbar history cervical spinal stenosis  Neuro: No dizziness, LOC, seizures   No diabetes, no anemia, no anxiety, no depression patient with history of bipolar schizophrenia--doing well with medication goes to Mental Health   Single, one child, disabled, lives alone     Objective:   Physical Exam:  General: Well nourished, well developed, no acute distress +overweight  Skin: No lesio\]\ , throat nonerythematous ears TMs clear  NECK: Supple, no bruits, No JVD, no nodes  Lungs: Clear, no rales, rhonchi, wheezing --wheezing heard is mainly at night  Heart: Regular rate and rhythm, no murmurs, gallops, or rubs  Abdomen: flat, bowel sounds positive, no tenderness, or organomegaly  MS:   Tenderness lumbar spine L1-S1 bilaterally anterior flexion 10° extension to degrees lateral flexion rotation 10°, able squat when quarter the way down hard arise due to pain in the right knee some pain in the right shoulder especially in the in for scapular area on the right --no significant change  Neuro: Alert, CN intact, oriented X 3  Extremities: No cyanosis, clubbing, or edema         Assessment:       1. Chronic cough    2. Nasal septal deviation    3. Schizophrenia, unspecified type    4. Bipolar 1 disorder    5. DDD (degenerative disc disease), cervical    6. Cervical spinal stenosis    7. Chronic pain syndrome    8. Hyperlipidemia, unspecified hyperlipidemia type    9. Essential hypertension    10. Obesity (BMI 35.0-39.9 without comorbidity)        Plan:       Chronic cough    Nasal septal deviation    Schizophrenia, unspecified type    Bipolar 1 disorder    DDD (degenerative disc disease), cervical    Cervical spinal stenosis    Chronic pain syndrome    Hyperlipidemia, unspecified hyperlipidemia type    Essential hypertension    Obesity (BMI 35.0-39.9 without comorbidity)      chronic cough--told unable to give Phenergan with codeine--patient filling too often--needs to seeDr [facility] for possible bronchoscopy due to chronic cough--has seen ENT told had some nasal septal deviation--has wheezing especially at night---patient should get an albuterol nebulizer machine---had Breo or symbicort whichever covered given coupons  OTC Mucinex DM/Tessalon Perles 200 mg 1 p.o. t.i.d./albuterol inhaler or albuterol nebulizer machine q.6 hours p.r.n. cough or wheeze/Breo or Symbicort or Advair whichever is covered/see Dr. Vasquez for bronchoscopy due to chronic cough   continue going to the [facility]-for back pain sees Dr Senior   Goes to mental health--bipolar and schizophrenic--told will give 6 months supply of Geodon 80 mg a.m. 40 mg p.m. for 6 months give time to see psychiatrist total I do not routinely  right this drug and do not treat bipolar and schizophrenia  Hypertension blood pressure elevated 152/90--nurse to recheck blood pressure now--if still elevated patient needs to have blood pressure recheck in 2 weeks if still high may need to adjust blood pressure medication  Low-sodium diet/exercise/decrease weight /exercise/decrease weight  Needs to DC smoking  Needs physical in October CBCs CMP lipid T4 TSH stool guaiac UA chest x-ray EKG is total physical--if EKG abnormal consider cardiac workup  Can do Pap smear next visit--or see OBGYN   Health maintenance tetanus/Pneumovax Pap smear had mammogram ready needs stool for blood--ordered in October with lab

## 2019-08-13 ENCOUNTER — OFFICE VISIT (OUTPATIENT)
Dept: OTOLARYNGOLOGY | Facility: CLINIC | Age: 54
End: 2019-08-13
Payer: MEDICARE

## 2019-08-13 ENCOUNTER — PATIENT OUTREACH (OUTPATIENT)
Dept: ADMINISTRATIVE | Facility: OTHER | Age: 54
End: 2019-08-13

## 2019-08-13 DIAGNOSIS — J40 BRONCHITIS: Primary | ICD-10-CM

## 2019-08-13 DIAGNOSIS — J34.2 NASAL SEPTAL DEVIATION: ICD-10-CM

## 2019-08-13 DIAGNOSIS — H90.A31 MIXED CONDUCTIVE AND SENSORINEURAL HEARING LOSS OF RIGHT EAR WITH RESTRICTED HEARING OF LEFT EAR: Primary | ICD-10-CM

## 2019-08-13 DIAGNOSIS — R05.3 CHRONIC COUGH: ICD-10-CM

## 2019-08-13 DIAGNOSIS — H93.A1 PULSATILE TINNITUS OF RIGHT EAR: ICD-10-CM

## 2019-08-13 DIAGNOSIS — Z13.9 SCREENING FOR CONDITION: ICD-10-CM

## 2019-08-13 DIAGNOSIS — J01.90 ACUTE NON-RECURRENT SINUSITIS, UNSPECIFIED LOCATION: ICD-10-CM

## 2019-08-13 DIAGNOSIS — J30.9 ALLERGIC RHINITIS, UNSPECIFIED SEASONALITY, UNSPECIFIED TRIGGER: ICD-10-CM

## 2019-08-13 DIAGNOSIS — R51.9 NONINTRACTABLE EPISODIC HEADACHE, UNSPECIFIED HEADACHE TYPE: ICD-10-CM

## 2019-08-13 PROCEDURE — 99214 OFFICE O/P EST MOD 30 MIN: CPT | Mod: 25,S$GLB,, | Performed by: SPECIALIST

## 2019-08-13 PROCEDURE — 99214 PR OFFICE/OUTPT VISIT, EST, LEVL IV, 30-39 MIN: ICD-10-PCS | Mod: 25,S$GLB,, | Performed by: SPECIALIST

## 2019-08-13 PROCEDURE — 96372 PR INJECTION,THERAP/PROPH/DIAG2ST, IM OR SUBCUT: ICD-10-PCS | Mod: S$GLB,,, | Performed by: SPECIALIST

## 2019-08-13 PROCEDURE — 96372 THER/PROPH/DIAG INJ SC/IM: CPT | Mod: S$GLB,,, | Performed by: SPECIALIST

## 2019-08-13 RX ORDER — LIDOCAINE HYDROCHLORIDE 10 MG/ML
2 INJECTION INFILTRATION; PERINEURAL ONCE
Status: COMPLETED | OUTPATIENT
Start: 2019-08-13 | End: 2019-08-13

## 2019-08-13 RX ORDER — ALBUTEROL SULFATE 0.83 MG/ML
2.5 SOLUTION RESPIRATORY (INHALATION) EVERY 4 HOURS PRN
Qty: 1 BOX | Refills: 1 | Status: SHIPPED | OUTPATIENT
Start: 2019-08-13 | End: 2021-04-06 | Stop reason: SDUPTHER

## 2019-08-13 RX ORDER — CEFTRIAXONE 1 G/1
1 INJECTION, POWDER, FOR SOLUTION INTRAMUSCULAR; INTRAVENOUS
Status: COMPLETED | OUTPATIENT
Start: 2019-08-13 | End: 2019-08-13

## 2019-08-13 RX ORDER — ALBUTEROL SULFATE 0.63 MG/3ML
0.63 SOLUTION RESPIRATORY (INHALATION) EVERY 6 HOURS PRN
Qty: 1 BOX | Refills: 0 | Status: CANCELLED | OUTPATIENT
Start: 2019-08-13 | End: 2020-08-12

## 2019-08-13 RX ORDER — AMOXICILLIN AND CLAVULANATE POTASSIUM 875; 125 MG/1; MG/1
TABLET, FILM COATED ORAL
Qty: 20 TABLET | Refills: 0 | Status: SHIPPED | OUTPATIENT
Start: 2019-08-13 | End: 2019-10-18

## 2019-08-13 RX ORDER — TIZANIDINE 4 MG/1
TABLET ORAL
Refills: 1 | COMMUNITY
Start: 2019-08-05 | End: 2020-02-24

## 2019-08-13 RX ORDER — PROMETHAZINE HYDROCHLORIDE AND CODEINE PHOSPHATE 6.25; 1 MG/5ML; MG/5ML
5 SOLUTION ORAL EVERY 4 HOURS PRN
Qty: 240 ML | Refills: 0 | Status: SHIPPED | OUTPATIENT
Start: 2019-08-13 | End: 2019-08-23

## 2019-08-13 RX ORDER — BETAMETHASONE SODIUM PHOSPHATE AND BETAMETHASONE ACETATE 3; 3 MG/ML; MG/ML
6 INJECTION, SUSPENSION INTRA-ARTICULAR; INTRALESIONAL; INTRAMUSCULAR; SOFT TISSUE ONCE
Status: COMPLETED | OUTPATIENT
Start: 2019-08-13 | End: 2019-08-13

## 2019-08-13 RX ADMIN — LIDOCAINE HYDROCHLORIDE 2 ML: 10 INJECTION INFILTRATION; PERINEURAL at 03:08

## 2019-08-13 RX ADMIN — BETAMETHASONE SODIUM PHOSPHATE AND BETAMETHASONE ACETATE 6 MG: 3; 3 INJECTION, SUSPENSION INTRA-ARTICULAR; INTRALESIONAL; INTRAMUSCULAR; SOFT TISSUE at 03:08

## 2019-08-13 RX ADMIN — CEFTRIAXONE 1 G: 1 INJECTION, POWDER, FOR SOLUTION INTRAMUSCULAR; INTRAVENOUS at 03:08

## 2019-08-13 NOTE — PROGRESS NOTES
Subjective:       Patient ID: Trina Marie Collette is a 54 y.o. female.    Chief Complaint: Sinus Problem and Ear Fullness    The patient is coming in for a follow-up visit.  She is having headaches, congestion, sore throat and earaches.   She is having pain in both ears.  The pulsatile tinnitus has become significantly worse.  She is using Singulair, Flonase and ipratropium bromide nasal spray.  Secretions initially were clear and there are now yellow.       Review of Systems   Constitutional: Positive for fatigue. Negative for activity change, appetite change, chills and fever.   HENT: Positive for congestion, ear pain, postnasal drip, rhinorrhea, sinus pressure, sinus pain, sore throat and tinnitus. Negative for ear discharge, facial swelling, hearing loss, mouth sores, sneezing, trouble swallowing and voice change.    Eyes: Negative for photophobia, pain, discharge, redness, itching and visual disturbance.   Respiratory: Positive for cough. Negative for apnea, choking, shortness of breath and wheezing.    Cardiovascular: Negative for chest pain and palpitations.   Gastrointestinal: Negative for abdominal distention, abdominal pain, nausea and vomiting.   Musculoskeletal: Negative for arthralgias, myalgias, neck pain and neck stiffness.   Skin: Negative.  Negative for color change, pallor and rash.   Allergic/Immunologic: Negative for environmental allergies, food allergies and immunocompromised state.   Neurological: Positive for headaches. Negative for dizziness, speech difficulty, weakness and light-headedness.   Hematological: Negative for adenopathy. Does not bruise/bleed easily.   Psychiatric/Behavioral: Negative for agitation, confusion, decreased concentration and sleep disturbance.       Objective:      Physical Exam   Constitutional: She is oriented to person, place, and time. She appears well-developed and well-nourished.   HENT:   Head: Normocephalic.   Right Ear: External ear and ear canal normal.  Tympanic membrane is retracted. Tympanic membrane mobility is abnormal.   Left Ear: External ear and ear canal normal. Tympanic membrane is retracted. Tympanic membrane mobility is abnormal.   Nose: Mucosal edema (with inflamed turbinates bilaterall), rhinorrhea (yellow pus bilaterally) and septal deviation (To the left) present. No nasal deformity.   Mouth/Throat: Uvula is midline and mucous membranes are normal. No oral lesions. Posterior oropharyngeal erythema ( mild) present. No oropharyngeal exudate (erythema thin yellow pus from the nasopharynx). No tonsillar exudate.   Eyes: Pupils are equal, round, and reactive to light. EOM and lids are normal. Right eye exhibits no discharge. Left eye exhibits no discharge. Right conjunctiva is injected. Left conjunctiva is injected.   Neck: Trachea normal, normal range of motion, full passive range of motion without pain and phonation normal. Neck supple. No neck rigidity. No thyroid mass and no thyromegaly present.   Cardiovascular: Normal rate, regular rhythm and normal heart sounds.   Pulmonary/Chest: No respiratory distress. She has decreased breath sounds ( Diffusely). She has no wheezes. She has no rhonchi. She has no rales.   Abdominal: Soft. Bowel sounds are normal. There is no tenderness.   Musculoskeletal: Normal range of motion.        Right shoulder: Normal.   Lymphadenopathy:        Head (right side): No occipital adenopathy present.        Head (left side): No occipital adenopathy present.     She has cervical adenopathy.        Right cervical: Superficial cervical, deep cervical and posterior cervical adenopathy present.        Left cervical: Superficial cervical, deep cervical and posterior cervical adenopathy present.   Neurological: She is alert and oriented to person, place, and time. She has normal strength. No cranial nerve deficit or sensory deficit. Gait normal.   Skin: Skin is warm and dry. No lesion, no petechiae and no rash noted. No cyanosis.  Nails show no clubbing.   Psychiatric: She has a normal mood and affect. Her speech is normal and behavior is normal. Cognition and memory are normal.       Assessment:       1. Mixed conductive and sensorineural hearing loss of right ear with restricted hearing of left ear    2. Acute non-recurrent sinusitis, unspecified location    3. Nonintractable episodic headache, unspecified headache type    4. Allergic rhinitis, unspecified seasonality, unspecified trigger    5. Nasal septal deviation    6. Pulsatile tinnitus of right ear    7. Screening for condition        Plan:       I will recheck the patient in 10 days.  She needs to undergo complete audiologic evaluation prior to seeing me on that visit.  She will need to undergo some type of repeat imaging study or studies.  I would like 1st discuss that with radiologist.  I will have her obtain for current renal function testing because contrast will be used.

## 2019-08-13 NOTE — TELEPHONE ENCOUNTER
----- Message from Malcolm Tian sent at 8/13/2019 11:20 AM CDT -----  Contact: pt  Type: Needs Medical Advice    Who Called:  pt    Best Call Back Number:  [phone]  Additional Information: pt request to speak to Shannan in the office. Also, pt states she is also has a bad cough. Pt states she has not received her breathing machine yet. Pt states she lost the prescription for the breathing machine Please call to advise.       [facility]/pharmacy #[id number] - [place] - [address]  [address] [place] [address]  Phone: [phone] Fax: [phone]

## 2019-08-15 ENCOUNTER — TELEPHONE (OUTPATIENT)
Dept: OTOLARYNGOLOGY | Facility: CLINIC | Age: 54
End: 2019-08-15

## 2019-08-15 DIAGNOSIS — H93.A9 PULSATILE TINNITUS: ICD-10-CM

## 2019-08-15 NOTE — TELEPHONE ENCOUNTER
Called and spoke with patient today letting her know per Dr. Ricardo ordered a CT Temporal Bone with and without contrast. Also, orders for blood work is done too; please go to [facility].

## 2019-08-16 ENCOUNTER — HOSPITAL ENCOUNTER (OUTPATIENT)
Dept: RADIOLOGY | Facility: OTHER | Age: 54
Discharge: HOME | End: 2019-08-16
Attending: SPECIALIST
Payer: MEDICARE

## 2019-08-16 ENCOUNTER — HOSPITAL ENCOUNTER (OUTPATIENT)
Dept: RADIOLOGY | Facility: OTHER | Age: 54
Discharge: HOME | End: 2019-08-16
Attending: FAMILY MEDICINE
Payer: MEDICARE

## 2019-08-16 DIAGNOSIS — R05.3 CHRONIC COUGH: ICD-10-CM

## 2019-08-16 DIAGNOSIS — H93.A9 PULSATILE TINNITUS: ICD-10-CM

## 2019-08-16 PROCEDURE — 70482 CT ORBIT/EAR/FOSSA W/O&W/DYE: CPT | Mod: TC

## 2019-08-16 PROCEDURE — 25500020 PHARM REV CODE 255: Performed by: SPECIALIST

## 2019-08-16 PROCEDURE — 71250 CT CHEST WITHOUT CONTRAST: ICD-10-PCS | Mod: 26,,, | Performed by: RADIOLOGY

## 2019-08-16 PROCEDURE — 71250 CT THORAX DX C-: CPT | Mod: TC

## 2019-08-16 PROCEDURE — 70482 CT ORBIT/EAR/FOSSA W/O&W/DYE: CPT | Mod: 26,,, | Performed by: INTERNAL MEDICINE

## 2019-08-16 PROCEDURE — 71250 CT THORAX DX C-: CPT | Mod: 26,,, | Performed by: RADIOLOGY

## 2019-08-16 PROCEDURE — 70482 CT TEMPORAL BONE WITH AND WITHOUT: ICD-10-PCS | Mod: 26,,, | Performed by: INTERNAL MEDICINE

## 2019-08-16 RX ADMIN — IOHEXOL 75 ML: 350 INJECTION, SOLUTION INTRAVENOUS at 01:08

## 2019-08-19 ENCOUNTER — PATIENT MESSAGE (OUTPATIENT)
Dept: OTOLARYNGOLOGY | Facility: CLINIC | Age: 54
End: 2019-08-19

## 2019-08-29 ENCOUNTER — PATIENT OUTREACH (OUTPATIENT)
Dept: ADMINISTRATIVE | Facility: OTHER | Age: 54
End: 2019-08-29

## 2019-09-03 ENCOUNTER — OFFICE VISIT (OUTPATIENT)
Dept: OTOLARYNGOLOGY | Facility: CLINIC | Age: 54
End: 2019-09-03
Payer: MEDICARE

## 2019-09-03 VITALS
HEIGHT: 66 IN | BODY MASS INDEX: 38.57 KG/M2 | HEART RATE: 67 BPM | DIASTOLIC BLOOD PRESSURE: 84 MMHG | WEIGHT: 240 LBS | TEMPERATURE: 98 F | SYSTOLIC BLOOD PRESSURE: 150 MMHG

## 2019-09-03 DIAGNOSIS — J34.2 NASAL SEPTAL DEVIATION: ICD-10-CM

## 2019-09-03 DIAGNOSIS — J30.9 ALLERGIC RHINITIS, UNSPECIFIED SEASONALITY, UNSPECIFIED TRIGGER: ICD-10-CM

## 2019-09-03 DIAGNOSIS — H90.A31 MIXED CONDUCTIVE AND SENSORINEURAL HEARING LOSS OF RIGHT EAR WITH RESTRICTED HEARING OF LEFT EAR: ICD-10-CM

## 2019-09-03 DIAGNOSIS — H93.A1 PULSATILE TINNITUS OF RIGHT EAR: ICD-10-CM

## 2019-09-03 DIAGNOSIS — L01.00 IMPETIGO: ICD-10-CM

## 2019-09-03 DIAGNOSIS — J34.89 NASAL VESTIBULITIS: Primary | ICD-10-CM

## 2019-09-03 DIAGNOSIS — H69.93 DYSFUNCTION OF BOTH EUSTACHIAN TUBES: ICD-10-CM

## 2019-09-03 PROCEDURE — 3079F DIAST BP 80-89 MM HG: CPT | Mod: CPTII,S$GLB,, | Performed by: SPECIALIST

## 2019-09-03 PROCEDURE — 99214 OFFICE O/P EST MOD 30 MIN: CPT | Mod: S$GLB,,, | Performed by: SPECIALIST

## 2019-09-03 PROCEDURE — 3077F SYST BP >= 140 MM HG: CPT | Mod: CPTII,S$GLB,, | Performed by: SPECIALIST

## 2019-09-03 PROCEDURE — 99214 PR OFFICE/OUTPT VISIT, EST, LEVL IV, 30-39 MIN: ICD-10-PCS | Mod: S$GLB,,, | Performed by: SPECIALIST

## 2019-09-03 PROCEDURE — 3008F BODY MASS INDEX DOCD: CPT | Mod: CPTII,S$GLB,, | Performed by: SPECIALIST

## 2019-09-03 PROCEDURE — 3008F PR BODY MASS INDEX (BMI) DOCUMENTED: ICD-10-PCS | Mod: CPTII,S$GLB,, | Performed by: SPECIALIST

## 2019-09-03 PROCEDURE — 3079F PR MOST RECENT DIASTOLIC BLOOD PRESSURE 80-89 MM HG: ICD-10-PCS | Mod: CPTII,S$GLB,, | Performed by: SPECIALIST

## 2019-09-03 PROCEDURE — 3077F PR MOST RECENT SYSTOLIC BLOOD PRESSURE >= 140 MM HG: ICD-10-PCS | Mod: CPTII,S$GLB,, | Performed by: SPECIALIST

## 2019-09-03 RX ORDER — BUSPIRONE HYDROCHLORIDE 30 MG/1
TABLET ORAL
COMMUNITY
Start: 2019-08-15 | End: 2020-01-23

## 2019-09-03 RX ORDER — AZELASTINE 1 MG/ML
SPRAY, METERED NASAL
Qty: 30 ML | Refills: 11 | Status: ON HOLD | OUTPATIENT
Start: 2019-09-03 | End: 2020-02-03 | Stop reason: HOSPADM

## 2019-09-03 RX ORDER — MUPIROCIN 20 MG/G
OINTMENT TOPICAL 3 TIMES DAILY
Qty: 22 G | Refills: 3 | Status: SHIPPED | OUTPATIENT
Start: 2019-09-03 | End: 2019-09-13

## 2019-09-03 RX ORDER — SULFAMETHOXAZOLE AND TRIMETHOPRIM 800; 160 MG/1; MG/1
1 TABLET ORAL 2 TIMES DAILY
Qty: 20 TABLET | Refills: 0 | Status: SHIPPED | OUTPATIENT
Start: 2019-09-03 | End: 2019-09-13

## 2019-09-03 RX ORDER — FLUTICASONE PROPIONATE 50 MCG
SPRAY, SUSPENSION (ML) NASAL
Qty: 1 BOTTLE | Refills: 11 | Status: SHIPPED | OUTPATIENT
Start: 2019-09-03 | End: 2019-11-14 | Stop reason: SDUPTHER

## 2019-09-03 RX ORDER — PROMETHAZINE HYDROCHLORIDE AND CODEINE PHOSPHATE 6.25; 1 MG/5ML; MG/5ML
5 SOLUTION ORAL EVERY 4 HOURS PRN
Qty: 240 ML | Refills: 0 | Status: SHIPPED | OUTPATIENT
Start: 2019-09-03 | End: 2019-09-13

## 2019-09-03 NOTE — PROGRESS NOTES
Subjective:       Patient ID: Trina Marie Collette is a 54 y.o. female.    Chief Complaint: Follow-up (with mixed conductive sensorineural hearing loss)    The patient is returning for follow-up visit.  She continues to have pulsatile tinnitus in her right ear.  She is having headaches and pressure and pain in both ears.  She is taking Singulair, Flonase and ipratropium bromide nasal spray routinely..  Nasal secretions are clear.  She does not have fever.  She did go for a CT of the temporal bone with and without IV contrast and is here to discuss the results.    Review of Systems   Constitutional: Positive for fatigue. Negative for activity change, appetite change, chills, fever and unexpected weight change.   HENT: Positive for congestion, hearing loss, postnasal drip, rhinorrhea, sinus pressure, sinus pain, sore throat and tinnitus (Pulsatile tinnitus in the right ear). Negative for ear discharge, ear pain, facial swelling, mouth sores, sneezing, trouble swallowing and voice change.    Eyes: Negative for photophobia, pain, discharge, redness, itching and visual disturbance.   Respiratory: Positive for cough. Negative for apnea, choking, shortness of breath and wheezing.    Cardiovascular: Negative for chest pain and palpitations.   Gastrointestinal: Negative for abdominal distention, abdominal pain, nausea and vomiting.   Musculoskeletal: Positive for neck stiffness. Negative for arthralgias, myalgias and neck pain.   Skin: Negative.  Negative for color change, pallor and rash.   Allergic/Immunologic: Positive for environmental allergies. Negative for food allergies and immunocompromised state.   Neurological: Positive for headaches. Negative for dizziness, facial asymmetry, speech difficulty, weakness, light-headedness and numbness.   Hematological: Negative for adenopathy. Does not bruise/bleed easily.   Psychiatric/Behavioral: Negative for confusion, decreased concentration and sleep disturbance.        Objective:      Physical Exam   Constitutional: She is oriented to person, place, and time. She appears well-developed and well-nourished. She is cooperative.   HENT:   Head: Normocephalic.   Right Ear: External ear and ear canal normal. Tympanic membrane is retracted. Tympanic membrane mobility is abnormal.   Left Ear: External ear and ear canal normal. Tympanic membrane is retracted. Tympanic membrane mobility is abnormal.   Nose: Mucosal edema (cyanotic, boggy inferior turbinates bilaterally), rhinorrhea (clear mucus bilaterally) and septal deviation (To the left) present.   Mouth/Throat: Uvula is midline, oropharynx is clear and moist and mucous membranes are normal. No oral lesions.   Eyes: Pupils are equal, round, and reactive to light. EOM and lids are normal. Right eye exhibits no discharge and no exudate. Left eye exhibits no discharge and no exudate. Right conjunctiva is injected. Left conjunctiva is injected.   Neck: Trachea normal and normal range of motion. No muscular tenderness present. No tracheal deviation present. No thyroid mass and no thyromegaly present.   Cardiovascular: Normal rate, regular rhythm, normal heart sounds and normal pulses.   Pulmonary/Chest: Effort normal and breath sounds normal. No stridor. She has no decreased breath sounds. She has no wheezes. She has no rhonchi. She has no rales.   Abdominal: Soft. Bowel sounds are normal. There is no tenderness.   Musculoskeletal: Normal range of motion.   Lymphadenopathy:        Head (right side): No submental, no submandibular, no preauricular, no posterior auricular and no occipital adenopathy present.        Head (left side): No submental, no submandibular, no preauricular, no posterior auricular and no occipital adenopathy present.     She has no cervical adenopathy.   Neurological: She is alert and oriented to person, place, and time. She has normal strength. No cranial nerve deficit or sensory deficit. Gait normal.   Skin: Skin is  warm and dry. No petechiae and no rash noted. No cyanosis. Nails show no clubbing.   Psychiatric: She has a normal mood and affect. Her speech is normal and behavior is normal. Judgment and thought content normal. Cognition and memory are normal.       CT scan of the temporal bones with and without IV contrast-no lesions noted, small polyp in the maxillary sinus on the left    Assessment:       1. Nasal vestibulitis    2. Impetigo    3. Pulsatile tinnitus of right ear    4. Mixed conductive and sensorineural hearing loss of right ear with restricted hearing of left ear    5. Allergic rhinitis, unspecified seasonality, unspecified trigger    6. Nasal septal deviation    7. Dysfunction of both eustachian tubes        Plan:       I will have the patient take 10 days of Bactrim DS.  She will refill that antibiotic if there is persistence of sores in her nose or in her ear. She will use Bactroban ointment topically 3 times daily on any existing or new lesions in the future.  I am placing her on Zyrtec in the morning Singulair at night before now and adding Astelin and Flonase spray twice daily when the nasal sore resolves.  I will recheck her in 3 weeks.

## 2019-09-12 RX ORDER — TRAZODONE HYDROCHLORIDE 50 MG/1
TABLET ORAL
Qty: 90 TABLET | Refills: 1 | Status: SHIPPED | OUTPATIENT
Start: 2019-09-12 | End: 2020-01-23

## 2019-09-25 ENCOUNTER — PATIENT OUTREACH (OUTPATIENT)
Dept: ADMINISTRATIVE | Facility: OTHER | Age: 54
End: 2019-09-25

## 2019-09-27 ENCOUNTER — OFFICE VISIT (OUTPATIENT)
Dept: OTOLARYNGOLOGY | Facility: CLINIC | Age: 54
End: 2019-09-27
Payer: MEDICARE

## 2019-09-27 VITALS
SYSTOLIC BLOOD PRESSURE: 126 MMHG | TEMPERATURE: 98 F | WEIGHT: 235.19 LBS | DIASTOLIC BLOOD PRESSURE: 78 MMHG | BODY MASS INDEX: 37.8 KG/M2 | HEART RATE: 67 BPM | HEIGHT: 66 IN

## 2019-09-27 DIAGNOSIS — R05.9 COUGH: ICD-10-CM

## 2019-09-27 DIAGNOSIS — J30.9 ALLERGIC RHINITIS, UNSPECIFIED SEASONALITY, UNSPECIFIED TRIGGER: ICD-10-CM

## 2019-09-27 DIAGNOSIS — L30.9 ECZEMA, UNSPECIFIED TYPE: ICD-10-CM

## 2019-09-27 DIAGNOSIS — J34.89 NASAL VESTIBULITIS: Primary | ICD-10-CM

## 2019-09-27 DIAGNOSIS — J34.2 NASAL SEPTAL DEVIATION: ICD-10-CM

## 2019-09-27 DIAGNOSIS — H69.93 DYSFUNCTION OF BOTH EUSTACHIAN TUBES: ICD-10-CM

## 2019-09-27 DIAGNOSIS — H93.A1 PULSATILE TINNITUS OF RIGHT EAR: ICD-10-CM

## 2019-09-27 DIAGNOSIS — H90.A31 MIXED CONDUCTIVE AND SENSORINEURAL HEARING LOSS OF RIGHT EAR WITH RESTRICTED HEARING OF LEFT EAR: ICD-10-CM

## 2019-09-27 DIAGNOSIS — R09.82 PND (POST-NASAL DRIP): ICD-10-CM

## 2019-09-27 PROCEDURE — 99214 PR OFFICE/OUTPT VISIT, EST, LEVL IV, 30-39 MIN: ICD-10-PCS | Mod: S$GLB,,, | Performed by: SPECIALIST

## 2019-09-27 PROCEDURE — 3078F PR MOST RECENT DIASTOLIC BLOOD PRESSURE < 80 MM HG: ICD-10-PCS | Mod: CPTII,S$GLB,, | Performed by: SPECIALIST

## 2019-09-27 PROCEDURE — 3074F SYST BP LT 130 MM HG: CPT | Mod: CPTII,S$GLB,, | Performed by: SPECIALIST

## 2019-09-27 PROCEDURE — 3008F BODY MASS INDEX DOCD: CPT | Mod: CPTII,S$GLB,, | Performed by: SPECIALIST

## 2019-09-27 PROCEDURE — 3078F DIAST BP <80 MM HG: CPT | Mod: CPTII,S$GLB,, | Performed by: SPECIALIST

## 2019-09-27 PROCEDURE — 3008F PR BODY MASS INDEX (BMI) DOCUMENTED: ICD-10-PCS | Mod: CPTII,S$GLB,, | Performed by: SPECIALIST

## 2019-09-27 PROCEDURE — 3074F PR MOST RECENT SYSTOLIC BLOOD PRESSURE < 130 MM HG: ICD-10-PCS | Mod: CPTII,S$GLB,, | Performed by: SPECIALIST

## 2019-09-27 PROCEDURE — 99214 OFFICE O/P EST MOD 30 MIN: CPT | Mod: S$GLB,,, | Performed by: SPECIALIST

## 2019-09-27 RX ORDER — CODEINE PHOSPHATE AND GUAIFENESIN 10; 100 MG/5ML; MG/5ML
5 SOLUTION ORAL EVERY 6 HOURS PRN
Qty: 180 ML | Refills: 0 | Status: SHIPPED | OUTPATIENT
Start: 2019-09-27 | End: 2019-10-18

## 2019-09-27 RX ORDER — TRIAMCINOLONE ACETONIDE 1 MG/G
CREAM TOPICAL 2 TIMES DAILY
Qty: 15 G | Refills: 2 | Status: SHIPPED | OUTPATIENT
Start: 2019-09-27 | End: 2022-02-02 | Stop reason: SDUPTHER

## 2019-09-29 NOTE — PROGRESS NOTES
Subjective:       Patient ID: Trina Marie Collette is a 54 y.o. female.    Chief Complaint: Other (Left Ear Sore inside of ear); Tinnitus; and Cough    The patient is coming in for a follow-up visit.  She continues to have pulsatile tinnitus.  She is having some sneezing and itching in her nose.  The sores in her nose have resolved.  Postnasal drip is causing some coughing and a mild sore throat.  Her ears are popping.  She has been taking Singulair, Flonase and ipratropium bromide nasal sprays.  Nasal secretions are clear.  She has no fever.    Review of Systems   Constitutional: Positive for fatigue. Negative for activity change, appetite change, chills, fever and unexpected weight change.   HENT: Positive for congestion, ear pain, hearing loss, postnasal drip, rhinorrhea, sore throat and tinnitus. Negative for ear discharge, facial swelling, mouth sores, sinus pressure, sinus pain, sneezing, trouble swallowing and voice change.    Eyes: Negative for photophobia, pain, discharge, redness, itching and visual disturbance.   Respiratory: Positive for cough. Negative for apnea, choking, shortness of breath and wheezing.    Cardiovascular: Negative for chest pain and palpitations.   Gastrointestinal: Negative for abdominal distention, abdominal pain, nausea and vomiting.   Musculoskeletal: Positive for neck pain and neck stiffness. Negative for arthralgias and myalgias.   Skin: Negative.  Negative for color change, pallor and rash.   Allergic/Immunologic: Negative for environmental allergies, food allergies and immunocompromised state.   Neurological: Positive for headaches. Negative for dizziness, facial asymmetry, speech difficulty, weakness, light-headedness and numbness.   Hematological: Negative for adenopathy. Does not bruise/bleed easily.   Psychiatric/Behavioral: Negative for confusion, decreased concentration and sleep disturbance.        Schizophrenia       Objective:      Physical Exam   Constitutional: She  is oriented to person, place, and time. She appears well-developed and well-nourished. She is cooperative.   HENT:   Head: Normocephalic.   Right Ear: External ear and ear canal normal. Tympanic membrane is retracted.   Left Ear: External ear and ear canal normal. Tympanic membrane is retracted.   Nose: Mucosal edema (cyanotic, boggy inferior turbinates bilaterally), rhinorrhea (clear mucus bilaterally) and septal deviation (To the left) present.   Mouth/Throat: Uvula is midline, oropharynx is clear and moist and mucous membranes are normal. No oral lesions.   Eyes: Pupils are equal, round, and reactive to light. EOM and lids are normal. Right eye exhibits no discharge and no exudate. Left eye exhibits no discharge and no exudate. Right conjunctiva is injected. Left conjunctiva is injected.   Neck: Trachea normal and normal range of motion. No muscular tenderness present. No tracheal deviation present. No thyroid mass and no thyromegaly present.   Cardiovascular: Normal rate, regular rhythm, normal heart sounds and normal pulses.   Pulmonary/Chest: Effort normal and breath sounds normal. No stridor. She has no decreased breath sounds. She has no wheezes. She has no rhonchi. She has no rales.   Abdominal: Soft. Bowel sounds are normal. There is no tenderness.   Musculoskeletal: Normal range of motion.   Lymphadenopathy:        Head (right side): No submental, no submandibular, no preauricular, no posterior auricular and no occipital adenopathy present.        Head (left side): No submental, no submandibular, no preauricular, no posterior auricular and no occipital adenopathy present.     She has no cervical adenopathy.   Neurological: She is alert and oriented to person, place, and time. She has normal strength. No cranial nerve deficit or sensory deficit. Gait normal.   Skin: Skin is warm and dry. No petechiae and no rash noted. No cyanosis. Nails show no clubbing.   Psychiatric: She has a normal mood and affect.  Her speech is normal and behavior is normal. Judgment and thought content normal. Cognition and memory are normal.       Assessment:       1. Nasal vestibulitis    2. Allergic rhinitis, unspecified seasonality, unspecified trigger    3. Dysfunction of both eustachian tubes    4. Pulsatile tinnitus of right ear    5. Nasal septal deviation    6. Eczema, unspecified type    7. PND (post-nasal drip)    8. Cough    9. Mixed conductive and sensorineural hearing loss of right ear with restricted hearing of left ear        Plan:       I will have her add a morning dose of Claritin to the ipratropium bromide Flonase and Singulair.  I will recheck her in 8 weeks.  I am referring her to Dr. swenson for evaluation of her pulsatile tinnitus and mixed hearing loss in the right ear.

## 2019-09-30 ENCOUNTER — TELEPHONE (OUTPATIENT)
Dept: INTERNAL MEDICINE | Facility: CLINIC | Age: 54
End: 2019-09-30

## 2019-10-03 ENCOUNTER — TELEPHONE (OUTPATIENT)
Dept: PRIMARY CARE CLINIC | Facility: CLINIC | Age: 54
End: 2019-10-03

## 2019-10-03 DIAGNOSIS — Z12.31 SCREENING MAMMOGRAM, ENCOUNTER FOR: Primary | ICD-10-CM

## 2019-10-06 RX ORDER — OMEPRAZOLE 40 MG/1
CAPSULE, DELAYED RELEASE ORAL
Qty: 90 CAPSULE | Refills: 0 | Status: SHIPPED | OUTPATIENT
Start: 2019-10-06 | End: 2019-12-24

## 2019-10-06 NOTE — TELEPHONE ENCOUNTER
Needs lab q 6 mo Lab overdue Needs CBC,CMP,lipid, if DM add HGbA1C needs old lab 3 mo refills give time get seen get lab get seen

## 2019-10-08 ENCOUNTER — PATIENT OUTREACH (OUTPATIENT)
Dept: ADMINISTRATIVE | Facility: OTHER | Age: 54
End: 2019-10-08

## 2019-10-09 ENCOUNTER — CLINICAL SUPPORT (OUTPATIENT)
Dept: AUDIOLOGY | Facility: CLINIC | Age: 54
End: 2019-10-09
Payer: MEDICARE

## 2019-10-09 ENCOUNTER — OFFICE VISIT (OUTPATIENT)
Dept: OTOLARYNGOLOGY | Facility: CLINIC | Age: 54
End: 2019-10-09
Payer: MEDICARE

## 2019-10-09 VITALS — HEIGHT: 66 IN | BODY MASS INDEX: 37.81 KG/M2 | WEIGHT: 235.25 LBS

## 2019-10-09 DIAGNOSIS — H90.3 SENSORY HEARING LOSS, BILATERAL: ICD-10-CM

## 2019-10-09 DIAGNOSIS — H93.A1 PULSATILE TINNITUS OF RIGHT EAR: Primary | ICD-10-CM

## 2019-10-09 DIAGNOSIS — R09.89 VEIN SYMPTOM: ICD-10-CM

## 2019-10-09 DIAGNOSIS — H93.11 TINNITUS OF RIGHT EAR: Primary | ICD-10-CM

## 2019-10-09 PROCEDURE — 99999 PR PBB SHADOW E&M-EST. PATIENT-LVL I: ICD-10-PCS | Mod: PBBFAC,,, | Performed by: AUDIOLOGIST

## 2019-10-09 PROCEDURE — 99999 PR PBB SHADOW E&M-EST. PATIENT-LVL II: ICD-10-PCS | Mod: PBBFAC,,, | Performed by: OTOLARYNGOLOGY

## 2019-10-09 PROCEDURE — 92557 COMPREHENSIVE HEARING TEST: CPT | Mod: S$GLB,,, | Performed by: AUDIOLOGIST

## 2019-10-09 PROCEDURE — 99999 PR PBB SHADOW E&M-EST. PATIENT-LVL II: CPT | Mod: PBBFAC,,, | Performed by: OTOLARYNGOLOGY

## 2019-10-09 PROCEDURE — 92550 PR TYMPANOMETRY AND REFLEX THRESHOLD MEASUREMENTS: ICD-10-PCS | Mod: S$GLB,,, | Performed by: AUDIOLOGIST

## 2019-10-09 PROCEDURE — 99999 PR PBB SHADOW E&M-EST. PATIENT-LVL I: CPT | Mod: PBBFAC,,, | Performed by: AUDIOLOGIST

## 2019-10-09 PROCEDURE — 3008F PR BODY MASS INDEX (BMI) DOCUMENTED: ICD-10-PCS | Mod: CPTII,S$GLB,, | Performed by: OTOLARYNGOLOGY

## 2019-10-09 PROCEDURE — 3008F BODY MASS INDEX DOCD: CPT | Mod: CPTII,S$GLB,, | Performed by: OTOLARYNGOLOGY

## 2019-10-09 PROCEDURE — 99214 PR OFFICE/OUTPT VISIT, EST, LEVL IV, 30-39 MIN: ICD-10-PCS | Mod: S$GLB,,, | Performed by: OTOLARYNGOLOGY

## 2019-10-09 PROCEDURE — 92550 TYMPANOMETRY & REFLEX THRESH: CPT | Mod: S$GLB,,, | Performed by: AUDIOLOGIST

## 2019-10-09 PROCEDURE — 92557 PR COMPREHENSIVE HEARING TEST: ICD-10-PCS | Mod: S$GLB,,, | Performed by: AUDIOLOGIST

## 2019-10-09 PROCEDURE — 99214 OFFICE O/P EST MOD 30 MIN: CPT | Mod: S$GLB,,, | Performed by: OTOLARYNGOLOGY

## 2019-10-09 NOTE — LETTER
October 13, 2019      DWAYNE Ricardo MD  [address]  [address]  [facility] [address]           [facility] - Otorhinolaryngology  [address]  [facility] [address]  Phone: [phone]  Fax: [phone]          Patient: Trina Marie Collette   MR Number: [id number]   YOB: 1965   Date of Visit: 10/9/2019       Dear Dr. DWAYNE Ricardo:    Thank you for referring Trina Collette to me for evaluation. Attached you will find relevant portions of my assessment and plan of care.    If you have questions, please do not hesitate to call me. I look forward to following Trina Collette along with you.    Sincerely,    Schuyler Collins MD    Enclosure  CC:  No Recipients    If you would like to receive this communication electronically, please contact [email or web address] or [phone] to request more information on [employer] Link access.    For providers and/or their staff who would like to refer a patient to [facility], please contact us through our one-stop-shop provider referral line, [facility], at [phone].    If you feel you have received this communication in error or would no longer like to receive these types of communications, please e-mail [email or web address]

## 2019-10-09 NOTE — PROGRESS NOTES
Otolaryngology-Head & Neck Surgery           History & Physical    CHIEF COMPLAINT:  Pulsatile tinnitus      HISTORY OF PRESENT ILLNESS:  Trina Marie Collette is a 54 y.o. female who presents to clinic as a consult for pulsatile tinnitus.  Onset of symptoms was gradual starting 10 years ago ago with a gradually worsening course since that time. Patient describes the tinnitus as pulsatile located in the right ear. The quality is described as low range pitch that sounds like wave. The pattern is pulsatile with an intensity that is medium.     There is no history of antecedent exposure to loud noise. There is no history of listening to loud music. There is no history of shooting firearms without ear protection. There is no history of prior head/ear trauma.  There is no history of exposure to ototoxic drugs. There is no history of chronic/recurrent ear infections.  There is no history of PET insertion.     The patient describes the level of annoyance as moderately annoying, always aware. Associated symptoms include hearing loss. There is no history of pain, dizziness, drainage or recurrent otitis.  Family history is negative family history for tinnitus Patient has had no prior evaluation, treatment or surgery for tinnitus         REVIEW OF SYSTEMS:    Review of Systems   Constitutional: Negative for chills, fever and weight loss.   HENT: Positive for hearing loss and tinnitus. Negative for congestion, ear discharge and nosebleeds.    Eyes: Negative for blurred vision, double vision and photophobia.   Cardiovascular: Negative for chest pain and orthopnea.   Gastrointestinal: Negative for abdominal pain, heartburn, nausea and vomiting.   Neurological: Negative for dizziness, tingling and headaches.         MEDICATIONS:   Reviewed and/or reconciled in EPIC    ALLERGIES:  Reviewed and/or reconciled in [facility]    PAST MEDICAL/SURGICAL HISTORY:   Past Medical History:   Diagnosis Date    Anxiety     Bipolar 1 disorder   "   Depression     No past surgical history on file.    FAMILY HISTORY:  No family history on file.    SOCIAL HISTORY:    Social History     Socioeconomic History    Marital status:      Spouse name: Not on file    Number of children: Not on file    Years of education: Not on file    Highest education level: Not on file   Occupational History    Not on file   Social Needs    Financial resource strain: Not on file    Food insecurity:     Worry: Not on file     Inability: Not on file    Transportation needs:     Medical: Not on file     Non-medical: Not on file   Tobacco Use    Smoking status: Current Every Day Smoker    Smokeless tobacco: Never Used   Substance and Sexual Activity    Alcohol use: Yes    Drug use: No    Sexual activity: Yes     Partners: Male   Lifestyle    Physical activity:     Days per week: Not on file     Minutes per session: Not on file    Stress: Not on file   Relationships    Social connections:     Talks on phone: Not on file     Gets together: Not on file     Attends Congregation service: Not on file     Active member of club or organization: Not on file     Attends meetings of clubs or organizations: Not on file     Relationship status: Not on file   Other Topics Concern    Not on file   Social History Narrative    Not on file       PHYSICAL EXAM:  VITAL SIGNS:   Vitals:    10/09/19 1604   Weight: 106.7 kg (235 lb 3.7 oz)   Height: 5' 6" (1.676 m)     GENERAL:  Patient appears well nourished, sitting on exam table, in no acute distress.  HEAD: Atraumatic, Normocephalic, no lesions  FACE: Symmetric, HB 1/6 bilat, no lesions, no obvious sinus tenderness, salivary glands nontender  EYES: Sclera anicteric, PERRLA, EOMI  NOSE: Dorsum straight, septum midline, normal turbinate size, normal mucosa  RIGHT EAR: Pinna and external ear appears normal, EAC patent, TM intact. Santizo midline, Rinne A>B  LEFT EAR: Pinna and external ear appears normal, EAC patent, TM intact. Santizo " midline, Rinne A>B  HEARING: Grossly intact  ORAL CAVITY: Healthy mucosa, no masses or lesions including lips, gums, floor of mouth, palate, or tongue.  OROPHARYNX: Palate intact, normal pharyngeal wall movement  NECK: Supple, no palpable nodes, no masses, trachea midline, no thyroid masses  Cardiovascular system: Pulses regular in both upper extremities, good skin turgor  CARDIOVASCULAR:  RRR  RESPIRATORY:  No wheezing or stridor.  No increased work of breathing, no use of accessory muscles.  EXTREMITIES:  2+ DP pulses b/l, no edema.  SKIN:  Warm, no lesions on exposed skin.  NEUROMUSCULAR:  Cranial nerves II-XII grossly intact.    PSYCH:  Patient is alert and oriented to person, time, place.     LABORATORY/IMAGING STUDIES:                      ASSESSMENT/PLAN: This is a 54 y.o. female who presents to clinic for evaluation of pulsatile tinnitus. Hearing appears to be slightly improved compared to the previous scan (no longer with anna frequency conductive loss). She does have reflexes, which points me away from otosclerosis. Looking at scan, she has a prominent sigmoid sinus, which could be cause of symptoms. SSCD is still in differential.     -MRV to look for sigmoid sinus diverticulum.    Patient needs open air scan  -RTC after to discuss results. Could consider mastoidectomy with Sigmoid Sinus remodeling.      Jass Stroud MD  Otolaryngology-[facility]   [phone]

## 2019-10-09 NOTE — PROGRESS NOTES
"Audiologic Evaluation    Trina Marie Collette was seen on the above date for a hearing evaluation. Trina Marie Collette reports "whooshing" tinnitus in the right ear.     Tympanometry indicated normal middle ear pressure, tympanic membrane compliance and ear canal volume (type A tympanogram) in each ear. Ipsi and contra lateral acoustic reflexes were present in each ear.     Audiometric testing via insert ear phones indicated a mild to moderate sensorineural hearing loss for 250-8000 Hz in each ear. Pure tone average and speech recognition threshold were in good agreement. Excellent speech discrimination ability was demonstrated in quiet when novel words were presented at a conversational level in each ear.      Recommendations:  1) Otologic consultation.  2) Repeat audiometric testing to monitor hearing sensitivity per physician request.   3) Neuro-diagnostic ABR evaluation if concerns regarding auditory nerve integrity arise.  4) Hearing protection in the presence of excessive noise.                  "

## 2019-10-16 NOTE — TELEPHONE ENCOUNTER
Dr Brock refilled your omeprazole, but states you need lab work done before any additional refills are sent. Portal message sent

## 2019-10-18 ENCOUNTER — OFFICE VISIT (OUTPATIENT)
Dept: PRIMARY CARE CLINIC | Facility: CLINIC | Age: 54
End: 2019-10-18
Payer: MEDICARE

## 2019-10-18 VITALS
SYSTOLIC BLOOD PRESSURE: 138 MMHG | BODY MASS INDEX: 37.8 KG/M2 | WEIGHT: 235.19 LBS | OXYGEN SATURATION: 98 % | DIASTOLIC BLOOD PRESSURE: 82 MMHG | RESPIRATION RATE: 18 BRPM | HEART RATE: 61 BPM | TEMPERATURE: 99 F | HEIGHT: 66 IN

## 2019-10-18 DIAGNOSIS — R05.3 CHRONIC COUGH: Primary | ICD-10-CM

## 2019-10-18 DIAGNOSIS — L30.9 ECZEMA, UNSPECIFIED TYPE: ICD-10-CM

## 2019-10-18 DIAGNOSIS — M50.30 DDD (DEGENERATIVE DISC DISEASE), CERVICAL: ICD-10-CM

## 2019-10-18 DIAGNOSIS — J40 BRONCHITIS: ICD-10-CM

## 2019-10-18 DIAGNOSIS — J98.01 BRONCHOSPASM: ICD-10-CM

## 2019-10-18 DIAGNOSIS — F31.9 BIPOLAR 1 DISORDER: ICD-10-CM

## 2019-10-18 DIAGNOSIS — F20.9 SCHIZOPHRENIA, UNSPECIFIED TYPE: ICD-10-CM

## 2019-10-18 DIAGNOSIS — J34.2 NASAL SEPTAL DEVIATION: ICD-10-CM

## 2019-10-18 DIAGNOSIS — Z72.0 TOBACCO ABUSE: ICD-10-CM

## 2019-10-18 PROCEDURE — 99213 PR OFFICE/OUTPT VISIT, EST, LEVL III, 20-29 MIN: ICD-10-PCS | Mod: S$GLB,,, | Performed by: FAMILY MEDICINE

## 2019-10-18 PROCEDURE — 3075F SYST BP GE 130 - 139MM HG: CPT | Mod: CPTII,S$GLB,, | Performed by: FAMILY MEDICINE

## 2019-10-18 PROCEDURE — 3008F BODY MASS INDEX DOCD: CPT | Mod: CPTII,S$GLB,, | Performed by: FAMILY MEDICINE

## 2019-10-18 PROCEDURE — 3008F PR BODY MASS INDEX (BMI) DOCUMENTED: ICD-10-PCS | Mod: CPTII,S$GLB,, | Performed by: FAMILY MEDICINE

## 2019-10-18 PROCEDURE — 99999 PR PBB SHADOW E&M-EST. PATIENT-LVL IV: CPT | Mod: PBBFAC,,, | Performed by: FAMILY MEDICINE

## 2019-10-18 PROCEDURE — 3079F PR MOST RECENT DIASTOLIC BLOOD PRESSURE 80-89 MM HG: ICD-10-PCS | Mod: CPTII,S$GLB,, | Performed by: FAMILY MEDICINE

## 2019-10-18 PROCEDURE — 99999 PR PBB SHADOW E&M-EST. PATIENT-LVL IV: ICD-10-PCS | Mod: PBBFAC,,, | Performed by: FAMILY MEDICINE

## 2019-10-18 PROCEDURE — 3079F DIAST BP 80-89 MM HG: CPT | Mod: CPTII,S$GLB,, | Performed by: FAMILY MEDICINE

## 2019-10-18 PROCEDURE — 3075F PR MOST RECENT SYSTOLIC BLOOD PRESS GE 130-139MM HG: ICD-10-PCS | Mod: CPTII,S$GLB,, | Performed by: FAMILY MEDICINE

## 2019-10-18 PROCEDURE — 99213 OFFICE O/P EST LOW 20 MIN: CPT | Mod: S$GLB,,, | Performed by: FAMILY MEDICINE

## 2019-10-18 RX ORDER — PROMETHAZINE HYDROCHLORIDE AND CODEINE PHOSPHATE 6.25; 1 MG/5ML; MG/5ML
5 SOLUTION ORAL EVERY 6 HOURS PRN
Qty: 180 ML | Refills: 0 | Status: SHIPPED | OUTPATIENT
Start: 2019-10-18 | End: 2019-11-14 | Stop reason: SDUPTHER

## 2019-10-18 RX ORDER — AZITHROMYCIN 250 MG/1
TABLET, FILM COATED ORAL
Qty: 6 TABLET | Refills: 0 | Status: SHIPPED | OUTPATIENT
Start: 2019-10-18 | End: 2019-10-22

## 2019-10-18 RX ORDER — NYSTATIN 100000 [USP'U]/ML
5 SUSPENSION ORAL 4 TIMES DAILY
Qty: 200 ML | Refills: 0 | Status: SHIPPED | OUTPATIENT
Start: 2019-10-18 | End: 2019-10-28

## 2019-10-18 RX ORDER — ZOLPIDEM TARTRATE 5 MG/1
TABLET ORAL
Qty: 30 TABLET | Refills: 2 | Status: SHIPPED | OUTPATIENT
Start: 2019-10-18 | End: 2020-01-23 | Stop reason: SDUPTHER

## 2019-10-18 NOTE — PROGRESS NOTES
53 yo BF sees Dr Gama Rg on [facility]  [facility]  --Has appt with  Pulmonary MD Nov 8 th sees pulmonary MD . Pt states needs good night sleep.   Subjective:       Patient ID: Trina Marie Collette is a 54 y.o. female.    Chief Complaint: Cough and Medication Refill    HPI:  54-year-old female --patient states has a chronic cough sees Dr. Keshav Rg on [facility] [address] Has appt Pulmonary MD Nov 8 th . Dr Cnocepcion.  Has sinus headaches no fever, +sore th , +cough, +phlegm-clear--.  +asthma--+wheezing has aerosol nebulizer machine-- no pneumonia ot TB trying to quit smoking  ROS:  Skin: no psoriasis, eczema, skin cancer  HEENT: No headache, ocular pain, blurred vision, diplopia, epistaxis, hoarseness change in voice, thyroid trouble--history of tinnitus/chronic sinus problem/nasal septal deviation/hearing loss  Lung: No pneumonia, asthma, Tb, wheezing, SOB, smoking 1/4 ppd trying to quit  --history of bronchospasm in the past uses albuterol  Heart: No chest pain, ankle edema, palpitations, MI, river murmur, +hypertension blood pressure   + hyperlipidemia-no stent bypass arrhythmia  Abdomen: No nausea, vomiting, diarrhea, constipation, ulcers, hepatitis, gallbladder disease, melena, hematochezia, hematemesis patient complaining of heartburn  : no UTI, renal disease, stones  GYN neg last mammogram was in October   MS: no fractures, O/A, lupus, rheumatoid, gout--history of chronic pain the back lumbar spine, right knee come right shoulder, history DDD lumbar history cervical spinal stenosis  Neuro: No dizziness, LOC, seizures   No diabetes, no anemia, no anxiety, no depression patient with history of bipolar schizophrenia--doing well with medication goes to Mental Health   Single, one child, disabled, lives alone     Objective:   Physical Exam:  General: Well nourished, well developed, no acute distress +overweight  Skin: No lesions  HEENT--eyes PERRLA EOM intact nose patent, throat 1/4 erythematous ears TMs clear  NECK:  Supple, no bruits, No JVD, no nodes  Lungs: Clear, no rales, rhonchi, wheezing --wheezing heard is mainly at night coarse cough  Heart: Regular rate and rhythm, no murmurs, gallops, or rubs  Abdomen: flat, bowel sounds positive, no tenderness, or organomegaly  MS:  No significant change Tenderness lumbar spine L1-S1 bilaterally anterior flexion 10° extension to degrees lateral flexion rotation 10°, able squat when quarter the way down hard arise due to pain in the right knee some pain in the right shoulder especially in the in for scapular area on the right --no significant change  Neuro: Alert, CN intact, oriented X 3  Extremities: No cyanosis, clubbing, or edema         Assessment:       1. Chronic cough    2. Bronchospasm    3. Bronchitis    4. Eczema, unspecified type    5. Nasal septal deviation    6. Schizophrenia, unspecified type    7. Bipolar 1 disorder    8. DDD (degenerative disc disease), cervical    9. Tobacco abuse        Plan:       Chronic cough    Bronchospasm    Bronchitis    Eczema, unspecified type    Nasal septal deviation    Schizophrenia, unspecified type    Bipolar 1 disorder    DDD (degenerative disc disease), cervical    Tobacco abuse    Other orders  -     zolpidem (AMBIEN) 5 MG Tab; 1 po Q O[facility] p.r.n. sleep alternate with trazodone so 1 of the other taken every other night as needed for sleep  Dispense: 30 tablet; Refill: 2  -     azithromycin (Z-LUIS) 250 MG tablet; 2 tabs by mouth day 1, then 1 tab by mouth daily x 4 days  Dispense: 6 tablet; Refill: 0  -     promethazine-codeine 6.25-10 mg/5 ml (PHENERGAN WITH CODEINE) 6.25-10 mg/5 mL syrup; Take 5 mLs by mouth every 6 (six) hours as needed for Cough.  Dispense: 180 mL; Refill: 0  -     nystatin (MYCOSTATIN) 100,000 unit/mL suspension; Take 5 mLs (500,000 Units total) by mouth 4 (four) times daily. for 10 days  Dispense: 200 mL; Refill: 0  -     umeclidinium-vilanterol (ANORO ELLIPTA) 62.5-25 mcg/actuation DsDv; Inhale 1 puff into the  lungs once daily. Controller  Dispense: 60 each; Refill: 5        Patient complaining again a cold--sees ear nose and throat--has appointment with Pulmonary MD---Zithromax/Phenergan with codeine-nystatin swish and swallow---patient has aerosol nebulizer machine should use q.6 hours p.r.n. Cough--had a Anoro-told I will not fill Phenergan with codeine  Told if not better after seeing ear nose and throat and pulmonary best to see an allergist   continue [facility] the [facility]-for back pain sees Dr Senior   Goes to mental health--bipolar and schizophrenic--told will give 6 months supply of Geodon 80 mg a.m. 40 mg p.m. for 6 months give time to see psychiatrist total I do not routinely right this drug and do not treat bipolar and schizophrenia  Low-sodium diet/exercise/decrease weight /exercise/decrease weight  Needs to DC smoking  Needs physical in October CBCs CMP lipid T4 TSH stool guaiac UA chest x-ray EKG is total physical--if EKG abnormal consider cardiac workup  Can do Pap smear next visit--or see OBGYN   Health maintenance needs colonoscopy Pap smear shingles

## 2019-11-03 ENCOUNTER — PATIENT OUTREACH (OUTPATIENT)
Dept: ADMINISTRATIVE | Facility: HOSPITAL | Age: 54
End: 2019-11-03

## 2019-11-03 NOTE — PROGRESS NOTES
PAP Smear from 2013 uploaded to  per [facility].   Portal message sent to patient regarding Fit Kit.

## 2019-11-05 RX ORDER — SERTRALINE HYDROCHLORIDE 100 MG/1
TABLET, FILM COATED ORAL
Qty: 90 TABLET | Refills: 0 | Status: SHIPPED | OUTPATIENT
Start: 2019-11-05 | End: 2020-02-26

## 2019-11-06 ENCOUNTER — PATIENT OUTREACH (OUTPATIENT)
Dept: ADMINISTRATIVE | Facility: OTHER | Age: 54
End: 2019-11-06

## 2019-11-08 ENCOUNTER — OFFICE VISIT (OUTPATIENT)
Dept: PULMONOLOGY | Facility: CLINIC | Age: 54
End: 2019-11-08
Payer: MEDICARE

## 2019-11-08 VITALS
HEART RATE: 72 BPM | SYSTOLIC BLOOD PRESSURE: 118 MMHG | OXYGEN SATURATION: 97 % | BODY MASS INDEX: 38.27 KG/M2 | WEIGHT: 238.13 LBS | DIASTOLIC BLOOD PRESSURE: 84 MMHG | HEIGHT: 66 IN

## 2019-11-08 DIAGNOSIS — R05.3 CHRONIC COUGH: Primary | ICD-10-CM

## 2019-11-08 DIAGNOSIS — Z72.0 TOBACCO ABUSE: ICD-10-CM

## 2019-11-08 PROCEDURE — 3074F PR MOST RECENT SYSTOLIC BLOOD PRESSURE < 130 MM HG: ICD-10-PCS | Mod: CPTII,S$GLB,, | Performed by: NURSE PRACTITIONER

## 2019-11-08 PROCEDURE — 99999 PR PBB SHADOW E&M-EST. PATIENT-LVL III: CPT | Mod: PBBFAC,,, | Performed by: NURSE PRACTITIONER

## 2019-11-08 PROCEDURE — 3079F PR MOST RECENT DIASTOLIC BLOOD PRESSURE 80-89 MM HG: ICD-10-PCS | Mod: CPTII,S$GLB,, | Performed by: NURSE PRACTITIONER

## 2019-11-08 PROCEDURE — 3008F PR BODY MASS INDEX (BMI) DOCUMENTED: ICD-10-PCS | Mod: CPTII,S$GLB,, | Performed by: NURSE PRACTITIONER

## 2019-11-08 PROCEDURE — 3074F SYST BP LT 130 MM HG: CPT | Mod: CPTII,S$GLB,, | Performed by: NURSE PRACTITIONER

## 2019-11-08 PROCEDURE — 3079F DIAST BP 80-89 MM HG: CPT | Mod: CPTII,S$GLB,, | Performed by: NURSE PRACTITIONER

## 2019-11-08 PROCEDURE — 99204 PR OFFICE/OUTPT VISIT, NEW, LEVL IV, 45-59 MIN: ICD-10-PCS | Mod: S$GLB,,, | Performed by: NURSE PRACTITIONER

## 2019-11-08 PROCEDURE — 99999 PR PBB SHADOW E&M-EST. PATIENT-LVL III: ICD-10-PCS | Mod: PBBFAC,,, | Performed by: NURSE PRACTITIONER

## 2019-11-08 PROCEDURE — 3008F BODY MASS INDEX DOCD: CPT | Mod: CPTII,S$GLB,, | Performed by: NURSE PRACTITIONER

## 2019-11-08 PROCEDURE — 99204 OFFICE O/P NEW MOD 45 MIN: CPT | Mod: S$GLB,,, | Performed by: NURSE PRACTITIONER

## 2019-11-08 NOTE — LETTER
November 8, 2019      Davon Brock MD  [address] [place] [address]           [facility]  [address] [facility] [address]  [place] [address]  Phone: [phone]  Fax: [phone]          Patient: Trina Marie Collette   MR Number: [id number]   YOB: 1965   Date of Visit: 11/8/2019       Dear Dr. Davon Brock:    Thank you for referring Trina Collette to me for evaluation. Attached you will find relevant portions of my assessment and plan of care.    If you have questions, please do not hesitate to call me. I look forward to following Trina Collette along with you.    Sincerely,    Grace Monzon, DNP    Enclosure  CC:  No Recipients    If you would like to receive this communication electronically, please contact [email or web address] or [phone] to request more information on [employer] Link access.    For providers and/or their staff who would like to refer a patient to [facility], please contact us through our one-stop-shop provider referral line, [facility], at [phone].    If you feel you have received this communication in error or would no longer like to receive these types of communications, please e-mail [email or web address]

## 2019-11-08 NOTE — PROGRESS NOTES
Subjective:       Patient ID: Trina Marie Collette is a 54 y.o. female.    Chief Complaint: Consult    HPI:   Trina Marie Collette is a 54 y.o. female who presents with cough for many months.  She has been taking promethazine and codeine syrup.  She has been out of it for some time. Has albuterol and nebulizers for the cough and it helps.  Takes it as needed. Started smoking since her early 20s, half pack daily. Would like to quit but is not quite ready yet.  She has been prescribed Anoro but has not used it.  She reports that the codeine cough syrup works best for her cough.  She is referred to us by Dr. Brock for further evaluation.    Review of Systems   Constitutional: Positive for fatigue and night sweats. Negative for chills and activity change.   HENT: Positive for postnasal drip and rhinorrhea. Negative for trouble swallowing and congestion.    Eyes: Negative for itching.   Respiratory: Positive for cough, sputum production (clear mucus), wheezing and use of rescue inhaler. Negative for hemoptysis, choking, chest tightness, shortness of breath and dyspnea on extertion.    Cardiovascular: Negative for chest pain and palpitations.   Genitourinary: Negative for difficulty urinating.   Endocrine: Negative for cold intolerance and heat intolerance.    Musculoskeletal: Negative for arthralgias.   Skin: Negative for rash.   Gastrointestinal: Negative for nausea, vomiting and acid reflux.   Neurological: Negative for dizziness and light-headedness.   Hematological: Negative for adenopathy.   Psychiatric/Behavioral: Positive for sleep disturbance (coughing).         Social History     Tobacco Use    Smoking status: Current Every Day Smoker    Smokeless tobacco: Never Used   Substance Use Topics    Alcohol use: Yes       Review of patient's allergies indicates:  No Known Allergies  Past Medical History:   Diagnosis Date    Anxiety     Bipolar 1 disorder     Depression      History reviewed. No pertinent  surgical history.  Current Outpatient Medications on File Prior to Visit   Medication Sig    albuterol (PROVENTIL) 2.5 mg /3 mL (0.083 %) nebulizer solution Take 3 mLs (2.5 mg total) by nebulization every 4 (four) hours as needed for Wheezing or Shortness of Breath. Rescue    azelastine (ASTELIN) 137 mcg (0.1 %) nasal spray Two sprays in each nostril, sniff until absorbed, then follow with 1 spray of fluticasone.  Use both sprays twice daily.    busPIRone (BUSPAR) 30 MG Tab     cyclobenzaprine (FLEXERIL) 10 MG tablet Take 1 tablet (10 mg total) by mouth 2 (two) times daily as needed for Muscle spasms.    fluticasone propionate (FLONASE) 50 mcg/actuation nasal spray One spray in each nostril twice daily after 1st using azelastine nasal spray    gabapentin (NEURONTIN) 800 MG tablet Take 800 mg by mouth 3 (three) times daily.    HYDROcodone-acetaminophen (NORCO) 5-325 mg per tablet Take 1 tablet by mouth daily as needed.    ipratropium (ATROVENT) 0.03 % nasal spray 2 sprays by Nasal route 2 (two) times daily. May be used every 6 hr if needed    losartan (COZAAR) 100 MG tablet Take 1 tablet (100 mg total) by mouth once daily.    montelukast (SINGULAIR) 10 mg tablet TAKE 1 TABLET BY MOUTH EVERY DAY    naproxen (NAPROSYN) 500 MG tablet Take 1 tablet (500 mg total) by mouth 2 (two) times daily with meals.    omeprazole (PRILOSEC) 40 MG capsule TAKE 1 CAPSULE BY MOUTH EVERY DAY    pantoprazole (PROTONIX) 40 MG tablet Take 1 tablet (40 mg total) by mouth once daily.    rosuvastatin (CRESTOR) 5 MG tablet Take 1 tablet (5 mg total) by mouth once daily.    sertraline (ZOLOFT) 100 MG tablet TAKE 1 TABLET BY MOUTH EVERY DAY    tiZANidine (ZANAFLEX) 4 MG tablet 1 TABLET AT BEDTIME AS NEEDED MUSCLE SPASMS    traZODone (DESYREL) 50 MG tablet TAKE 1 TABLET BY MOUTH AT BEDTIME    triamcinolone acetonide 0.1% (KENALOG) 0.1 % cream Apply topically 2 (two) times daily.    ziprasidone (GEODON) 40 MG Cap Take 1 capsule  (40 mg total) by mouth once daily.    ziprasidone (GEODON) 80 MG capsule Take 1 capsule (80 mg total) by mouth once daily.    zolpidem (AMBIEN) 5 MG Tab 1 po Q [facility] p.r.n. sleep alternate with trazodone so 1 of the other taken every other night as needed for sleep    promethazine-codeine 6.25-10 mg/5 ml (PHENERGAN WITH CODEINE) 6.25-10 mg/5 mL syrup Take 5 mLs by mouth every 6 (six) hours as needed for Cough. (Patient not taking: Reported on 11/8/2019)    umeclidinium-vilanterol (ANORO ELLIPTA) 62.5-25 mcg/actuation DsDv Inhale 1 puff into the lungs once daily. Controller (Patient not taking: Reported on 11/8/2019)     No current facility-administered medications on file prior to visit.        Objective:      Vitals:    11/08/19 1006   BP: 118/84   Pulse: 72     Physical Exam   Constitutional: She is oriented to person, place, and time. She appears well-developed and well-nourished. No distress.   HENT:   Head: Normocephalic.   Neck: Normal range of motion. Neck supple.   Cardiovascular: Normal rate and regular rhythm.   No murmur heard.  Pulmonary/Chest: Normal expansion, symmetric chest wall expansion and effort normal. No respiratory distress. She has decreased breath sounds. She has wheezes (scant expiratory). She has no rhonchi. She has no rales.   Abdominal: Soft. She exhibits no distension. There is no hepatosplenomegaly. There is no tenderness.   Musculoskeletal: Normal range of motion. She exhibits no edema.   Lymphadenopathy:     She has no cervical adenopathy.   Neurological: She is alert and oriented to person, place, and time. Gait normal.   Skin: Skin is warm and dry. No cyanosis. Nails show no clubbing.   Psychiatric: She has a normal mood and affect.   Vitals reviewed.    Personal Diagnostic Review    Imaging personally reviewed with patient CT 8/16/19        Assessment:     Problem List Items Addressed This Visit        Pulmonary    Chronic cough - Primary    Overview     Ongoing for many months.   Associated with wheezing and clear mucus. PRN TREMAINE use with moderate relief.          Current Assessment & Plan     Trial saline nasal rinses, oral antihistamine, Flonase nasal spray and Delsym cough syrup BID as outlined in AVS.  Patient requested codeine cough syrup throughout visit.  Recommend treating the cause of cough rather than continuing use of codeine syrup.  Offered prescription for promethazine cough syrup, patient declined as it is not effective.    Strongly suspect that her cough is secondary to PND and smoking and possible chronic sinusitis.  She is followed by ENT- appreciate their opinion.  Await PFTs.  Patient may benefit from daily maintenance inhaler.          Relevant Orders    Complete PFT with bronchodilator    Six Minute Walk Test to qualify for Home Oxygen       Other    Tobacco abuse    Overview     Current daily smoker         Current Assessment & Plan     Long discussion regarding need to quit

## 2019-11-08 NOTE — PATIENT INSTRUCTIONS
Nasal Saline:     A. Tilt head back and squirt into nostril 2-3 times until you taste saline in back of throat. Spit, and blow nose. Do this 4 times, alternating right and left nostril. Do this routine 2-3 times per day- at least once in the shower.      Gargle with warm salt water 2-3 times per day. About 1 cup, fairly warm, fairly salty    Xyzal 5 mg at bedtime    Flonase 2 sprays each nostril daily, or Astelin    Delsym cough syrup twice a day    Continue all of these things for 2-3 weeks        Call or email me in 2 weeks and let me know how you are doing.      Get your breathing tests- you would likely benefit from a daily maintenance inhaler.

## 2019-11-09 NOTE — ASSESSMENT & PLAN NOTE
Trial saline nasal rinses, oral antihistamine, Flonase nasal spray and Delsym cough syrup BID as outlined in AVS.  Patient requested codeine cough syrup throughout visit.  Recommend treating the cause of cough rather than continuing use of codeine syrup.  Offered prescription for promethazine cough syrup, patient declined as it is not effective.    Strongly suspect that her cough is secondary to PND and smoking and possible chronic sinusitis.  She is followed by ENT- appreciate their opinion.  Await PFTs.  Patient may benefit from daily maintenance inhaler.

## 2019-11-12 ENCOUNTER — PATIENT OUTREACH (OUTPATIENT)
Dept: ADMINISTRATIVE | Facility: OTHER | Age: 54
End: 2019-11-12

## 2019-11-13 DIAGNOSIS — R94.2 ABNORMAL PFT: Primary | ICD-10-CM

## 2019-11-13 PROBLEM — E55.9 VITAMIN D DEFICIENCY: Status: ACTIVE | Noted: 2019-11-13

## 2019-11-14 ENCOUNTER — OFFICE VISIT (OUTPATIENT)
Dept: OTOLARYNGOLOGY | Facility: CLINIC | Age: 54
End: 2019-11-14
Payer: MEDICARE

## 2019-11-14 VITALS — HEIGHT: 66 IN | BODY MASS INDEX: 38.25 KG/M2 | WEIGHT: 238 LBS

## 2019-11-14 DIAGNOSIS — J30.9 ALLERGIC RHINITIS, UNSPECIFIED SEASONALITY, UNSPECIFIED TRIGGER: ICD-10-CM

## 2019-11-14 DIAGNOSIS — R05.9 COUGH: ICD-10-CM

## 2019-11-14 DIAGNOSIS — H93.A1 PULSATILE TINNITUS OF RIGHT EAR: Primary | ICD-10-CM

## 2019-11-14 DIAGNOSIS — H90.A31 MIXED CONDUCTIVE AND SENSORINEURAL HEARING LOSS OF RIGHT EAR WITH RESTRICTED HEARING OF LEFT EAR: ICD-10-CM

## 2019-11-14 DIAGNOSIS — H69.93 DYSFUNCTION OF BOTH EUSTACHIAN TUBES: ICD-10-CM

## 2019-11-14 DIAGNOSIS — R09.82 PND (POST-NASAL DRIP): ICD-10-CM

## 2019-11-14 PROCEDURE — 99214 OFFICE O/P EST MOD 30 MIN: CPT | Mod: S$GLB,,, | Performed by: SPECIALIST

## 2019-11-14 PROCEDURE — 3008F BODY MASS INDEX DOCD: CPT | Mod: CPTII,S$GLB,, | Performed by: SPECIALIST

## 2019-11-14 PROCEDURE — 3008F PR BODY MASS INDEX (BMI) DOCUMENTED: ICD-10-PCS | Mod: CPTII,S$GLB,, | Performed by: SPECIALIST

## 2019-11-14 PROCEDURE — 99214 PR OFFICE/OUTPT VISIT, EST, LEVL IV, 30-39 MIN: ICD-10-PCS | Mod: S$GLB,,, | Performed by: SPECIALIST

## 2019-11-14 RX ORDER — PROMETHAZINE HYDROCHLORIDE AND DEXTROMETHORPHAN HYDROBROMIDE 6.25; 15 MG/5ML; MG/5ML
5 SYRUP ORAL EVERY 4 HOURS PRN
Qty: 360 ML | Refills: 3 | Status: SHIPPED | OUTPATIENT
Start: 2019-11-14 | End: 2019-11-24

## 2019-11-14 RX ORDER — PROMETHAZINE HYDROCHLORIDE AND CODEINE PHOSPHATE 6.25; 1 MG/5ML; MG/5ML
5 SOLUTION ORAL EVERY 4 HOURS PRN
Qty: 240 ML | Refills: 0 | Status: SHIPPED | OUTPATIENT
Start: 2019-11-14 | End: 2019-11-24

## 2019-11-14 RX ORDER — FLUTICASONE PROPIONATE 50 MCG
2 SPRAY, SUSPENSION (ML) NASAL DAILY
Qty: 1 BOTTLE | Refills: 11 | Status: ON HOLD | OUTPATIENT
Start: 2019-11-14 | End: 2020-02-03 | Stop reason: HOSPADM

## 2019-11-14 RX ORDER — IPRATROPIUM BROMIDE 21 UG/1
2 SPRAY, METERED NASAL 2 TIMES DAILY
Qty: 30 ML | Refills: 11 | Status: SHIPPED | OUTPATIENT
Start: 2019-11-14 | End: 2021-04-06 | Stop reason: SDUPTHER

## 2019-11-14 NOTE — PROGRESS NOTES
Subjective:       Patient ID: Trina Marie Collette is a 54 y.o. female.    Chief Complaint: Sinus Problem; Cough; and Follow-up    The patient is returning for follow-up visit.  There are multiple issues to discuss:  1.  She continus to have nasal congestion and postnasal drip.  Postnasal drip causes coughing which is worse at night.  Nasal secretions are clear.  She is using Astelin, Flonase and Singulair routinely and ipratropium bromide spray as needed.  Uses Phenergan with codeine to control coughing at night.  She does have periodic coughing during the day from a tickle in the throat.  She has tried Tessalon Perles in the past with no benefit.  2.  Her pulsatile tinnitus in the right ear continues.  She did see Dr. Collins.  An MRV was recommended but has not yet been performed.  Surgery was discussed; however, she is not favorable to that option at this point because it of the lack of a guarantee DQ or.    Review of Systems   Constitutional: Positive for fatigue. Negative for activity change, appetite change, chills, fever and unexpected weight change.   HENT: Positive for congestion, ear pain, hearing loss, postnasal drip, rhinorrhea, sinus pressure, sinus pain, sore throat and tinnitus. Negative for ear discharge, facial swelling, mouth sores, sneezing, trouble swallowing and voice change.    Eyes: Negative for photophobia, pain, discharge, redness, itching and visual disturbance.   Respiratory: Positive for cough. Negative for apnea, choking, shortness of breath and wheezing.    Cardiovascular: Negative for chest pain and palpitations.   Gastrointestinal: Negative for abdominal distention, abdominal pain, nausea and vomiting.   Musculoskeletal: Positive for back pain. Negative for arthralgias, myalgias, neck pain and neck stiffness.   Skin: Negative.  Negative for color change, pallor and rash.   Allergic/Immunologic: Positive for environmental allergies. Negative for food allergies and immunocompromised  state.   Neurological: Positive for headaches. Negative for dizziness, facial asymmetry, speech difficulty, weakness, light-headedness and numbness.   Hematological: Negative for adenopathy. Does not bruise/bleed easily.   Psychiatric/Behavioral: Positive for sleep disturbance. Negative for confusion and decreased concentration.        Bipolar 1 disorder       Objective:      Physical Exam   Constitutional: She is oriented to person, place, and time. She appears well-developed and well-nourished. She is cooperative.   HENT:   Head: Normocephalic.   Right Ear: External ear and ear canal normal. Tympanic membrane is retracted.   Left Ear: External ear and ear canal normal. Tympanic membrane is retracted.   Nose: Mucosal edema (cyanotic, boggy inferior turbinates bilaterally), rhinorrhea (clear mucus bilaterally) and septal deviation (To the left) present.   Mouth/Throat: Uvula is midline, oropharynx is clear and moist and mucous membranes are normal. No oral lesions.   Eyes: Pupils are equal, round, and reactive to light. EOM and lids are normal. Right eye exhibits no discharge and no exudate. Left eye exhibits no discharge and no exudate. Right conjunctiva is injected. Left conjunctiva is injected.   Neck: Trachea normal and normal range of motion. No muscular tenderness present. No tracheal deviation present. No thyroid mass and no thyromegaly present.   Cardiovascular: Normal rate, regular rhythm, normal heart sounds and normal pulses.   Pulmonary/Chest: Effort normal. No stridor. She has decreased breath sounds. She has no wheezes. She has no rhonchi. She has no rales.   Abdominal: Soft. Bowel sounds are normal. There is no tenderness.   Musculoskeletal: Normal range of motion.   Lymphadenopathy:        Head (right side): No submental, no submandibular, no preauricular, no posterior auricular and no occipital adenopathy present.        Head (left side): No submental, no submandibular, no preauricular, no posterior  auricular and no occipital adenopathy present.     She has no cervical adenopathy.   Neurological: She is alert and oriented to person, place, and time. She has normal strength. No cranial nerve deficit or sensory deficit. Gait normal.   Skin: Skin is warm and dry. No petechiae and no rash noted. No cyanosis. Nails show no clubbing.   Psychiatric: She has a normal mood and affect. Her speech is normal and behavior is normal. Judgment and thought content normal. Cognition and memory are normal.       Assessment:       1. Pulsatile tinnitus of right ear    2. Allergic rhinitis, unspecified seasonality, unspecified trigger    3. PND (post-nasal drip)    4. Cough    5. Dysfunction of both eustachian tubes    6. Mixed conductive and sensorineural hearing loss of right ear with restricted hearing of left ear        Plan:       Regarding her nighttime cough I am having the patient state ipratropium bromide spray before going to bed.  She can use Phenergan with codeine at night and the Phenergan DM during the daytime.  She will continue with her allergy medicines.  Regarding the pulsatile tinnitus in her right ear she needs to either reconcile to living with the problem or pursue treatment as prescribed by Dr. Collins.  I will recheck the patient in 3 months, or sooner on an as-needed basis

## 2019-11-15 NOTE — TELEPHONE ENCOUNTER
----- Message from Davon Brock MD sent at 11/13/2019  8:54 PM CST -----  Call tell Vit D low 12 needs be on Vit d 50.000nunits q week  #12 3 refills All rest lab WNL CBC,CMP,lipid, trhyroid all WNL

## 2019-11-18 ENCOUNTER — TELEPHONE (OUTPATIENT)
Dept: PRIMARY CARE CLINIC | Facility: CLINIC | Age: 54
End: 2019-11-18

## 2019-11-18 ENCOUNTER — PATIENT MESSAGE (OUTPATIENT)
Dept: PRIMARY CARE CLINIC | Facility: CLINIC | Age: 54
End: 2019-11-18

## 2019-11-18 NOTE — TELEPHONE ENCOUNTER
----- Message from Luz Vasques sent at 11/18/2019  2:27 PM CST -----  Contact: pt   Pt called stated she missed a call from this number.  Please call back @ [phone]

## 2019-11-18 NOTE — TELEPHONE ENCOUNTER
Spoke with [facility] pharmacy, verbal order given for Ambien 5 mg 1 PO q H.S. PRN sleep #30 with 1 refill. Patient is stopping her trazodone. Pharmacist verbalized understanding.

## 2019-11-18 NOTE — TELEPHONE ENCOUNTER
Spoke with patient in regards to her portal message, will call [facility] pharmacy in regard to her RX for ambien and trazodone. Patient verbalized understanding. Will message patient through the portal.

## 2019-11-19 RX ORDER — ERGOCALCIFEROL 1.25 MG/1
50000 CAPSULE ORAL
Qty: 12 CAPSULE | Refills: 3 | Status: SHIPPED | OUTPATIENT
Start: 2019-11-19 | End: 2021-04-06 | Stop reason: SDUPTHER

## 2019-11-25 ENCOUNTER — PATIENT OUTREACH (OUTPATIENT)
Dept: ADMINISTRATIVE | Facility: OTHER | Age: 54
End: 2019-11-25

## 2019-11-25 NOTE — PROGRESS NOTES
Attempted to contact pt to remind to return [employer]. No Answer. [facility] for any questions.

## 2019-11-29 ENCOUNTER — PATIENT MESSAGE (OUTPATIENT)
Dept: ADMINISTRATIVE | Facility: OTHER | Age: 54
End: 2019-11-29

## 2019-11-29 ENCOUNTER — PATIENT OUTREACH (OUTPATIENT)
Dept: ADMINISTRATIVE | Facility: OTHER | Age: 54
End: 2019-11-29

## 2019-11-29 NOTE — PROGRESS NOTES
2nd outreach to patient to return fitkit. Patient active on patient portal. Sent message via portal.

## 2019-12-03 DIAGNOSIS — M54.50 LUMBAR SPINE PAIN: ICD-10-CM

## 2019-12-03 DIAGNOSIS — E78.5 HYPERLIPIDEMIA, UNSPECIFIED HYPERLIPIDEMIA TYPE: ICD-10-CM

## 2019-12-03 DIAGNOSIS — E07.9 THYROID MASS: ICD-10-CM

## 2019-12-03 DIAGNOSIS — H93.19 TINNITUS, UNSPECIFIED LATERALITY: ICD-10-CM

## 2019-12-04 ENCOUNTER — OFFICE VISIT (OUTPATIENT)
Dept: SURGERY | Facility: CLINIC | Age: 54
End: 2019-12-04
Payer: MEDICARE

## 2019-12-04 VITALS
TEMPERATURE: 98 F | HEIGHT: 66 IN | BODY MASS INDEX: 38.57 KG/M2 | HEART RATE: 63 BPM | DIASTOLIC BLOOD PRESSURE: 80 MMHG | SYSTOLIC BLOOD PRESSURE: 145 MMHG | WEIGHT: 240 LBS

## 2019-12-04 DIAGNOSIS — Q17.8 SPLIT EAR LOBE: Primary | ICD-10-CM

## 2019-12-04 PROCEDURE — 99999 PR PBB SHADOW E&M-EST. PATIENT-LVL III: CPT | Mod: PBBFAC,,, | Performed by: STUDENT IN AN ORGANIZED HEALTH CARE EDUCATION/TRAINING PROGRAM

## 2019-12-04 PROCEDURE — 3077F SYST BP >= 140 MM HG: CPT | Mod: CPTII,S$GLB,, | Performed by: STUDENT IN AN ORGANIZED HEALTH CARE EDUCATION/TRAINING PROGRAM

## 2019-12-04 PROCEDURE — 3079F DIAST BP 80-89 MM HG: CPT | Mod: CPTII,S$GLB,, | Performed by: STUDENT IN AN ORGANIZED HEALTH CARE EDUCATION/TRAINING PROGRAM

## 2019-12-04 PROCEDURE — 3077F PR MOST RECENT SYSTOLIC BLOOD PRESSURE >= 140 MM HG: ICD-10-PCS | Mod: CPTII,S$GLB,, | Performed by: STUDENT IN AN ORGANIZED HEALTH CARE EDUCATION/TRAINING PROGRAM

## 2019-12-04 PROCEDURE — 3079F PR MOST RECENT DIASTOLIC BLOOD PRESSURE 80-89 MM HG: ICD-10-PCS | Mod: CPTII,S$GLB,, | Performed by: STUDENT IN AN ORGANIZED HEALTH CARE EDUCATION/TRAINING PROGRAM

## 2019-12-04 PROCEDURE — 3008F PR BODY MASS INDEX (BMI) DOCUMENTED: ICD-10-PCS | Mod: CPTII,S$GLB,, | Performed by: STUDENT IN AN ORGANIZED HEALTH CARE EDUCATION/TRAINING PROGRAM

## 2019-12-04 PROCEDURE — 99203 OFFICE O/P NEW LOW 30 MIN: CPT | Mod: 25,S$GLB,, | Performed by: STUDENT IN AN ORGANIZED HEALTH CARE EDUCATION/TRAINING PROGRAM

## 2019-12-04 PROCEDURE — 12011 PR RESUPERF WND FACE <2.5 CM: ICD-10-PCS | Mod: LT,S$GLB,, | Performed by: STUDENT IN AN ORGANIZED HEALTH CARE EDUCATION/TRAINING PROGRAM

## 2019-12-04 PROCEDURE — 99203 PR OFFICE/OUTPT VISIT, NEW, LEVL III, 30-44 MIN: ICD-10-PCS | Mod: 25,S$GLB,, | Performed by: STUDENT IN AN ORGANIZED HEALTH CARE EDUCATION/TRAINING PROGRAM

## 2019-12-04 PROCEDURE — 12011 RPR F/E/E/N/L/M 2.5 CM/<: CPT | Mod: LT,S$GLB,, | Performed by: STUDENT IN AN ORGANIZED HEALTH CARE EDUCATION/TRAINING PROGRAM

## 2019-12-04 PROCEDURE — 3008F BODY MASS INDEX DOCD: CPT | Mod: CPTII,S$GLB,, | Performed by: STUDENT IN AN ORGANIZED HEALTH CARE EDUCATION/TRAINING PROGRAM

## 2019-12-04 PROCEDURE — 99999 PR PBB SHADOW E&M-EST. PATIENT-LVL III: ICD-10-PCS | Mod: PBBFAC,,, | Performed by: STUDENT IN AN ORGANIZED HEALTH CARE EDUCATION/TRAINING PROGRAM

## 2019-12-04 NOTE — PROGRESS NOTES
GENERAL SURGERY  Clinic Note        SUBJECTIVE:     HISTORY OF PRESENT ILLNESS:  Trina Marie Collette is a 54 y.o. female who has been referred to surgery clinic for evaluation/repair of a left torn ear lobe. Patient notes that she noticed her upper left piercing had been coming loose, and when she was sitting watching tv two nights ago she noticed her upper left earring had torn out of her ear. Her lower piercing on her left ear has also stretched so that she cannot wear earrings. However, it is not completely torn through. The upper tear is not bleeding, and is not completely scabbed over. However, is not tender to palpation. Wishes for immediate repair.     Is not currently on blood thinners.     MEDICATIONS:  Home Medications:  Current Outpatient Medications on File Prior to Visit   Medication Sig Dispense Refill    albuterol (PROVENTIL) 2.5 mg /3 mL (0.083 %) nebulizer solution Take 3 mLs (2.5 mg total) by nebulization every 4 (four) hours as needed for Wheezing or Shortness of Breath. Rescue 1 Box 1    azelastine (ASTELIN) 137 mcg (0.1 %) nasal spray Two sprays in each nostril, sniff until absorbed, then follow with 1 spray of fluticasone.  Use both sprays twice daily. 30 mL 11    busPIRone (BUSPAR) 30 MG Tab       cyclobenzaprine (FLEXERIL) 10 MG tablet Take 1 tablet (10 mg total) by mouth 2 (two) times daily as needed for Muscle spasms. 60 tablet 5    ergocalciferol (ERGOCALCIFEROL) 50,000 unit Cap Take 1 capsule (50,000 Units total) by mouth every 7 days. 12 capsule 3    fluticasone propionate (FLONASE) 50 mcg/actuation nasal spray 2 sprays (100 mcg total) by Each Nostril route once daily. 1 Bottle 11    gabapentin (NEURONTIN) 800 MG tablet Take 800 mg by mouth 3 (three) times daily.  1    HYDROcodone-acetaminophen (NORCO) 5-325 mg per tablet Take 1 tablet by mouth daily as needed.  0    ipratropium (ATROVENT) 0.03 % nasal spray 2 sprays by Nasal route 2 (two) times daily. May be use more often if  needed 30 mL 11    losartan (COZAAR) 100 MG tablet Take 1 tablet (100 mg total) by mouth once daily. 90 tablet 3    montelukast (SINGULAIR) 10 mg tablet TAKE 1 TABLET BY MOUTH EVERY DAY 90 tablet 1    naproxen (NAPROSYN) 500 MG tablet Take 1 tablet (500 mg total) by mouth 2 (two) times daily with meals. 30 tablet 0    omeprazole (PRILOSEC) 40 MG capsule TAKE 1 CAPSULE BY MOUTH EVERY DAY 90 capsule 0    pantoprazole (PROTONIX) 40 MG tablet Take 1 tablet (40 mg total) by mouth once daily. 90 tablet 1    rosuvastatin (CRESTOR) 5 MG tablet Take 1 tablet (5 mg total) by mouth once daily. 90 tablet 0    sertraline (ZOLOFT) 100 MG tablet TAKE 1 TABLET BY MOUTH EVERY DAY 90 tablet 0    tiZANidine (ZANAFLEX) 4 MG tablet 1 TABLET AT BEDTIME AS NEEDED MUSCLE SPASMS  1    traZODone (DESYREL) 50 MG tablet TAKE 1 TABLET BY MOUTH AT BEDTIME 90 tablet 1    triamcinolone acetonide 0.1% (KENALOG) 0.1 % cream Apply topically 2 (two) times daily. 15 g 2    umeclidinium-vilanterol (ANORO ELLIPTA) 62.5-25 mcg/actuation DsDv Inhale 1 puff into the lungs once daily. Controller 60 each 5    ziprasidone (GEODON) 40 MG Cap Take 1 capsule (40 mg total) by mouth once daily. 30 capsule 5    ziprasidone (GEODON) 80 MG capsule Take 1 capsule (80 mg total) by mouth once daily. 30 capsule 5    zolpidem (AMBIEN) 5 MG Tab 1 po Q [facility] p.r.n. sleep alternate with trazodone so 1 of the other taken every other night as needed for sleep 30 tablet 2     No current facility-administered medications on file prior to visit.        ALLERGIES:    Review of patient's allergies indicates:  No Known Allergies    PAST MEDICAL HISTORY:    Past Medical History:   Diagnosis Date    Anxiety     Bipolar 1 disorder     Depression        SURGICAL HISTORY:  No past surgical history on file.    FAMILY HISTORY:  No family history on file.    SOCIAL HISTORY:  Social History     Tobacco Use    Smoking status: Current Every Day Smoker    Smokeless tobacco:  Never Used   Substance Use Topics    Alcohol use: Yes    Drug use: No        REVIEW OF SYSTEMS:  Review of Systems 10 point ROS completed and otherwise negative except what has been stated in HPI.       OBJECTIVE:     Most Recent Vitals:  Temp: 98.1 °F (36.7 °C) (12/04/19 1423)  Pulse: 63 (12/04/19 1423)  BP: (!) 145/80 (12/04/19 1423)      PHYSICAL EXAM:  Physical Exam   Constitutional: She is oriented to person, place, and time and well-developed, well-nourished, and in no distress.   HENT:   Head: Normocephalic and atraumatic.   Tear to left ear lobe at first and second piercing. Bottom piercing not completely torn throughout (only stretched), top piercing completely torn through.   Eyes: Pupils are equal, round, and reactive to light. EOM are normal.   Neck: Normal range of motion. Neck supple.   Cardiovascular: Normal rate.   Pulmonary/Chest: Effort normal. No respiratory distress.   Abdominal: Soft. She exhibits no distension.   Musculoskeletal: Normal range of motion. She exhibits no edema.   Neurological: She is alert and oriented to person, place, and time.   Skin: Skin is warm and dry.   Psychiatric: Mood, memory, affect and judgment normal.         LABORATORY VALUES:  Lab Results   Component Value Date    WBC 9.30 11/12/2019    HGB 12.3 11/12/2019    HCT 38.3 11/12/2019     11/12/2019     Lab Results   Component Value Date     11/12/2019    K 3.7 11/12/2019     11/12/2019    CO2 27 11/12/2019    BUN 17 11/12/2019    CREATININE 0.6 11/12/2019    GLU 99 11/12/2019    CALCIUM 9.6 11/12/2019    AST 18 11/12/2019    ALT 12 (L) 11/12/2019    ALKPHOS 113 11/12/2019    BILITOT 0.3 11/12/2019    PROT 7.6 11/12/2019    ALBUMIN 4.2 11/12/2019     Lab Results   Component Value Date    INR 1.0 09/25/2018     Lab Results   Component Value Date    TSH 1.23 11/12/2019    FREET4 0.93 11/12/2019         DIAGNOSTIC STUDIES:  None    Procedure:  PREOPERATIVE DIAGNOSIS:  Torn left earlobe    POSTOPERATIVE  DIAGNOSIS: same    PROCEDURE PERFORMED: Repair of left earlobe tear x 2    ATTENDING SURGEON: Dr. Dias    [facility] APRIL McculloughI and Tessy Felix PGYIV    ANESTHESIA:  Local    ESTIMATED BLOOD LOSS: 5cc    FINDINGS: Repair of earlobe tear x2 (left earlobe)    INDICATIONS: Trina Collette is a 53 yo female referred to my General Surgery Clinic with a left earlobe tear x2. We recommended repair/excision of scar tissue. This procedure has been fully reviewed with the patient and written informed consent has been obtained.      PROCEDURE IN DETAIL: The patient was identified and brought to the minor procedure room in the General Surgery Clinic. Patient was positioned appropriately and prepped and draped sterilely. (5 cc) of local anesthesia was administered and the skin surrounding both earlobe tears was excised with the scalpel, removing the scar tissue and revealing healthy bleeding tissue. Hemostasis was obtained with pressure. The wounds were then closed utilizing 5-0 monocryl suture in simple interrupted fashion. Dermabond was then applied. The patient tolerated the procedure well, was discharged from the minor procedure room and will follow up in two weeks for a wound check.    ASSESSMENT:     Trina Marie Collette is a 54 y.o. female referred for evaluation/repair of torn left ear lobe. Repair completed in office.      PLAN:  -Repair of left ear lobe completed in office. Repaired with dissolvable suture/glue. No need to remove sutures.  -Return to clinic in 2 weeks to assess incisions.     Iliana Woods MD  General Surgery  [facility]

## 2019-12-05 ENCOUNTER — PATIENT OUTREACH (OUTPATIENT)
Dept: ADMINISTRATIVE | Facility: OTHER | Age: 54
End: 2019-12-05

## 2019-12-06 PROBLEM — Q17.8 SPLIT EAR LOBE: Status: ACTIVE | Noted: 2019-12-06

## 2019-12-06 RX ORDER — ROSUVASTATIN CALCIUM 5 MG/1
TABLET, COATED ORAL
Qty: 90 TABLET | Refills: 1 | Status: SHIPPED | OUTPATIENT
Start: 2019-12-06 | End: 2020-01-23 | Stop reason: SDUPTHER

## 2019-12-10 ENCOUNTER — OFFICE VISIT (OUTPATIENT)
Dept: OTOLARYNGOLOGY | Facility: CLINIC | Age: 54
End: 2019-12-10
Payer: MEDICARE

## 2019-12-10 ENCOUNTER — TELEPHONE (OUTPATIENT)
Dept: OTOLARYNGOLOGY | Facility: CLINIC | Age: 54
End: 2019-12-10

## 2019-12-10 VITALS
HEART RATE: 72 BPM | BODY MASS INDEX: 38.6 KG/M2 | DIASTOLIC BLOOD PRESSURE: 84 MMHG | SYSTOLIC BLOOD PRESSURE: 151 MMHG | HEIGHT: 66 IN | TEMPERATURE: 98 F | WEIGHT: 240.19 LBS

## 2019-12-10 DIAGNOSIS — J45.21 MILD INTERMITTENT ASTHMA WITH ACUTE EXACERBATION: Primary | ICD-10-CM

## 2019-12-10 DIAGNOSIS — J30.9 ALLERGIC RHINITIS, UNSPECIFIED SEASONALITY, UNSPECIFIED TRIGGER: ICD-10-CM

## 2019-12-10 DIAGNOSIS — J34.2 NASAL SEPTAL DEVIATION: ICD-10-CM

## 2019-12-10 DIAGNOSIS — H90.A31 MIXED CONDUCTIVE AND SENSORINEURAL HEARING LOSS OF RIGHT EAR WITH RESTRICTED HEARING OF LEFT EAR: ICD-10-CM

## 2019-12-10 DIAGNOSIS — H93.A1 PULSATILE TINNITUS OF RIGHT EAR: ICD-10-CM

## 2019-12-10 PROCEDURE — 3077F PR MOST RECENT SYSTOLIC BLOOD PRESSURE >= 140 MM HG: ICD-10-PCS | Mod: CPTII,S$GLB,, | Performed by: SPECIALIST

## 2019-12-10 PROCEDURE — 3079F PR MOST RECENT DIASTOLIC BLOOD PRESSURE 80-89 MM HG: ICD-10-PCS | Mod: CPTII,S$GLB,, | Performed by: SPECIALIST

## 2019-12-10 PROCEDURE — 99499 RISK ADDL DX/OHS AUDIT: ICD-10-PCS | Mod: S$GLB,,, | Performed by: SPECIALIST

## 2019-12-10 PROCEDURE — 3079F DIAST BP 80-89 MM HG: CPT | Mod: CPTII,S$GLB,, | Performed by: SPECIALIST

## 2019-12-10 PROCEDURE — 3008F BODY MASS INDEX DOCD: CPT | Mod: CPTII,S$GLB,, | Performed by: SPECIALIST

## 2019-12-10 PROCEDURE — 99214 PR OFFICE/OUTPT VISIT, EST, LEVL IV, 30-39 MIN: ICD-10-PCS | Mod: S$GLB,,, | Performed by: SPECIALIST

## 2019-12-10 PROCEDURE — 99499 UNLISTED E&M SERVICE: CPT | Mod: S$GLB,,, | Performed by: SPECIALIST

## 2019-12-10 PROCEDURE — 99214 OFFICE O/P EST MOD 30 MIN: CPT | Mod: S$GLB,,, | Performed by: SPECIALIST

## 2019-12-10 PROCEDURE — 3077F SYST BP >= 140 MM HG: CPT | Mod: CPTII,S$GLB,, | Performed by: SPECIALIST

## 2019-12-10 PROCEDURE — 3008F PR BODY MASS INDEX (BMI) DOCUMENTED: ICD-10-PCS | Mod: CPTII,S$GLB,, | Performed by: SPECIALIST

## 2019-12-10 RX ORDER — PREDNISONE 10 MG/1
TABLET ORAL
Qty: 18 TABLET | Refills: 0 | Status: SHIPPED | OUTPATIENT
Start: 2019-12-10 | End: 2020-01-23

## 2019-12-10 RX ORDER — BUDESONIDE AND FORMOTEROL FUMARATE DIHYDRATE 160; 4.5 UG/1; UG/1
2 AEROSOL RESPIRATORY (INHALATION) EVERY 12 HOURS
Qty: 1 INHALER | Refills: 11 | Status: SHIPPED | OUTPATIENT
Start: 2019-12-10 | End: 2021-04-06 | Stop reason: SDUPTHER

## 2019-12-10 RX ORDER — HYDROCODONE BITARTRATE AND ACETAMINOPHEN 10; 325 MG/1; MG/1
1 TABLET ORAL EVERY 8 HOURS PRN
Refills: 0 | COMMUNITY
Start: 2019-11-08

## 2019-12-10 RX ORDER — PROMETHAZINE HYDROCHLORIDE AND CODEINE PHOSPHATE 6.25; 1 MG/5ML; MG/5ML
5 SOLUTION ORAL EVERY 4 HOURS PRN
Qty: 240 ML | Refills: 0 | Status: SHIPPED | OUTPATIENT
Start: 2019-12-10 | End: 2019-12-20 | Stop reason: SDUPTHER

## 2019-12-10 RX ORDER — AMOXICILLIN AND CLAVULANATE POTASSIUM 875; 125 MG/1; MG/1
TABLET, FILM COATED ORAL
Qty: 20 TABLET | Refills: 0 | Status: SHIPPED | OUTPATIENT
Start: 2019-12-10 | End: 2020-01-23

## 2019-12-10 NOTE — PROGRESS NOTES
Subjective:       Patient ID: Trina Marie Collette is a 54 y.o. female.    Chief Complaint: Sore Throat (with Cough) and Follow-up    The patient has been feeling poorly for the last week.  She has had a cough that would produce clear sputum she is having shortness of breath and wheezing.  The coughing is becoming more severe.  She is also having nasal congestion, headaches, postnasal drip and a mild sore throat.  She does not have fever.  She has been taking Singulair Astelin, Flonase and using an Anoro inhaler and albuterol nebulizer treatments.  She has slept very little in the last 3 days because of shortness of breath and coughing.    Review of Systems   Constitutional: Positive for fatigue. Negative for activity change, appetite change, chills, fever and unexpected weight change.   HENT: Positive for congestion, postnasal drip, rhinorrhea, sinus pressure, sinus pain and sore throat. Negative for ear discharge, ear pain, facial swelling, hearing loss, mouth sores, sneezing, tinnitus, trouble swallowing and voice change.    Eyes: Negative for photophobia, pain, discharge, redness, itching and visual disturbance.   Respiratory: Positive for cough, shortness of breath and wheezing. Negative for apnea and choking.    Cardiovascular: Negative for chest pain and palpitations.   Gastrointestinal: Negative for abdominal distention, abdominal pain, nausea and vomiting.   Musculoskeletal: Negative for arthralgias, myalgias, neck pain and neck stiffness.   Skin: Negative.  Negative for color change, pallor and rash.   Allergic/Immunologic: Positive for environmental allergies. Negative for food allergies and immunocompromised state.   Neurological: Positive for weakness and headaches. Negative for dizziness, facial asymmetry, speech difficulty, light-headedness and numbness.   Hematological: Negative for adenopathy. Does not bruise/bleed easily.   Psychiatric/Behavioral: Negative for confusion, decreased concentration and  sleep disturbance.       Objective:      Physical Exam   Constitutional: She is oriented to person, place, and time. She appears well-developed and well-nourished. She is cooperative.   HENT:   Head: Normocephalic.   Right Ear: External ear and ear canal normal. Tympanic membrane is retracted.   Left Ear: External ear and ear canal normal. Tympanic membrane is retracted.   Nose: Mucosal edema (cyanotic, boggy inferior turbinates bilaterally), rhinorrhea (clear mucus bilaterally) and septal deviation (To the left) present.   Mouth/Throat: Uvula is midline, oropharynx is clear and moist and mucous membranes are normal. No oral lesions.   Eyes: Pupils are equal, round, and reactive to light. EOM and lids are normal. Right eye exhibits no discharge and no exudate. Left eye exhibits no discharge and no exudate. Right conjunctiva is injected. Left conjunctiva is injected.   Neck: Trachea normal and normal range of motion. No muscular tenderness present. No tracheal deviation present. No thyroid mass and no thyromegaly present.   Cardiovascular: Normal rate, regular rhythm, normal heart sounds and normal pulses.   Pulmonary/Chest: Effort normal. No stridor. She has decreased breath sounds ( all lung fields). She has wheezes ( Expiratory wheezing in all lung fields ). She has no rhonchi. She has no rales.   Abdominal: Soft. Bowel sounds are normal. There is no tenderness.   Musculoskeletal: Normal range of motion.   Lymphadenopathy:        Head (right side): No submental, no submandibular, no preauricular, no posterior auricular and no occipital adenopathy present.        Head (left side): No submental, no submandibular, no preauricular, no posterior auricular and no occipital adenopathy present.     She has no cervical adenopathy.   Neurological: She is alert and oriented to person, place, and time. She has normal strength. No cranial nerve deficit or sensory deficit. Gait normal.   Skin: Skin is warm and dry. No petechiae  and no rash noted. No cyanosis. Nails show no clubbing.   Psychiatric: She has a normal mood and affect. Her speech is normal and behavior is normal. Judgment and thought content normal. Cognition and memory are normal.       Assessment:       1. Mild intermittent asthma with acute exacerbation    2. Allergic rhinitis, unspecified seasonality, unspecified trigger    3. Mixed conductive and sensorineural hearing loss of right ear with restricted hearing of left ear    4. Pulsatile tinnitus of right ear    5. Nasal septal deviation        Plan:       I will check patient at the end of her steroid taper.  She has not found the and neuro inhaler to be effective so I am switching her to Symbicort.  If her condition is worsening she needs to go to an emergency room.  She will start the antibiotics if nasal secretions or sputum become discolored.

## 2019-12-10 NOTE — TELEPHONE ENCOUNTER
----- Message from Marci Plascencia sent at 12/10/2019  9:15 AM CST -----  Contact: COLLETTE,TRINA MARIE   Type: RX Refill Request    Who Called: COLLETTE,TRINA MARIE     RX Name and Strength: promethazine-codeine 6.25-10 mg/5 ml (PHENERGAN WITH CODEINE) 6.25-10 mg/5 mL syrup    [facility] Pharm[facility] with phone number[id number] [place] [facility] [address] Call Back Number: [phone]    Additional Information: N/A

## 2019-12-18 ENCOUNTER — PATIENT OUTREACH (OUTPATIENT)
Dept: ADMINISTRATIVE | Facility: OTHER | Age: 54
End: 2019-12-18

## 2019-12-20 ENCOUNTER — OFFICE VISIT (OUTPATIENT)
Dept: OTOLARYNGOLOGY | Facility: CLINIC | Age: 54
End: 2019-12-20
Payer: MEDICARE

## 2019-12-20 VITALS
DIASTOLIC BLOOD PRESSURE: 82 MMHG | HEIGHT: 66 IN | SYSTOLIC BLOOD PRESSURE: 137 MMHG | TEMPERATURE: 98 F | BODY MASS INDEX: 38.31 KG/M2 | WEIGHT: 238.38 LBS | HEART RATE: 70 BPM

## 2019-12-20 DIAGNOSIS — J45.20 MILD INTERMITTENT ASTHMA WITHOUT COMPLICATION: Primary | ICD-10-CM

## 2019-12-20 DIAGNOSIS — J30.9 ALLERGIC RHINITIS, UNSPECIFIED SEASONALITY, UNSPECIFIED TRIGGER: ICD-10-CM

## 2019-12-20 DIAGNOSIS — J34.2 NASAL SEPTAL DEVIATION: ICD-10-CM

## 2019-12-20 DIAGNOSIS — R05.3 CHRONIC COUGH: ICD-10-CM

## 2019-12-20 PROCEDURE — 99214 OFFICE O/P EST MOD 30 MIN: CPT | Mod: S$GLB,,, | Performed by: SPECIALIST

## 2019-12-20 PROCEDURE — 99499 UNLISTED E&M SERVICE: CPT | Mod: S$GLB,,, | Performed by: SPECIALIST

## 2019-12-20 PROCEDURE — 99499 RISK ADDL DX/OHS AUDIT: ICD-10-PCS | Mod: S$GLB,,, | Performed by: SPECIALIST

## 2019-12-20 PROCEDURE — 3075F PR MOST RECENT SYSTOLIC BLOOD PRESS GE 130-139MM HG: ICD-10-PCS | Mod: CPTII,S$GLB,, | Performed by: SPECIALIST

## 2019-12-20 PROCEDURE — 3079F DIAST BP 80-89 MM HG: CPT | Mod: CPTII,S$GLB,, | Performed by: SPECIALIST

## 2019-12-20 PROCEDURE — 3075F SYST BP GE 130 - 139MM HG: CPT | Mod: CPTII,S$GLB,, | Performed by: SPECIALIST

## 2019-12-20 PROCEDURE — 99214 PR OFFICE/OUTPT VISIT, EST, LEVL IV, 30-39 MIN: ICD-10-PCS | Mod: S$GLB,,, | Performed by: SPECIALIST

## 2019-12-20 PROCEDURE — 3008F BODY MASS INDEX DOCD: CPT | Mod: CPTII,S$GLB,, | Performed by: SPECIALIST

## 2019-12-20 PROCEDURE — 3008F PR BODY MASS INDEX (BMI) DOCUMENTED: ICD-10-PCS | Mod: CPTII,S$GLB,, | Performed by: SPECIALIST

## 2019-12-20 PROCEDURE — 3079F PR MOST RECENT DIASTOLIC BLOOD PRESSURE 80-89 MM HG: ICD-10-PCS | Mod: CPTII,S$GLB,, | Performed by: SPECIALIST

## 2019-12-20 RX ORDER — PROMETHAZINE HYDROCHLORIDE AND CODEINE PHOSPHATE 6.25; 1 MG/5ML; MG/5ML
5 SOLUTION ORAL EVERY 4 HOURS PRN
Qty: 240 ML | Refills: 0 | Status: SHIPPED | OUTPATIENT
Start: 2019-12-20 | End: 2019-12-30

## 2019-12-20 NOTE — PROGRESS NOTES
Subjective:       Patient ID: Trina Marie Collette is a 54 y.o. female.    Chief Complaint: Follow-up    The patient is returning for follow-up visit.  There are multiple issues to discuss:  1.  After taking the prednisone taper and Augmentin her breathing has improved significantly.  She is using Breo inhaler and albuterol HFA inhaler as well. Sputum is either clear or white.  She does have some mild shortness of breath but no wheezing.  2.  She is taking Flonase, Singulair and ipratropium bromide nasal spray for nasal allergies.  She is having some congestion and postnasal drip.  Nasal symptoms are significantly improved with that drug regimen.  3.  She is taking Protonix twice daily for her laryngopharyngeal reflux and GERD.  She is not having significant heartburn.  She does feel like she has a lump in her throat and phlegm in her throat.  Throat symptoms have improved significantly since being on the Protonix    Review of Systems   Constitutional: Positive for fatigue. Negative for activity change, appetite change, chills, fever and unexpected weight change.   HENT: Positive for congestion, hearing loss, postnasal drip, rhinorrhea, sinus pressure, sinus pain, sore throat, tinnitus, trouble swallowing and voice change. Negative for ear discharge, ear pain, facial swelling, mouth sores and sneezing.    Eyes: Negative for photophobia, pain, discharge, redness, itching and visual disturbance.   Respiratory: Positive for cough, shortness of breath and wheezing. Negative for apnea and choking.    Cardiovascular: Negative for chest pain and palpitations.   Gastrointestinal: Negative for abdominal distention, abdominal pain, nausea and vomiting.   Musculoskeletal: Positive for back pain and neck pain. Negative for arthralgias, myalgias and neck stiffness.   Skin: Negative.  Negative for color change, pallor and rash.   Allergic/Immunologic: Positive for environmental allergies. Negative for food allergies and  immunocompromised state.   Neurological: Positive for headaches. Negative for dizziness, facial asymmetry, speech difficulty, weakness, light-headedness and numbness.   Hematological: Negative for adenopathy. Does not bruise/bleed easily.   Psychiatric/Behavioral: Negative for confusion, decreased concentration and sleep disturbance.        Schizophrenia       Objective:      Physical Exam   Constitutional: She is oriented to person, place, and time. She appears well-developed and well-nourished. She is cooperative.   HENT:   Head: Normocephalic.   Right Ear: External ear and ear canal normal. Tympanic membrane is retracted.   Left Ear: External ear and ear canal normal. Tympanic membrane is retracted.   Nose: Mucosal edema (cyanotic, boggy inferior turbinates bilaterally), rhinorrhea (clear mucus bilaterally) and septal deviation (To the left) present.   Mouth/Throat: Uvula is midline, oropharynx is clear and moist and mucous membranes are normal. No oral lesions.   Eyes: Pupils are equal, round, and reactive to light. EOM and lids are normal. Right eye exhibits no discharge and no exudate. Left eye exhibits no discharge and no exudate. Right conjunctiva is injected. Left conjunctiva is injected.   Neck: Trachea normal. No muscular tenderness present. Decreased range of motion present. No tracheal deviation present. No thyroid mass and no thyromegaly present.   Cardiovascular: Normal rate, regular rhythm, normal heart sounds and normal pulses.   Pulmonary/Chest: Effort normal. No stridor. She has decreased breath sounds. She has no wheezes. She has no rhonchi. She has no rales.   Abdominal: Soft. Bowel sounds are normal. There is no tenderness.   Lymphadenopathy:        Head (right side): No submental, no submandibular, no preauricular, no posterior auricular and no occipital adenopathy present.        Head (left side): No submental, no submandibular, no preauricular, no posterior auricular and no occipital  adenopathy present.     She has no cervical adenopathy.   Neurological: She is alert and oriented to person, place, and time. She has normal strength. No cranial nerve deficit or sensory deficit. Gait normal.   Skin: Skin is warm and dry. No petechiae and no rash noted. No cyanosis. Nails show no clubbing.   Psychiatric: She has a normal mood and affect. Her speech is normal and behavior is normal. Judgment and thought content normal. Cognition and memory are normal.       Assessment:       1. Mild intermittent asthma without complication    2. Chronic cough    3. Allergic rhinitis, unspecified seasonality, unspecified trigger    4. Nasal septal deviation        Plan:       The patient will be out of town for 10 days over the Christmas holidays.  I will recheck her when she comes back on an as-needed basis or in 4 weeks..  I am refilling her Phenergan with codeine.    she is to continue with the Breo inhaler and p.r.n. use of albuterol

## 2019-12-24 RX ORDER — OMEPRAZOLE 40 MG/1
CAPSULE, DELAYED RELEASE ORAL
Qty: 90 CAPSULE | Refills: 1 | Status: SHIPPED | OUTPATIENT
Start: 2019-12-24 | End: 2019-12-26 | Stop reason: SDUPTHER

## 2020-01-04 RX ORDER — OMEPRAZOLE 40 MG/1
40 CAPSULE, DELAYED RELEASE ORAL DAILY
Qty: 90 CAPSULE | Refills: 1 | Status: SHIPPED | OUTPATIENT
Start: 2020-01-04 | End: 2020-01-17 | Stop reason: ALTCHOICE

## 2020-01-15 ENCOUNTER — PATIENT OUTREACH (OUTPATIENT)
Dept: ADMINISTRATIVE | Facility: OTHER | Age: 55
End: 2020-01-15

## 2020-01-17 ENCOUNTER — LAB VISIT (OUTPATIENT)
Dept: LAB | Facility: OTHER | Age: 55
End: 2020-01-17
Attending: SPECIALIST
Payer: MEDICARE

## 2020-01-17 ENCOUNTER — OFFICE VISIT (OUTPATIENT)
Dept: OTOLARYNGOLOGY | Facility: CLINIC | Age: 55
End: 2020-01-17
Payer: MEDICARE

## 2020-01-17 VITALS — HEIGHT: 66 IN | WEIGHT: 234.5 LBS | TEMPERATURE: 98 F | BODY MASS INDEX: 37.69 KG/M2

## 2020-01-17 DIAGNOSIS — J30.9 ALLERGIC RHINITIS, UNSPECIFIED SEASONALITY, UNSPECIFIED TRIGGER: ICD-10-CM

## 2020-01-17 DIAGNOSIS — R05.3 CHRONIC COUGH: ICD-10-CM

## 2020-01-17 DIAGNOSIS — R13.10 DYSPHAGIA, UNSPECIFIED TYPE: ICD-10-CM

## 2020-01-17 DIAGNOSIS — Z13.9 SCREENING FOR CONDITION: ICD-10-CM

## 2020-01-17 DIAGNOSIS — D37.05 NEOPLASM OF UNCERTAIN BEHAVIOR OF NASOPHARYNX: Primary | ICD-10-CM

## 2020-01-17 DIAGNOSIS — J34.2 NASAL SEPTAL DEVIATION: ICD-10-CM

## 2020-01-17 DIAGNOSIS — J45.21 MILD INTERMITTENT ASTHMA WITH ACUTE EXACERBATION: ICD-10-CM

## 2020-01-17 DIAGNOSIS — K21.9 LARYNGOPHARYNGEAL REFLUX (LPR): ICD-10-CM

## 2020-01-17 LAB
BUN SERPL-MCNC: 12 MG/DL (ref 6–20)
CREAT SERPL-MCNC: 0.7 MG/DL (ref 0.5–1.4)
EST. GFR  (AFRICAN AMERICAN): >60 ML/MIN/1.73 M^2
EST. GFR  (NON AFRICAN AMERICAN): >60 ML/MIN/1.73 M^2

## 2020-01-17 PROCEDURE — 36415 COLL VENOUS BLD VENIPUNCTURE: CPT

## 2020-01-17 PROCEDURE — 82565 ASSAY OF CREATININE: CPT

## 2020-01-17 PROCEDURE — 31575 DIAGNOSTIC LARYNGOSCOPY: CPT | Mod: S$GLB,,, | Performed by: SPECIALIST

## 2020-01-17 PROCEDURE — 99214 OFFICE O/P EST MOD 30 MIN: CPT | Mod: 25,S$GLB,, | Performed by: SPECIALIST

## 2020-01-17 PROCEDURE — 84520 ASSAY OF UREA NITROGEN: CPT

## 2020-01-17 PROCEDURE — 99499 RISK ADDL DX/OHS AUDIT: ICD-10-PCS | Mod: S$GLB,,, | Performed by: SPECIALIST

## 2020-01-17 PROCEDURE — 99499 UNLISTED E&M SERVICE: CPT | Mod: S$GLB,,, | Performed by: SPECIALIST

## 2020-01-17 PROCEDURE — 99214 PR OFFICE/OUTPT VISIT, EST, LEVL IV, 30-39 MIN: ICD-10-PCS | Mod: 25,S$GLB,, | Performed by: SPECIALIST

## 2020-01-17 PROCEDURE — 3008F BODY MASS INDEX DOCD: CPT | Mod: CPTII,S$GLB,, | Performed by: SPECIALIST

## 2020-01-17 PROCEDURE — 31575 LARYNGOSCOPY: ICD-10-PCS | Mod: S$GLB,,, | Performed by: SPECIALIST

## 2020-01-17 PROCEDURE — 3008F PR BODY MASS INDEX (BMI) DOCUMENTED: ICD-10-PCS | Mod: CPTII,S$GLB,, | Performed by: SPECIALIST

## 2020-01-17 RX ORDER — PROMETHAZINE HYDROCHLORIDE AND CODEINE PHOSPHATE 6.25; 1 MG/5ML; MG/5ML
5 SOLUTION ORAL EVERY 4 HOURS PRN
Qty: 240 ML | Refills: 0 | Status: SHIPPED | OUTPATIENT
Start: 2020-01-17 | End: 2020-01-24 | Stop reason: SDUPTHER

## 2020-01-17 RX ORDER — FAMOTIDINE 20 MG/1
20 TABLET, FILM COATED ORAL 2 TIMES DAILY
Qty: 60 TABLET | Refills: 11 | Status: SHIPPED | OUTPATIENT
Start: 2020-01-17 | End: 2020-03-16 | Stop reason: ALTCHOICE

## 2020-01-17 NOTE — PROGRESS NOTES
"Subjective:       Patient ID: Trina Marie Collette is a 55 y.o. female.    Chief Complaint: Follow-up and Cough    The patient is returning for follow-up visit.  There are multiple issues to discuss:  1.  She continues to have some coughing but it has improved significantly.  She is using her Symbicort inhaler twice a day.  She does experience tachycardia and tremors for about 10 min after using it.  Her cough is worse at night when she lays down.  2.  She is taking Protonix twice daily for her laryngopharyngeal reflux.  She does have some foreign body sensation in clearing of phlegm from her throat with mild dysphagia.  Those symptoms have resolved improved significantly with the Protonix.  She has not been completely compliant with the an anti-reflux diet.  3.  She is using Singulair, Flonase daily and ipratropium bromide as needed to control her allergy symptoms.  Right now her level of symptom control is "pretty good" with minimal rhinorrhea and postnasal drip.  Nasal secretions are clear.  She does not have fever.  4.  She continues to have the pulsatile tinnitus in her right ear. Management of her allergies has not had any effect on a.  She did see Dr. Collins at [facility] regarding this issue.    Review of Systems   Constitutional: Positive for fatigue. Negative for activity change, appetite change, chills, fever and unexpected weight change.   HENT: Positive for congestion, postnasal drip, rhinorrhea, sinus pressure, sinus pain, sore throat, tinnitus and trouble swallowing. Negative for ear discharge, ear pain, facial swelling, hearing loss, mouth sores, sneezing and voice change.    Eyes: Negative for photophobia, pain, discharge, redness, itching and visual disturbance.   Respiratory: Positive for cough, shortness of breath and wheezing. Negative for apnea and choking.    Cardiovascular: Negative for chest pain and palpitations.   Gastrointestinal: Negative for abdominal distention, abdominal pain, nausea " and vomiting.   Musculoskeletal: Negative for arthralgias, myalgias, neck pain and neck stiffness.   Skin: Negative.  Negative for color change, pallor and rash.   Allergic/Immunologic: Positive for environmental allergies. Negative for food allergies and immunocompromised state.   Neurological: Positive for headaches. Negative for dizziness, facial asymmetry, speech difficulty, weakness, light-headedness and numbness.   Hematological: Negative for adenopathy. Does not bruise/bleed easily.   Psychiatric/Behavioral: Negative for confusion, decreased concentration and sleep disturbance.       Objective:      Physical Exam   Constitutional: She is oriented to person, place, and time. She appears well-developed and well-nourished. She is cooperative.   HENT:   Head: Normocephalic.   Right Ear: External ear and ear canal normal. Tympanic membrane is retracted.   Left Ear: External ear and ear canal normal. Tympanic membrane is retracted.   Nose: Mucosal edema (cyanotic, boggy inferior turbinates bilaterally), rhinorrhea (clear mucus bilaterally) and septal deviation (To the left) present.   Mouth/Throat: Uvula is midline, oropharynx is clear and moist and mucous membranes are normal. No oral lesions.   Eyes: Pupils are equal, round, and reactive to light. EOM and lids are normal. Right eye exhibits no discharge and no exudate. Left eye exhibits no discharge and no exudate. Right conjunctiva is injected. Left conjunctiva is injected.   Neck: Trachea normal and normal range of motion. No muscular tenderness present. No tracheal deviation present. No thyroid mass and no thyromegaly present.   Cardiovascular: Normal rate, regular rhythm, normal heart sounds and normal pulses.   Pulmonary/Chest: Effort normal. No stridor. She has decreased breath sounds. She has no wheezes. She has no rhonchi. She has no rales.   Abdominal: Soft. Bowel sounds are normal. There is no tenderness.   Musculoskeletal: Normal range of motion.    Lymphadenopathy:        Head (right side): No submental, no submandibular, no preauricular, no posterior auricular and no occipital adenopathy present.        Head (left side): No submental, no submandibular, no preauricular, no posterior auricular and no occipital adenopathy present.     She has no cervical adenopathy.   Neurological: She is alert and oriented to person, place, and time. She has normal strength. No cranial nerve deficit or sensory deficit. Gait normal.   Skin: Skin is warm and dry. No petechiae and no rash noted. No cyanosis. Nails show no clubbing.   Psychiatric: She has a normal mood and affect. Her speech is normal and behavior is normal. Judgment and thought content normal. Cognition and memory are normal.       Flexible nasolaryngoscopy-septum deviated to the left, nasal changes allergic rhinitis, cystic mass in the nasopharynx that has increased in size since last endoscopy; laryngeal changes consistent with laryngopharyngeal reflux which has improved significantly since last endoscopy on b.i.d. PPI therapy    Assessment:       1. Neoplasm of uncertain behavior of nasopharynx    2. Allergic rhinitis, unspecified seasonality, unspecified trigger    3. Mild intermittent asthma with acute exacerbation    4. Laryngopharyngeal reflux (LPR)    5. Dysphagia, unspecified type    6. Nasal septal deviation    7. Screening for condition        Plan:       I will schedule the patient for CT scan of the neck from skull base to thoracic inlet with and without IV contrast.  I am ordering a BUN and creatinine to obtain current renal function tests.  I will also switch her from Protonix to  Pepcid .  I will recheck her   in 4 weeks, or sooner if indicated by CT findings.

## 2020-01-17 NOTE — PROCEDURES
"Laryngoscopy  Date/Time: 1/17/2020 1:30 PM  Performed by: DWAYNE Ricardo MD  Authorized by: DWAYNE Ricardo MD     Time out: Immediately prior to procedure a "time out" was called to verify the correct patient, procedure, equipment, support staff and site/side marked as required.    Consent Done?:  Yes (Verbal)  Anesthesia:     Local anesthetic:  Lidocaine 2% and Yan-Synephrine 1/2% (Topical aerosol)    Patient tolerance:  Patient tolerated the procedure well with no immediate complications    Decongestion performed?: Yes    Laryngoscopy:     Areas examined:  Vocal cords, larynx, hypopharynx, oropharynx, nasopharynx and nasal cavities    Laryngoscope size:  4 mm (Flexible video nasolaryngoscopy)  Nose External:      No external nasal deformity  Nose Intranasal:      Mucosa no polyps     Mucosa ulcers not present     No mucosa lesions present (Clear stringy mucus in the nasal passages bilaterally)     Septum gross deformity ( septum deviated to the left)     Enlarged turbinates ( inferior turbinates enlarged bilaterally)  Nasopharynx:      Mucosa lesions ( cystic mass emanating from roof of nasopharynx fills entire left half of nasopharynx and extends to the medial 1/3 of the right nasopharynx)     Adenoids not present     Posterior choanae patent     Eustachian tube patent  Larynx/hypopharynx:      No epiglottis lesions     No epiglottis edema     No AE folds lesions     No vocal cord polyps     Equal and normal bilateral ( vocal cords move symmetrically, no mucosal lesions noted)     No hypopharynx lesions     No piriform sinus pooling     No piriform sinus lesions     Post cricoid edema ( mild, improved significantly if from last endoscopy)     Post cricoid erythema ( mild, improved significantly from last endoscopy)     Flexible nasolaryngoscopy-nasal septal deviation and nasal changes of allergic rhinitis, cystic mass of nasopharynx which has enlarged since last endoscopy; laryngeal changes of " laryngopharyngeal reflux that have improved on b.i.d. PPI therapy

## 2020-01-22 ENCOUNTER — NURSE TRIAGE (OUTPATIENT)
Dept: ADMINISTRATIVE | Facility: CLINIC | Age: 55
End: 2020-01-22

## 2020-01-22 NOTE — TELEPHONE ENCOUNTER
Pt c/o HTN x 3 days. Current CM=939/104. Pt advised per protocol, pt verbalizes understanding, and appointment made tomorrow.     Reason for Disposition   Systolic BP >= 160 OR Diastolic >= 100    Additional Information   Negative: Sounds like a life-threatening emergency to the triager   Negative: Pregnant > 20 weeks and new hand or face swelling   Negative: Pregnant > 20 weeks and BP > 140/90   Negative: Systolic BP >= 160 OR Diastolic >= 100, and any cardiac or neurologic symptoms (e.g., chest pain, difficulty breathing, unsteady gait, blurred vision)   Negative: Patient sounds very sick or weak to the triager   Negative: BP Systolic BP >= 140 OR Diastolic >= 90 and postpartum < 4 weeks   Negative: Systolic BP >= 180 OR Diastolic >= 110, and missed most recent dose of blood pressure medication   Negative: Systolic BP >= 180 OR Diastolic >= 110   Negative: Patient wants to be seen   Negative: Ran out of BP medications   Negative: Taking BP medications and feels is having side effects (e.g., impotence, cough, dizziness)    Protocols used: HIGH BLOOD PRESSURE-A-OH

## 2020-01-23 ENCOUNTER — OFFICE VISIT (OUTPATIENT)
Dept: PRIMARY CARE CLINIC | Facility: CLINIC | Age: 55
End: 2020-01-23
Payer: MEDICARE

## 2020-01-23 ENCOUNTER — TELEPHONE (OUTPATIENT)
Dept: PRIMARY CARE CLINIC | Facility: CLINIC | Age: 55
End: 2020-01-23

## 2020-01-23 VITALS
WEIGHT: 238 LBS | SYSTOLIC BLOOD PRESSURE: 142 MMHG | BODY MASS INDEX: 38.25 KG/M2 | HEIGHT: 66 IN | DIASTOLIC BLOOD PRESSURE: 86 MMHG | RESPIRATION RATE: 16 BRPM | HEART RATE: 69 BPM | TEMPERATURE: 98 F | OXYGEN SATURATION: 99 %

## 2020-01-23 DIAGNOSIS — R05.3 CHRONIC COUGH: ICD-10-CM

## 2020-01-23 DIAGNOSIS — G89.29 CHRONIC PAIN OF BOTH KNEES: ICD-10-CM

## 2020-01-23 DIAGNOSIS — F20.9 SCHIZOPHRENIA, UNSPECIFIED TYPE: ICD-10-CM

## 2020-01-23 DIAGNOSIS — G47.00 INSOMNIA, UNSPECIFIED TYPE: Primary | ICD-10-CM

## 2020-01-23 DIAGNOSIS — L98.9 SKIN LESION: ICD-10-CM

## 2020-01-23 DIAGNOSIS — G89.29 CHRONIC RIGHT SHOULDER PAIN: ICD-10-CM

## 2020-01-23 DIAGNOSIS — I10 ESSENTIAL HYPERTENSION: ICD-10-CM

## 2020-01-23 DIAGNOSIS — E55.9 VITAMIN D DEFICIENCY: ICD-10-CM

## 2020-01-23 DIAGNOSIS — E78.5 HYPERLIPIDEMIA, UNSPECIFIED HYPERLIPIDEMIA TYPE: ICD-10-CM

## 2020-01-23 DIAGNOSIS — M25.562 CHRONIC PAIN OF BOTH KNEES: ICD-10-CM

## 2020-01-23 DIAGNOSIS — M25.511 CHRONIC RIGHT SHOULDER PAIN: ICD-10-CM

## 2020-01-23 DIAGNOSIS — E66.9 OBESITY (BMI 35.0-39.9 WITHOUT COMORBIDITY): ICD-10-CM

## 2020-01-23 DIAGNOSIS — M25.561 CHRONIC PAIN OF BOTH KNEES: ICD-10-CM

## 2020-01-23 DIAGNOSIS — Z72.0 TOBACCO ABUSE: ICD-10-CM

## 2020-01-23 DIAGNOSIS — F31.9 BIPOLAR 1 DISORDER: ICD-10-CM

## 2020-01-23 PROCEDURE — 99499 UNLISTED E&M SERVICE: CPT | Mod: S$GLB,,, | Performed by: FAMILY MEDICINE

## 2020-01-23 PROCEDURE — 3079F DIAST BP 80-89 MM HG: CPT | Mod: CPTII,S$GLB,, | Performed by: FAMILY MEDICINE

## 2020-01-23 PROCEDURE — 99999 PR PBB SHADOW E&M-EST. PATIENT-LVL V: ICD-10-PCS | Mod: PBBFAC,,, | Performed by: FAMILY MEDICINE

## 2020-01-23 PROCEDURE — 3008F PR BODY MASS INDEX (BMI) DOCUMENTED: ICD-10-PCS | Mod: CPTII,S$GLB,, | Performed by: FAMILY MEDICINE

## 2020-01-23 PROCEDURE — 99214 OFFICE O/P EST MOD 30 MIN: CPT | Mod: S$GLB,,, | Performed by: FAMILY MEDICINE

## 2020-01-23 PROCEDURE — 3079F PR MOST RECENT DIASTOLIC BLOOD PRESSURE 80-89 MM HG: ICD-10-PCS | Mod: CPTII,S$GLB,, | Performed by: FAMILY MEDICINE

## 2020-01-23 PROCEDURE — 99499 RISK ADDL DX/OHS AUDIT: ICD-10-PCS | Mod: S$GLB,,, | Performed by: FAMILY MEDICINE

## 2020-01-23 PROCEDURE — 99999 PR PBB SHADOW E&M-EST. PATIENT-LVL V: CPT | Mod: PBBFAC,,, | Performed by: FAMILY MEDICINE

## 2020-01-23 PROCEDURE — 3008F BODY MASS INDEX DOCD: CPT | Mod: CPTII,S$GLB,, | Performed by: FAMILY MEDICINE

## 2020-01-23 PROCEDURE — 99214 PR OFFICE/OUTPT VISIT, EST, LEVL IV, 30-39 MIN: ICD-10-PCS | Mod: S$GLB,,, | Performed by: FAMILY MEDICINE

## 2020-01-23 PROCEDURE — 3077F SYST BP >= 140 MM HG: CPT | Mod: CPTII,S$GLB,, | Performed by: FAMILY MEDICINE

## 2020-01-23 PROCEDURE — 3077F PR MOST RECENT SYSTOLIC BLOOD PRESSURE >= 140 MM HG: ICD-10-PCS | Mod: CPTII,S$GLB,, | Performed by: FAMILY MEDICINE

## 2020-01-23 RX ORDER — ZOLPIDEM TARTRATE 5 MG/1
TABLET ORAL
Qty: 30 TABLET | Refills: 2 | Status: SHIPPED | OUTPATIENT
Start: 2020-01-23 | End: 2020-06-01 | Stop reason: SDUPTHER

## 2020-01-23 RX ORDER — BETAMETHASONE VALERATE 1.2 MG/G
CREAM TOPICAL
Qty: 60 G | Refills: 2 | Status: SHIPPED | OUTPATIENT
Start: 2020-01-23 | End: 2021-04-06 | Stop reason: SDUPTHER

## 2020-01-23 RX ORDER — AMLODIPINE BESYLATE 2.5 MG/1
TABLET ORAL
Qty: 30 TABLET | Refills: 11 | Status: SHIPPED | OUTPATIENT
Start: 2020-01-23 | End: 2020-12-09

## 2020-01-23 RX ORDER — HYDROXYZINE PAMOATE 25 MG/1
CAPSULE ORAL
Qty: 60 CAPSULE | Refills: 5 | Status: SHIPPED | OUTPATIENT
Start: 2020-01-23 | End: 2020-08-25

## 2020-01-23 NOTE — TELEPHONE ENCOUNTER
----- Message from Ana Luisa Montana sent at 1/23/2020  4:00 PM CST -----  Contact: Patient  Type: Needs Medical Advice    Who Called:  Elizabeth, patient  Symptoms (please be specific):  Hypertension  How long has patient had these symptoms:  ?  Pharmacy name and phone #:    [id number] - [place], [place] - [address]  [address] [place] [address]  Phone: [phone] Fax: [phone]  Best Call Back Number: [phone]  Additional Information: Calling to ask to be set up on the Hypertension Digital Program. Please advise. Thanks.

## 2020-01-23 NOTE — PROGRESS NOTES
55 yo BF sees Dr Gama Rg on [facility]  ENT  --Has appt with  Pulmonary MD Nov 8 th sees pulmonary MD . Pt states needs good night sleep.   Subjective:       Patient ID: Trina Marie Collette is a 55 y.o. female.    Chief Complaint: Hypertension and Medication Refill    HPI:  55-year-old female --history of hypertension patient has been using sisters blood pressure cuff--last 1 patient got was 162/107--patient wants to get her home blood pressure cuff to check blood pressure daily.  On low-sodium diet, no exercising , weight up and down.       ROS:  Skin: no psoriasis, eczema, skin cancer--dry hyperpigmented skin near the umbilicus in the midline on the belt line  HEENT:Occas  headache, ocular pain, blurred vision, diplopia, epistaxis, hoarseness change in voice, thyroid trouble--history of tinnitus/chronic sinus problem/nasal septal deviation/hearing loss  Lung: No pneumonia, asthma, Tb, wheezing, SOB, smoking 1/4 ppd trying to quit  --history of bronchospasm in the past uses albuterol  Heart: No chest pain, ankle edema, +occas palpitations, MI, river murmur, +hypertension blood pressure   + hyperlipidemia-no stent bypass arrhythmia  Abdomen: No nausea, vomiting, diarrhea, constipation, ulcers, hepatitis, gallbladder disease, melena, hematochezia, hematemesis patient complaining of heartburn  : no UTI, renal disease, stones  GYN last menstrual.  Years ago has mammogram yearly--refuses PAP smear --I don't fool with that    MS: no fractures, O/A, lupus, rheumatoid, gout--history of chronic pain the back lumbar spine, right knee come right shoulder, history DDD lumbar history cervical spinal stenosis  Neuro: No dizziness, LOC, seizures   No diabetes, no anemia, no anxiety, no depression patient with history of bipolar schizophrenia--doing well with medication goes to Mental Health   Single, one child, disabled, lives alone     Objective:   Physical Exam:  General: Well nourished, well developed, no acute distress  +overweight  Skin: No lesions  HEENT--eyes PERRLA EOM intact nose patent, throat nonerythematous ears TMs clear  NECK: Supple, no bruits, No JVD, no nodes  Lungs: Clear, no rales, rhonchi, wheezing   Heart: Regular rate and rhythm, no murmurs, gallops, or rubs  Abdomen: flat, bowel sounds positive, no tenderness, or organomegaly  MS:  Range of motion muscle strength overall intact  Neuro: Alert, CN intact, oriented X 3 still with some anxiety and depression of issues  Extremities: No cyanosis, clubbing, or edema         Assessment:       1. Insomnia, unspecified type    2. Tobacco abuse    3. Chronic pain of both knees    4. Chronic right shoulder pain    5. Vitamin D deficiency    6. Obesity (BMI 35.0-39.9 without comorbidity)    7. Essential hypertension    8. Hyperlipidemia, unspecified hyperlipidemia type    9. Chronic cough    10. Skin lesion    11. Schizophrenia, unspecified type    12. Bipolar 1 disorder        Plan:       Insomnia, unspecified type  -     zolpidem (AMBIEN) 5 MG Tab; 1 po Q OHS p.r.n. sleep alternate with trazodone so 1 of the other taken every other night as needed for sleep  Dispense: 30 tablet; Refill: 2    Tobacco abuse    Chronic pain of both knees    Chronic right shoulder pain    Vitamin D deficiency    Obesity (BMI 35.0-39.9 without comorbidity)    Essential hypertension    Hyperlipidemia, unspecified hyperlipidemia type    Chronic cough    Skin lesion    Schizophrenia, unspecified type    Bipolar 1 disorder    Other orders  -     betamethasone valerate 0.1% (VALISONE) 0.1 % Crea; Applied to the abdomen twice a day p.r.n. skin rash or itch  Dispense: 60 g; Refill: 2  -     hydrOXYzine pamoate (VISTARIL) 25 MG Cap; One p.o. q.8 hours p.r.n. anxiety or 1 p.o. q.h.s. p.r.n. anxiety to help prevent palpitations  Dispense: 60 capsule; Refill: 5  -     amLODIPine (NORVASC) 2.5 MG tablet; One p.o. q. HS p.r.n. hypertension continue Cozaar 100 mg q.a.m.  Dispense: 30 tablet; Refill: 11         Skin rash--near the umbilicus on the belt line--Valisone cream b.i.d. probably from the metal in her blue jeans or belt arti  Hypertension blood pressure at home running 162/107 here 142/86 patient given note for her own arm blood pressure cuff at home should check blood pressure daily if continues to run high will further increase her blood pressure medications Cozaar 100    Pain Clinic-for back pain sees Dr Senior   Goes to mental health--bipolar and schizophrenic--patient asking for something for anxiety especially at night states anxiety giving palpitations will give Vistaril 25 mg 1 p.o. q.6 hours p.r.n. anxiety or q.h.s. for palpitations--if no relief should ask psychiatrist for medications to help her at night with anxiety  Low-sodium diet/exercise/decrease weight /exercise/decrease weight  Needs to DC smoking  Lab due in May--CBCs CMP lipids T4 TSH see me 2 weeks later  Can do Pap smear next visit--or see OBGYN   Health maintenance needs HIV colonoscopy Pap

## 2020-01-24 ENCOUNTER — OFFICE VISIT (OUTPATIENT)
Dept: OTOLARYNGOLOGY | Facility: CLINIC | Age: 55
End: 2020-01-24
Payer: MEDICARE

## 2020-01-24 ENCOUNTER — TELEPHONE (OUTPATIENT)
Dept: OTOLARYNGOLOGY | Facility: CLINIC | Age: 55
End: 2020-01-24

## 2020-01-24 VITALS
BODY MASS INDEX: 38.27 KG/M2 | DIASTOLIC BLOOD PRESSURE: 87 MMHG | HEIGHT: 66 IN | HEART RATE: 65 BPM | WEIGHT: 238.13 LBS | TEMPERATURE: 98 F | SYSTOLIC BLOOD PRESSURE: 150 MMHG

## 2020-01-24 DIAGNOSIS — R05.3 CHRONIC COUGH: ICD-10-CM

## 2020-01-24 DIAGNOSIS — J30.9 ALLERGIC RHINITIS, UNSPECIFIED SEASONALITY, UNSPECIFIED TRIGGER: ICD-10-CM

## 2020-01-24 DIAGNOSIS — K21.9 LARYNGOPHARYNGEAL REFLUX (LPR): ICD-10-CM

## 2020-01-24 DIAGNOSIS — R13.10 DYSPHAGIA, UNSPECIFIED TYPE: ICD-10-CM

## 2020-01-24 DIAGNOSIS — D37.05 NEOPLASM OF UNCERTAIN BEHAVIOR OF NASOPHARYNX: Primary | ICD-10-CM

## 2020-01-24 DIAGNOSIS — J34.2 NASAL SEPTAL DEVIATION: ICD-10-CM

## 2020-01-24 PROCEDURE — 3008F BODY MASS INDEX DOCD: CPT | Mod: CPTII,S$GLB,, | Performed by: SPECIALIST

## 2020-01-24 PROCEDURE — 3079F PR MOST RECENT DIASTOLIC BLOOD PRESSURE 80-89 MM HG: ICD-10-PCS | Mod: CPTII,S$GLB,, | Performed by: SPECIALIST

## 2020-01-24 PROCEDURE — 99214 OFFICE O/P EST MOD 30 MIN: CPT | Mod: S$GLB,,, | Performed by: SPECIALIST

## 2020-01-24 PROCEDURE — 3077F PR MOST RECENT SYSTOLIC BLOOD PRESSURE >= 140 MM HG: ICD-10-PCS | Mod: CPTII,S$GLB,, | Performed by: SPECIALIST

## 2020-01-24 PROCEDURE — 3079F DIAST BP 80-89 MM HG: CPT | Mod: CPTII,S$GLB,, | Performed by: SPECIALIST

## 2020-01-24 PROCEDURE — 99499 RISK ADDL DX/OHS AUDIT: ICD-10-PCS | Mod: S$GLB,,, | Performed by: SPECIALIST

## 2020-01-24 PROCEDURE — 99214 PR OFFICE/OUTPT VISIT, EST, LEVL IV, 30-39 MIN: ICD-10-PCS | Mod: S$GLB,,, | Performed by: SPECIALIST

## 2020-01-24 PROCEDURE — 3008F PR BODY MASS INDEX (BMI) DOCUMENTED: ICD-10-PCS | Mod: CPTII,S$GLB,, | Performed by: SPECIALIST

## 2020-01-24 PROCEDURE — 3077F SYST BP >= 140 MM HG: CPT | Mod: CPTII,S$GLB,, | Performed by: SPECIALIST

## 2020-01-24 PROCEDURE — 99499 UNLISTED E&M SERVICE: CPT | Mod: S$GLB,,, | Performed by: SPECIALIST

## 2020-01-24 RX ORDER — PROMETHAZINE HYDROCHLORIDE AND CODEINE PHOSPHATE 6.25; 1 MG/5ML; MG/5ML
5 SOLUTION ORAL EVERY 4 HOURS PRN
Qty: 240 ML | Refills: 0 | Status: SHIPPED | OUTPATIENT
Start: 2020-01-24 | End: 2020-02-03

## 2020-01-24 NOTE — PROGRESS NOTES
Care Everywhere: updated  Immunization: updated  Health Maintenance: updated  Media Review: reviewed for possible outside colonoscopy report  Legacy Review: n/a  Order placed: n/a  Upcoming appts:n/a  Fit kit ordered by Dr. Brock, Ambulatory referral in to gastro on 5/29/19

## 2020-01-24 NOTE — TELEPHONE ENCOUNTER
----- Message from DWAYNE Ricardo MD sent at 1/23/2020 11:34 AM CST -----  Please notify patient that CT findings were mildly abnormal.   Please schedule an appointment for her next week to discuss the CT findings.    Send copy of CT scan to PCP

## 2020-01-24 NOTE — TELEPHONE ENCOUNTER
Called and spoke with pt today letting her know about her CT results per Dr. Ricardo and pt is schedule for next week on 1/27/20.

## 2020-01-24 NOTE — H&P (VIEW-ONLY)
"Subjective:       Patient ID: Trina Marie Collette is a 55 y.o. female.    Chief Complaint: Follow-up    The patient is returning for follow-up visit.  She is here to discuss her CT scan.  There are multiple issues to discuss:  1.  She continues to have some coughing but it has improved significantly.  She is using her Symbicort inhaler twice a day.  She does experience tachycardia and tremors for about 10 min after using it.  Her cough is worse at night when she lays down.  She needs a refill on her Phenergan with codeine  2.  She is taking Protonix twice daily for her laryngopharyngeal reflux.  She does have some foreign body sensation in clearing of phlegm from her throat with mild dysphagia.  Those symptoms have resolved improved significantly with the Protonix.  She has tried to be better with the anti-reflux diet.  She still has not notice significant improvement in the globe was sensation, phlegm or dysphagia since her last visit.  3.  She is using Singulair, Flonase daily and ipratropium bromide as needed to control her allergy symptoms.  Right now her level of symptom control is "pretty good" with minimal rhinorrhea and postnasal drip.  Nasal secretions are clear.  She does not have fever.  4.  She continues to have the pulsatile tinnitus in her right ear. Management of her allergies has not had any effect on a.  She did see Dr. Collins at [facility] [place] regarding this issue.  5.  She is coming in today to discuss the results of her CT scan.  A mass in the nasopharynx with thickening of the soft tissue of the nasopharynx was noted.  The mass enhances with IV contrast.  Her symptoms have not changed.  She does not have nasal obstruction.  She does not have hyper nasal speech.    Follow-up   Associated symptoms include congestion, coughing, fatigue, headaches and a sore throat. Pertinent negatives include no abdominal pain, arthralgias, chest pain, chills, fever, myalgias, nausea, neck pain, numbness, rash, " vomiting or weakness.     Review of Systems   Constitutional: Positive for fatigue. Negative for activity change, appetite change, chills, fever and unexpected weight change.   HENT: Positive for congestion, postnasal drip, rhinorrhea, sinus pressure, sinus pain, sore throat, tinnitus and trouble swallowing. Negative for ear discharge, ear pain, facial swelling, hearing loss, mouth sores, sneezing and voice change.    Eyes: Negative for photophobia, pain, discharge, redness, itching and visual disturbance.   Respiratory: Positive for cough, shortness of breath and wheezing. Negative for apnea and choking.    Cardiovascular: Negative for chest pain and palpitations.   Gastrointestinal: Negative for abdominal distention, abdominal pain, nausea and vomiting.   Musculoskeletal: Negative for arthralgias, myalgias, neck pain and neck stiffness.   Skin: Negative.  Negative for color change, pallor and rash.   Allergic/Immunologic: Positive for environmental allergies. Negative for food allergies and immunocompromised state.   Neurological: Positive for headaches. Negative for dizziness, facial asymmetry, speech difficulty, weakness, light-headedness and numbness.   Hematological: Negative for adenopathy. Does not bruise/bleed easily.   Psychiatric/Behavioral: Negative for confusion, decreased concentration and sleep disturbance.       Objective:      Physical Exam   Constitutional: She is oriented to person, place, and time. She appears well-developed and well-nourished. She is cooperative.   HENT:   Head: Normocephalic.   Right Ear: External ear and ear canal normal. Tympanic membrane is retracted.   Left Ear: External ear and ear canal normal. Tympanic membrane is retracted.   Nose: Mucosal edema (cyanotic, boggy inferior turbinates bilaterally), rhinorrhea (clear mucus bilaterally) and septal deviation (To the left) present.   Mouth/Throat: Uvula is midline, oropharynx is clear and moist and mucous membranes are normal.  No oral lesions.   Eyes: Pupils are equal, round, and reactive to light. EOM and lids are normal. Right eye exhibits no discharge and no exudate. Left eye exhibits no discharge and no exudate. Right conjunctiva is injected. Left conjunctiva is injected.   Neck: Trachea normal and normal range of motion. No muscular tenderness present. No tracheal deviation present. No thyroid mass and no thyromegaly present.   Cardiovascular: Normal rate, regular rhythm, normal heart sounds and normal pulses.   Pulmonary/Chest: Effort normal. No stridor. She has decreased breath sounds. She has no wheezes. She has no rhonchi. She has no rales.   Abdominal: Soft. Bowel sounds are normal. There is no tenderness.   Musculoskeletal: Normal range of motion.   Lymphadenopathy:        Head (right side): No submental, no submandibular, no preauricular, no posterior auricular and no occipital adenopathy present.        Head (left side): No submental, no submandibular, no preauricular, no posterior auricular and no occipital adenopathy present.     She has no cervical adenopathy.   Neurological: She is alert and oriented to person, place, and time. She has normal strength. No cranial nerve deficit or sensory deficit. Gait normal.   Skin: Skin is warm and dry. No petechiae and no rash noted. No cyanosis. Nails show no clubbing.   Psychiatric: She has a normal mood and affect. Her speech is normal and behavior is normal. Judgment and thought content normal. Cognition and memory are normal.       I personally reviewed the report and films of the CT scan with IV contrast with the patient in the examining room.  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 Chronic cough    3. Laryngopharyngeal reflux (LPR)    4. Dysphagia, unspecified type    5. Allergic rhinitis, unspecified seasonality, unspecified trigger    6. Nasal septal deviation        Plan:        I will schedule the patient for bilateral nasal endoscopy with excision of nasopharyngeal tissue/tumor and possible adenoidectomy.  I we discussed the risks and benefits of the procedure as outlined on the informed consent and I have answered the patient's questions and those of her sister who accompanied her.  She will need to see anesthesia preoperatively.  Operative consents were signed and witnessed.  I discuss the possibility that if the neoplasm is malignant I would proceed with a panendoscopy as a staging procedure. I will recheck the patient 1 week postoperatively.

## 2020-01-24 NOTE — PROGRESS NOTES
"Subjective:       Patient ID: Trina Marie Collette is a 55 y.o. female.    Chief Complaint: Follow-up    The patient is returning for follow-up visit.  She is here to discuss her CT scan.  There are multiple issues to discuss:  1.  She continues to have some coughing but it has improved significantly.  She is using her Symbicort inhaler twice a day.  She does experience tachycardia and tremors for about 10 min after using it.  Her cough is worse at night when she lays down.  She needs a refill on her Phenergan with codeine  2.  She is taking Protonix twice daily for her laryngopharyngeal reflux.  She does have some foreign body sensation in clearing of phlegm from her throat with mild dysphagia.  Those symptoms have resolved improved significantly with the Protonix.  She has tried to be better with the anti-reflux diet.  She still has not notice significant improvement in the globe was sensation, phlegm or dysphagia since her last visit.  3.  She is using Singulair, Flonase daily and ipratropium bromide as needed to control her allergy symptoms.  Right now her level of symptom control is "pretty good" with minimal rhinorrhea and postnasal drip.  Nasal secretions are clear.  She does not have fever.  4.  She continues to have the pulsatile tinnitus in her right ear. Management of her allergies has not had any effect on a.  She did see Dr. Collins at [facility] regarding this issue.  5.  She is coming in today to discuss the results of her CT scan.  A mass in the nasopharynx with thickening of the soft tissue of the nasopharynx was noted.  The mass enhances with IV contrast.  Her symptoms have not changed.  She does not have nasal obstruction.  She does not have hyper nasal speech.    Follow-up   Associated symptoms include congestion, coughing, fatigue, headaches and a sore throat. Pertinent negatives include no abdominal pain, arthralgias, chest pain, chills, fever, myalgias, nausea, neck pain, numbness, rash, " vomiting or weakness.     Review of Systems   Constitutional: Positive for fatigue. Negative for activity change, appetite change, chills, fever and unexpected weight change.   HENT: Positive for congestion, postnasal drip, rhinorrhea, sinus pressure, sinus pain, sore throat, tinnitus and trouble swallowing. Negative for ear discharge, ear pain, facial swelling, hearing loss, mouth sores, sneezing and voice change.    Eyes: Negative for photophobia, pain, discharge, redness, itching and visual disturbance.   Respiratory: Positive for cough, shortness of breath and wheezing. Negative for apnea and choking.    Cardiovascular: Negative for chest pain and palpitations.   Gastrointestinal: Negative for abdominal distention, abdominal pain, nausea and vomiting.   Musculoskeletal: Negative for arthralgias, myalgias, neck pain and neck stiffness.   Skin: Negative.  Negative for color change, pallor and rash.   Allergic/Immunologic: Positive for environmental allergies. Negative for food allergies and immunocompromised state.   Neurological: Positive for headaches. Negative for dizziness, facial asymmetry, speech difficulty, weakness, light-headedness and numbness.   Hematological: Negative for adenopathy. Does not bruise/bleed easily.   Psychiatric/Behavioral: Negative for confusion, decreased concentration and sleep disturbance.       Objective:      Physical Exam   Constitutional: She is oriented to person, place, and time. She appears well-developed and well-nourished. She is cooperative.   HENT:   Head: Normocephalic.   Right Ear: External ear and ear canal normal. Tympanic membrane is retracted.   Left Ear: External ear and ear canal normal. Tympanic membrane is retracted.   Nose: Mucosal edema (cyanotic, boggy inferior turbinates bilaterally), rhinorrhea (clear mucus bilaterally) and septal deviation (To the left) present.   Mouth/Throat: Uvula is midline, oropharynx is clear and moist and mucous membranes are normal.  No oral lesions.   Eyes: Pupils are equal, round, and reactive to light. EOM and lids are normal. Right eye exhibits no discharge and no exudate. Left eye exhibits no discharge and no exudate. Right conjunctiva is injected. Left conjunctiva is injected.   Neck: Trachea normal and normal range of motion. No muscular tenderness present. No tracheal deviation present. No thyroid mass and no thyromegaly present.   Cardiovascular: Normal rate, regular rhythm, normal heart sounds and normal pulses.   Pulmonary/Chest: Effort normal. No stridor. She has decreased breath sounds. She has no wheezes. She has no rhonchi. She has no rales.   Abdominal: Soft. Bowel sounds are normal. There is no tenderness.   Musculoskeletal: Normal range of motion.   Lymphadenopathy:        Head (right side): No submental, no submandibular, no preauricular, no posterior auricular and no occipital adenopathy present.        Head (left side): No submental, no submandibular, no preauricular, no posterior auricular and no occipital adenopathy present.     She has no cervical adenopathy.   Neurological: She is alert and oriented to person, place, and time. She has normal strength. No cranial nerve deficit or sensory deficit. Gait normal.   Skin: Skin is warm and dry. No petechiae and no rash noted. No cyanosis. Nails show no clubbing.   Psychiatric: She has a normal mood and affect. Her speech is normal and behavior is normal. Judgment and thought content normal. Cognition and memory are normal.       I personally reviewed the report and films of the CT scan with IV contrast with the patient in the examining room.  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 Chronic cough    3. Laryngopharyngeal reflux (LPR)    4. Dysphagia, unspecified type    5. Allergic rhinitis, unspecified seasonality, unspecified trigger    6. Nasal septal deviation        Plan:        I will schedule the patient for bilateral nasal endoscopy with excision of nasopharyngeal tissue/tumor and possible adenoidectomy.  I we discussed the risks and benefits of the procedure as outlined on the informed consent and I have answered the patient's questions and those of her sister who accompanied her.  She will need to see anesthesia preoperatively.  Operative consents were signed and witnessed.  I discuss the possibility that if the neoplasm is malignant I would proceed with a panendoscopy as a staging procedure. I will recheck the patient 1 week postoperatively.

## 2020-01-24 NOTE — TELEPHONE ENCOUNTER
----- Message from Lester Cormier sent at 1/24/2020  9:49 AM CST -----  Contact: COLLETTE,TRINA MARIE [[id number]]  Name of Who is Calling:COLLETTE,TRINA MARIE [[id number]]      What is the request in detail:COLLETTE,TRINA MARIE [[id number]] is requesting a call back  in regards same day appointment ...  Please contact to further discuss and advise      Can the clinic reply by [facility]:  No     [facility]  [phone] (home)

## 2020-01-27 DIAGNOSIS — D37.01 NEOPLASM OF UNCERTAIN BEHAVIOR OF LIP, ORAL CAVITY, AND PHARYNX: Primary | ICD-10-CM

## 2020-01-27 DIAGNOSIS — D37.05 NEOPLASM OF UNCERTAIN BEHAVIOR OF LIP, ORAL CAVITY, AND PHARYNX: Primary | ICD-10-CM

## 2020-01-27 DIAGNOSIS — D37.09 NEOPLASM OF UNCERTAIN BEHAVIOR OF LIP, ORAL CAVITY, AND PHARYNX: Primary | ICD-10-CM

## 2020-01-27 NOTE — PROGRESS NOTES
Patient, Trina Marie Collette (MRN #[id number]), presented with a recorded BMI of 38.41 kg/m^2 and a documented comorbidity(s):  - Hypertension  - Hyperlipidemia  to which the severe obesity is a contributing factor. This is consistent with the definition of severe obesity (BMI 35.0-39.9) with comorbidity (ICD-10 E66.01, Z68.35). The patient's severe obesity was monitored, evaluated, addressed and/or treated. This addendum to the medical record is made on 01/26/2020.

## 2020-01-31 ENCOUNTER — ANESTHESIA EVENT (OUTPATIENT)
Dept: SURGERY | Facility: OTHER | Age: 55
End: 2020-01-31
Payer: MEDICARE

## 2020-01-31 ENCOUNTER — HOSPITAL ENCOUNTER (OUTPATIENT)
Dept: PREADMISSION TESTING | Facility: OTHER | Age: 55
Discharge: HOME | End: 2020-01-31
Attending: SPECIALIST
Payer: MEDICARE

## 2020-01-31 VITALS
OXYGEN SATURATION: 98 % | TEMPERATURE: 98 F | DIASTOLIC BLOOD PRESSURE: 82 MMHG | WEIGHT: 238 LBS | HEIGHT: 66 IN | HEART RATE: 65 BPM | BODY MASS INDEX: 38.25 KG/M2 | SYSTOLIC BLOOD PRESSURE: 172 MMHG

## 2020-01-31 DIAGNOSIS — Z01.818 PREOP TESTING: Primary | ICD-10-CM

## 2020-01-31 LAB
ANION GAP SERPL CALC-SCNC: 9 MMOL/L (ref 8–16)
BASOPHILS # BLD AUTO: 0.06 K/UL (ref 0–0.2)
BASOPHILS NFR BLD: 0.7 % (ref 0–1.9)
BUN SERPL-MCNC: 17 MG/DL (ref 6–20)
CALCIUM SERPL-MCNC: 9.5 MG/DL (ref 8.7–10.5)
CHLORIDE SERPL-SCNC: 105 MMOL/L (ref 95–110)
CO2 SERPL-SCNC: 28 MMOL/L (ref 23–29)
CREAT SERPL-MCNC: 0.7 MG/DL (ref 0.5–1.4)
DIFFERENTIAL METHOD: ABNORMAL
EOSINOPHIL # BLD AUTO: 0.2 K/UL (ref 0–0.5)
EOSINOPHIL NFR BLD: 2.1 % (ref 0–8)
ERYTHROCYTE [DISTWIDTH] IN BLOOD BY AUTOMATED COUNT: 15.3 % (ref 11.5–14.5)
EST. GFR  (AFRICAN AMERICAN): >60 ML/MIN/1.73 M^2
EST. GFR  (NON AFRICAN AMERICAN): >60 ML/MIN/1.73 M^2
GLUCOSE SERPL-MCNC: 93 MG/DL (ref 70–110)
HCT VFR BLD AUTO: 38.7 % (ref 37–48.5)
HGB BLD-MCNC: 12 G/DL (ref 12–16)
IMM GRANULOCYTES # BLD AUTO: 0.03 K/UL (ref 0–0.04)
IMM GRANULOCYTES NFR BLD AUTO: 0.3 % (ref 0–0.5)
LYMPHOCYTES # BLD AUTO: 2.3 K/UL (ref 1–4.8)
LYMPHOCYTES NFR BLD: 26.1 % (ref 18–48)
MCH RBC QN AUTO: 27.2 PG (ref 27–31)
MCHC RBC AUTO-ENTMCNC: 31 G/DL (ref 32–36)
MCV RBC AUTO: 88 FL (ref 82–98)
MONOCYTES # BLD AUTO: 0.5 K/UL (ref 0.3–1)
MONOCYTES NFR BLD: 5.6 % (ref 4–15)
NEUTROPHILS # BLD AUTO: 5.7 K/UL (ref 1.8–7.7)
NEUTROPHILS NFR BLD: 65.2 % (ref 38–73)
NRBC BLD-RTO: 0 /100 WBC
PLATELET # BLD AUTO: 236 K/UL (ref 150–350)
PMV BLD AUTO: 12.1 FL (ref 9.2–12.9)
POTASSIUM SERPL-SCNC: 3.6 MMOL/L (ref 3.5–5.1)
RBC # BLD AUTO: 4.41 M/UL (ref 4–5.4)
SODIUM SERPL-SCNC: 142 MMOL/L (ref 136–145)
WBC # BLD AUTO: 8.75 K/UL (ref 3.9–12.7)

## 2020-01-31 PROCEDURE — 80048 BASIC METABOLIC PNL TOTAL CA: CPT

## 2020-01-31 PROCEDURE — 85025 COMPLETE CBC W/AUTO DIFF WBC: CPT

## 2020-01-31 PROCEDURE — 36415 COLL VENOUS BLD VENIPUNCTURE: CPT

## 2020-01-31 RX ORDER — PREGABALIN 25 MG/1
50 CAPSULE ORAL
Status: CANCELLED | OUTPATIENT
Start: 2020-01-31 | End: 2020-01-31

## 2020-01-31 RX ORDER — ACETAMINOPHEN 500 MG
1000 TABLET ORAL
Status: CANCELLED | OUTPATIENT
Start: 2020-01-31 | End: 2020-01-31

## 2020-01-31 RX ORDER — LIDOCAINE HYDROCHLORIDE 10 MG/ML
1 INJECTION, SOLUTION EPIDURAL; INFILTRATION; INTRACAUDAL; PERINEURAL ONCE
Status: CANCELLED | OUTPATIENT
Start: 2020-01-31 | End: 2020-01-31

## 2020-01-31 RX ORDER — SODIUM CHLORIDE, SODIUM LACTATE, POTASSIUM CHLORIDE, CALCIUM CHLORIDE 600; 310; 30; 20 MG/100ML; MG/100ML; MG/100ML; MG/100ML
INJECTION, SOLUTION INTRAVENOUS CONTINUOUS
Status: CANCELLED | OUTPATIENT
Start: 2020-01-31

## 2020-01-31 RX ORDER — FAMOTIDINE 20 MG/1
20 TABLET, FILM COATED ORAL
Status: CANCELLED | OUTPATIENT
Start: 2020-01-31 | End: 2020-01-31

## 2020-01-31 RX ORDER — ALBUTEROL SULFATE 0.83 MG/ML
2.5 SOLUTION RESPIRATORY (INHALATION)
Status: CANCELLED | OUTPATIENT
Start: 2020-01-31 | End: 2020-01-31

## 2020-01-31 RX ORDER — CELECOXIB 200 MG/1
400 CAPSULE ORAL
Status: CANCELLED | OUTPATIENT
Start: 2020-01-31 | End: 2020-01-31

## 2020-01-31 RX ORDER — LIDOCAINE HYDROCHLORIDE 10 MG/ML
0.5 INJECTION, SOLUTION EPIDURAL; INFILTRATION; INTRACAUDAL; PERINEURAL ONCE
Status: CANCELLED | OUTPATIENT
Start: 2020-01-31 | End: 2020-01-31

## 2020-01-31 NOTE — DISCHARGE INSTRUCTIONS
PRE-ADMIT TESTING -  [phone]    [address]  [place] [facility]          Your surgery has been scheduled at [facility]. We are pleased to have the opportunity to serve you. For Further Information please call [phone].    On the day of surgery please report to the Information Desk on the 1st floor.    · CONTACT YOUR PHYSICIAN'S OFFICE THE DAY PRIOR TO YOUR SURGERY TO OBTAIN YOUR ARRIVAL TIME.     · The evening before surgery do not eat anything after 9 p.m. ( this includes hard candy, chewing gum and mints).  You may only have GATORADE, POWERADE AND WATER  from 9 p.m. until you leave your home.   DO NOT DRINK ANY LIQUIDS ON THE WAY TO THE HOSPITAL.      SPECIAL MEDICATION INSTRUCTIONS: TAKE medications checked off by the Anesthesiologist on your Medication List.    Angiogram Patients: Take medications as instructed by your physician, including aspirin.     Surgery Patients:    If you take ASPIRIN - Your PHYSICIAN/SURGEON will need to inform you IF/OR when you need to stop taking aspirin prior to your surgery.     Do Not take any medications containing IBUPROFEN.  Do Not Wear any make-up or dark nail polish   (especially eye make-up) to surgery. If you come to surgery with makeup on you will be required to remove the makeup or nail polish.    Do not shave your surgical area at least 5 days prior to your surgery. The surgical prep will be performed at the hospital according to Infection Control regulations.    Leave all valuables at home.   Do Not wear any jewelry or watches, including any metal in body piercings. Jewelry must be removed prior to coming to the hospital.  There is a possibility that rings that are unable to be removed may be cut off if they are on the surgical extremity.    Contact Lens must be removed before surgery. Either do not wear the contact lens or bring a case and solution for storage.  Please bring a container for eyeglasses or dentures as required.  Bring  any paperwork your physician has provided, such as consent forms,  history and physicals, doctor's orders, etc.   Bring comfortable clothes that are loose fitting to wear upon discharge. Take into consideration the type of surgery being performed.  Maintain your diet as advised per your physician the day prior to surgery.      Adequate rest the night before surgery is advised.   Park in the Parking lot behind the hospital or in the [facility] across the street from the parking lot. Parking is complimentary.  If you will be discharged the same day as your procedure, please arrange for a responsible adult to drive you home or to accompany you if traveling by taxi.   YOU WILL NOT BE PERMITTED TO DRIVE OR TO LEAVE THE HOSPITAL ALONE AFTER SURGERY.   It is strongly recommended that you arrange for someone to remain with you for the first 24 hrs following your surgery.    The Surgeon will speak to your family/visitor after your surgery regarding the outcome of your surgery and post op care.  The Surgeon may speak to you after your surgery, but there is a possibility you may not remember the details.  Please check with your family members regarding the conversation with the Surgeon.    We strongly recommend whoever is bringing you home be present for discharge instructions.  This will ensure a thorough understanding for your post op home care.    EACH PATIENT IS ALLOWED TWO FAMILY MEMBERS OR VISITORS IN THE ROOM AND IN THE WAITING ROOMS WHILE YOU ARE IN SURGERY. ALL CHILDREN MUST ALWAYS BE ACCOMPANIED BY AN ADULT.    Thank you for your cooperation.  The Staff of [facility].                Bathing Instructions with Hibiclens     Shower the evening before and morning of your procedure with Hibiclens:   Wash your face with water and your regular face wash/soap   Apply Hibiclens directly on your skin or on a wet washcloth and wash gently. When showering: Move away from the shower stream when  applying Hibiclens to avoid rinsing off too soon.   Rinse thoroughly with warm water   Do not dilute Hibiclens         Dry off as usual, do not use any deodorant, powder, body lotions, perfume, after shave or cologne.

## 2020-01-31 NOTE — ANESTHESIA PREPROCEDURE EVALUATION
01/31/2020  Trina Marie Collette is a 55 y.o., female.    Anesthesia Evaluation    I have reviewed the Patient Summary Reports.    I have reviewed the Nursing Notes.   I have reviewed the Medications.     Review of Systems  Anesthesia Hx:  No problems with previous Anesthesia    Social:  Smoker, No Alcohol Use    Hematology/Oncology:  Hematology Normal   Oncology Normal     EENT/Dental:   chronic allergic rhinitis Chronic hoarseness. Tinnitus.   Cardiovascular:   Exercise tolerance: good Hypertension, well controlled    Pulmonary:   Asthma asymptomatic    Hepatic/GI:   Dysphagia.   Musculoskeletal:   Arthritis   Spine Disorders: lumbar Chronic Pain    Neurological:  Neurology Normal    Dermatological:   Eczema   Psych:   Psychiatric History anxiety depression Bipolar         Physical Exam  General:  Well nourished, Obesity    Airway/Jaw/Neck:  Airway Findings: Mouth Opening: Normal Tongue: Normal  General Airway Assessment: Adult, Average  Mallampati: II  TM Distance: 4 - 6 cm  Jaw/Neck Findings:  Neck ROM: Normal ROM      Dental:  Dental Findings:         Mental Status:  Mental Status Findings:  Cooperative, Alert and Oriented         Anesthesia Plan  Type of Anesthesia, risks & benefits discussed:  Anesthesia Type:  general  Patient's Preference:   Intra-op Monitoring Plan: standard ASA monitors  Intra-op Monitoring Plan Comments:   Post Op Pain Control Plan: per primary service following discharge from PACU and multimodal analgesia  Post Op Pain Control Plan Comments:   Induction:    Beta Blocker:         Informed Consent: Patient understands risks and agrees with Anesthesia plan.  Questions answered. Anesthesia consent signed with patient.  ASA Score: 3     Day of Surgery Review of History & Physical:    H&P update referred to the surgeon.     Anesthesia Plan Notes: CBC and BMP today.        Ready For  Surgery From Anesthesia Perspective.

## 2020-02-03 ENCOUNTER — HOSPITAL ENCOUNTER (OUTPATIENT)
Facility: OTHER | Age: 55
Discharge: HOME | End: 2020-02-03
Attending: SPECIALIST | Admitting: SPECIALIST
Payer: MEDICARE

## 2020-02-03 ENCOUNTER — ANESTHESIA (OUTPATIENT)
Dept: SURGERY | Facility: OTHER | Age: 55
End: 2020-02-03
Payer: MEDICARE

## 2020-02-03 VITALS
BODY MASS INDEX: 38.25 KG/M2 | TEMPERATURE: 98 F | SYSTOLIC BLOOD PRESSURE: 174 MMHG | DIASTOLIC BLOOD PRESSURE: 69 MMHG | HEART RATE: 65 BPM | WEIGHT: 238 LBS | RESPIRATION RATE: 16 BRPM | OXYGEN SATURATION: 98 % | HEIGHT: 66 IN

## 2020-02-03 DIAGNOSIS — D37.05 NEOPLASM OF UNCERTAIN BEHAVIOR OF NASOPHARYNX: ICD-10-CM

## 2020-02-03 PROCEDURE — 63600175 PHARM REV CODE 636 W HCPCS: Performed by: SPECIALIST

## 2020-02-03 PROCEDURE — 31237 PR NASAL/SINUS ENDOSCOPY,BX/RMV POLYP/DEBRID: ICD-10-PCS | Mod: 50,,, | Performed by: SPECIALIST

## 2020-02-03 PROCEDURE — 71000015 HC POSTOP RECOV 1ST HR: Performed by: SPECIALIST

## 2020-02-03 PROCEDURE — 71000016 HC POSTOP RECOV ADDL HR: Performed by: SPECIALIST

## 2020-02-03 PROCEDURE — 88305 TISSUE EXAM BY PATHOLOGIST: CPT | Mod: 59 | Performed by: PATHOLOGY

## 2020-02-03 PROCEDURE — 63600175 PHARM REV CODE 636 W HCPCS: Performed by: NURSE ANESTHETIST, CERTIFIED REGISTERED

## 2020-02-03 PROCEDURE — 25000003 PHARM REV CODE 250: Performed by: SPECIALIST

## 2020-02-03 PROCEDURE — 88305 TISSUE EXAM BY PATHOLOGIST: CPT | Mod: 26,,, | Performed by: PATHOLOGY

## 2020-02-03 PROCEDURE — 27201423 OPTIME MED/SURG SUP & DEVICES STERILE SUPPLY: Performed by: SPECIALIST

## 2020-02-03 PROCEDURE — 37000009 HC ANESTHESIA EA ADD 15 MINS: Performed by: SPECIALIST

## 2020-02-03 PROCEDURE — 36000711: Performed by: SPECIALIST

## 2020-02-03 PROCEDURE — 36000710: Performed by: SPECIALIST

## 2020-02-03 PROCEDURE — 25000242 PHARM REV CODE 250 ALT 637 W/ HCPCS: Performed by: SPECIALIST

## 2020-02-03 PROCEDURE — 25000003 PHARM REV CODE 250: Performed by: NURSE ANESTHETIST, CERTIFIED REGISTERED

## 2020-02-03 PROCEDURE — 88305 TISSUE EXAM BY PATHOLOGIST: ICD-10-PCS | Mod: 26,,, | Performed by: PATHOLOGY

## 2020-02-03 PROCEDURE — 31237 NSL/SINS NDSC SURG BX POLYPC: CPT | Mod: 50,,, | Performed by: SPECIALIST

## 2020-02-03 PROCEDURE — 71000033 HC RECOVERY, INTIAL HOUR: Performed by: SPECIALIST

## 2020-02-03 PROCEDURE — 37000008 HC ANESTHESIA 1ST 15 MINUTES: Performed by: SPECIALIST

## 2020-02-03 RX ORDER — SODIUM CHLORIDE, SODIUM LACTATE, POTASSIUM CHLORIDE, CALCIUM CHLORIDE 600; 310; 30; 20 MG/100ML; MG/100ML; MG/100ML; MG/100ML
INJECTION, SOLUTION INTRAVENOUS CONTINUOUS
Status: DISCONTINUED | OUTPATIENT
Start: 2020-02-03 | End: 2020-02-03 | Stop reason: HOSPADM

## 2020-02-03 RX ORDER — ACETAMINOPHEN 500 MG
1000 TABLET ORAL
Status: COMPLETED | OUTPATIENT
Start: 2020-02-03 | End: 2020-02-03

## 2020-02-03 RX ORDER — CEPHALEXIN 500 MG/1
500 CAPSULE ORAL 4 TIMES DAILY
Qty: 40 CAPSULE | Refills: 0 | Status: SHIPPED | OUTPATIENT
Start: 2020-02-03 | End: 2020-09-03

## 2020-02-03 RX ORDER — OXYCODONE HYDROCHLORIDE 5 MG/1
5 TABLET ORAL
Status: DISCONTINUED | OUTPATIENT
Start: 2020-02-03 | End: 2020-02-03 | Stop reason: HOSPADM

## 2020-02-03 RX ORDER — OXYMETAZOLINE HCL 0.05 %
SPRAY, NON-AEROSOL (ML) NASAL
Status: DISCONTINUED | OUTPATIENT
Start: 2020-02-03 | End: 2020-02-03 | Stop reason: HOSPADM

## 2020-02-03 RX ORDER — FAMOTIDINE 20 MG/1
20 TABLET, FILM COATED ORAL
Status: DISCONTINUED | OUTPATIENT
Start: 2020-02-03 | End: 2020-02-03 | Stop reason: HOSPADM

## 2020-02-03 RX ORDER — CEFAZOLIN SODIUM 1 G/3ML
2 INJECTION, POWDER, FOR SOLUTION INTRAMUSCULAR; INTRAVENOUS
Status: COMPLETED | OUTPATIENT
Start: 2020-02-03 | End: 2020-02-03

## 2020-02-03 RX ORDER — FENTANYL CITRATE 50 UG/ML
INJECTION, SOLUTION INTRAMUSCULAR; INTRAVENOUS
Status: DISCONTINUED | OUTPATIENT
Start: 2020-02-03 | End: 2020-02-03

## 2020-02-03 RX ORDER — ESMOLOL HYDROCHLORIDE 10 MG/ML
INJECTION INTRAVENOUS
Status: DISCONTINUED | OUTPATIENT
Start: 2020-02-03 | End: 2020-02-03

## 2020-02-03 RX ORDER — EPHEDRINE SULFATE 50 MG/ML
INJECTION, SOLUTION INTRAVENOUS
Status: DISCONTINUED | OUTPATIENT
Start: 2020-02-03 | End: 2020-02-03

## 2020-02-03 RX ORDER — LIDOCAINE HYDROCHLORIDE 10 MG/ML
0.5 INJECTION, SOLUTION EPIDURAL; INFILTRATION; INTRACAUDAL; PERINEURAL ONCE
Status: DISCONTINUED | OUTPATIENT
Start: 2020-02-03 | End: 2020-02-03 | Stop reason: HOSPADM

## 2020-02-03 RX ORDER — ONDANSETRON 8 MG/1
8 TABLET, ORALLY DISINTEGRATING ORAL EVERY 6 HOURS PRN
Status: CANCELLED | OUTPATIENT
Start: 2020-02-03

## 2020-02-03 RX ORDER — LIDOCAINE HYDROCHLORIDE 20 MG/ML
INJECTION INTRAVENOUS
Status: DISCONTINUED | OUTPATIENT
Start: 2020-02-03 | End: 2020-02-03

## 2020-02-03 RX ORDER — ONDANSETRON 8 MG/1
8 TABLET, ORALLY DISINTEGRATING ORAL EVERY 6 HOURS PRN
Qty: 6 TABLET | Refills: 1 | Status: SHIPPED | OUTPATIENT
Start: 2020-02-03 | End: 2020-09-03

## 2020-02-03 RX ORDER — HYDROCODONE BITARTRATE AND ACETAMINOPHEN 5; 325 MG/1; MG/1
1 TABLET ORAL EVERY 6 HOURS PRN
Qty: 15 TABLET | Refills: 0 | Status: SHIPPED | OUTPATIENT
Start: 2020-02-03 | End: 2020-09-03

## 2020-02-03 RX ORDER — SODIUM CHLORIDE 0.9 % (FLUSH) 0.9 %
3 SYRINGE (ML) INJECTION
Status: DISCONTINUED | OUTPATIENT
Start: 2020-02-03 | End: 2020-02-03 | Stop reason: HOSPADM

## 2020-02-03 RX ORDER — HYDROMORPHONE HYDROCHLORIDE 2 MG/ML
0.4 INJECTION, SOLUTION INTRAMUSCULAR; INTRAVENOUS; SUBCUTANEOUS EVERY 5 MIN PRN
Status: DISCONTINUED | OUTPATIENT
Start: 2020-02-03 | End: 2020-02-03 | Stop reason: HOSPADM

## 2020-02-03 RX ORDER — MEPERIDINE HYDROCHLORIDE 25 MG/ML
12.5 INJECTION INTRAMUSCULAR; INTRAVENOUS; SUBCUTANEOUS ONCE AS NEEDED
Status: DISCONTINUED | OUTPATIENT
Start: 2020-02-03 | End: 2020-02-03 | Stop reason: HOSPADM

## 2020-02-03 RX ORDER — LIDOCAINE HYDROCHLORIDE 10 MG/ML
1 INJECTION, SOLUTION EPIDURAL; INFILTRATION; INTRACAUDAL; PERINEURAL ONCE
Status: DISCONTINUED | OUTPATIENT
Start: 2020-02-03 | End: 2020-02-03 | Stop reason: HOSPADM

## 2020-02-03 RX ORDER — ROCURONIUM BROMIDE 10 MG/ML
INJECTION, SOLUTION INTRAVENOUS
Status: DISCONTINUED | OUTPATIENT
Start: 2020-02-03 | End: 2020-02-03

## 2020-02-03 RX ORDER — CELECOXIB 200 MG/1
400 CAPSULE ORAL
Status: COMPLETED | OUTPATIENT
Start: 2020-02-03 | End: 2020-02-03

## 2020-02-03 RX ORDER — PREGABALIN 50 MG/1
50 CAPSULE ORAL
Status: DISCONTINUED | OUTPATIENT
Start: 2020-02-03 | End: 2020-02-03 | Stop reason: HOSPADM

## 2020-02-03 RX ORDER — ALBUTEROL SULFATE 0.83 MG/ML
2.5 SOLUTION RESPIRATORY (INHALATION)
Status: COMPLETED | OUTPATIENT
Start: 2020-02-03 | End: 2020-02-03

## 2020-02-03 RX ORDER — PROPOFOL 10 MG/ML
VIAL (ML) INTRAVENOUS
Status: DISCONTINUED | OUTPATIENT
Start: 2020-02-03 | End: 2020-02-03

## 2020-02-03 RX ORDER — ONDANSETRON 2 MG/ML
4 INJECTION INTRAMUSCULAR; INTRAVENOUS DAILY PRN
Status: DISCONTINUED | OUTPATIENT
Start: 2020-02-03 | End: 2020-02-03 | Stop reason: HOSPADM

## 2020-02-03 RX ORDER — HYDROCODONE BITARTRATE AND ACETAMINOPHEN 5; 325 MG/1; MG/1
1 TABLET ORAL EVERY 4 HOURS PRN
Status: CANCELLED | OUTPATIENT
Start: 2020-02-03

## 2020-02-03 RX ORDER — DEXAMETHASONE SODIUM PHOSPHATE 4 MG/ML
INJECTION, SOLUTION INTRA-ARTICULAR; INTRALESIONAL; INTRAMUSCULAR; INTRAVENOUS; SOFT TISSUE
Status: DISCONTINUED | OUTPATIENT
Start: 2020-02-03 | End: 2020-02-03

## 2020-02-03 RX ORDER — HYDROCODONE BITARTRATE AND ACETAMINOPHEN 10; 325 MG/1; MG/1
1 TABLET ORAL EVERY 4 HOURS PRN
Status: CANCELLED | OUTPATIENT
Start: 2020-02-03

## 2020-02-03 RX ADMIN — ALBUTEROL SULFATE 2.5 MG: 2.5 SOLUTION RESPIRATORY (INHALATION) at 07:02

## 2020-02-03 RX ADMIN — EPHEDRINE SULFATE 10 MG: 50 INJECTION INTRAMUSCULAR; INTRAVENOUS; SUBCUTANEOUS at 08:02

## 2020-02-03 RX ADMIN — ESMOLOL HYDROCHLORIDE 30 MG: 10 INJECTION INTRAVENOUS at 09:02

## 2020-02-03 RX ADMIN — FENTANYL CITRATE 25 MCG: 50 INJECTION, SOLUTION INTRAMUSCULAR; INTRAVENOUS at 09:02

## 2020-02-03 RX ADMIN — DEXAMETHASONE SODIUM PHOSPHATE 12 MG: 4 INJECTION, SOLUTION INTRAMUSCULAR; INTRAVENOUS at 08:02

## 2020-02-03 RX ADMIN — PROPOFOL 200 MG: 10 INJECTION, EMULSION INTRAVENOUS at 08:02

## 2020-02-03 RX ADMIN — FENTANYL CITRATE 100 MCG: 50 INJECTION, SOLUTION INTRAMUSCULAR; INTRAVENOUS at 08:02

## 2020-02-03 RX ADMIN — LIDOCAINE HYDROCHLORIDE 50 MG: 20 INJECTION, SOLUTION INTRAVENOUS at 08:02

## 2020-02-03 RX ADMIN — EPHEDRINE SULFATE 10 MG: 50 INJECTION INTRAMUSCULAR; INTRAVENOUS; SUBCUTANEOUS at 09:02

## 2020-02-03 RX ADMIN — FENTANYL CITRATE 50 MCG: 50 INJECTION, SOLUTION INTRAMUSCULAR; INTRAVENOUS at 09:02

## 2020-02-03 RX ADMIN — CEFAZOLIN 2 G: 330 INJECTION, POWDER, FOR SOLUTION INTRAMUSCULAR; INTRAVENOUS at 08:02

## 2020-02-03 RX ADMIN — ROCURONIUM BROMIDE 50 MG: 10 INJECTION, SOLUTION INTRAVENOUS at 08:02

## 2020-02-03 RX ADMIN — CELECOXIB 400 MG: 200 CAPSULE ORAL at 07:02

## 2020-02-03 RX ADMIN — ACETAMINOPHEN 1000 MG: 500 TABLET, FILM COATED ORAL at 07:02

## 2020-02-03 RX ADMIN — SODIUM CHLORIDE, SODIUM LACTATE, POTASSIUM CHLORIDE, AND CALCIUM CHLORIDE: 600; 310; 30; 20 INJECTION, SOLUTION INTRAVENOUS at 07:02

## 2020-02-03 NOTE — PLAN OF CARE
Trina Marie Collette has met all discharge criteria from Phase II. Vital Signs are stable, ambulating  without difficulty. Discharge instructions given, patient verbalized understanding. Discharged from facility via wheelchair in stable condition.

## 2020-02-03 NOTE — OP NOTE
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  Nasal passages were packed with gauze strips of with Afrin.  The face was prepped with Phisoderm.  The patient was sterilely draped.  The 0 degree telescope was coupled to a video system.  Nasal packs were removed and bilateral rigid nasal endoscopy was performed revealing a tumor emanating from the roof of the nasopharynx and filling the left nasopharyngeal area and partially filling the right nasopharyngeal area.  A sickle knife was used to incise superiorly and laterally on the left and laterally on the right to the mass.  The mass was partially dissected with a Austin dissector and then grasped with a large she straight Blakesley forceps and removed in pieces.  The mucosa of the right nasopharynx was then removed in multiple pieces.  Hemostasis was achieved using topical gauze cottonoid soaked with Afrin, suction Bovie through the nose and ultimately FloSeal with a neurosurgical cottonoid pack placed on it for 5 min.  The nasal passages were examined and no further bleeding occurred.  The mouth oropharynx was suctioned of blood.  An NG-tube was placed in the stomach which was suctioned of its secretions.  Estimated blood loss is 50 mL.  The no complications.  The patient tolerated the  procedure well and was extubated in the operating suite.  She was transferred to recovery room in stable good condition.    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  When adequately cover from anesthesia she is being transferred to home.      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     ondansetron 8 MG Tbdl  Commonly known as:  ZOFRAN-ODT  Take 1 tablet (8 mg total) by mouth every 6 (six) hours as needed.        CHANGE how you take these medications    * HYDROcodone-acetaminophen 7.5-325 mg per tablet  Commonly known as:  NORCO  TAKE 1 TABLET BY MOUTH ONCE A DAY AS NEEDED FOR PAIN  What changed:  Another medication with the same name was added. Make sure you understand how and when to take each.     * HYDROcodone-acetaminophen 5-325 mg per tablet  Commonly known as:  NORCO  Take 1 tablet by mouth every 6 (six) hours as needed for Pain.  What changed:  You were already taking a medication with the same name, and this prescription was added. Make sure you understand how and when to take each.     zolpidem 5  MG Tab  Commonly known as:  AMBIEN  1 po Q [facility] p.r.n. sleep alternate with trazodone so 1 of the other taken every other night as needed for sleep  What changed:  additional instructions         * This list has 2 medication(s) that are the same as other medications prescribed for you. Read the directions carefully, and ask your doctor or other care provider to review them with you.            CONTINUE taking these medications    albuterol 2.5 mg /3 mL (0.083 %) nebulizer solution  Commonly known as:  PROVENTIL  Take 3 mLs (2.5 mg total) by nebulization every 4 (four) hours as needed for Wheezing or Shortness of Breath. Rescue     amLODIPine 2.5 MG tablet  Commonly known as:  NORVASC  One p.o. q. HS p.r.n. hypertension continue Cozaar 100 mg q.a.m.     betamethasone valerate 0.1% 0.1 % Crea  Commonly known as:  VALISONE  Applied to the abdomen twice a day p.r.n. skin rash or itch     budesonide-formoterol 160-4.5 mcg 160-4.5 mcg/actuation Hfaa  Commonly known as:  SYMBICORT  Inhale 2 puffs into the lungs every 12 (twelve) hours. Controller, rinse mouth after using     cyclobenzaprine 10 MG tablet  Commonly known as:  FLEXERIL  Take 1 tablet (10 mg total) by mouth 2 (two) times daily as needed for Muscle spasms.     ergocalciferol 50,000 unit Cap  Commonly known as:  ERGOCALCIFEROL  Take 1 capsule (50,000 Units total) by mouth every 7 days.     famotidine 20 MG tablet  Commonly known as:  Pepcid  Take 1 tablet (20 mg total) by mouth 2 (two) times daily.     gabapentin 800 MG tablet  Commonly known as:  NEURONTIN  Take 800 mg by mouth 3 (three) times daily.     hydrOXYzine pamoate 25 MG Cap  Commonly known as:  VISTARIL  One p.o. q.8 hours p.r.n. anxiety or 1 p.o. q.h.s. p.r.n. anxiety to help prevent palpitations     ipratropium 0.03 % nasal spray  Commonly known as:  ATROVENT  2 sprays by Nasal route 2 (two) times daily. May be use more often if needed     losartan 100 MG tablet  Commonly known as:  COZAAR  Take 1  tablet (100 mg total) by mouth once daily.     montelukast 10 mg tablet  Commonly known as:  SINGULAIR  TAKE 1 TABLET BY MOUTH EVERY DAY     naproxen 500 MG tablet  Commonly known as:  NAPROSYN  Take 1 tablet (500 mg total) by mouth 2 (two) times daily with meals.     promethazine-codeine 6.25-10 mg/5 ml 6.25-10 mg/5 mL syrup  Commonly known as:  PHENERGAN with CODEINE  Take 5 mLs by mouth every 4 (four) hours as needed.     rosuvastatin 5 MG tablet  Commonly known as:  CRESTOR  Take 1 tablet (5 mg total) by mouth once daily.     sertraline 100 MG tablet  Commonly known as:  ZOLOFT  TAKE 1 TABLET BY MOUTH EVERY DAY     tiZANidine 4 MG tablet  Commonly known as:  ZANAFLEX  1 TABLET AT BEDTIME AS NEEDED MUSCLE SPASMS     triamcinolone acetonide 0.1% 0.1 % cream  Commonly known as:  KENALOG  Apply topically 2 (two) times daily.     * ziprasidone 40 MG Cap  Commonly known as:  GEODON  Take 1 capsule (40 mg total) by mouth once daily.     * ziprasidone 80 MG capsule  Commonly known as:  GEODON  Take 1 capsule (80 mg total) by mouth once daily.         * This list has 2 medication(s) that are the same as other medications prescribed for you. Read the directions carefully, and ask your doctor or other care provider to review them with you.            STOP taking these medications    azelastine 137 mcg (0.1 %) nasal spray  Commonly known as:  ASTELIN     fluticasone propionate 50 mcg/actuation nasal spray  Commonly known as:  FLONASE          Discharge Procedure Orders   Diet general     Lifting restrictions   Order Comments: Do not lift heavier than 10 pounds     Other restrictions (specify):     Call MD for:  temperature >100.4     Call MD for:  persistent nausea and vomiting     Call MD for:  severe uncontrolled pain     Call MD for:  difficulty breathing, headache or visual disturbances     Call MD for:  redness, tenderness, or signs of infection (pain, swelling, redness, odor or green/yellow discharge around incision  site)     Change dressing (specify)   Order Comments: Change mustache dressing as knee     Follow-up Information     R Keshav Ricardo MD In 1 week.    Specialty:  Otolaryngology  Contact information:  [address]  [address] [address] [phone]

## 2020-02-03 NOTE — DISCHARGE INSTRUCTIONS
Nasal Surgery: Your Recovery  Youve just had nasal surgery. During the first weeks after surgery, be sure to follow the advice of your doctor. The tips on this sheet can help speed your recovery.  Tips for healing  · Dont take medicines containing aspirin or ibuprofen.  · Don't bump your nose or touch the splint or packing.  · Don't bend or lift.  · Sneeze or cough with your mouth open to reduce pressure inside your nose.  · Keep from blowing your nose until youre told its OK to do so.  · Keep eyeglasses from resting on your nose by taping them above the nose.  · Protect your nose from the sun.  · After packing is removed, start saltwater rinses if prescribed.  · Keep follow-up appointments with your doctor.  Follow-up visits  Your doctor will most likely want to see you within a week to check your healing. Any packing, splint, or dressings will probably be removed at that time. You may feel slight discomfort and bleed a little when this is done.  Assessing the results  During later follow-up visits, your doctor will assess your healing and the results of your surgery. Talk with your doctor about any problems or concerns you may have.  When to call the doctor  Call the doctor if you notice any of the following:  · Sudden increase in pain, swelling, or bruising  · Fever  · Heavy bleeding  · Yellow or greenish drainage from the nose  · Unrelieved headache  · Decreased or double vision  · Stiff neck or very tired feeling   Date Last Reviewed: 11/1/2016  © 7778-8322 [employer]. [address] [place] [address], [place] [address]. All rights reserved. This information is not intended as a substitute for professional medical care. Always follow your healthcare professional's instructions.      Anesthesia: After Your Surgery  Youve just had surgery. During surgery, you received medication called anesthesia to keep you comfortable and pain-free. After surgery, you may experience some pain or nausea. This is  common. Here are some tips for feeling better and recovering after surgery.    Going home  Your doctor or nurse will show you how to take care of yourself when you go home. He or she will also answer your questions. Have an adult family member or friend drive you home. For the first 24 hours after your surgery:  · Do not drive or use heavy equipment.  · Do not make important decisions or sign legal documents.  · Avoid alcohol.  · Have someone stay with you, if needed. He or she can watch for problems and help keep you safe.  · Take your time getting up from a seated or lying position. You may experience dizziness for 24 hours  Be sure to keep all follow-up appointments with your doctor. And rest after your procedure for as long as your doctor tells you to.    Coping with pain  If you have pain after surgery, pain medication will help you feel better. Take it as directed, before pain becomes severe. Also, ask your doctor or pharmacist about other ways to control pain, such as with heat, ice, and relaxation. And follow any other instructions your surgeon or nurse gives you.    URINARY RETENTION  Should you experience a decrease in your urine output or are unable to urinate following surgery, this can be due to the medications given during surgery.  We recommend you going to the nearest Emergency Department.    Tips for taking pain medication  To get the best relief possible, remember these points:  · Pain medications can upset your stomach. Taking them with a little food may help.  · Most pain relievers taken by mouth need at least 20 to 30 minutes to take effect.  · Taking medication on a schedule can help you remember to take it. Try to time your medication so that you can take it before beginning an activity, such as dressing, walking, or sitting down for dinner.  · Constipation is a common side effect of pain medications. Contact your doctor before taking any medications like laxatives or stool softeners to help  relieve constipation. Also ask about any dietary restrictions, because drinking lots of fluids and eating foods like fruits and vegetables that are high in fiber can also help. Remember, dont take laxatives unless your surgeon has prescribed them.  · Mixing alcohol and pain medication can cause dizziness and slow your breathing. It can even be fatal. Dont drink alcohol while taking pain medication.  · Pain medication can slow your reflexes. Dont drive or operate machinery while taking pain medication.  If your health care provider tells you to take acetaminophen to help relieve your pain, ask him or her how much you are supposed to take each day. (Acetaminophen is the generic name for Tylenol and other brand-name pain relievers.) Acetaminophen or other pain relievers may interact with your prescription medicines or other over-the-counter (OTC) drugs. Some prescription medications contain acetaminophen along with other active ingredients. Using both prescription and OTC acetaminophen for pain can cause you to overdose. The FDA recommends that you read the labels on your OTC medications carefully. This will help you to clearly understand the list of active ingredients, dosing instructions, and any warnings. It may also help you avoid taking too much acetaminophen. If you have questions or don't understand the information, ask your pharmacist or health care provider to explain it to you before you take the OTC medication.    Managing nausea  Some people have an upset stomach after surgery. This is often due to anesthesia, pain, pain medications, or the stress of surgery. The following tips will help you manage nausea and get good nutrition as you recover. If you were on a special diet before surgery, ask your doctor if you should follow it during recovery. These tips may help:  · Dont push yourself to eat. Your body will tell you when to eat and how much.  · Start off with clear liquids and soup. They are easier to  digest.  · Progress to semi-solid foods (mashed potatoes, applesauce, and gelatin) as you feel ready.  · Slowly move to solid foods. Dont eat fatty, rich, or spicy foods at first.  · Dont force yourself to have three large meals a day. Instead, eat smaller amounts more often.  · Take pain medications with a small amount of solid food, such as crackers or toast to avoid nausea.      Call your surgeon if    · You feel too sleepy, dizzy, or groggy (medication may be too strong).  · You have side effects like nausea, vomiting, or skin changes (rash, itching, or hives).   © 8588-1381 [employer]. [address] [place] [address], [place] [address]. All rights reserved. This information is not intended as a substitute for professional medical care. Always follow your healthcare professional's instructions.                     PLEASE FOLLOW ANY ADDITIONAL INSTRUCTIONS GIVEN TO YOU BY DR ARMSTRONG!

## 2020-02-03 NOTE — ANESTHESIA POSTPROCEDURE EVALUATION
Anesthesia Post Evaluation    Patient: Trina Marie Collette    Procedure(s) Performed: Procedure(s) (LRB):  FESS, USING COMPUTER-ASSISTED NAVIGATION (Bilateral)    Final Anesthesia Type: general    Patient location during evaluation: PACU  Patient participation: Yes- Able to Participate  Level of consciousness: awake and alert and oriented  Post-procedure vital signs: reviewed and stable  Pain management: adequate  Airway patency: patent    PONV status at discharge: No PONV  Anesthetic complications: no      Cardiovascular status: blood pressure returned to baseline and hemodynamically stable  Respiratory status: unassisted, spontaneous ventilation and room air  Hydration status: euvolemic  Follow-up not needed.          Vitals Value Taken Time   /69 2/3/2020 11:09 AM   Temp 36.6 °C (97.8 °F) 2/3/2020 10:33 AM   Pulse 65 2/3/2020 11:09 AM   Resp 16 2/3/2020 11:09 AM   SpO2 98 % 2/3/2020 11:09 AM         Event Time     Out of Recovery 10:30:02          Pain/Payal Score: Pain Rating Prior to Med Admin: 0 (2/3/2020  7:45 AM)  Payal Score: 10 (2/3/2020 10:33 AM)

## 2020-02-03 NOTE — TRANSFER OF CARE
"Anesthesia Transfer of Care Note    Patient: Trina Marie Collette    Procedure(s) Performed: Procedure(s) (LRB):  FESS, USING COMPUTER-ASSISTED NAVIGATION (Bilateral)    Patient location: PACU    Anesthesia Type: general    Transport from OR: Transported from OR on room air with adequate spontaneous ventilation    Post pain: adequate analgesia    Post assessment: no apparent anesthetic complications    Post vital signs: stable    Level of consciousness: awake, alert and oriented    Nausea/Vomiting: no nausea/vomiting    Complications: none          Last vitals:   Visit Vitals  BP (!) 194/91 (BP Location: Right arm, Patient Position: Lying)   Pulse 73   Temp 36.6 °C (97.8 °F) (Oral)   Resp 16   Ht 5' 6" (1.676 m)   Wt 108 kg (238 lb)   LMP 09/25/2017   SpO2 100%   Breastfeeding? No   BMI 38.41 kg/m²     "

## 2020-02-03 NOTE — INTERVAL H&P NOTE
The patient has been examined and the H&P has been reviewed:    I concur with the findings and no changes have occurred since H&P was written.    Anesthesia/Surgery risks, benefits and alternative options discussed and understood by patient/family.          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

## 2020-02-07 ENCOUNTER — TELEPHONE (OUTPATIENT)
Dept: OTOLARYNGOLOGY | Facility: CLINIC | Age: 55
End: 2020-02-07

## 2020-02-07 ENCOUNTER — PATIENT OUTREACH (OUTPATIENT)
Dept: ADMINISTRATIVE | Facility: OTHER | Age: 55
End: 2020-02-07

## 2020-02-07 NOTE — TELEPHONE ENCOUNTER
Called and spoke with pt to reschedule her post op appointment with Dr. Ricardo.        ----- Message from Sarah Young sent at 2/7/2020 12:20 PM CST -----  Contact: COLLETTE,TRINA   Name of Who is Calling: COLLETTE,TRINA       What is the request in detail: Patient is requesting to reschedule her post op. Please contact to further advise.      Can the clinic reply by [facility]: NO      [facility] [phone]

## 2020-02-10 ENCOUNTER — CLINICAL SUPPORT (OUTPATIENT)
Dept: PRIMARY CARE CLINIC | Facility: CLINIC | Age: 55
End: 2020-02-10
Payer: MEDICARE

## 2020-02-10 ENCOUNTER — TELEPHONE (OUTPATIENT)
Dept: PRIMARY CARE CLINIC | Facility: CLINIC | Age: 55
End: 2020-02-10

## 2020-02-10 ENCOUNTER — TELEPHONE (OUTPATIENT)
Dept: OTOLARYNGOLOGY | Facility: CLINIC | Age: 55
End: 2020-02-10

## 2020-02-10 ENCOUNTER — OFFICE VISIT (OUTPATIENT)
Dept: OTOLARYNGOLOGY | Facility: CLINIC | Age: 55
End: 2020-02-10
Payer: MEDICARE

## 2020-02-10 VITALS — HEART RATE: 72 BPM | DIASTOLIC BLOOD PRESSURE: 88 MMHG | OXYGEN SATURATION: 99 % | SYSTOLIC BLOOD PRESSURE: 152 MMHG

## 2020-02-10 VITALS
SYSTOLIC BLOOD PRESSURE: 145 MMHG | DIASTOLIC BLOOD PRESSURE: 100 MMHG | BODY MASS INDEX: 38.25 KG/M2 | TEMPERATURE: 97 F | HEIGHT: 66 IN | HEART RATE: 79 BPM | WEIGHT: 238 LBS

## 2020-02-10 DIAGNOSIS — J34.89 NASAL CRUSTING: ICD-10-CM

## 2020-02-10 DIAGNOSIS — D37.05 NEOPLASM OF UNCERTAIN BEHAVIOR OF NASOPHARYNX: Primary | ICD-10-CM

## 2020-02-10 DIAGNOSIS — R13.10 DYSPHAGIA, UNSPECIFIED TYPE: ICD-10-CM

## 2020-02-10 DIAGNOSIS — R05.3 CHRONIC COUGH: ICD-10-CM

## 2020-02-10 DIAGNOSIS — K21.9 LARYNGOPHARYNGEAL REFLUX (LPR): ICD-10-CM

## 2020-02-10 DIAGNOSIS — J34.2 NASAL SEPTAL DEVIATION: ICD-10-CM

## 2020-02-10 DIAGNOSIS — H69.93 DYSFUNCTION OF BOTH EUSTACHIAN TUBES: ICD-10-CM

## 2020-02-10 PROCEDURE — 99999 PR PBB SHADOW E&M-EST. PATIENT-LVL II: ICD-10-PCS | Mod: PBBFAC,,,

## 2020-02-10 PROCEDURE — 31237 NASAL/SINUS ENDOSCOPY: ICD-10-PCS | Mod: 50,S$GLB,, | Performed by: SPECIALIST

## 2020-02-10 PROCEDURE — 3077F SYST BP >= 140 MM HG: CPT | Mod: CPTII,S$GLB,, | Performed by: SPECIALIST

## 2020-02-10 PROCEDURE — 3008F PR BODY MASS INDEX (BMI) DOCUMENTED: ICD-10-PCS | Mod: CPTII,S$GLB,, | Performed by: SPECIALIST

## 2020-02-10 PROCEDURE — 3077F PR MOST RECENT SYSTOLIC BLOOD PRESSURE >= 140 MM HG: ICD-10-PCS | Mod: CPTII,S$GLB,, | Performed by: SPECIALIST

## 2020-02-10 PROCEDURE — 99213 OFFICE O/P EST LOW 20 MIN: CPT | Mod: 25,S$GLB,, | Performed by: SPECIALIST

## 2020-02-10 PROCEDURE — 3080F PR MOST RECENT DIASTOLIC BLOOD PRESSURE >= 90 MM HG: ICD-10-PCS | Mod: CPTII,S$GLB,, | Performed by: SPECIALIST

## 2020-02-10 PROCEDURE — 99499 UNLISTED E&M SERVICE: CPT | Mod: S$GLB,,, | Performed by: SPECIALIST

## 2020-02-10 PROCEDURE — 99499 RISK ADDL DX/OHS AUDIT: ICD-10-PCS | Mod: S$GLB,,, | Performed by: SPECIALIST

## 2020-02-10 PROCEDURE — 99999 PR PBB SHADOW E&M-EST. PATIENT-LVL II: CPT | Mod: PBBFAC,,,

## 2020-02-10 PROCEDURE — 31237 NSL/SINS NDSC SURG BX POLYPC: CPT | Mod: 50,S$GLB,, | Performed by: SPECIALIST

## 2020-02-10 PROCEDURE — 99213 PR OFFICE/OUTPT VISIT, EST, LEVL III, 20-29 MIN: ICD-10-PCS | Mod: 25,S$GLB,, | Performed by: SPECIALIST

## 2020-02-10 PROCEDURE — 3080F DIAST BP >= 90 MM HG: CPT | Mod: CPTII,S$GLB,, | Performed by: SPECIALIST

## 2020-02-10 PROCEDURE — 3008F BODY MASS INDEX DOCD: CPT | Mod: CPTII,S$GLB,, | Performed by: SPECIALIST

## 2020-02-10 RX ORDER — NAPROXEN 375 MG/1
TABLET ORAL
COMMUNITY
Start: 2020-01-29 | End: 2020-09-03 | Stop reason: SDUPTHER

## 2020-02-10 RX ORDER — TRAZODONE HYDROCHLORIDE 50 MG/1
50 TABLET ORAL DAILY
COMMUNITY
Start: 2020-01-29 | End: 2020-05-02

## 2020-02-10 RX ORDER — PROMETHAZINE HYDROCHLORIDE AND CODEINE PHOSPHATE 6.25; 1 MG/5ML; MG/5ML
5 SOLUTION ORAL EVERY 4 HOURS PRN
Qty: 240 ML | Refills: 0 | Status: SHIPPED | OUTPATIENT
Start: 2020-02-10 | End: 2020-02-20

## 2020-02-10 NOTE — TELEPHONE ENCOUNTER
----- Message from Joan Tompkins sent at 2/10/2020  2:33 PM CST -----  Contact: Pt [phone]  The patient said her BP was 151/100 today at another doctor's appointment therefore, she wants to know what you want her to do next.    Thank you

## 2020-02-10 NOTE — PROGRESS NOTES
Subjective:       Patient ID: Trina Marie Collette is a 55 y.o. female.    Chief Complaint: Post-op Evaluation    One week postop    The patient is having some mild sore throat and congestion and postnasal drip.  She continues to have some coughing.  She does not have fever.  She is able to eat a regular diet.  She is not having significant issues with headaches or ear pain.    Review of Systems   Constitutional: Positive for fatigue. Negative for activity change, appetite change, chills, fever and unexpected weight change.   HENT: Positive for congestion, ear pain, postnasal drip, rhinorrhea, sinus pressure, sinus pain and sore throat. Negative for ear discharge, facial swelling, hearing loss, mouth sores, sneezing, tinnitus, trouble swallowing and voice change.    Eyes: Negative for photophobia, pain, discharge, redness, itching and visual disturbance.   Respiratory: Positive for cough. Negative for apnea, choking, shortness of breath and wheezing.    Cardiovascular: Negative for chest pain and palpitations.   Gastrointestinal: Negative for abdominal distention, abdominal pain, nausea and vomiting.   Musculoskeletal: Negative for arthralgias, myalgias, neck pain and neck stiffness.   Skin: Negative.  Negative for color change, pallor and rash.   Allergic/Immunologic: Negative for environmental allergies, food allergies and immunocompromised state.   Neurological: Positive for headaches. Negative for dizziness, facial asymmetry, speech difficulty, weakness, light-headedness and numbness.   Hematological: Negative for adenopathy. Does not bruise/bleed easily.   Psychiatric/Behavioral: Positive for dysphoric mood and sleep disturbance. Negative for confusion and decreased concentration. The patient is nervous/anxious.        Objective:      Physical Exam   Constitutional: She is oriented to person, place, and time. She appears well-developed and well-nourished. She is cooperative.   HENT:   Head: Normocephalic.    Right Ear: External ear and ear canal normal. Tympanic membrane is retracted.   Left Ear: External ear and ear canal normal. Tympanic membrane is retracted.   Nose: Mucosal edema (cyanotic, boggy inferior turbinates bilaterally), rhinorrhea (clear mucus bilaterally) and septal deviation present.   Mouth/Throat: Uvula is midline, oropharynx is clear and moist and mucous membranes are normal. No oral lesions.       Mouth-unable to visualize as pictured secondary to hyperactive gag response   Eyes: Pupils are equal, round, and reactive to light. EOM and lids are normal. Right eye exhibits no discharge and no exudate. Left eye exhibits no discharge and no exudate. Right conjunctiva is injected. Left conjunctiva is injected.   Neck: Trachea normal and normal range of motion. No muscular tenderness present. No tracheal deviation present. No thyroid mass and no thyromegaly present.   Cardiovascular: Normal rate, regular rhythm, normal heart sounds and normal pulses.   Pulmonary/Chest: Effort normal and breath sounds normal. No stridor. She has no decreased breath sounds. She has no wheezes. She has no rhonchi. She has no rales.   Abdominal: Soft. Bowel sounds are normal. There is no tenderness.   Musculoskeletal: Normal range of motion.   Lymphadenopathy:        Head (right side): No submental, no submandibular, no preauricular, no posterior auricular and no occipital adenopathy present.        Head (left side): No submental, no submandibular, no preauricular, no posterior auricular and no occipital adenopathy present.     She has no cervical adenopathy.   Neurological: She is alert and oriented to person, place, and time. She has normal strength. No cranial nerve deficit or sensory deficit. Gait normal.   Skin: Skin is warm and dry. No petechiae and no rash noted. No cyanosis. Nails show no clubbing.   Psychiatric: She has a normal mood and affect. Her speech is normal and behavior is normal. Judgment and thought  content normal. Cognition and memory are normal.       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 Laryngopharyngeal reflux (LPR)    3. Dysphagia, unspecified type    4. Chronic cough    5. Dysfunction of both eustachian tubes    6. Nasal septal deviation        Plan:       I will recheck the patient in 2 weeks.

## 2020-02-10 NOTE — PROGRESS NOTES
Verified pt ID using name and . Obtained bp, p, and sp02. Notified physician of results. Instructed pt to increase amlodipine to 5 mg daily and RTC in 2 weeks for another BP check. Pt verbalized understanding

## 2020-02-10 NOTE — TELEPHONE ENCOUNTER
Called and spoke with pt to reschedule her appointment with Dr. Ricardo on today.        ----- Message from Kathleen Haines sent at 2/10/2020 10:06 AM CST -----  Contact: COLLETTE,TRINA MARIE [[id number]]  Name of Who is Calling : COLLETTE,TRINA MARIE [[id number]]    Patient is requesting a call from staff in regards to rescheduling her post op appointment for today patient states she has a ride now  .....Please contact to further discuss and advise.    Can the clinic reply by [facility] : No    What Number to Call Back : [phone]

## 2020-02-10 NOTE — PROCEDURES
Nasal/sinus endoscopy  Date/Time: 2/10/2020 2:15 PM  Performed by: DWAYNE Ricardo MD  Authorized by: DWAYNE Ricardo MD     Consent Done?:  Yes (Verbal)  Anesthesia:     Local anesthetic:  Lidocaine 2% and Yan-Synephrine 1/2% (Topical aerosol)    Location: Rigid nasal endoscopy with debridement bilaterally.    Endoscope type: 0 degree, 30 mm rigid nasal telescope.  Nose:     Procedure Performed:  Removal of Debridement  External:      No external nasal deformity  Intranasal:      Mucosa no polyps     Mucosa ulcers not present     No mucosa lesions present (Clear mucus in the nasal passages)     Enlarged turbinates ( inferior turbinates enlarged bilaterally)     Septum gross deformity  Nasopharynx:      Mucosa lesions ( crusting and scabbing in the nasopharynx mucus and scabbing on the roof and posterior wall of the nasopharynx)     Adenoids not present     Posterior choanae patent     Eustachian tube patent     Rigid nasal endoscopy with debridement-nasal passages acute bilaterally and nasopharynx is a cleared of scabbing and debris under endoscopic guidance bilaterally

## 2020-02-13 ENCOUNTER — PATIENT OUTREACH (OUTPATIENT)
Dept: ADMINISTRATIVE | Facility: HOSPITAL | Age: 55
End: 2020-02-13

## 2020-02-13 NOTE — PROGRESS NOTES
Immunizations reviewed. [facility] reviewed. Care Everywhere reviewed. [facility] and [facility] reviewed with no results yielded. Portal message sent to patient. Pre-visit chart review completed.

## 2020-02-14 LAB
FINAL PATHOLOGIC DIAGNOSIS: NORMAL
GROSS: NORMAL

## 2020-02-17 DIAGNOSIS — N64.4 BREAST TENDERNESS IN FEMALE: ICD-10-CM

## 2020-02-17 DIAGNOSIS — E78.5 HYPERLIPIDEMIA, UNSPECIFIED HYPERLIPIDEMIA TYPE: ICD-10-CM

## 2020-02-17 DIAGNOSIS — E07.9 THYROID MASS: ICD-10-CM

## 2020-02-17 DIAGNOSIS — G89.29 CHRONIC KNEE PAIN, UNSPECIFIED LATERALITY: ICD-10-CM

## 2020-02-17 DIAGNOSIS — H93.19 TINNITUS, UNSPECIFIED LATERALITY: ICD-10-CM

## 2020-02-17 DIAGNOSIS — M54.50 LUMBAR SPINE PAIN: ICD-10-CM

## 2020-02-17 DIAGNOSIS — M25.569 CHRONIC KNEE PAIN, UNSPECIFIED LATERALITY: ICD-10-CM

## 2020-02-24 RX ORDER — ROSUVASTATIN CALCIUM 5 MG/1
5 TABLET, COATED ORAL DAILY
Qty: 90 TABLET | Refills: 1 | Status: SHIPPED | OUTPATIENT
Start: 2020-02-24 | End: 2020-09-23

## 2020-02-24 RX ORDER — CYCLOBENZAPRINE HCL 10 MG
10 TABLET ORAL 2 TIMES DAILY PRN
Qty: 60 TABLET | Refills: 5 | Status: SHIPPED | OUTPATIENT
Start: 2020-02-24 | End: 2020-08-04

## 2020-02-26 ENCOUNTER — PATIENT OUTREACH (OUTPATIENT)
Dept: ADMINISTRATIVE | Facility: OTHER | Age: 55
End: 2020-02-26

## 2020-02-26 RX ORDER — SERTRALINE HYDROCHLORIDE 100 MG/1
TABLET, FILM COATED ORAL
Qty: 90 TABLET | Refills: 1 | Status: SHIPPED | OUTPATIENT
Start: 2020-02-26 | End: 2020-08-04

## 2020-02-26 NOTE — PROGRESS NOTES
Chart reviewed.   Immunizations: Triggered Imm Registry     Orders placed: n/a  Upcoming appts to satisfy MARTY topics: n/a

## 2020-02-27 ENCOUNTER — OFFICE VISIT (OUTPATIENT)
Dept: OTOLARYNGOLOGY | Facility: CLINIC | Age: 55
End: 2020-02-27
Payer: MEDICARE

## 2020-02-27 ENCOUNTER — CLINICAL SUPPORT (OUTPATIENT)
Dept: PRIMARY CARE CLINIC | Facility: CLINIC | Age: 55
End: 2020-02-27
Payer: MEDICARE

## 2020-02-27 VITALS — SYSTOLIC BLOOD PRESSURE: 136 MMHG | DIASTOLIC BLOOD PRESSURE: 82 MMHG | HEART RATE: 83 BPM | RESPIRATION RATE: 97 BRPM

## 2020-02-27 VITALS
TEMPERATURE: 99 F | HEART RATE: 63 BPM | DIASTOLIC BLOOD PRESSURE: 81 MMHG | BODY MASS INDEX: 37.77 KG/M2 | SYSTOLIC BLOOD PRESSURE: 140 MMHG | WEIGHT: 235 LBS | HEIGHT: 66 IN

## 2020-02-27 DIAGNOSIS — J30.9 ALLERGIC RHINITIS, UNSPECIFIED SEASONALITY, UNSPECIFIED TRIGGER: ICD-10-CM

## 2020-02-27 DIAGNOSIS — J34.89 NASAL CRUSTING: ICD-10-CM

## 2020-02-27 DIAGNOSIS — J34.2 NASAL SEPTAL DEVIATION: ICD-10-CM

## 2020-02-27 DIAGNOSIS — H69.93 DYSFUNCTION OF BOTH EUSTACHIAN TUBES: ICD-10-CM

## 2020-02-27 DIAGNOSIS — D37.05 NEOPLASM OF UNCERTAIN BEHAVIOR OF NASOPHARYNX: ICD-10-CM

## 2020-02-27 DIAGNOSIS — J45.21 MILD INTERMITTENT ASTHMA WITH ACUTE EXACERBATION: Primary | ICD-10-CM

## 2020-02-27 PROCEDURE — 3079F PR MOST RECENT DIASTOLIC BLOOD PRESSURE 80-89 MM HG: ICD-10-PCS | Mod: CPTII,S$GLB,, | Performed by: SPECIALIST

## 2020-02-27 PROCEDURE — 3008F BODY MASS INDEX DOCD: CPT | Mod: CPTII,S$GLB,, | Performed by: SPECIALIST

## 2020-02-27 PROCEDURE — 99999 PR PBB SHADOW E&M-EST. PATIENT-LVL II: CPT | Mod: PBBFAC,,,

## 2020-02-27 PROCEDURE — 99499 UNLISTED E&M SERVICE: CPT | Mod: S$GLB,,, | Performed by: SPECIALIST

## 2020-02-27 PROCEDURE — 3008F PR BODY MASS INDEX (BMI) DOCUMENTED: ICD-10-PCS | Mod: CPTII,S$GLB,, | Performed by: SPECIALIST

## 2020-02-27 PROCEDURE — 31237 NASAL/SINUS ENDOSCOPY: ICD-10-PCS | Mod: 50,S$GLB,, | Performed by: SPECIALIST

## 2020-02-27 PROCEDURE — 99214 OFFICE O/P EST MOD 30 MIN: CPT | Mod: 25,S$GLB,, | Performed by: SPECIALIST

## 2020-02-27 PROCEDURE — 3074F PR MOST RECENT SYSTOLIC BLOOD PRESSURE < 130 MM HG: ICD-10-PCS | Mod: CPTII,S$GLB,, | Performed by: SPECIALIST

## 2020-02-27 PROCEDURE — 99214 PR OFFICE/OUTPT VISIT, EST, LEVL IV, 30-39 MIN: ICD-10-PCS | Mod: 25,S$GLB,, | Performed by: SPECIALIST

## 2020-02-27 PROCEDURE — 99999 PR PBB SHADOW E&M-EST. PATIENT-LVL II: ICD-10-PCS | Mod: PBBFAC,,,

## 2020-02-27 PROCEDURE — 3079F DIAST BP 80-89 MM HG: CPT | Mod: CPTII,S$GLB,, | Performed by: SPECIALIST

## 2020-02-27 PROCEDURE — 3074F SYST BP LT 130 MM HG: CPT | Mod: CPTII,S$GLB,, | Performed by: SPECIALIST

## 2020-02-27 PROCEDURE — 99499 RISK ADDL DX/OHS AUDIT: ICD-10-PCS | Mod: S$GLB,,, | Performed by: SPECIALIST

## 2020-02-27 PROCEDURE — 31237 NSL/SINS NDSC SURG BX POLYPC: CPT | Mod: 50,S$GLB,, | Performed by: SPECIALIST

## 2020-02-27 RX ORDER — PREDNISONE 10 MG/1
TABLET ORAL
Qty: 18 TABLET | Refills: 0 | Status: SHIPPED | OUTPATIENT
Start: 2020-02-27 | End: 2020-09-03

## 2020-02-27 RX ORDER — PROMETHAZINE HYDROCHLORIDE AND CODEINE PHOSPHATE 6.25; 1 MG/5ML; MG/5ML
5 SOLUTION ORAL EVERY 4 HOURS PRN
Qty: 200 ML | Refills: 0 | Status: SHIPPED | OUTPATIENT
Start: 2020-02-27 | End: 2020-03-08

## 2020-02-27 NOTE — PROCEDURES
Nasal/sinus endoscopy  Date/Time: 2/27/2020 2:15 PM  Performed by: DWAYNE Ricardo MD  Authorized by: DWAYNE Ricardo MD     Consent Done?:  Yes (Verbal)  Anesthesia:     Local anesthetic:  Lidocaine 2% and Yan-Synephrine 1/2% (Topical aerosol)    Location: Bilateral rigid nasal endoscopy with debridement.    Endoscope type: 30 degree, 3 mm rigid nasal telescope.  Nose:     Procedure Performed:  Removal of Debridement  External:      No external nasal deformity  Intranasal:      Mucosa no polyps     Mucosa ulcers not present     No mucosa lesions present ( clear mucus in the nasal passages bilaterally)     Enlarged turbinates ( inferior turbinates enlarged bilaterally)     Septum gross deformity ( deviated to the left)  Nasopharynx:      Mucosa lesions ( crusting and pus at base of cyst resection in upper nasopharynx)     Adenoids not present     Posterior choanae patent     Eustachian tube patent     Rigid nasal endoscopy with debridement-crusting and debris was removed from the nasopharynx tumor resection site bilaterally with 10.  Slovenian suction under endoscopic guidance

## 2020-02-27 NOTE — PROGRESS NOTES
Patient came in for BP check  Patient is taking losartan 100 mg and amlodipine 2.5 mg  */82  HR 83  OX 97

## 2020-02-29 NOTE — PROGRESS NOTES
Subjective:       Patient ID: Trina Marie Collette is a 55 y.o. female.    Chief Complaint: Follow-up    3 1/2 weeks postop.    The patient is returning for follow-up visit.  There are multiple issues to discuss:  1.  Her primary current problem is that she has a persisting cough.  The cough is associated with production of white sputum and wheezing.  She has been using a Symbicort inhaler twice daily an albuterol nebulizer treatments.  She is not having fever.  She does have some mild fatigue.  2.  She is having some nasal congestion and rhinorrhea and postnasal drip.  She continues to produce some yellow crusting intermittently either by firm blowing over nose or by sniffing back and expectorates eating.  She does have some postnasal drip.    Review of Systems   Constitutional: Positive for fatigue. Negative for activity change, appetite change, chills, fever and unexpected weight change.   HENT: Positive for congestion, ear pain, hearing loss, postnasal drip, rhinorrhea and tinnitus. Negative for ear discharge, facial swelling, mouth sores, sinus pressure, sinus pain, sneezing, sore throat, trouble swallowing and voice change.    Eyes: Negative for photophobia, pain, discharge, redness, itching and visual disturbance.   Respiratory: Positive for cough, shortness of breath and wheezing. Negative for apnea and choking.    Cardiovascular: Negative for chest pain and palpitations.   Gastrointestinal: Negative for abdominal distention, abdominal pain, nausea and vomiting.   Musculoskeletal: Negative for arthralgias, myalgias, neck pain and neck stiffness.   Skin: Negative.  Negative for color change, pallor and rash.   Allergic/Immunologic: Positive for environmental allergies. Negative for food allergies and immunocompromised state.   Neurological: Positive for headaches. Negative for dizziness, facial asymmetry, speech difficulty, weakness, light-headedness and numbness.   Hematological: Negative for adenopathy.  Does not bruise/bleed easily.   Psychiatric/Behavioral: Positive for agitation, decreased concentration and sleep disturbance. Negative for confusion.       Objective:      Physical Exam   Constitutional: She is oriented to person, place, and time. She appears well-developed and well-nourished. She is cooperative.   HENT:   Head: Normocephalic.   Right Ear: External ear and ear canal normal. Tympanic membrane is retracted.   Left Ear: External ear and ear canal normal. Tympanic membrane is retracted.   Nose: Mucosal edema (cyanotic, boggy inferior turbinates bilaterally), rhinorrhea (clear mucus bilaterally) and septal deviation (To the left) present.   Mouth/Throat: Uvula is midline, oropharynx is clear and moist and mucous membranes are normal. No oral lesions.   Eyes: Pupils are equal, round, and reactive to light. EOM and lids are normal. Right eye exhibits no discharge and no exudate. Left eye exhibits no discharge and no exudate. Right conjunctiva is injected. Left conjunctiva is injected.   Neck: Trachea normal and normal range of motion. No muscular tenderness present. No tracheal deviation present. No thyroid mass and no thyromegaly present.   Cardiovascular: Normal rate, regular rhythm, normal heart sounds and normal pulses.   Pulmonary/Chest: Effort normal. No stridor. She has no decreased breath sounds. She has wheezes ( mild expiratory wheezing in all lung fields). She has no rhonchi. She has no rales.   Abdominal: Soft. Bowel sounds are normal. There is no tenderness.   Musculoskeletal: Normal range of motion.   Lymphadenopathy:        Head (right side): No submental, no submandibular, no preauricular, no posterior auricular and no occipital adenopathy present.        Head (left side): No submental, no submandibular, no preauricular, no posterior auricular and no occipital adenopathy present.     She has no cervical adenopathy.   Neurological: She is alert and oriented to person, place, and time. She  has normal strength. No cranial nerve deficit or sensory deficit. Gait normal.   Skin: Skin is warm and dry. No petechiae and no rash noted. No cyanosis. Nails show no clubbing.   Psychiatric: She has a normal mood and affect. Her speech is normal and behavior is normal. Judgment and thought content normal. Cognition and memory are normal.       Bilateral rigid nasal endoscopy with debridement-crusting in the nasopharyngeal area was removed bilaterally with 10 French suction under endoscopic guidance, nasal endoscopy reveals changes of allergic rhinitis and nasal septal deviation with inferior turbinate hypertrophy bilaterally    Assessment:       1. Mild intermittent asthma with acute exacerbation    2. Neoplasm of uncertain behavior of nasopharynx    3. Allergic rhinitis, unspecified seasonality, unspecified trigger    4. Nasal septal deviation    5. Dysfunction of both eustachian tubes        Plan:       I am placing the patient on a oral steroid taper.  I will have her continue with her Symbicort and nebulizer treatments.  I will recheck her in 2 weeks, or sooner on an as-needed basis.  If her wheezing and cough do not subside she is to call me.

## 2020-03-01 DIAGNOSIS — N64.4 BREAST TENDERNESS IN FEMALE: ICD-10-CM

## 2020-03-01 DIAGNOSIS — R79.89 LFT ELEVATION: ICD-10-CM

## 2020-03-01 DIAGNOSIS — T17.308A CHOKING, INITIAL ENCOUNTER: ICD-10-CM

## 2020-03-01 DIAGNOSIS — E78.5 HYPERLIPIDEMIA, UNSPECIFIED HYPERLIPIDEMIA TYPE: ICD-10-CM

## 2020-03-01 DIAGNOSIS — M25.569 CHRONIC KNEE PAIN, UNSPECIFIED LATERALITY: ICD-10-CM

## 2020-03-01 DIAGNOSIS — G47.00 INSOMNIA, UNSPECIFIED TYPE: ICD-10-CM

## 2020-03-01 DIAGNOSIS — Z00.00 REGULAR CHECK-UP: ICD-10-CM

## 2020-03-01 DIAGNOSIS — G89.29 CHRONIC KNEE PAIN, UNSPECIFIED LATERALITY: ICD-10-CM

## 2020-03-01 DIAGNOSIS — H93.19 TINNITUS, UNSPECIFIED LATERALITY: ICD-10-CM

## 2020-03-01 DIAGNOSIS — M79.10 MYALGIA: ICD-10-CM

## 2020-03-01 DIAGNOSIS — M54.50 LUMBAR SPINE PAIN: ICD-10-CM

## 2020-03-01 DIAGNOSIS — E07.9 THYROID MASS: ICD-10-CM

## 2020-03-02 RX ORDER — PANTOPRAZOLE SODIUM 40 MG/1
TABLET, DELAYED RELEASE ORAL
Qty: 90 TABLET | Refills: 1 | Status: SHIPPED | OUTPATIENT
Start: 2020-03-02 | End: 2020-03-16

## 2020-03-11 ENCOUNTER — TELEPHONE (OUTPATIENT)
Dept: PRIMARY CARE CLINIC | Facility: CLINIC | Age: 55
End: 2020-03-11

## 2020-03-11 RX ORDER — CODEINE PHOSPHATE AND GUAIFENESIN 10; 100 MG/5ML; MG/5ML
5 SOLUTION ORAL EVERY 4 HOURS PRN
Qty: 200 ML | Refills: 0 | Status: SHIPPED | OUTPATIENT
Start: 2020-03-11 | End: 2020-03-21

## 2020-03-11 NOTE — TELEPHONE ENCOUNTER
----- Message from Ana Luisa Rubén sent at 3/11/2020 12:22 PM CDT -----  Contact: Patient  Type: Needs Medical Advice    Who Called:  Elizabeth, patient  Symptoms (please be specific):  N/A  How long has patient had these symptoms:  N/A  Pharmacy name and phone #:    [id number] - [place] - [address] [facility]  [address] [place] [address]  Phone: [phone] Fax: [phone]  Best Call Back Number: [phone]  Additional Information: Calling because the pharmacy will only give her 15 pills. She said she is not taking the Trazodone anymore. Please advise. Thanks.

## 2020-03-15 ENCOUNTER — PATIENT OUTREACH (OUTPATIENT)
Dept: ADMINISTRATIVE | Facility: OTHER | Age: 55
End: 2020-03-15

## 2020-03-16 ENCOUNTER — OFFICE VISIT (OUTPATIENT)
Dept: OTOLARYNGOLOGY | Facility: CLINIC | Age: 55
End: 2020-03-16
Payer: MEDICARE

## 2020-03-16 VITALS
TEMPERATURE: 98 F | HEIGHT: 66 IN | DIASTOLIC BLOOD PRESSURE: 71 MMHG | SYSTOLIC BLOOD PRESSURE: 120 MMHG | HEART RATE: 81 BPM | WEIGHT: 235 LBS | BODY MASS INDEX: 37.77 KG/M2

## 2020-03-16 DIAGNOSIS — J30.9 ALLERGIC RHINITIS, UNSPECIFIED SEASONALITY, UNSPECIFIED TRIGGER: ICD-10-CM

## 2020-03-16 DIAGNOSIS — K21.9 LARYNGOPHARYNGEAL REFLUX (LPR): ICD-10-CM

## 2020-03-16 DIAGNOSIS — J34.2 NASAL SEPTAL DEVIATION: ICD-10-CM

## 2020-03-16 DIAGNOSIS — H69.93 DYSFUNCTION OF BOTH EUSTACHIAN TUBES: ICD-10-CM

## 2020-03-16 DIAGNOSIS — R05.3 CHRONIC COUGH: ICD-10-CM

## 2020-03-16 DIAGNOSIS — J45.21 MILD INTERMITTENT ASTHMA WITH ACUTE EXACERBATION: ICD-10-CM

## 2020-03-16 DIAGNOSIS — R09.89 CHOKING IN ADULT: ICD-10-CM

## 2020-03-16 DIAGNOSIS — R13.10 DYSPHAGIA, UNSPECIFIED TYPE: Primary | ICD-10-CM

## 2020-03-16 PROCEDURE — 3008F BODY MASS INDEX DOCD: CPT | Mod: CPTII,S$GLB,, | Performed by: SPECIALIST

## 2020-03-16 PROCEDURE — 3074F SYST BP LT 130 MM HG: CPT | Mod: CPTII,S$GLB,, | Performed by: SPECIALIST

## 2020-03-16 PROCEDURE — 3078F DIAST BP <80 MM HG: CPT | Mod: CPTII,S$GLB,, | Performed by: SPECIALIST

## 2020-03-16 PROCEDURE — 31575 LARYNGOSCOPY: ICD-10-PCS | Mod: S$GLB,,, | Performed by: SPECIALIST

## 2020-03-16 PROCEDURE — 3074F PR MOST RECENT SYSTOLIC BLOOD PRESSURE < 130 MM HG: ICD-10-PCS | Mod: CPTII,S$GLB,, | Performed by: SPECIALIST

## 2020-03-16 PROCEDURE — 3008F PR BODY MASS INDEX (BMI) DOCUMENTED: ICD-10-PCS | Mod: CPTII,S$GLB,, | Performed by: SPECIALIST

## 2020-03-16 PROCEDURE — 99214 OFFICE O/P EST MOD 30 MIN: CPT | Mod: 25,S$GLB,, | Performed by: SPECIALIST

## 2020-03-16 PROCEDURE — 31575 DIAGNOSTIC LARYNGOSCOPY: CPT | Mod: S$GLB,,, | Performed by: SPECIALIST

## 2020-03-16 PROCEDURE — 99214 PR OFFICE/OUTPT VISIT, EST, LEVL IV, 30-39 MIN: ICD-10-PCS | Mod: 25,S$GLB,, | Performed by: SPECIALIST

## 2020-03-16 PROCEDURE — 3078F PR MOST RECENT DIASTOLIC BLOOD PRESSURE < 80 MM HG: ICD-10-PCS | Mod: CPTII,S$GLB,, | Performed by: SPECIALIST

## 2020-03-16 RX ORDER — PROMETHAZINE HYDROCHLORIDE AND CODEINE PHOSPHATE 6.25; 1 MG/5ML; MG/5ML
5 SOLUTION ORAL EVERY 4 HOURS PRN
Qty: 240 ML | Refills: 0 | Status: SHIPPED | OUTPATIENT
Start: 2020-03-16 | End: 2020-03-26

## 2020-03-16 RX ORDER — PANTOPRAZOLE SODIUM 40 MG/1
TABLET, DELAYED RELEASE ORAL
Qty: 60 TABLET | Refills: 11 | Status: SHIPPED | OUTPATIENT
Start: 2020-03-16 | End: 2020-07-07 | Stop reason: ALTCHOICE

## 2020-03-16 NOTE — PATIENT INSTRUCTIONS
How Acid Reflux Affects Your Throat    Do you have to clear your throat or cough often? Are you hoarse? Do you have trouble swallowing? If you have these or other throat symptoms, you may have acid reflux. This occurs when stomach acid flows back up and irritates your throat.  Why you have throat symptoms  There are muscles (esophageal sphincters) at both ends of the tube that carries food to your stomach (the esophagus). These muscles relax to let food pass. Then they tighten to keep stomach acid down. When the lower esophageal sphincter (LES) doesnt tighten enough, acid can flow back (reflux) from your stomach into your esophagus. This may cause heartburn. In some cases the upper esophageal sphincter (UES) also doesnt work well. Then acid can travel higher and enter your throat (pharynx). In many cases, this causes throat symptoms.  Common throat symptoms  · Need to clear your throat often  · Feeling like youre choking  · Long-term (chronic) cough  · Hoarseness  · Trouble swallowing  · Feel like you have a lump in your throat  · Sour or acid taste  · Sore throat that keeps coming back   Date Last Reviewed: 7/1/2016  © 1318-1748 [employer]. [address] [place] [address], [place] [address]. All rights reserved. This information is not intended as a substitute for professional medical care. Always follow your healthcare professional's instructions.        Tips to Control Acid Reflux    To control acid reflux, youll need to make some basic diet and lifestyle changes. The simple steps outlined below may be all youll need to ease discomfort.  Watch what you eat  · Avoid fatty foods and spicy foods.  · Eat fewer acidic foods, such as citrus and tomato-based foods. These can increase symptoms.  · Limit drinking alcohol, caffeine, and fizzy beverages. All increase acid reflux.  · Try limiting chocolate, peppermint, and spearmint. These can worsen acid reflux in some people.  Watch when you eat  · Avoid lying  down for 3 hours after eating.  · Do not snack before going to bed.  Raise your head  Raising your head and upper body by 4 to 6 inches helps limit reflux when youre lying down. Put blocks under the head of your bed frame to raise it.  Other changes  · Lose weight, if you need to  · Dont exercise near bedtime  · Avoid tight-fitting clothes  · Limit aspirin and ibuprofen  · Stop smoking   Date Last Reviewed: 7/1/2016 © 2000-2017 [employer]. [address] [place] [address], [place] [address]. All rights reserved. This information is not intended as a substitute for professional medical care. Always follow your healthcare professional's instructions.        Lifestyle Changes for Controlling GERD  When you have GERD, stomach acid feels as if its backing up toward your mouth. Whether or not you take medicine to control your GERD, your symptoms can often be improved with lifestyle changes. Talk to your healthcare provider about the following suggestions. These suggestions may help you get relief from your symptoms.      Raise your head  Reflux is more likely to strike when youre lying down flat, because stomach fluid can flow backward more easily. Raising the head of your bed 4 to 6 inches can help. To do this:  · Slide blocks or books under the legs at the head of your bed. Or, place a wedge under the mattress. Many [employer] stores can make a suitable wedge for you. The wedge should run from your waist to the top of your head.  · Dont just prop your head on several pillows. This increases pressure on your stomach. It can make GERD worse.  Watch your eating habits  Certain foods may increase the acid in your stomach or relax the lower esophageal sphincter. This makes GERD more likely. Its best to avoid the following if they cause you symptoms:  · Coffee, tea, and carbonated drinks (with and without caffeine)  · Fatty, fried, or spicy food  · Mint, chocolate, onions, and tomatoes  · Peppermint  · Any other foods  that seem to irritate your stomach or cause you pain  Relieve the pressure  Tips include the following:  · Eat smaller meals, even if you have to eat more often.  · Dont lie down right after you eat. Wait a few hours for your stomach to empty.  · Avoid tight belts and tight-fitting clothes.  · Lose excess weight.  Tobacco and alcohol  Avoid smoking tobacco and drinking alcohol. They can make GERD symptoms worse.  Date Last Reviewed: 7/1/2016  © 6254-2682 [employer]. [address] [place] [address], [place] [address]. All rights reserved. This information is not intended as a substitute for professional medical care. Always follow your healthcare professional's instructions.

## 2020-03-16 NOTE — PROGRESS NOTES
Subjective:       Patient ID: Trina Marie Collette is a 55 y.o. female.    Chief Complaint: Follow-up and Cough (with problem swallowing )    3 1/2 weeks postop.    The patient is returning for follow-up visit.  There are multiple issues to discuss:  1.  Her primary current problem is that she continues to have a persisting cough.  The cough is associated with production of white sputum and wheezing.   It has improved with Symbicort but not resolved.  It is worse at night when she lays down.  2.  She feels like there is swelling in her throat and she is choking on certain foods.  I had placed her on famotidine which she has been taking for about 6 weeks.  She saw her primary care 2 weeks ago who started her on pantoprazole once daily.  She did not  that prescription.  3.  She is having significant rhinorrhea, postnasal drip and congestion.  Nasal secretions are clear.    Follow-up   Associated symptoms include congestion, coughing, fatigue and headaches. Pertinent negatives include no abdominal pain, arthralgias, chest pain, chills, fever, myalgias, nausea, neck pain, numbness, rash, sore throat, vomiting or weakness.   Cough   Associated symptoms include ear pain, headaches, postnasal drip, rhinorrhea, shortness of breath and wheezing. Pertinent negatives include no chest pain, chills, eye redness, fever, myalgias, rash or sore throat. Her past medical history is significant for environmental allergies.     Review of Systems   Constitutional: Positive for fatigue. Negative for activity change, appetite change, chills, fever and unexpected weight change.   HENT: Positive for congestion, ear pain, hearing loss, postnasal drip, rhinorrhea and tinnitus. Negative for ear discharge, facial swelling, mouth sores, sinus pressure, sinus pain, sneezing, sore throat, trouble swallowing and voice change.    Eyes: Negative for photophobia, pain, discharge, redness, itching and visual disturbance.   Respiratory: Positive  for cough, shortness of breath and wheezing. Negative for apnea and choking.    Cardiovascular: Negative for chest pain and palpitations.   Gastrointestinal: Negative for abdominal distention, abdominal pain, nausea and vomiting.   Musculoskeletal: Negative for arthralgias, myalgias, neck pain and neck stiffness.   Skin: Negative.  Negative for color change, pallor and rash.   Allergic/Immunologic: Positive for environmental allergies. Negative for food allergies and immunocompromised state.   Neurological: Positive for headaches. Negative for dizziness, facial asymmetry, speech difficulty, weakness, light-headedness and numbness.   Hematological: Negative for adenopathy. Does not bruise/bleed easily.   Psychiatric/Behavioral: Positive for agitation, decreased concentration and sleep disturbance. Negative for confusion.       Objective:      Physical Exam   Constitutional: She is oriented to person, place, and time. She appears well-developed and well-nourished. She is cooperative.   HENT:   Head: Normocephalic.   Right Ear: External ear and ear canal normal. Tympanic membrane is retracted.   Left Ear: External ear and ear canal normal. Tympanic membrane is retracted.   Nose: Mucosal edema (cyanotic, boggy inferior turbinates bilaterally), rhinorrhea (clear mucus bilaterally) and septal deviation (To the left) present.   Mouth/Throat: Uvula is midline, oropharynx is clear and moist and mucous membranes are normal. No oral lesions.   Eyes: Pupils are equal, round, and reactive to light. EOM and lids are normal. Right eye exhibits no discharge and no exudate. Left eye exhibits no discharge and no exudate. Right conjunctiva is injected. Left conjunctiva is injected.   Neck: Trachea normal and normal range of motion. No muscular tenderness present. No tracheal deviation present. No thyroid mass and no thyromegaly present.   Cardiovascular: Normal rate, regular rhythm, normal heart sounds and normal pulses.    Pulmonary/Chest: Effort normal. No stridor. She has no decreased breath sounds. She has wheezes ( mild expiratory wheezing in all lung fields). She has no rhonchi. She has no rales.   Abdominal: Soft. Bowel sounds are normal. There is no tenderness.   Musculoskeletal: Normal range of motion.   Lymphadenopathy:        Head (right side): No submental, no submandibular, no preauricular, no posterior auricular and no occipital adenopathy present.        Head (left side): No submental, no submandibular, no preauricular, no posterior auricular and no occipital adenopathy present.     She has no cervical adenopathy.   Neurological: She is alert and oriented to person, place, and time. She has normal strength. No cranial nerve deficit or sensory deficit. Gait normal.   Skin: Skin is warm and dry. No petechiae and no rash noted. No cyanosis. Nails show no clubbing.   Psychiatric: She has a normal mood and affect. Her speech is normal and behavior is normal. Judgment and thought content normal. Cognition and memory are normal.       Flexible nasolaryngoscopy-septum deviated to the left and nasal changes of allergic rhinitis, biopsy site in nasopharynx is healed; laryngeal changes consistent with laryngopharyngeal reflux that is worsening on H2 agonist therapy     Assessment:       1. Dysphagia, unspecified type    2. Choking in adult    3. Laryngopharyngeal reflux (LPR)    4. Allergic rhinitis, unspecified seasonality, unspecified trigger    5. Mild intermittent asthma with acute exacerbation    6. Chronic cough    7. Nasal septal deviation    8. Dysfunction of both eustachian tubes        Plan:       I am placing the patient on a oral steroid taper.  I will have her continue with her Symbicort and nebulizer treatments.  I will recheck her in 2 weeks, or sooner on an as-needed basis.  If her wheezing and cough do not subside she is to call me.     twice daily pantoprazole.  I have given her some Phenergan with codeine to  use at night for coughing.  She will continue with the Symbicort.  I am giving her an anti-reflux diet and instructions regarding GERD and laryngopharyngeal reflux.  I will recheck her in 4 weeks.  She is to refrain from eating for 2-3 hours before going to bed and I have recommended that she elevate the headboard of her bed at least 2-3 inches.

## 2020-03-16 NOTE — PROCEDURES
Laryngoscopy  Date/Time: 3/16/2020 1:45 PM  Performed by: DWAYNE Ricardo MD  Authorized by: DWAYNE Ricardo MD     Consent Done?:  Yes (Verbal)  Anesthesia:     Local anesthetic:  Lidocaine 2% and Yan-Synephrine 1/2% (Topical aerosol)    Patient tolerance:  Patient tolerated the procedure well with no immediate complications    Decongestion performed?: Yes    Laryngoscopy:     Areas examined:  Vocal cords, larynx, hypopharynx, oropharynx, nasopharynx and nasal cavities    Laryngoscope size:  4 mm (Flexible video nasolaryngoscopy)  Nose External:      No external nasal deformity  Nose Intranasal:      Mucosa no polyps     Mucosa ulcers not present     No mucosa lesions present (Profuse clear mucus in the nasal passages bilaterally)     Septum gross deformity ( septum deviated to the left)     Enlarged turbinates ( inferior turbinates enlarged bilrally)  Nasopharynx:      No mucosa lesions ( biopsy site healed)     Adenoids not present     Posterior choanae patent     Eustachian tube patent  Larynx/hypopharynx:      No epiglottis lesions     No epiglottis edema     No AE folds lesions     No vocal cord polyps     Equal and normal bilateral ( vocal cords edematous and mildly inflamed, move symmetrically)     No hypopharynx lesions     No piriform sinus pooling     No piriform sinus lesions     Post cricoid edema ( mild)     Post cricoid erythema ( mild to moderate)     Flexible video nasolaryngoscopy-nasal septal deviation and nasal changes of allergic rhinitis, surgical site and nasopharynx is 100% healed; laryngeal changes consistent with laryngopharyngeal reflux

## 2020-03-24 RX ORDER — MONTELUKAST SODIUM 10 MG/1
TABLET ORAL
Qty: 90 TABLET | Refills: 1 | Status: SHIPPED | OUTPATIENT
Start: 2020-03-24 | End: 2020-09-23

## 2020-04-02 ENCOUNTER — TELEPHONE (OUTPATIENT)
Dept: PRIMARY CARE CLINIC | Facility: CLINIC | Age: 55
End: 2020-04-02

## 2020-04-02 NOTE — TELEPHONE ENCOUNTER
----- Message from Joan Tompkins sent at 4/2/2020 10:30 AM CDT -----  Contact: Pt [phone]  The patient said the pharmacy won't give her 30 pills unless you let them know that she is no longer taking the trazadone.    Is this a refill or new RX:  Refill  RX name and strength: zolpidem (AMBIEN) 5 MG Tab      Pharm[facility] name and phone # [id number]  [place] - [address] [phone] (Phone) [phone] (Fax)    Thank you

## 2020-04-02 NOTE — TELEPHONE ENCOUNTER
Called pharmacy and notified that per patient chart states that she should still be alternating with trazodone

## 2020-04-14 ENCOUNTER — TELEPHONE (OUTPATIENT)
Dept: OTOLARYNGOLOGY | Facility: CLINIC | Age: 55
End: 2020-04-14

## 2020-04-14 DIAGNOSIS — R05.9 COUGH: Primary | ICD-10-CM

## 2020-04-14 RX ORDER — PROMETHAZINE HYDROCHLORIDE AND CODEINE PHOSPHATE 6.25; 1 MG/5ML; MG/5ML
5 SOLUTION ORAL EVERY 4 HOURS PRN
Qty: 210 ML | Refills: 0 | Status: SHIPPED | OUTPATIENT
Start: 2020-04-14 | End: 2020-04-24

## 2020-04-14 NOTE — TELEPHONE ENCOUNTER
Called and spoke with the pharmacist.     ----- Message from Marylin Renee sent at 4/14/2020  4:24 PM CDT -----  Contact: Pharmacy      Name of Who is Calling: Pharmacy      What is the request in detail: Pharmacy called to make sure that the script(promethazine-codeine 6.25-10 mg/5 ml (PHENERGAN WITH CODEINE) 6.25-10 mg/5 mL syrup 210) was suppose to be called in Pt usually comes in with a hard copy.Please contact to further discuss and advise.        Can the clinic reply by [facility]: N      What Number to Call Back if not in [facility]: Pharmacy [phone]

## 2020-04-16 ENCOUNTER — PATIENT OUTREACH (OUTPATIENT)
Dept: ADMINISTRATIVE | Facility: OTHER | Age: 55
End: 2020-04-16

## 2020-04-16 ENCOUNTER — PATIENT MESSAGE (OUTPATIENT)
Dept: OTOLARYNGOLOGY | Facility: CLINIC | Age: 55
End: 2020-04-16

## 2020-05-02 RX ORDER — TRAZODONE HYDROCHLORIDE 50 MG/1
TABLET ORAL
Qty: 90 TABLET | Refills: 1 | Status: SHIPPED | OUTPATIENT
Start: 2020-05-02 | End: 2020-09-03

## 2020-05-07 ENCOUNTER — TELEPHONE (OUTPATIENT)
Dept: OTOLARYNGOLOGY | Facility: CLINIC | Age: 55
End: 2020-05-07

## 2020-05-07 RX ORDER — PROMETHAZINE HYDROCHLORIDE AND CODEINE PHOSPHATE 6.25; 1 MG/5ML; MG/5ML
5 SOLUTION ORAL EVERY 4 HOURS PRN
Qty: 240 ML | Refills: 0 | Status: SHIPPED | OUTPATIENT
Start: 2020-05-07 | End: 2020-05-17

## 2020-05-07 NOTE — TELEPHONE ENCOUNTER
Called and spoke with the pt per Dr. Ricardo, pt understood the nature of the phone call     ----- Message from DWAYNE Ricardo MD sent at 5/7/2020  3:05 PM CDT -----  Contact: COLLETTE,TRINA MARIE [[id number]]  Please fax prescription to patient's pharmacy.  Please call or to inform her that there will be no further codeine containing cough syrup for 1 month.  ----- Message -----  From: Valorie Kunz MA  Sent: 5/7/2020   2:39 PM CDT  To: DWAYNE Ricardo MD        ----- Message -----  From: Lola Armstrong  Sent: 5/7/2020   1:49 PM CDT  To: Davion Parr Staff    Who Called: COLLETTE,TRINA MARIE [[id number]]    RX Name and Strength: promethazine-codeine 6.25-10 mg/5 ml (PHENERGAN WITH CODEINE) 6.25-10 mg/5 mL syrup    Preferred Pharmacy with phone number: [facility]/PHARMACY #[id number]  [place] - [address]    Best Call Back Number: [phone]    Additional Information: N/A

## 2020-05-21 DIAGNOSIS — G47.00 INSOMNIA, UNSPECIFIED TYPE: ICD-10-CM

## 2020-05-21 NOTE — TELEPHONE ENCOUNTER
"----- Message from Susy Weaver sent at 5/21/2020  4:44 PM CDT -----  Contact: [phone]  Requesting an RX refill or new RX.  Is this a refill or new RX:  refill  RX name and strength: zolpidem (AMBIEN) 5 MG Tab  Directions (copy/paste from chart):    Is this a 30 day or 90 day RX:  30  Local pharmacy or mail order pharmacy:  local  Pharmacy name and phone # (copy/paste from chart):   [facility]/pharmacy #[id number] - [place] - [address] [phone] (Phone)  [phone] (Fax)  Comments:  Patient states she would like to speak to the nurse before the medication is sent, patient states she does not want to get "trazadone" please call and advise        "

## 2020-05-22 NOTE — TELEPHONE ENCOUNTER
----- Message from Socorro Roberts sent at 5/22/2020 10:36 AM CDT -----  Contact: self/[phone]  2nd message  Patient called in regards needing to talk with Dr Brock medical assistant about discontinue traZODone (DESYREL) 50 MG tablet. Please contact [facility]/pharmacy #[id number] - [place] - [address] [phone] (Phone) [phone] (Fax). Please call and advise. Thank you

## 2020-05-22 NOTE — TELEPHONE ENCOUNTER
Spoke with patient in regard to Trazodone and Ambien prescriptions. Patient feels that the Trazodone is not helping with her sleep and would like to take Ambien daily. Patient is only getting 15 pills from the pharmacy due to alternating with Trazodone. Patient would like a 30 day supply of Ambien. Please advise.

## 2020-05-22 NOTE — TELEPHONE ENCOUNTER
----- Message from Socorro Roberts sent at 5/22/2020 10:36 AM CDT -----  Contact: self/[phone]  2nd message  Patient called in regards needing to talk with Dr Brock medical assistant about discontinue traZODone (DESYREL) 50 MG tablet. Please contact [facility]/pharmacy #[id number] - [place] - [address] [phone] (Phone) [phone] (Fax). Please call and advise. Thank you

## 2020-05-25 ENCOUNTER — NURSE TRIAGE (OUTPATIENT)
Dept: ADMINISTRATIVE | Facility: CLINIC | Age: 55
End: 2020-05-25

## 2020-05-25 NOTE — TELEPHONE ENCOUNTER
Pt took a shower and washed her hair with a shampoo. Eyes burning since. Pt eyes running clear tears. Pt reports blurred vision. Pt tried to wash out eyes with no luck. Pt reports it was a hair repair for damaged hair that she used. 1st time using product.     Reason for Disposition   [1] Blurred vision AND [2] persists > 1 hour since irrigation    Additional Information   Negative: Acid or alkali was the chemical  (Exceptions: mild household bleach or ammonia)   Negative: [1] Unknown chemical AND [2] any eye symptoms (e.g., pain)   Negative: [1] Harmful or possibly harmful chemical AND [2] unable to carry out irrigation (at home, work)   Negative: Shortness of breath   Negative: Cloudy spot or sore on the cornea (clear central part of eye)    Protocols used: EYE - CHEMICAL IN-A-AH

## 2020-05-25 NOTE — TELEPHONE ENCOUNTER
"Called to check on patient. States she went to ED for eyes and they are feeling "a little better"  "

## 2020-05-28 RX ORDER — LOSARTAN POTASSIUM 100 MG/1
TABLET ORAL
Qty: 90 TABLET | Refills: 1 | Status: SHIPPED | OUTPATIENT
Start: 2020-05-28 | End: 2020-12-09

## 2020-05-29 RX ORDER — ZOLPIDEM TARTRATE 5 MG/1
5 TABLET ORAL NIGHTLY PRN
Qty: 30 TABLET | Refills: 2 | OUTPATIENT
Start: 2020-05-29

## 2020-06-01 ENCOUNTER — OFFICE VISIT (OUTPATIENT)
Dept: PRIMARY CARE CLINIC | Facility: CLINIC | Age: 55
End: 2020-06-01
Payer: MEDICARE

## 2020-06-01 DIAGNOSIS — D37.05 NEOPLASM OF UNCERTAIN BEHAVIOR OF NASOPHARYNX: ICD-10-CM

## 2020-06-01 DIAGNOSIS — N64.4 BREAST TENDERNESS IN FEMALE: ICD-10-CM

## 2020-06-01 DIAGNOSIS — I10 ESSENTIAL HYPERTENSION: ICD-10-CM

## 2020-06-01 DIAGNOSIS — E55.9 VITAMIN D DEFICIENCY: ICD-10-CM

## 2020-06-01 DIAGNOSIS — J30.9 ALLERGIC RHINITIS, UNSPECIFIED SEASONALITY, UNSPECIFIED TRIGGER: ICD-10-CM

## 2020-06-01 DIAGNOSIS — Z72.0 TOBACCO ABUSE: Primary | ICD-10-CM

## 2020-06-01 DIAGNOSIS — G47.00 INSOMNIA, UNSPECIFIED TYPE: ICD-10-CM

## 2020-06-01 DIAGNOSIS — K21.9 LARYNGOPHARYNGEAL REFLUX (LPR): ICD-10-CM

## 2020-06-01 DIAGNOSIS — E66.9 OBESITY (BMI 35.0-39.9 WITHOUT COMORBIDITY): ICD-10-CM

## 2020-06-01 DIAGNOSIS — E78.5 HYPERLIPIDEMIA, UNSPECIFIED HYPERLIPIDEMIA TYPE: ICD-10-CM

## 2020-06-01 PROCEDURE — 99499 RISK ADDL DX/OHS AUDIT: ICD-10-PCS | Mod: 95,,, | Performed by: FAMILY MEDICINE

## 2020-06-01 PROCEDURE — 99213 PR OFFICE/OUTPT VISIT, EST, LEVL III, 20-29 MIN: ICD-10-PCS | Mod: 95,,, | Performed by: FAMILY MEDICINE

## 2020-06-01 PROCEDURE — 99213 OFFICE O/P EST LOW 20 MIN: CPT | Mod: 95,,, | Performed by: FAMILY MEDICINE

## 2020-06-01 PROCEDURE — 99499 UNLISTED E&M SERVICE: CPT | Mod: 95,,, | Performed by: FAMILY MEDICINE

## 2020-06-01 RX ORDER — ZOLPIDEM TARTRATE 5 MG/1
TABLET ORAL
Qty: 30 TABLET | Refills: 2 | Status: SHIPPED | OUTPATIENT
Start: 2020-06-01 | End: 2020-09-03 | Stop reason: SDUPTHER

## 2020-06-01 NOTE — PROGRESS NOTES
55 yo BF sees Dr Gama Rg on [facility]  ENT  --Has appt with  Pulmonary MD Nov 8 th sees pulmonary MD . Pt states needs good night sleep.   Subjective:       Patient ID: Trina Marie Collette is a 55 y.o. female.    Chief Complaint:   HPI: The patient location is:  car  The chief complaint leading to consultation is:  Insomnia    Visit type: audiovisual    Face to Face time with patient:  15 min  History of present illness minutes of total time spent on the encounter, which includes face to face time and non-face to face time preparing to see the patient (eg, review of tests), Obtaining and/or reviewing separately obtained history, Documenting clinical information in the electronic or other health record, Independently interpreting results (not separately reported) and communicating results to the patient/family/caregiver, or Care coordination (not separately reported).         Each patient to whom he or she provides medical services by telemedicine is:  (1) informed of the relationship between the physician and patient and the respective role of any other health care provider with respect to management of the patient; and (2) notified that he or she may decline to receive medical services by telemedicine and may withdraw from such care at any time.    Notes:    55-year-old female needs refills---ambien--states can't get 30 from the pharmacy--due to desyrel .  Some anxiety and stress no exercise.  Eating well-- +BM---ambulating well       ROS:  Skin: no psoriasis, eczema, skin cancer--dry hyperpigmented skin near the umbilicus in the midline on the belt line  HEENT:Occas  headache, ocular pain, blurred vision, diplopia, epistaxis, hoarseness change in voice, thyroid trouble--history of tinnitus/chronic sinus problem/nasal septal deviation/hearing loss   Lung: No pneumonia, asthma, Tb, wheezing, SOB, smoking 1/4 ppd trying to quit  --history of bronchospasm in the past uses albuterol  Heart: No chest pain, ankle  edema, +occas palpitations, MI, river murmur, +hypertension blood pressure   + hyperlipidemia-no stent bypass arrhythmia  Abdomen: No nausea, vomiting, diarrhea, constipation, ulcers, hepatitis, gallbladder disease, melena, hematochezia, hematemesis patient complaining of heartburn  : no UTI, renal disease, stones  GYN last menstrual.  Years ago has mammogram yearly--refuses PAP smear --I don't fool with that    MS: no fractures, O/A, lupus, rheumatoid, gout--history of chronic pain the back lumbar spine, right knee come right shoulder, history DDD lumbar history cervical spinal stenosis  Neuro: No dizziness, LOC, seizures   No diabetes, no anemia, no anxiety, no depression patient with history of bipolar schizophrenia--doing well with medication goes to Mental Health   Single, one child, disabled, lives alone     Objective:   Physical Exam:  No physical exam was done this was a virtual visit  General: Well nourished, well developed, no acute distress +overweight   Skin: No lesions  HEENT--eyes PERRLA EOM intact nose patent, throat nonerythematous ears TMs clear  NECK: Supple, no bruits, No JVD, no nodes  Lungs: Clear, no rales, rhonchi, wheezing   Heart: Regular rate and rhythm, no murmurs, gallops, or rubs  Abdomen: flat, bowel sounds positive, no tenderness, or organomegaly  MS:  Range of motion muscle strength overall intact  Neuro: Alert, CN intact, oriented X 3 still with some anxiety and depression of issues  Extremities: No cyanosis, clubbing, or edema         Assessment:       1. Tobacco abuse    2. Insomnia, unspecified type    3. Allergic rhinitis, unspecified seasonality, unspecified trigger    4. Laryngopharyngeal reflux (LPR)    5. Vitamin D deficiency    6. 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 Breast tenderness in female    9. Hyperlipidemia, unspecified hyperlipidemia type    10. Essential hypertension        Plan:       Tobacco  abuse    Insomnia, unspecified type  -     zolpidem (AMBIEN) 5 MG Tab; 1 po Q OHS p.r.n. sleep  Dispense: 30 tablet; Refill: 2    Allergic rhinitis, unspecified seasonality, unspecified trigger    Laryngopharyngeal reflux (LPR)    Vitamin D deficiency    Obesity (BMI 35.0-39.9 without comorbidity)    Neoplasm of uncertain behavior of nasopharynx    Breast tenderness in female    Hyperlipidemia, unspecified hyperlipidemia type    Essential hypertension        Insomnia---patient was taking Ambien and alternating with trazodone but no relief with trazodone---pharmacy will only give 15 Ambien because air alternating instead of 3rd--will DC Desyrel or trazodone give 30 pills with 2 refills patient told should take as needed and not nightly as it can be addictive and can develop a tolerate---patient is bipolar and schizophrenic patient told could speak to psychiatrist in consider getting Sinequan for sleep  Hypertension patient running out of her blood pressure medications needs refill of Norvasc and Cozaar  Multiple medical issues  See below Skin rash--near the umbilicus on the belt line--Valisone cream b.i.d. probably from the metal in her blue jeans   or belt arti  Hypertension blood pressure at home running 162/107 here 142/86 patient given note for her own arm blood     Pain Clinic-for back pain sees Dr Senior   Goes to mental health--bipolar and schizophrenic--patient asking for something for anxiety especially at night states anxiety giving palpitations will give Vistaril 25 mg 1 p.o. q.6 hours p.r.n. anxiety or q.h.s. for palpitations--if no relief should ask psychiatrist for medications to help her at night with anxiety  Low-sodium diet/exercise/decrease weight /exercise/decrease weight  Needs to DC smoking  Lab due in May--CBCs CMP lipids T4 TSH see me 2 weeks later  Can do Pap smear next visit--or see OBGYN   Health maintenance needs HIV colonoscopy Pap

## 2020-06-08 ENCOUNTER — TELEPHONE (OUTPATIENT)
Dept: OTOLARYNGOLOGY | Facility: CLINIC | Age: 55
End: 2020-06-08

## 2020-06-08 RX ORDER — PROMETHAZINE HYDROCHLORIDE AND CODEINE PHOSPHATE 6.25; 1 MG/5ML; MG/5ML
5 SOLUTION ORAL EVERY 4 HOURS PRN
Qty: 240 ML | Refills: 0 | Status: SHIPPED | OUTPATIENT
Start: 2020-06-08 | End: 2020-06-18

## 2020-06-08 NOTE — TELEPHONE ENCOUNTER
----- Message from Tessy Duenas sent at 6/8/2020 12:42 PM CDT -----  Contact: COLLETTE,TRINA MARIE [[id number]]  Who Called: COLLETTE,TRINA MARIE [[id number]]     RX Name and Strength: promethazine-codeine 6.25-10 mg/5 ml (PHENERGAN WITH CODEINE) 6.25-10 mg/5 mL syrup     Preferred Pharmacy with phone number: [facility]/PHARMACY #[id number]  [place] - [address]     Best Call Back Number: [phone]     Additional Information: N/A

## 2020-06-08 NOTE — TELEPHONE ENCOUNTER
Returned pt call. Informed pt that prescription was faxed over to pharmacy 2x by Ms. Ritchie MA.       ----- Message from Matt Chu sent at 6/8/2020  4:54 PM CDT -----  Contact: COLLETTE,TRINA MARIE [[id number]]  Name of Who is Calling: COLLETTE,TRINA MARIE [[id number]]    What is the request in detail: Would like to speak with staff in regards to [facility] that [id number] [place], [place] - [address]      Can the clinic reply by [facility]: no      [facility]: [phone]

## 2020-06-08 NOTE — TELEPHONE ENCOUNTER
Called and spoke with pt today letting her know per Dr. Ricardo approved Rx for cough medicine faxed to [facility] pharmacy.

## 2020-06-24 ENCOUNTER — TELEPHONE (OUTPATIENT)
Dept: OTOLARYNGOLOGY | Facility: CLINIC | Age: 55
End: 2020-06-24

## 2020-06-24 NOTE — TELEPHONE ENCOUNTER
Returned pt call. Informed pt that Dr. Ricardo is out of office until Monday.         ----- Message from Belkis Gregorio sent at 6/24/2020  3:08 PM CDT -----      Can the clinic reply in [facility]: Yes      Please refill the medication(s) listed below. Please call the patient when the prescription(s) is ready for  at this phone number    [phone]      Medication #1 promethazine-codeine 6.25-10 mg/5 ml (PHENERGAN WITH CODEINE) 6.25-10 mg/5 mL syrup      Preferred Pharmacy: [facility]/PHARMACY #[id number] - [place]  [address]

## 2020-06-30 ENCOUNTER — TELEPHONE (OUTPATIENT)
Dept: OTOLARYNGOLOGY | Facility: CLINIC | Age: 55
End: 2020-06-30

## 2020-06-30 NOTE — TELEPHONE ENCOUNTER
Called and spoke with patient today letting her know per Dr. Ricardo states the Promethazine-codeine 6.25 mg/5 ml (phenergan with CODEINE) 6.25- 10 mg/5 ml syrup cannot be filled at this time. To soon last filled on 6/8/20.

## 2020-06-30 NOTE — TELEPHONE ENCOUNTER
----- Message from Matt Chu sent at 6/30/2020  9:08 AM CDT -----  Regarding: Pt Advice  Contact: COLLETTE, TRINA MARIE [[id number]]  Name of Who is Calling: COLLETTE, TRINA MARIE [[id number]]      What is the request in detail: Would like to speak with staff in regards to following up on refill request for promethazine-codeine 6.25-10 mg/5 ml (PHENERGAN WITH CODEINE) 6.25-10 mg/5 mL syrup. Requesting a call to know what is going on. Please advise      Can the clinic reply by [facility]: no      What Number to Call Back if not in [facility]: [phone]

## 2020-07-03 DIAGNOSIS — Z12.31 ENCOUNTER FOR SCREENING MAMMOGRAM FOR BREAST CANCER: ICD-10-CM

## 2020-07-07 ENCOUNTER — OFFICE VISIT (OUTPATIENT)
Dept: OTOLARYNGOLOGY | Facility: CLINIC | Age: 55
End: 2020-07-07
Payer: MEDICARE

## 2020-07-07 VITALS
BODY MASS INDEX: 38.86 KG/M2 | WEIGHT: 241.81 LBS | HEIGHT: 66 IN | TEMPERATURE: 99 F | DIASTOLIC BLOOD PRESSURE: 91 MMHG | HEART RATE: 89 BPM | SYSTOLIC BLOOD PRESSURE: 140 MMHG

## 2020-07-07 DIAGNOSIS — M26.69 TMJ CREPITUS: ICD-10-CM

## 2020-07-07 DIAGNOSIS — R09.82 PND (POST-NASAL DRIP): ICD-10-CM

## 2020-07-07 DIAGNOSIS — K21.9 LARYNGOPHARYNGEAL REFLUX (LPR): ICD-10-CM

## 2020-07-07 DIAGNOSIS — J34.2 NASAL SEPTAL DEVIATION: ICD-10-CM

## 2020-07-07 DIAGNOSIS — R05.9 COUGH: ICD-10-CM

## 2020-07-07 DIAGNOSIS — J30.9 ALLERGIC RHINITIS, UNSPECIFIED SEASONALITY, UNSPECIFIED TRIGGER: Primary | ICD-10-CM

## 2020-07-07 DIAGNOSIS — R13.10 DYSPHAGIA, UNSPECIFIED TYPE: ICD-10-CM

## 2020-07-07 DIAGNOSIS — J45.21 MILD INTERMITTENT ASTHMA WITH ACUTE EXACERBATION: ICD-10-CM

## 2020-07-07 PROCEDURE — 3008F BODY MASS INDEX DOCD: CPT | Mod: CPTII,S$GLB,, | Performed by: SPECIALIST

## 2020-07-07 PROCEDURE — 99499 RISK ADDL DX/OHS AUDIT: ICD-10-PCS | Mod: S$GLB,,, | Performed by: SPECIALIST

## 2020-07-07 PROCEDURE — 3077F SYST BP >= 140 MM HG: CPT | Mod: CPTII,S$GLB,, | Performed by: SPECIALIST

## 2020-07-07 PROCEDURE — 3080F DIAST BP >= 90 MM HG: CPT | Mod: CPTII,S$GLB,, | Performed by: SPECIALIST

## 2020-07-07 PROCEDURE — 99214 PR OFFICE/OUTPT VISIT, EST, LEVL IV, 30-39 MIN: ICD-10-PCS | Mod: S$GLB,,, | Performed by: SPECIALIST

## 2020-07-07 PROCEDURE — 3080F PR MOST RECENT DIASTOLIC BLOOD PRESSURE >= 90 MM HG: ICD-10-PCS | Mod: CPTII,S$GLB,, | Performed by: SPECIALIST

## 2020-07-07 PROCEDURE — 3008F PR BODY MASS INDEX (BMI) DOCUMENTED: ICD-10-PCS | Mod: CPTII,S$GLB,, | Performed by: SPECIALIST

## 2020-07-07 PROCEDURE — 3077F PR MOST RECENT SYSTOLIC BLOOD PRESSURE >= 140 MM HG: ICD-10-PCS | Mod: CPTII,S$GLB,, | Performed by: SPECIALIST

## 2020-07-07 PROCEDURE — 99214 OFFICE O/P EST MOD 30 MIN: CPT | Mod: S$GLB,,, | Performed by: SPECIALIST

## 2020-07-07 PROCEDURE — 99499 UNLISTED E&M SERVICE: CPT | Mod: S$GLB,,, | Performed by: SPECIALIST

## 2020-07-07 RX ORDER — PROMETHAZINE HYDROCHLORIDE AND CODEINE PHOSPHATE 6.25; 1 MG/5ML; MG/5ML
5 SOLUTION ORAL EVERY 4 HOURS PRN
Qty: 210 ML | Refills: 0 | Status: SHIPPED | OUTPATIENT
Start: 2020-07-07 | End: 2020-07-17

## 2020-07-07 RX ORDER — ESOMEPRAZOLE MAGNESIUM 40 MG/1
40 CAPSULE, DELAYED RELEASE ORAL
Qty: 60 CAPSULE | Refills: 11 | Status: SHIPPED | OUTPATIENT
Start: 2020-07-07 | End: 2020-09-23

## 2020-07-07 NOTE — PROGRESS NOTES
Subjective:       Patient ID: Trina Marie Collette is a 55 y.o. female.    Chief Complaint: Follow-up (with right ear pain)    Follow-up  Associated symptoms include coughing.   Cough     The patient is coming in for a follow-up visit.  There are multiple issues to discuss:  1.  Laryngopharyngeal reflux:  She does not feel that the b.i.d. Protonix is controlling her symptoms.  She is having more sore throat more phlegm in the throat and throat clearing.  The sensation of swelling in her throat and recurring choking on food is worsening.  2.  Allergic rhinitis:  She continues to have chronic rhinorrhea, postnasal drip and coughing.  When she is having severe coughing only thing the works to control it is Phenergan with codeine.  3.  She has a new symptom.  She has popping in her right ear with no associated pain when she opens and closes her mouth, particularly when eating      Review of Systems   Constitutional: Negative for activity change, appetite change and unexpected weight change.   HENT: Positive for hearing loss and tinnitus. Negative for ear discharge, facial swelling, mouth sores, sinus pressure, sinus pain, sneezing, trouble swallowing and voice change.    Eyes: Negative for photophobia, pain, discharge, itching and visual disturbance.   Respiratory: Positive for cough. Negative for apnea and choking.    Cardiovascular: Negative for palpitations.   Gastrointestinal: Negative for abdominal distention.   Musculoskeletal: Negative for neck stiffness.   Skin: Negative.  Negative for color change and pallor.   Allergic/Immunologic: Negative for food allergies and immunocompromised state.   Neurological: Negative for dizziness, facial asymmetry, speech difficulty and light-headedness.   Hematological: Negative for adenopathy. Does not bruise/bleed easily.   Psychiatric/Behavioral: Positive for agitation, decreased concentration and sleep disturbance. Negative for confusion.       Objective:      Physical  Exam  Constitutional:       Appearance: She is well-developed.   HENT:      Head: Normocephalic.      Right Ear: Ear canal and external ear normal. Tympanic membrane is retracted.      Left Ear: Ear canal and external ear normal. Tympanic membrane is retracted.      Nose: Septal deviation (To the left), mucosal edema (cyanotic, boggy inferior turbinates bilaterally) and rhinorrhea (clear mucus bilaterally) present.      Mouth/Throat:      Mouth: No oral lesions.      Pharynx: Uvula midline.   Eyes:      General: Lids are normal.         Right eye: No discharge.         Left eye: No discharge.      Conjunctiva/sclera:      Right eye: Right conjunctiva is injected. No exudate.     Left eye: Left conjunctiva is injected. No exudate.     Pupils: Pupils are equal, round, and reactive to light.   Neck:      Musculoskeletal: Normal range of motion. No muscular tenderness.      Thyroid: No thyroid mass or thyromegaly.      Trachea: Trachea normal. No tracheal deviation.   Cardiovascular:      Rate and Rhythm: Normal rate and regular rhythm.      Pulses: Normal pulses.      Heart sounds: Normal heart sounds.   Pulmonary:      Effort: Pulmonary effort is normal.      Breath sounds: No stridor. Wheezing ( mild expiratory wheezing in all lung fields) present. No decreased breath sounds, rhonchi or rales.   Abdominal:      General: Bowel sounds are normal.      Palpations: Abdomen is soft.      Tenderness: There is no abdominal tenderness.   Musculoskeletal: Normal range of motion.   Lymphadenopathy:      Head:      Right side of head: No submental, submandibular, preauricular, posterior auricular or occipital adenopathy.      Left side of head: No submental, submandibular, preauricular, posterior auricular or occipital adenopathy.      Cervical: No cervical adenopathy.   Skin:     General: Skin is warm and dry.      Findings: No petechiae or rash.      Nails: There is no clubbing.     Neurological:      Mental Status: She is  alert and oriented to person, place, and time.      Cranial Nerves: No cranial nerve deficit.      Sensory: No sensory deficit.      Gait: Gait normal.   Psychiatric:         Speech: Speech normal.         Behavior: Behavior normal. Behavior is cooperative.         Thought Content: Thought content normal.         Judgment: Judgment normal.         Assessment:       1. Allergic rhinitis, unspecified seasonality, unspecified trigger    2. PND (post-nasal drip)    3. Cough    4. Dysphagia, unspecified type    5. Laryngopharyngeal reflux (LPR)    6. Mild intermittent asthma with acute exacerbation    7. Nasal septal deviation    8. TMJ crepitus        Plan:       I am placing the patient on a  soft diet.  She has 2 only on the left side of her mouth.  She can apply heat followed by massage to the temporalis and masseter muscles to the right side for 20 min 2-3 times daily.  I am switching her to a different PPI in an attempt to better control her LPR.  She will continue her other medications.  I will recheck her in 1 month.  She will need to undergo a nasolaryngoscopy at that visit.  She will need cover testing 3 days before.

## 2020-07-07 NOTE — PATIENT INSTRUCTIONS
TMJ Syndrome  The temporomandibular joint (TMJ) is the joint that connects your lower jaw to your head. You can feel it in front of your ears when you open and close your mouth. TMJ disorders involve chronic or recurrent pain in the joint. When treated, symptoms of TMJ disorders usually go away within a few months.  Causes  There is no widely agreed-on cause of TMJ disorders. They have been linked to injury, arthritis, chronic fatigue syndrome, and fibromyalgia. A definite connection has not been shown, though.  Symptoms  · Pain in the face, jaw, or neck  · Pain with jaw movement or chewing  · Locking or catching sensation of the jaw  · Clicking, popping, or grinding sounds with movement of the TMJ  · Headache  · Ear pain  Home care  Modest, nonsurgical treatments are a good first step toward relieving symptoms. Try the approaches described below.  · Rest the jaw by avoiding crunchy or hard-to-chew foods. Do not eat hard or sticky candies. Soft foods and liquids are easier on the jaw.  · Protect your jaw while yawning. If you need to yawn, put your fist under your chin to prevent your mouth from opening up too wide.  · To help relieve pain, try applying hot or cold packs to the painful area. Try both hot and cold to find out which works best for you. If you use hot packs (small towels soaked in hot water), be careful not to burn yourself.  · You may take acetaminophen or ibuprofen for pain, unless you were given a different pain medicine. (Note: If you have chronic liver or kidney disease or have ever had a stomach ulcer or gastrointestinal bleeding, talk with your healthcare provider before using these medicines. Also talk to your provider if you are taking medicine to prevent blood clots.) Aspirin should never be given to anyone younger than 18 years of age who is ill with a viral infection or fever. It may cause severe liver or brain damage.  Reducing stress  If stress seems to be contributing to your symptoms,  try to identify the sources of stress in your life. These arent always obvious. Common stressors include:  · Everyday hassles (which can add up), such as traffic jams, missed appointments, or car trouble  · Major life changes, both good (such as a new baby or job promotion) and bad (loss of job or loss of a loved one)  · Overload: The feeling that you have too many responsibilities and can't take care of everything at once  · Helplessness: Feeling like your problems are more than you can solve  When possible, do something about your sources of stress. See if you can avoid hassles, limit the amount of change in your life at one time, and take breaks when you feel overloaded.  Unfortunately, many stressful situations cannot be avoided. So learning how to manage stress better is very important. Getting regular exercise, eating nutritious, balanced meals, and getting adequate rest all help to make everyday stress more manageable. Certain techniques are also helpful: relaxation and breathing exercises, visualization, biofeedback, meditation, or simply taking some time out to clear your mind. For more information, talk with your healthcare provider.  Follow-up care  Follow up with your healthcare provider, or as advised. Further testing and additional treatment may be required. If changes to your lifestyle do not improve your symptoms, talk with your healthcare provider about other available therapies. These include bite guards for help with teeth grinding, stress management techniques, and more. If stress is an important factor and does not respond to the above simple measures, talk to your doctor about a referral for stress management.  · If X-rays were done, they will be reviewed by a specialist. You will be notified of the results, especially if they affect treatment.  Call 911  Call emergency services right away if any of these occur:  · Trouble breathing or swallowing, wheezing  · Confusion  · Extreme drowsiness or  trouble awakening  · Fainting or loss of consciousness  · Rapid heart rate  When to seek medical advice  Call your healthcare provider right away if any of these occur:  · Your face becomes swollen or red.  · Your pain worsens.  · You have increasing neck, mouth, tooth, or throat pain.  · You develop a fever of 100.4F (38°C) or higher  Date Last Reviewed: 7/30/2015  © 7502-0164 [employer]. [address] [place] [address], [place] [address]. All rights reserved. This information is not intended as a substitute for professional medical care. Always follow your healthcare professional's instructions.        Understanding Temporomandibular Disorders (TMD)    Do you have pain in your face, jaw, or teeth? Do you have trouble chewing? Does your jaw make clicking or popping noises? These symptoms can be caused by temporomandibular disorders (TMD). This term describes a group of problems related to the temporomandibular joint (TMJ) and nearby muscles. Your symptoms may be painful and frustrating. But dont worry. Your health care team can help you treat TMD and prevent future problems.  Whats wrong?  TMD causes many kinds of symptoms. Thats part of the reason it can be hard to diagnose. You may have headaches, tooth pain, or muscle aches. Your pain may be constant. Or it may come and go without any apparent reason. TMD-related problems include:  · Tight muscles  · Joint inflammation  · Joint damage  · Teeth grinding or clenching  What can you do?  If you are having TMD symptoms, dont wait. Call your dentist or health care provider right away. You dont have to live with pain or discomfort. TMD can be treated. In fact, a key part of treatment is learning to manage your condition at home.  Which treatment is right for you?  Treatment helps rest the muscles and joint. It also helps relieve symptoms and restore function. Depending on the type of problem you have, your treatment plan may include:  · Temporary diet  changes.  · New habits for managing stress and maintaining the health of your jaw.  · Medicine to reduce pain and inflammation.  · Therapy to reduce pressure on the joint and restore function.  · Dental treatment to reduce pressure on the joint.  How can you avoid future problems?  Treatment can help relieve your current condition. But TMD symptoms may return over time. You can avoid future problems by maintaining the health of your jaw:  · Avoid foods and habits that make your symptoms worse.  · Lower the stress level in your life.  · Follow your treatment plan.  · Pay attention to your body and get help if symptoms return.  Date Last Reviewed: 7/13/2015  © 0556-3844 [employer]. [address] [place] [address], [place] [address]. All rights reserved. This information is not intended as a substitute for professional medical care. Always follow your healthcare professional's instructions.

## 2020-07-09 DIAGNOSIS — Z01.812 PRE-PROCEDURE LAB EXAM: ICD-10-CM

## 2020-07-09 DIAGNOSIS — R05.9 COUGH: ICD-10-CM

## 2020-08-07 ENCOUNTER — NURSE TRIAGE (OUTPATIENT)
Dept: ADMINISTRATIVE | Facility: CLINIC | Age: 55
End: 2020-08-07

## 2020-08-07 NOTE — TELEPHONE ENCOUNTER
"  Reason for Disposition   Caller requesting a CONTROLLED substance prescription refill (e.g., narcotics, ADHD medicines)    Additional Information   Negative: Drug overdose and triager unable to answer question   Negative: Caller requesting information unrelated to medicine   Negative: Caller requesting a prescription for Strep throat and has a positive culture result   Negative: Rash while taking a medication or within 3 days of stopping it   Negative: Immunization reaction suspected   Negative: [1] Asthma and [2] having symptoms of asthma (cough, wheezing, etc.)   Negative: [1] Influenza symptoms AND [2] anti-viral med prescription request, such as Tamiflu   Negative: [1] Symptom of illness (e.g., headache, abdominal pain, earache, vomiting) AND [2] more than mild   Negative: MORE THAN A DOUBLE DOSE of a prescription or over-the-counter (OTC) drug   Negative: [1] DOUBLE DOSE (an extra dose or lesser amount) of over-the-counter (OTC) drug AND [2] any symptoms (e.g., dizziness, nausea, pain, sleepiness)   Negative: [1] DOUBLE DOSE (an extra dose or lesser amount) of prescription drug AND [2] any symptoms (e.g., dizziness, nausea, pain, sleepiness)   Negative: Took another person's prescription drug   Negative: [1] DOUBLE DOSE (an extra dose or lesser amount) of prescription drug AND [2] NO symptoms (Exception: a double dose of antibiotics)   Negative: Diabetes drug error or overdose (e.g., took wrong type of insulin or took extra dose)   Negative: [1] Request for URGENT new prescription or refill of "essential" medication (i.e., likelihood of harm to patient if not taken) AND [2] triager unable to fill per unit policy   Negative: [1] Prescription not at pharmacy AND [2] was prescribed by PCP recently   Negative: [1] Pharmacy calling with prescription questions AND [2] triager unable to answer question   Negative: [1] Caller has URGENT medication question about med that PCP or specialist " prescribed AND [2] triager unable to answer question   Negative: [1] Caller has NON-URGENT medication question about med that PCP prescribed AND [2] triager unable to answer question   Negative: [1] Caller requesting a NON-URGENT new prescription or refill AND [2] triager unable to refill per unit policy   Negative: [1] Caller has medication question about med not prescribed by PCP AND [2] triager unable to answer question (e.g., compatibility with other med, storage)    Protocols used: MEDICATION QUESTION CALL-A-AH

## 2020-08-07 NOTE — TELEPHONE ENCOUNTER
"Pt requesting refill on cough medication, "promethazine with codeine", pt unable to get in touch with prescribing   [place],  Dr. Rg. Informed pt that message would be sent, expected response on next open office day, Monday. Discussed f/u with urgent care or ER if needing to be seen/treated before then. Pt verbalized understanding.   "

## 2020-08-10 ENCOUNTER — TELEPHONE (OUTPATIENT)
Dept: OTOLARYNGOLOGY | Facility: CLINIC | Age: 55
End: 2020-08-10

## 2020-08-10 ENCOUNTER — TELEPHONE (OUTPATIENT)
Dept: PRIMARY CARE CLINIC | Facility: CLINIC | Age: 55
End: 2020-08-10

## 2020-08-10 DIAGNOSIS — Z12.31 VISIT FOR SCREENING MAMMOGRAM: Primary | ICD-10-CM

## 2020-08-10 RX ORDER — PROMETHAZINE HYDROCHLORIDE AND CODEINE PHOSPHATE 6.25; 1 MG/5ML; MG/5ML
5 SOLUTION ORAL EVERY 4 HOURS PRN
Qty: 210 ML | Refills: 0 | Status: SHIPPED | OUTPATIENT
Start: 2020-08-10 | End: 2020-08-20

## 2020-08-10 NOTE — TELEPHONE ENCOUNTER
Called and spoke with pt today letting her know we on waiting on Dr. Ricardo approval for medication.

## 2020-08-10 NOTE — TELEPHONE ENCOUNTER
----- Message from Roxy Dangelo sent at 8/10/2020 11:10 AM CDT -----  Contact: Pt [phone]  Patient is requesting a call a about her repeat mammogram.    Please call and advise.    Thank You

## 2020-08-10 NOTE — TELEPHONE ENCOUNTER
Spoke with patient and gave her Sheela's information to get Mammo scheduled. Patient stated understanding Order was pended to Dr. Brock

## 2020-09-03 ENCOUNTER — HOSPITAL ENCOUNTER (OUTPATIENT)
Dept: RADIOLOGY | Facility: OTHER | Age: 55
Discharge: HOME | End: 2020-09-03
Attending: FAMILY MEDICINE
Payer: MEDICARE

## 2020-09-03 ENCOUNTER — OFFICE VISIT (OUTPATIENT)
Dept: PRIMARY CARE CLINIC | Facility: CLINIC | Age: 55
End: 2020-09-03
Payer: MEDICARE

## 2020-09-03 VITALS
OXYGEN SATURATION: 98 % | SYSTOLIC BLOOD PRESSURE: 130 MMHG | HEART RATE: 78 BPM | DIASTOLIC BLOOD PRESSURE: 80 MMHG | BODY MASS INDEX: 38.72 KG/M2 | WEIGHT: 240.94 LBS | HEIGHT: 66 IN | RESPIRATION RATE: 17 BRPM | TEMPERATURE: 99 F

## 2020-09-03 DIAGNOSIS — Z72.0 TOBACCO ABUSE: ICD-10-CM

## 2020-09-03 DIAGNOSIS — E55.9 VITAMIN D DEFICIENCY: ICD-10-CM

## 2020-09-03 DIAGNOSIS — Z12.31 VISIT FOR SCREENING MAMMOGRAM: ICD-10-CM

## 2020-09-03 DIAGNOSIS — N64.89 FULLNESS OF BREAST: ICD-10-CM

## 2020-09-03 DIAGNOSIS — G47.00 INSOMNIA, UNSPECIFIED TYPE: ICD-10-CM

## 2020-09-03 DIAGNOSIS — I10 ESSENTIAL HYPERTENSION: ICD-10-CM

## 2020-09-03 DIAGNOSIS — E78.5 HYPERLIPIDEMIA, UNSPECIFIED HYPERLIPIDEMIA TYPE: ICD-10-CM

## 2020-09-03 DIAGNOSIS — D37.05 NEOPLASM OF UNCERTAIN BEHAVIOR OF NASOPHARYNX: ICD-10-CM

## 2020-09-03 DIAGNOSIS — E66.9 OBESITY (BMI 35.0-39.9 WITHOUT COMORBIDITY): ICD-10-CM

## 2020-09-03 DIAGNOSIS — L30.9 ECZEMA, UNSPECIFIED TYPE: Primary | ICD-10-CM

## 2020-09-03 DIAGNOSIS — R25.2 MUSCLE CRAMPS AT NIGHT: ICD-10-CM

## 2020-09-03 PROCEDURE — 3079F DIAST BP 80-89 MM HG: CPT | Mod: CPTII,S$GLB,, | Performed by: FAMILY MEDICINE

## 2020-09-03 PROCEDURE — 99214 OFFICE O/P EST MOD 30 MIN: CPT | Mod: S$GLB,,, | Performed by: FAMILY MEDICINE

## 2020-09-03 PROCEDURE — 3008F BODY MASS INDEX DOCD: CPT | Mod: CPTII,S$GLB,, | Performed by: FAMILY MEDICINE

## 2020-09-03 PROCEDURE — 77066 DX MAMMO INCL CAD BI: CPT | Mod: 26,,, | Performed by: RADIOLOGY

## 2020-09-03 PROCEDURE — 3008F PR BODY MASS INDEX (BMI) DOCUMENTED: ICD-10-PCS | Mod: CPTII,S$GLB,, | Performed by: FAMILY MEDICINE

## 2020-09-03 PROCEDURE — 99999 PR PBB SHADOW E&M-EST. PATIENT-LVL IV: ICD-10-PCS | Mod: PBBFAC,,, | Performed by: FAMILY MEDICINE

## 2020-09-03 PROCEDURE — 99999 PR PBB SHADOW E&M-EST. PATIENT-LVL IV: CPT | Mod: PBBFAC,,, | Performed by: FAMILY MEDICINE

## 2020-09-03 PROCEDURE — 77066 DX MAMMO INCL CAD BI: CPT | Mod: TC

## 2020-09-03 PROCEDURE — 77062 BREAST TOMOSYNTHESIS BI: CPT | Mod: 26,,, | Performed by: RADIOLOGY

## 2020-09-03 PROCEDURE — 77062 MAMMO DIGITAL DIAGNOSTIC BILAT WITH TOMOSYNTHESIS_CAD: ICD-10-PCS | Mod: 26,,, | Performed by: RADIOLOGY

## 2020-09-03 PROCEDURE — 3079F PR MOST RECENT DIASTOLIC BLOOD PRESSURE 80-89 MM HG: ICD-10-PCS | Mod: CPTII,S$GLB,, | Performed by: FAMILY MEDICINE

## 2020-09-03 PROCEDURE — 3075F PR MOST RECENT SYSTOLIC BLOOD PRESS GE 130-139MM HG: ICD-10-PCS | Mod: CPTII,S$GLB,, | Performed by: FAMILY MEDICINE

## 2020-09-03 PROCEDURE — 3075F SYST BP GE 130 - 139MM HG: CPT | Mod: CPTII,S$GLB,, | Performed by: FAMILY MEDICINE

## 2020-09-03 PROCEDURE — 77066 MAMMO DIGITAL DIAGNOSTIC BILAT WITH TOMOSYNTHESIS_CAD: ICD-10-PCS | Mod: 26,,, | Performed by: RADIOLOGY

## 2020-09-03 PROCEDURE — 99214 PR OFFICE/OUTPT VISIT, EST, LEVL IV, 30-39 MIN: ICD-10-PCS | Mod: S$GLB,,, | Performed by: FAMILY MEDICINE

## 2020-09-03 RX ORDER — FAMOTIDINE 20 MG/1
20 TABLET, FILM COATED ORAL 2 TIMES DAILY
COMMUNITY
Start: 2020-07-09 | End: 2021-01-19

## 2020-09-03 RX ORDER — PANTOPRAZOLE SODIUM 20 MG/1
TABLET, DELAYED RELEASE ORAL
COMMUNITY
Start: 2020-08-21 | End: 2020-09-23

## 2020-09-03 RX ORDER — OMEPRAZOLE 40 MG/1
40 CAPSULE, DELAYED RELEASE ORAL DAILY
COMMUNITY
Start: 2020-06-21 | End: 2020-09-23

## 2020-09-03 RX ORDER — ZOLPIDEM TARTRATE 5 MG/1
TABLET ORAL
Qty: 30 TABLET | Refills: 2 | Status: SHIPPED | OUTPATIENT
Start: 2020-09-03 | End: 2020-12-17 | Stop reason: SDUPTHER

## 2020-09-03 RX ORDER — TIZANIDINE 4 MG/1
TABLET ORAL
COMMUNITY
Start: 2020-08-21 | End: 2020-09-03

## 2020-09-03 RX ORDER — METHOCARBAMOL 750 MG/1
TABLET, FILM COATED ORAL
Qty: 40 TABLET | Refills: 0 | Status: SHIPPED | OUTPATIENT
Start: 2020-09-03 | End: 2021-04-06

## 2020-09-03 NOTE — PROGRESS NOTES
55 yo BF sees Dr Gama Rg on [facility]  ENT  --Has appt with  Pulmonary MD Nov 8 th sees pulmonary MD . Pt states needs good night sleep.   Subjective:       Patient ID: Trina Marie Collette is a 55 y.o. female.    Chief Complaint:   HPI:  55-year-old female--wants diagnostic mammogram with ultrasound--feels a possible fullness in her lower breast--had to get ultrasound in the past       Patient having cramps in her legs at night--mouth of saliva--cramping hands---patient is on gabapentin.  No recent labs to check calcium potassium magnesium is vitamin-D deficient--history DDD lumbar spine with cervical stenosis and chronic pain knee pain comes and goes          ROS:  Skin: no psoriasis, eczema, skin cancer--dry hyperpigmented skin near the umbilicus comes and goes --thinks due to belt arti  HEENT:Occas  headache, ocular pain, blurred vision, diplopia, epistaxis, hoarseness change in voice, thyroid trouble--history of tinnitus/chronic sinus problem/nasal septal deviation/hearing loss   Lung: No pneumonia, asthma, Tb, wheezing, SOB, smoking 1/4 ppd trying to quit  --history of bronchospasm in the past uses albuterol  Heart: No chest pain, ankle edema, +occas palpitations, MI, river murmur, +hypertension blood pressure   + hyperlipidemia-no stent bypass arrhythmia  Abdomen: No nausea, vomiting, diarrhea, constipation, ulcers, hepatitis, gallbladder disease, melena, hematochezia, hematemesis patient complaining of heartburn  : no UTI, renal disease, stones  GYN last menstrual.  Years ago has mammogram yearly--refuses PAP smear --I don't fool with that    MS: no fractures, O/A, lupus, rheumatoid, gout--history of chronic pain the back lumbar spine, right knee come right shoulder, history DDD lumbar history cervical spinal stenosis  Neuro: No dizziness, LOC, seizures   No diabetes, no anemia, no anxiety, no depression patient with history of bipolar schizophrenia--doing well with medication goes to Mental Health    Single, one child, disabled, lives alone     Objective:   Physical Exam:  No physical exam was done this was a virtual visit  General: Well nourished, well developed, no acute distress +overweight   Skin: No lesions  HEENT--eyes PERRLA EOM intact nose patent, throat nonerythematous ears TMs clear  NECK: Supple, no bruits, No JVD, no nodes  Lungs: Clear, no rales, rhonchi, wheezing   Heart: Regular rate and rhythm, no murmurs, gallops, or rubs  Abdomen: flat, bowel sounds positive, no tenderness, or organomegaly  MS:  Range of motion muscle strength overall intact  Neuro: Alert, CN intact, oriented X 3 still with some anxiety and depression of issues  Extremities: No cyanosis, clubbing, or edema         Assessment:       1. Eczema, unspecified type    2. Insomnia, unspecified type    3. Muscle cramps at night    4. Fullness of breast    5. Essential hypertension    6. Hyperlipidemia, unspecified hyperlipidemia type    7. Obesity (BMI 35.0-39.9 without comorbidity)    8. 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 Tobacco abuse        Plan:       Eczema, unspecified type    Insomnia, unspecified type  -     zolpidem (AMBIEN) 5 MG Tab; 1 po Q [facility] p.r.n. sleep  Dispense: 30 tablet; Refill: 2    Muscle cramps at night  -     T4, free; Future; Expected date: 09/03/2020  -     TSH; Future; Expected date: 09/03/2020    Fullness of breast    Essential hypertension  -     CBC auto differential; Future; Expected date: 09/03/2020  -     Comprehensive metabolic panel; Future; Expected date: 09/03/2020  -     T4, free; Future; Expected date: 09/03/2020  -     TSH; Future; Expected date: 09/03/2020    Hyperlipidemia, unspecified hyperlipidemia type  -     Lipid Panel; Future; Expected date: 09/03/2020    Obesity (BMI 35.0-39.9 without comorbidity)    Vitamin D deficiency    Neoplasm of uncertain behavior of nasopharynx    Tobacco abuse    Other orders  -     methocarbamoL (ROBAXIN) 750 MG Tab;  One p.o. q.h.s. p.r.n. muscle cramps Lopez CT  Dispense: 40 tablet; Refill: 0        Breast fullness--patient has fullness right breast--medial lower quadrant--no specific lesion found--has had to had diagnostic mammogram and ultrasound in the past will order diagnostic mammogram ultrasound now  Muscle cramps hands and legs especially at night patient on gabapentin 800 mg---will check vitamin-D/magnesium calcium potassium--told to try quinine water--Robaxin 500 mg q.h.s. p.r.n. sleep muscle cream  Multiple medical issues  See below Skin rash--near the umbilicus on the belt line--Valisone cream b.i.d. probably from the metal in her blue jeans   or belt arti--told to try heart is nail on belt buckle or make sure when she buys belt arti do not have nickel in the  Hypertension blood pressure low-sodium diet exercise as tolerated trying get to ideal body weight Needs blood pressure be 140/90 or less and greater than 90/60 blood pressure 130/80 today    Pain Clinic-for back pain sees Dr Senior   Goes to mental health--bipolar and schizophrenic  Low-sodium diet/exercise/decrease weight /exercise/decrease weight  Needs to DC smoking  Lab due muscle cramps--CBCs CMP lipids T4 TSH magnesium level  Can do Pap smear next visit--or see OBGYN   Health maintenance needs HIV colonoscopy pneumococcal vaccine shingles Pap smear flu shot

## 2020-09-04 DIAGNOSIS — Z12.11 COLON CANCER SCREENING: ICD-10-CM

## 2020-09-13 ENCOUNTER — NURSE TRIAGE (OUTPATIENT)
Dept: ADMINISTRATIVE | Facility: CLINIC | Age: 55
End: 2020-09-13

## 2020-09-13 PROBLEM — E87.6 HYPOKALEMIA: Status: ACTIVE | Noted: 2020-09-13

## 2020-09-13 NOTE — TELEPHONE ENCOUNTER
"Pt calling for a refill on phenergan with codine. Advised to call PCP in the AM.     Reason for Disposition   Caller requesting a CONTROLLED substance prescription refill (e.g., narcotics, ADHD medicines)    Additional Information   Negative: MORE THAN A DOUBLE DOSE of a prescription or over-the-counter (OTC) drug   Negative: [1] DOUBLE DOSE (an extra dose or lesser amount) of over-the-counter (OTC) drug AND [2] any symptoms (e.g., dizziness, nausea, pain, sleepiness)   Negative: [1] DOUBLE DOSE (an extra dose or lesser amount) of prescription drug AND [2] any symptoms (e.g., dizziness, nausea, pain, sleepiness)   Negative: Took another person's prescription drug   Negative: [1] DOUBLE DOSE (an extra dose or lesser amount) of prescription drug AND [2] NO symptoms (Exception: a double dose of antibiotics)   Negative: Diabetes drug error or overdose (e.g., took wrong type of insulin or took extra dose)   Negative: [1] Request for URGENT new prescription or refill of "essential" medication (i.e., likelihood of harm to patient if not taken) AND [2] triager unable to fill per unit policy   Negative: [1] Prescription not at pharmacy AND [2] was prescribed by PCP recently   Negative: [1] Pharmacy calling with prescription questions AND [2] triager unable to answer question   Negative: [1] Caller has URGENT medication question about med that PCP or specialist prescribed AND [2] triager unable to answer question   Negative: [1] Caller has NON-URGENT medication question about med that PCP prescribed AND [2] triager unable to answer question   Negative: [1] Caller requesting a NON-URGENT new prescription or refill AND [2] triager unable to refill per unit policy   Negative: [1] Caller has medication question about med not prescribed by PCP AND [2] triager unable to answer question (e.g., compatibility with other med, storage)    Protocols used: MEDICATION QUESTION CALL-A-AH      "

## 2020-09-14 ENCOUNTER — TELEPHONE (OUTPATIENT)
Dept: OTOLARYNGOLOGY | Facility: CLINIC | Age: 55
End: 2020-09-14

## 2020-09-14 RX ORDER — PROMETHAZINE HYDROCHLORIDE AND CODEINE PHOSPHATE 6.25; 1 MG/5ML; MG/5ML
5 SOLUTION ORAL EVERY 4 HOURS PRN
Qty: 240 ML | Refills: 0 | Status: SHIPPED | OUTPATIENT
Start: 2020-09-14 | End: 2020-09-24

## 2020-09-14 NOTE — TELEPHONE ENCOUNTER
Called and spoke with pt today letting her know per Dr. Ricardo faxed Rx to pharmacy [phone] [facility]

## 2020-09-14 NOTE — TELEPHONE ENCOUNTER
Called and spoke with pharmacy [facility] per Dr. Ricardo approval medication refill for promethazine-codeine 6.25-10 mg/5 ml (PHENERGAN WITH CODEINE) 6.25-10 mg/5 mL syrup

## 2020-09-14 NOTE — TELEPHONE ENCOUNTER
----- Message from DWAYNE Ricardo MD sent at 9/14/2020 11:16 AM CDT -----  Please fax prescription to patient's pharmacy  ----- Message -----  From: Ritchie Taylor MA  Sent: 9/11/2020   5:15 PM CDT  To: DWAYNE Ricardo MD      ----- Message -----  From: Bhumika maurobetsy  Sent: 9/11/2020   4:56 PM CDT  To: [facility]r Staff    Type: RX Refill Request    Who Called:  pt     Have you contacted your pharmacy: no     Refill or New Rx: refill     RX Name and Strength: promethazine-codeine 6.25-10 mg/5 ml (PHENERGAN WITH CODEINE) 6.25-10 mg/5 mL syrup    How is the patient currently taking it? (ex. 1XDay):    Is this a 30 day or 90 day RX:    Preferred Pharmacy with phone number: [phone] (home)     Local or Mail Order: local    Ordering Provider:    Would the patient rather a call back or a response via [facility]? Call back     Best Call Back Number: [phone] (home)       Additional Information:  pt feels this is urgent

## 2020-09-14 NOTE — TELEPHONE ENCOUNTER
----- Message from Marci Plascencia sent at 9/14/2020  1:25 PM CDT -----  Contact: COLLETTE, TRINA MARIE [[id number]]  Type: Patient Call Back    Who called:COLLETTE, TRINA MARIE [[id number]]    What is the request in detail: Patient is requesting a call back from Ritchie in regards to her promethazine-codeine refill. She states that she is at [facility] and they have not received the request.   Please advise.    Can the clinic reply by [facility]? No    Best call back number: [phone]    Additional Information: N/A

## 2020-09-16 RX ORDER — POTASSIUM CHLORIDE 750 MG/1
10 CAPSULE, EXTENDED RELEASE ORAL DAILY
Qty: 30 CAPSULE | Refills: 5 | Status: SHIPPED | OUTPATIENT
Start: 2020-09-16 | End: 2021-03-11

## 2020-10-05 ENCOUNTER — PATIENT MESSAGE (OUTPATIENT)
Dept: ADMINISTRATIVE | Facility: HOSPITAL | Age: 55
End: 2020-10-05

## 2020-10-05 ENCOUNTER — TELEPHONE (OUTPATIENT)
Dept: RADIOLOGY | Facility: OTHER | Age: 55
End: 2020-10-05

## 2020-10-12 ENCOUNTER — PATIENT MESSAGE (OUTPATIENT)
Dept: OTOLARYNGOLOGY | Facility: CLINIC | Age: 55
End: 2020-10-12

## 2020-10-13 ENCOUNTER — PATIENT MESSAGE (OUTPATIENT)
Dept: OTOLARYNGOLOGY | Facility: CLINIC | Age: 55
End: 2020-10-13

## 2020-10-13 ENCOUNTER — TELEPHONE (OUTPATIENT)
Dept: OTOLARYNGOLOGY | Facility: CLINIC | Age: 55
End: 2020-10-13

## 2020-10-13 NOTE — TELEPHONE ENCOUNTER
Attempted to call pt a while ago letting her know per Dr. Ricardo has not fill you cough meds yet. He will let me know when he does and I will call you asap.

## 2020-10-13 NOTE — TELEPHONE ENCOUNTER
----- Message from Sagar Tay sent at 10/13/2020  3:02 PM CDT -----   Name of Who is Calling:     What is the request in detail: patient request call back in reference to  medication refill for , promethazine--codeine cough syrup    Please contact to further discuss and advise      Can the clinic reply by [facility]: no     What Number to Call Back if not in [facility]:  [phone]

## 2020-10-14 RX ORDER — PROMETHAZINE HYDROCHLORIDE AND CODEINE PHOSPHATE 6.25; 1 MG/5ML; MG/5ML
5 SOLUTION ORAL EVERY 4 HOURS PRN
Qty: 240 ML | Refills: 0 | Status: SHIPPED | OUTPATIENT
Start: 2020-10-14 | End: 2020-10-24

## 2020-10-26 ENCOUNTER — HOSPITAL ENCOUNTER (OUTPATIENT)
Dept: RADIOLOGY | Facility: OTHER | Age: 55
Discharge: HOME | End: 2020-10-26
Attending: FAMILY MEDICINE
Payer: MEDICARE

## 2020-10-26 DIAGNOSIS — N64.89 FULLNESS OF BREAST: ICD-10-CM

## 2020-10-26 PROCEDURE — 76642 ULTRASOUND BREAST LIMITED: CPT | Mod: TC,RT

## 2020-10-26 PROCEDURE — 76642 US BREAST RIGHT LIMITED: ICD-10-PCS | Mod: 26,RT,, | Performed by: RADIOLOGY

## 2020-10-26 PROCEDURE — 76642 ULTRASOUND BREAST LIMITED: CPT | Mod: 26,RT,, | Performed by: RADIOLOGY

## 2020-10-30 ENCOUNTER — PATIENT MESSAGE (OUTPATIENT)
Dept: ADMINISTRATIVE | Facility: HOSPITAL | Age: 55
End: 2020-10-30

## 2020-11-11 DIAGNOSIS — Z01.812 PRE-PROCEDURE LAB EXAM: ICD-10-CM

## 2020-11-12 ENCOUNTER — PATIENT MESSAGE (OUTPATIENT)
Dept: ADMINISTRATIVE | Facility: HOSPITAL | Age: 55
End: 2020-11-12

## 2020-11-13 ENCOUNTER — TELEPHONE (OUTPATIENT)
Dept: OTOLARYNGOLOGY | Facility: CLINIC | Age: 55
End: 2020-11-13

## 2020-11-13 NOTE — TELEPHONE ENCOUNTER
----- Message from DWAYNE Ricardo MD sent at 11/13/2020  5:42 AM CST -----  Please inform the patient that there COVID-19 test was negative.  They should proceed as scheduled.

## 2020-11-16 ENCOUNTER — OFFICE VISIT (OUTPATIENT)
Dept: OTOLARYNGOLOGY | Facility: CLINIC | Age: 55
End: 2020-11-16
Payer: MEDICARE

## 2020-11-16 VITALS
HEIGHT: 66 IN | SYSTOLIC BLOOD PRESSURE: 110 MMHG | TEMPERATURE: 98 F | BODY MASS INDEX: 39.66 KG/M2 | HEART RATE: 81 BPM | WEIGHT: 246.81 LBS | DIASTOLIC BLOOD PRESSURE: 74 MMHG

## 2020-11-16 DIAGNOSIS — R05.3 CHRONIC COUGH: ICD-10-CM

## 2020-11-16 DIAGNOSIS — H90.A31 MIXED CONDUCTIVE AND SENSORINEURAL HEARING LOSS OF RIGHT EAR WITH RESTRICTED HEARING OF LEFT EAR: ICD-10-CM

## 2020-11-16 DIAGNOSIS — K21.9 LARYNGOPHARYNGEAL REFLUX (LPR): ICD-10-CM

## 2020-11-16 DIAGNOSIS — R06.2 WHEEZING: ICD-10-CM

## 2020-11-16 DIAGNOSIS — J34.2 NASAL SEPTAL DEVIATION: ICD-10-CM

## 2020-11-16 DIAGNOSIS — H93.A1 PULSATILE TINNITUS OF RIGHT EAR: ICD-10-CM

## 2020-11-16 DIAGNOSIS — J30.9 ALLERGIC RHINITIS, UNSPECIFIED SEASONALITY, UNSPECIFIED TRIGGER: ICD-10-CM

## 2020-11-16 DIAGNOSIS — R13.10 DYSPHAGIA, UNSPECIFIED TYPE: Primary | ICD-10-CM

## 2020-11-16 PROCEDURE — 1126F AMNT PAIN NOTED NONE PRSNT: CPT | Mod: S$GLB,,, | Performed by: SPECIALIST

## 2020-11-16 PROCEDURE — 99214 OFFICE O/P EST MOD 30 MIN: CPT | Mod: 25,S$GLB,, | Performed by: SPECIALIST

## 2020-11-16 PROCEDURE — 99214 PR OFFICE/OUTPT VISIT, EST, LEVL IV, 30-39 MIN: ICD-10-PCS | Mod: 25,S$GLB,, | Performed by: SPECIALIST

## 2020-11-16 PROCEDURE — 3078F DIAST BP <80 MM HG: CPT | Mod: CPTII,S$GLB,, | Performed by: SPECIALIST

## 2020-11-16 PROCEDURE — 1126F PR PAIN SEVERITY QUANTIFIED, NO PAIN PRESENT: ICD-10-PCS | Mod: S$GLB,,, | Performed by: SPECIALIST

## 2020-11-16 PROCEDURE — 99499 UNLISTED E&M SERVICE: CPT | Mod: S$GLB,,, | Performed by: SPECIALIST

## 2020-11-16 PROCEDURE — 99499 RISK ADDL DX/OHS AUDIT: ICD-10-PCS | Mod: S$GLB,,, | Performed by: SPECIALIST

## 2020-11-16 PROCEDURE — 3074F PR MOST RECENT SYSTOLIC BLOOD PRESSURE < 130 MM HG: ICD-10-PCS | Mod: CPTII,S$GLB,, | Performed by: SPECIALIST

## 2020-11-16 PROCEDURE — 3074F SYST BP LT 130 MM HG: CPT | Mod: CPTII,S$GLB,, | Performed by: SPECIALIST

## 2020-11-16 PROCEDURE — 31575 DIAGNOSTIC LARYNGOSCOPY: CPT | Mod: S$GLB,,, | Performed by: SPECIALIST

## 2020-11-16 PROCEDURE — 3008F PR BODY MASS INDEX (BMI) DOCUMENTED: ICD-10-PCS | Mod: CPTII,S$GLB,, | Performed by: SPECIALIST

## 2020-11-16 PROCEDURE — 3008F BODY MASS INDEX DOCD: CPT | Mod: CPTII,S$GLB,, | Performed by: SPECIALIST

## 2020-11-16 PROCEDURE — 3078F PR MOST RECENT DIASTOLIC BLOOD PRESSURE < 80 MM HG: ICD-10-PCS | Mod: CPTII,S$GLB,, | Performed by: SPECIALIST

## 2020-11-16 PROCEDURE — 31575 LARYNGOSCOPY: ICD-10-PCS | Mod: S$GLB,,, | Performed by: SPECIALIST

## 2020-11-16 RX ORDER — TIZANIDINE 4 MG/1
4 TABLET ORAL EVERY 8 HOURS
COMMUNITY
Start: 2020-11-04 | End: 2021-04-06 | Stop reason: SDUPTHER

## 2020-11-16 RX ORDER — ESOMEPRAZOLE MAGNESIUM 40 MG/1
CAPSULE, DELAYED RELEASE ORAL
COMMUNITY
Start: 2020-10-02 | End: 2021-04-06

## 2020-11-16 RX ORDER — BUSPIRONE HYDROCHLORIDE 30 MG/1
30 TABLET ORAL 2 TIMES DAILY
COMMUNITY
Start: 2020-09-03 | End: 2021-04-06 | Stop reason: SDUPTHER

## 2020-11-16 RX ORDER — TRAZODONE HYDROCHLORIDE 100 MG/1
100 TABLET ORAL NIGHTLY PRN
COMMUNITY
Start: 2020-11-10 | End: 2021-04-06 | Stop reason: SDUPTHER

## 2020-11-16 RX ORDER — PANTOPRAZOLE SODIUM 20 MG/1
TABLET, DELAYED RELEASE ORAL
COMMUNITY
Start: 2020-10-23 | End: 2021-01-19

## 2020-11-16 RX ORDER — ZIPRASIDONE HYDROCHLORIDE 60 MG/1
CAPSULE ORAL
COMMUNITY
Start: 2020-09-03 | End: 2021-04-06

## 2020-11-16 RX ORDER — PROMETHAZINE HYDROCHLORIDE AND CODEINE PHOSPHATE 6.25; 1 MG/5ML; MG/5ML
5 SOLUTION ORAL EVERY 4 HOURS PRN
Qty: 240 ML | Refills: 0 | Status: SHIPPED | OUTPATIENT
Start: 2020-11-16 | End: 2020-11-26

## 2020-11-16 NOTE — PROCEDURES
"Laryngoscopy    Date/Time: 11/16/2020 3:00 PM  Performed by: DWAYNE Ricardo MD  Authorized by: DWAYNE Ricardo MD     Time out: Immediately prior to procedure a "time out" was called to verify the correct patient, procedure, equipment, support staff and site/side marked as required.    Consent Done?:  Yes (Verbal)  Anesthesia:     Local anesthetic:  Lidocaine 2% and Yan-Synephrine 1/2% (Slightly wet sponge placed in each nasal passage moistened with)    Patient tolerance:  Patient tolerated the procedure well with no immediate complications    Decongestion performed?: No    Laryngoscopy:     Areas examined:  Vocal cords, larynx, hypopharynx, oropharynx, nasopharynx and nasal cavities    Laryngoscope size:  4 mm (Flexible video nasolaryngoscopy)  Nose External:      No external nasal deformity  Nose Intranasal:      Mucosa no polyps     Mucosa ulcers not present     No mucosa lesions present (Clear mucus in the nasal passages bilaterally)     Septum gross deformity ( septum deviated to the right anteriorly and left posteriorly)     Enlarged turbinates ( inferior turbinates enlarged bilaterally)  Nasopharynx:      No mucosa lesions     Adenoids not present     Posterior choanae patent     Eustachian tube patent  Larynx/hypopharynx:      No epiglottis lesions     No epiglottis edema     No AE folds lesions     No vocal cord polyps     Equal and normal bilateral ( vocal cords move symmetrically, Reincke's edema)     No hypopharynx lesions     No piriform sinus pooling     No piriform sinus lesions ( )     Post cricoid edema ( mild to moderate)     Post cricoid erythema ( moderate)      "

## 2020-11-16 NOTE — PROGRESS NOTES
Subjective:       Patient ID: Trina Marie Collette is a 55 y.o. female.    Chief Complaint: Follow-up (with Scope)    Follow-up  Associated symptoms include coughing.   Cough     The patient is coming in for a follow-up visit.  There are multiple issues to discuss:  1.  Laryngopharyngeal reflux:   The patient is using pantoprazole in the morning and omeprazole at night.  She continues to have globus sensation, phlegm in her throat and throat clearing.  She has now been on b.i.d. PPI therapy for 10 months with some, but minimal improvement.  2.  Allergic rhinitis:  She is using Singulair every night and supplements with OTC antihistamines when needed.  She is having some congestion and runny nose and postnasal drip.    3.  Pulsatile tinnitus right ear:  There is no change with regard to this issue.  4.  Chronic cough:  The patient continues to have coughing day and night.  The only way she can rest at night is to take Phenergan with codeine.  She does have intermittent episodes of wheezing for which she uses her albuterol inhaler.  She is also taking Singulair.      Review of Systems   Constitutional: Negative for activity change, appetite change and unexpected weight change.   HENT: Positive for hearing loss and tinnitus. Negative for ear discharge, facial swelling, mouth sores, sinus pressure, sinus pain, sneezing, trouble swallowing and voice change.    Eyes: Negative for photophobia, pain, discharge, itching and visual disturbance.   Respiratory: Positive for cough. Negative for apnea and choking.    Cardiovascular: Negative for palpitations.   Gastrointestinal: Negative for abdominal distention.   Musculoskeletal: Negative for neck stiffness.   Skin: Negative.  Negative for color change and pallor.   Allergic/Immunologic: Negative for food allergies and immunocompromised state.   Neurological: Negative for dizziness, facial asymmetry, speech difficulty and light-headedness.   Hematological: Negative for  adenopathy. Does not bruise/bleed easily.   Psychiatric/Behavioral: Positive for agitation, decreased concentration and sleep disturbance. Negative for confusion.       Objective:      Physical Exam  Constitutional:       Appearance: She is well-developed.   HENT:      Head: Normocephalic.      Right Ear: Ear canal and external ear normal. Tympanic membrane is retracted.      Left Ear: Ear canal and external ear normal. Tympanic membrane is retracted.      Nose: Septal deviation (To the left), mucosal edema (cyanotic, boggy inferior turbinates bilaterally) and rhinorrhea (clear mucus bilaterally) present.      Mouth/Throat:      Mouth: No oral lesions.      Pharynx: Uvula midline.   Eyes:      General: Lids are normal.         Right eye: No discharge.         Left eye: No discharge.      Conjunctiva/sclera:      Right eye: Right conjunctiva is injected. No exudate.     Left eye: Left conjunctiva is injected. No exudate.     Pupils: Pupils are equal, round, and reactive to light.   Neck:      Musculoskeletal: Normal range of motion. No muscular tenderness.      Thyroid: No thyroid mass or thyromegaly.      Trachea: Trachea normal. No tracheal deviation.   Cardiovascular:      Rate and Rhythm: Normal rate and regular rhythm.      Pulses: Normal pulses.      Heart sounds: Normal heart sounds.   Pulmonary:      Effort: Pulmonary effort is normal.      Breath sounds: No stridor. Wheezing ( mild expiratory wheezing in all lung fields) present. No decreased breath sounds, rhonchi or rales.   Abdominal:      General: Bowel sounds are normal.      Palpations: Abdomen is soft.      Tenderness: There is no abdominal tenderness.   Musculoskeletal: Normal range of motion.   Lymphadenopathy:      Head:      Right side of head: No submental, submandibular, preauricular, posterior auricular or occipital adenopathy.      Left side of head: No submental, submandibular, preauricular, posterior auricular or occipital adenopathy.       Cervical: No cervical adenopathy.   Skin:     General: Skin is warm and dry.      Findings: No petechiae or rash.      Nails: There is no clubbing.     Neurological:      Mental Status: She is alert and oriented to person, place, and time.      Cranial Nerves: No cranial nerve deficit.      Sensory: No sensory deficit.      Gait: Gait normal.   Psychiatric:         Speech: Speech normal.         Behavior: Behavior normal. Behavior is cooperative.         Thought Content: Thought content normal.         Judgment: Judgment normal.         Flexible video nasolaryngoscopy-septal deviation mild with nasal changes allergic rhinitis; laryngeal changes consistent with laryngopharyngeal reflux that has worsened since her endoscopy in January 2020    Assessment:       1. Dysphagia, unspecified type    2. Laryngopharyngeal reflux (LPR)    3. Chronic cough    4. Allergic rhinitis, unspecified seasonality, unspecified trigger    5. Nasal septal deviation    6. Pulsatile tinnitus of right ear    7. Mixed conductive and sensorineural hearing loss of right ear with restricted hearing of left ear        Plan:       I am  referring the patient both Pulmonary Medicine and GI for evaluation.  Her laryngopharyngeal reflux and cough are not really responding to the b.i.d. PPI therapy.  I will recheck the patient in 8 weeks.

## 2020-11-17 ENCOUNTER — TELEPHONE (OUTPATIENT)
Dept: OTOLARYNGOLOGY | Facility: CLINIC | Age: 55
End: 2020-11-17

## 2020-12-06 DIAGNOSIS — G47.00 INSOMNIA, UNSPECIFIED TYPE: ICD-10-CM

## 2020-12-08 ENCOUNTER — OFFICE VISIT (OUTPATIENT)
Dept: GASTROENTEROLOGY | Facility: CLINIC | Age: 55
End: 2020-12-08
Payer: MEDICARE

## 2020-12-08 VITALS
BODY MASS INDEX: 40.15 KG/M2 | WEIGHT: 249.81 LBS | SYSTOLIC BLOOD PRESSURE: 135 MMHG | DIASTOLIC BLOOD PRESSURE: 84 MMHG | HEART RATE: 76 BPM | HEIGHT: 66 IN

## 2020-12-08 DIAGNOSIS — R05.3 CHRONIC COUGH: ICD-10-CM

## 2020-12-08 DIAGNOSIS — Z12.11 SPECIAL SCREENING FOR MALIGNANT NEOPLASMS, COLON: Primary | ICD-10-CM

## 2020-12-08 DIAGNOSIS — R13.10 DYSPHAGIA, UNSPECIFIED TYPE: Primary | ICD-10-CM

## 2020-12-08 DIAGNOSIS — K21.9 LARYNGOPHARYNGEAL REFLUX (LPR): ICD-10-CM

## 2020-12-08 DIAGNOSIS — Z01.818 PRE-OP TESTING: ICD-10-CM

## 2020-12-08 PROCEDURE — 3079F PR MOST RECENT DIASTOLIC BLOOD PRESSURE 80-89 MM HG: ICD-10-PCS | Mod: CPTII,S$GLB,, | Performed by: STUDENT IN AN ORGANIZED HEALTH CARE EDUCATION/TRAINING PROGRAM

## 2020-12-08 PROCEDURE — 1126F PR PAIN SEVERITY QUANTIFIED, NO PAIN PRESENT: ICD-10-PCS | Mod: S$GLB,,, | Performed by: STUDENT IN AN ORGANIZED HEALTH CARE EDUCATION/TRAINING PROGRAM

## 2020-12-08 PROCEDURE — 3008F BODY MASS INDEX DOCD: CPT | Mod: CPTII,S$GLB,, | Performed by: STUDENT IN AN ORGANIZED HEALTH CARE EDUCATION/TRAINING PROGRAM

## 2020-12-08 PROCEDURE — 99999 PR PBB SHADOW E&M-EST. PATIENT-LVL III: ICD-10-PCS | Mod: PBBFAC,,, | Performed by: STUDENT IN AN ORGANIZED HEALTH CARE EDUCATION/TRAINING PROGRAM

## 2020-12-08 PROCEDURE — 3079F DIAST BP 80-89 MM HG: CPT | Mod: CPTII,S$GLB,, | Performed by: STUDENT IN AN ORGANIZED HEALTH CARE EDUCATION/TRAINING PROGRAM

## 2020-12-08 PROCEDURE — 3075F SYST BP GE 130 - 139MM HG: CPT | Mod: CPTII,S$GLB,, | Performed by: STUDENT IN AN ORGANIZED HEALTH CARE EDUCATION/TRAINING PROGRAM

## 2020-12-08 PROCEDURE — 3008F PR BODY MASS INDEX (BMI) DOCUMENTED: ICD-10-PCS | Mod: CPTII,S$GLB,, | Performed by: STUDENT IN AN ORGANIZED HEALTH CARE EDUCATION/TRAINING PROGRAM

## 2020-12-08 PROCEDURE — 99204 OFFICE O/P NEW MOD 45 MIN: CPT | Mod: S$GLB,,, | Performed by: STUDENT IN AN ORGANIZED HEALTH CARE EDUCATION/TRAINING PROGRAM

## 2020-12-08 PROCEDURE — 99204 PR OFFICE/OUTPT VISIT, NEW, LEVL IV, 45-59 MIN: ICD-10-PCS | Mod: S$GLB,,, | Performed by: STUDENT IN AN ORGANIZED HEALTH CARE EDUCATION/TRAINING PROGRAM

## 2020-12-08 PROCEDURE — 1126F AMNT PAIN NOTED NONE PRSNT: CPT | Mod: S$GLB,,, | Performed by: STUDENT IN AN ORGANIZED HEALTH CARE EDUCATION/TRAINING PROGRAM

## 2020-12-08 PROCEDURE — 3075F PR MOST RECENT SYSTOLIC BLOOD PRESS GE 130-139MM HG: ICD-10-PCS | Mod: CPTII,S$GLB,, | Performed by: STUDENT IN AN ORGANIZED HEALTH CARE EDUCATION/TRAINING PROGRAM

## 2020-12-08 PROCEDURE — 99999 PR PBB SHADOW E&M-EST. PATIENT-LVL III: CPT | Mod: PBBFAC,,, | Performed by: STUDENT IN AN ORGANIZED HEALTH CARE EDUCATION/TRAINING PROGRAM

## 2020-12-08 RX ORDER — SODIUM, POTASSIUM,MAG SULFATES 17.5-3.13G
1 SOLUTION, RECONSTITUTED, ORAL ORAL DAILY
Qty: 1 KIT | Refills: 0 | Status: SHIPPED | OUTPATIENT
Start: 2020-12-08 | End: 2020-12-10

## 2020-12-08 NOTE — LETTER
December 8, 2020      DWAYNE Ricardo MD  [address]  [address]  [facility] [address]           [facility] [address]  [address]  [place] [address]  Phone: [phone]  Fax: [phone]          Patient: Trina Marie Collette   MR Number: [id number]   YOB: 1965   Date of Visit: 12/8/2020       Dear Dr. DWAYNE Ricardo:    Thank you for referring Trina Collette to me for evaluation. Attached you will find relevant portions of my assessment and plan of care.    If you have questions, please do not hesitate to call me. I look forward to following Trina Collette along with you.    Sincerely,    Vitaliy Hammer MD    Enclosure  CC:  No Recipients    If you would like to receive this communication electronically, please contact [email or web address] or [phone] to request more information on [facility] access.    For providers and/or their staff who would like to refer a patient to [facility], please contact us through our one-stop-shop provider referral line, [facility], at [phone].    If you feel you have received this communication in error or would no longer like to receive these types of communications, please e-mail [email or web address]

## 2020-12-08 NOTE — TELEPHONE ENCOUNTER
----- Message from Lissette Franklin sent at 12/8/2020 10:14 AM CST -----  Pt need refills on medications pharmacy on file.      losartan (COZAAR) 100 MG tablet  hydrOXYzine pamoate (VISTARIL) 25 MG Cap  amLODIPine (NORVASC) 2.5 MG tablet

## 2020-12-08 NOTE — PROGRESS NOTES
[facility] Consultation Note    Reason for Consult:  The primary encounter diagnosis was Dysphagia, unspecified type. Diagnoses of Laryngopharyngeal reflux (LPR) and Chronic cough were also pertinent to this visit.    PCP:   Davon Brock   [address] / [facility] [address]    Referring MD:  DWAYNE Ricardo Md  [address]  [place] [address]    HPI:  This is a 55 y.o. female here for evaluation of dysphagia.    The patient notes that she has had difficulty swallowing for the last 6 months.  Her symptoms have progressively worsened over the time period.  Her dysphagia is present with both solids and liquids.  She feels food gets stuck in her chest and sometimes has to regurgitate the food back up.      She does have symptoms of acid reflux and heartburn.  She is on acid suppressive therapy, but notes her symptoms are poorly controlled.  She takes a twice daily PPI with minimal improvement in symptoms.     She also complains of frequent bowel movements.  She usually moves her bowels once daily at baseline.  However, there are days when she has uncontrollable loose stools.  After this loose stool, her bowel movements then return to baseline.      ROS:  Constitutional: No fevers, chills, No weight loss  ENT: No allergies  CV: No chest pain  Pulm: No cough, No shortness of breath  Ophtho: No vision changes  GI: see HPI  Derm: No rash  Heme: No lymphadenopathy, No bruising  MSK: No arthritis  : No dysuria, No hematuria  Endo: No hot or cold intolerance  Neuro: No syncope, No seizure  Psych: No anxiety, No depression    Medical History:  has a past medical history of Allergic rhinitis, Anxiety, Bipolar 1 disorder, Chronic cough, Depression, Hypertension, Laryngopharyngeal reflux (LPR), and Tinnitus of right ear.    Surgical History:  has a past surgical history that includes Vaginal delivery (1986) and Functional endoscopic sinus surgery (FESS) using  computer-assisted navigation (Bilateral, 2/3/2020).    Family History: family history is not on file..     Social History:  reports that she has been smoking cigarettes. She has been smoking about 0.25 packs per day. She has never used smokeless tobacco. She reports previous alcohol use. She reports that she does not use drugs.    Review of patient's allergies indicates:  No Known Allergies    Current Outpatient Rx   Medication Sig Dispense Refill    albuterol (PROVENTIL) 2.5 mg /3 mL (0.083 %) nebulizer solution Take 3 mLs (2.5 mg total) by nebulization every 4 (four) hours as needed for Wheezing or Shortness of Breath. Rescue 1 Box 1    amLODIPine (NORVASC) 2.5 MG tablet One p.o. q. HS p.r.n. hypertension continue Cozaar 100 mg q.a.m. 30 tablet 11    betamethasone valerate 0.1% (VALISONE) 0.1 % Crea Applied to the abdomen twice a day p.r.n. skin rash or itch 60 g 2    budesonide-formoterol 160-4.5 mcg (SYMBICORT) 160-4.5 mcg/actuation HFAA Inhale 2 puffs into the lungs every 12 (twelve) hours. Controller, rinse mouth after using 1 Inhaler 11    busPIRone (BUSPAR) 30 MG Tab       ergocalciferol (ERGOCALCIFEROL) 50,000 unit Cap Take 1 capsule (50,000 Units total) by mouth every 7 days. 12 capsule 3    esomeprazole (NEXIUM) 40 MG capsule       famotidine (PEPCID) 20 MG tablet Take 20 mg by mouth 2 (two) times daily.      gabapentin (NEURONTIN) 800 MG tablet Take 800 mg by mouth 3 (three) times daily.  1    HYDROcodone-acetaminophen (NORCO) 7.5-325 mg per tablet TAKE 1 TABLET BY MOUTH ONCE A DAY AS NEEDED FOR PAIN  0    hydrOXYzine pamoate (VISTARIL) 25 MG Cap TAKE 1 CAPSULE BY MOUTH EVERY 8 HOURS OR 1 CAP AT BEDTIME FOR ANXIETY TO HELP PREVENT PALPITATIONS 60 capsule 5    ipratropium (ATROVENT) 0.03 % nasal spray 2 sprays by Nasal route 2 (two) times daily. May be use more often if needed 30 mL 11    losartan (COZAAR) 100 MG tablet TAKE 1 TABLET BY MOUTH EVERY DAY 90 tablet 1    meloxicam (MOBIC) 15 MG  "tablet Take 1 tablet (15 mg total) by mouth once daily. 10 tablet 0    methocarbamoL (ROBAXIN) 750 MG Tab One p.o. q.h.s. p.r.n. muscle cramps Lopez CT 40 tablet 0    montelukast (SINGULAIR) 10 mg tablet TAKE 1 TABLET BY MOUTH EVERY DAY 90 tablet 1    naproxen (NAPROSYN) 500 MG tablet Take 1 tablet (500 mg total) by mouth 2 (two) times daily with meals. 30 tablet 0    omeprazole (PRILOSEC) 40 MG capsule TAKE 1 CAPSULE BY MOUTH EVERY DAY 90 capsule 1    pantoprazole (PROTONIX) 20 MG tablet       potassium chloride (MICRO-K) 10 MEQ CpSR Take 1 capsule (10 mEq total) by mouth once daily. 30 capsule 5    rosuvastatin (CRESTOR) 5 MG tablet TAKE 1 TABLET BY MOUTH EVERY DAY 90 tablet 1    sertraline (ZOLOFT) 100 MG tablet TAKE 1 TABLET BY MOUTH EVERY DAY 90 tablet 1    tiZANidine (ZANAFLEX) 4 MG tablet       traZODone (DESYREL) 100 MG tablet       triamcinolone acetonide 0.1% (KENALOG) 0.1 % cream Apply topically 2 (two) times daily. 15 g 2    ziprasidone (GEODON) 40 MG Cap Take 1 capsule (40 mg total) by mouth once daily. 30 capsule 5    ziprasidone (GEODON) 60 MG Cap       ziprasidone (GEODON) 80 MG capsule Take 1 capsule (80 mg total) by mouth once daily. 30 capsule 5    zolpidem (AMBIEN) 5 MG Tab 1 po Q [facility] p.r.n. sleep 30 tablet 2       Objective Findings:    Vital Signs:  /84 (BP Location: Right arm)   Pulse 76   Ht 5' 6" (1.676 m)   Wt 113.3 kg (249 lb 12.5 oz)   LMP 09/25/2017   BMI 40.32 kg/m²   Body mass index is 40.32 kg/m².    Physical Exam:  General Appearance: Well appearing in no acute distress  Head:   Normocephalic, without obvious abnormality  Eyes:    No scleral icterus, EOMI  ENT: Neck supple, Lips, mucosa, and tongue normal; teeth and gums normal  Lungs: CTA bilaterally in anterior and posterior fields, no wheezes, no crackles.  Heart:  Regular rate and rhythm, S1, S2 normal, no murmurs heard  Abdomen: Soft, non tender, non distended with positive bowel sounds in all four " quadrants. No hepatosplenomegaly, ascites, or mass  Extremities: 2+ pulses, no clubbing, cyanosis or edema  Skin: No rash  Neurologic: CN II-XII intact      Labs:  Lab Results   Component Value Date    WBC 10.10 09/03/2020    HGB 12.8 09/03/2020    HCT 38.6 09/03/2020     09/03/2020    CHOL 156 09/03/2020    TRIG 74 09/03/2020    HDL 48 09/03/2020    ALT 12 (L) 09/03/2020    AST 16 09/03/2020     09/03/2020    K 3.3 (L) 09/03/2020     09/03/2020    CREATININE 0.5 09/03/2020    BUN 13 09/03/2020    CO2 26 09/03/2020    TSH 2.11 09/03/2020    INR 1.0 09/25/2018       Imaging:  CT Neck 1/17/20  Impression:     Focal convexity at left paramidline posterior nasopharynx with central area of relative hypodensity measuring 8 x 8 mm which may reflect postinflammatory type cyst or Thornwaldt cyst, however CT appearance is nonspecific and other considerations including mucosal space neoplasm remain a possibility.  ENT evaluation with biopsy if then warranted for diagnosis.     Asymmetric abnormally enhancing tissue right posterolateral nasopharynx at fossa of Rosenmuller measuring 9 x 7 x 9 mm raising possibility of mucosal space neoplasm. ENT evaluation with biopsy if then warranted for diagnosis.     4 mm well-defined nodule superficial aspect midportion right parotid gland unchanged as compared to CTA exam 07/09/2018 nonspecific by CT exam.       Assessment:  1. Dysphagia, unspecified type    2. Laryngopharyngeal reflux (LPR)    3. Chronic cough         In summary, the patient is a 55-year-old female who presents to the GI clinic in the setting of dysphagia.    The patient has ongoing dysphagia to solids and liquids in the setting of ongoing acid reflux.  Possible etiologies for the patient's symptoms include GERD, luminal obstruction, or esophageal motility abnormality.  I recommended we proceed with an upper endoscopy to evaluate for luminal mucosal disease.  I will obtain esophageal biopsies at that  time.  In addition, I have recommended she continue her acid suppressive regimen.  It is not clear to me why the patient has ongoing GERD like symptoms despite acid suppressive therapy.  She is obese which could be contributing to her ongoing symptoms.  Pending the results of her upper endoscopy we can further modify her acid suppressive regimen as needed.    Regarding the patient's irregular bowel movements, I recommended she begin an over-the-counter fiber supplement such as Metamucil, Citrucel or Benefiber.  Biopsies to rule out microscopic colitis could be obtained at the time of her colonoscopy.    Given that she is 55 and has not had a screening colonoscopy I have recommended a colonoscopy be done at the time of her upper endoscopy.    No follow-ups on file.      Order summary:         Thank you so much for allowing me to participate in the care of Trina Marie Collette Reinaldo J Quevedo, MD

## 2020-12-09 RX ORDER — HYDROXYZINE PAMOATE 25 MG/1
CAPSULE ORAL
Qty: 60 CAPSULE | Refills: 5 | Status: SHIPPED | OUTPATIENT
Start: 2020-12-09 | End: 2021-04-06 | Stop reason: SDUPTHER

## 2020-12-09 RX ORDER — LOSARTAN POTASSIUM 100 MG/1
TABLET ORAL
Qty: 90 TABLET | Refills: 1 | Status: SHIPPED | OUTPATIENT
Start: 2020-12-09 | End: 2021-04-06 | Stop reason: SDUPTHER

## 2020-12-09 RX ORDER — ZOLPIDEM TARTRATE 5 MG/1
TABLET ORAL
Qty: 30 TABLET | Refills: 2 | OUTPATIENT
Start: 2020-12-09

## 2020-12-09 RX ORDER — LOSARTAN POTASSIUM 100 MG/1
100 TABLET ORAL DAILY
Qty: 90 TABLET | Refills: 1 | Status: SHIPPED | OUTPATIENT
Start: 2020-12-09 | End: 2020-12-10 | Stop reason: SDUPTHER

## 2020-12-09 RX ORDER — AMLODIPINE BESYLATE 2.5 MG/1
TABLET ORAL
Qty: 90 TABLET | Refills: 3 | Status: SHIPPED | OUTPATIENT
Start: 2020-12-09 | End: 2021-04-06 | Stop reason: SDUPTHER

## 2020-12-09 RX ORDER — AMLODIPINE BESYLATE 2.5 MG/1
TABLET ORAL
Qty: 30 TABLET | Refills: 5 | Status: SHIPPED | OUTPATIENT
Start: 2020-12-09 | End: 2020-12-10 | Stop reason: SDUPTHER

## 2020-12-10 ENCOUNTER — TELEPHONE (OUTPATIENT)
Dept: PRIMARY CARE CLINIC | Facility: CLINIC | Age: 55
End: 2020-12-10

## 2020-12-10 DIAGNOSIS — G47.00 INSOMNIA, UNSPECIFIED TYPE: ICD-10-CM

## 2020-12-10 RX ORDER — AMLODIPINE BESYLATE 2.5 MG/1
TABLET ORAL
Qty: 30 TABLET | Refills: 5 | Status: SHIPPED | OUTPATIENT
Start: 2020-12-10 | End: 2021-04-06

## 2020-12-10 RX ORDER — LOSARTAN POTASSIUM 100 MG/1
100 TABLET ORAL DAILY
Qty: 90 TABLET | Refills: 1 | Status: SHIPPED | OUTPATIENT
Start: 2020-12-10 | End: 2021-04-06

## 2020-12-10 RX ORDER — ZOLPIDEM TARTRATE 5 MG/1
TABLET ORAL
Qty: 30 TABLET | Refills: 2 | OUTPATIENT
Start: 2020-12-10

## 2020-12-10 NOTE — TELEPHONE ENCOUNTER
Call tell patient control medications not refilled over the phone if patient desires can have a virtual visit refill of Ambien denied

## 2020-12-10 NOTE — TELEPHONE ENCOUNTER
Call tell pt OK refills Norvasc and losartan --butg ambien denied control meds not refilled over the phone can be seen virtually if needs refills ambien

## 2020-12-10 NOTE — TELEPHONE ENCOUNTER
I have refilled patient's losartan.  Please forward a request for Ambien 5 mg to her PCP and have her follow-up as soon as possible.  I have never seen the patient in cannot write for controlled substances.

## 2020-12-10 NOTE — TELEPHONE ENCOUNTER
----- Message from Evelyn Church sent at 12/10/2020 10:50 AM CST -----  Regarding: Pt self Mobile/Home [phone]  Caller is requesting an earlier appt than we can schedule.  Caller declined first available appointment listed below. Caller will not accept being placed on the wait list and is requesting a message be sent to the provider.  When is the next available appointment:  12/17/2020  Reason for the appointment:  Medications  Patient preference of timeframe to be scheduled:  As soon as possible  Comments:  Patient's would like to do a virtual visit

## 2020-12-11 ENCOUNTER — PATIENT MESSAGE (OUTPATIENT)
Dept: OTHER | Facility: OTHER | Age: 55
End: 2020-12-11

## 2020-12-11 ENCOUNTER — TELEPHONE (OUTPATIENT)
Dept: CARDIOLOGY | Facility: CLINIC | Age: 55
End: 2020-12-11

## 2020-12-11 NOTE — TELEPHONE ENCOUNTER
I called patient apologized that Mr. Celeste Muller will be out of office, I can reschedule appointment or offer an  appointment with Fredy Grace Editasaman on 12/15/2020.  Patient stated that she will call back to schedule or she will call [facility] to schedule.

## 2020-12-15 DIAGNOSIS — G47.00 INSOMNIA, UNSPECIFIED TYPE: ICD-10-CM

## 2020-12-15 RX ORDER — ZOLPIDEM TARTRATE 5 MG/1
TABLET ORAL
Qty: 30 TABLET | Refills: 2 | Status: CANCELLED | OUTPATIENT
Start: 2020-12-15

## 2020-12-15 NOTE — TELEPHONE ENCOUNTER
----- Message from Tessy Pond MA sent at 12/15/2020 11:18 AM CST -----  Contact: [phone]  Requesting an RX refill or new RX.  Is this a refill or new RX: RX name and strength:  zolpidem (AMBIEN) 5 MG Tab  Is this a 30 day or 90 day RX: 90  Pharmacy name and phone # (copy/paste from chart):    [id number] [place] - [address]  [address] [place] [address]  Phone: [phone] Fax: [phone]  Comments:

## 2020-12-16 ENCOUNTER — TELEPHONE (OUTPATIENT)
Dept: OTOLARYNGOLOGY | Facility: CLINIC | Age: 55
End: 2020-12-16

## 2020-12-16 NOTE — TELEPHONE ENCOUNTER
Returned pt call. Informed pt that provider is out of office  today and will inform provider about her call.        ----- Message from Matt Chu sent at 12/16/2020  2:27 PM CST -----  Regarding: Medication  Contact: COLLETTE, TRINA MARIE [[id number]]  Name of Who is Calling: COLLETTE, TRINA MARIE [[id number]]      What is the request in detail: would like to speak with staff in regards to needing a prescription for promethazine with codeine, sent to [id number] - [place], [place] - [address]      Can the clinic reply by [facility]: yes      What Number [facility] Back [facility]  [id number] - [place], [place] - [address]

## 2020-12-17 ENCOUNTER — OFFICE VISIT (OUTPATIENT)
Dept: PRIMARY CARE CLINIC | Facility: CLINIC | Age: 55
End: 2020-12-17
Payer: MEDICARE

## 2020-12-17 DIAGNOSIS — J40 BRONCHITIS: ICD-10-CM

## 2020-12-17 DIAGNOSIS — E55.9 VITAMIN D DEFICIENCY: Primary | ICD-10-CM

## 2020-12-17 DIAGNOSIS — D37.05 NEOPLASM OF UNCERTAIN BEHAVIOR OF NASOPHARYNX: ICD-10-CM

## 2020-12-17 DIAGNOSIS — E66.9 OBESITY (BMI 35.0-39.9 WITHOUT COMORBIDITY): ICD-10-CM

## 2020-12-17 DIAGNOSIS — E78.5 HYPERLIPIDEMIA, UNSPECIFIED HYPERLIPIDEMIA TYPE: ICD-10-CM

## 2020-12-17 DIAGNOSIS — G47.00 INSOMNIA, UNSPECIFIED TYPE: ICD-10-CM

## 2020-12-17 DIAGNOSIS — I10 ESSENTIAL HYPERTENSION: ICD-10-CM

## 2020-12-17 DIAGNOSIS — K21.9 LARYNGOPHARYNGEAL REFLUX (LPR): ICD-10-CM

## 2020-12-17 DIAGNOSIS — J98.01 BRONCHOSPASM: ICD-10-CM

## 2020-12-17 PROCEDURE — 99499 RISK ADDL DX/OHS AUDIT: ICD-10-PCS | Mod: 95,,, | Performed by: FAMILY MEDICINE

## 2020-12-17 PROCEDURE — 99499 UNLISTED E&M SERVICE: CPT | Mod: 95,,, | Performed by: FAMILY MEDICINE

## 2020-12-17 PROCEDURE — 99214 PR OFFICE/OUTPT VISIT, EST, LEVL IV, 30-39 MIN: ICD-10-PCS | Mod: 95,,, | Performed by: FAMILY MEDICINE

## 2020-12-17 PROCEDURE — 99214 OFFICE O/P EST MOD 30 MIN: CPT | Mod: 95,,, | Performed by: FAMILY MEDICINE

## 2020-12-17 RX ORDER — PROMETHAZINE HYDROCHLORIDE AND CODEINE PHOSPHATE 6.25; 1 MG/5ML; MG/5ML
5 SOLUTION ORAL EVERY 4 HOURS PRN
Qty: 240 ML | Refills: 0 | Status: SHIPPED | OUTPATIENT
Start: 2020-12-17 | End: 2020-12-27

## 2020-12-17 RX ORDER — CLOTRIMAZOLE AND BETAMETHASONE DIPROPIONATE 10; .64 MG/G; MG/G
CREAM TOPICAL 2 TIMES DAILY
Qty: 45 G | Refills: 1 | Status: SHIPPED | OUTPATIENT
Start: 2020-12-17 | End: 2021-04-06 | Stop reason: SDUPTHER

## 2020-12-17 RX ORDER — CLOTRIMAZOLE AND BETAMETHASONE DIPROPIONATE 10; .64 MG/G; MG/G
CREAM TOPICAL 2 TIMES DAILY
Qty: 45 G | Refills: 1 | Status: SHIPPED | OUTPATIENT
Start: 2020-12-17 | End: 2020-12-17

## 2020-12-17 RX ORDER — ZOLPIDEM TARTRATE 5 MG/1
TABLET ORAL
Qty: 30 TABLET | Refills: 2 | Status: SHIPPED | OUTPATIENT
Start: 2020-12-17 | End: 2021-04-06 | Stop reason: SDUPTHER

## 2020-12-17 RX ORDER — ZOLPIDEM TARTRATE 5 MG/1
TABLET ORAL
Qty: 30 TABLET | Refills: 2 | Status: SHIPPED | OUTPATIENT
Start: 2020-12-17 | End: 2020-12-17

## 2020-12-17 NOTE — PROGRESS NOTES
53 yo BF sees Dr Gama Rg on [facility]  ENT  --Has appt with  Pulmonary MD Nov 8 th sees pulmonary MD . Pt states needs good night sleep.   Subjective:       Patient ID: Trina Marie Collette is a 55 y.o. female.    Chief Complaint:   HPI: The patient location is:  home  The chief complaint leading to consultation is:  Insomnia skin rash    Visit type: audiovisual    Face to Face time with patient:  30 min  See history of present illness minutes of total time spent on the encounter, which includes face to face time and non-face to face time preparing to see the patient (eg, review of tests), Obtaining and/or reviewing separately obtained history, Documenting clinical information in the electronic or other health record, Independently interpreting results (not separately reported) and communicating results to the patient/family/caregiver, or Care coordination (not separately reported).         Each patient to whom he or she provides medical services by telemedicine is:  (1) informed of the relationship between the physician and patient and the respective role of any other health care provider with respect to management of the patient; and (2) notified that he or she may decline to receive medical services by telemedicine and may withdraw from such care at any time.    Notes:  55-year-old female--mammogram workup was benign--patient having problems with insomnia needs Ambien refilled      Eating weight but gaining weight--+BM--Ambulating has appoint with bariatric doctor for weight loss weight 240.  Has colonoscopy in EGD scheduled          ROS:  Skin: no psoriasis, eczema, skin cancer--rash on stomach no relief with Kenalog cream--will try Lotrisone if no relief help log  HEENT:Occas  headache, ocular pain, blurred vision, diplopia, epistaxis, hoarseness change in voice, thyroid trouble--history of tinnitus/chronic sinus problem/nasal septal deviation/hearing loss   Lung: No pneumonia, asthma, Tb, wheezing, SOB,  smoking 1/4 ppd trying to quit  --history of bronchospasm in the past uses albuterol  Heart: No chest pain, ankle edema, +occas palpitations, MI, river murmur, +hypertension blood pressure   + hyperlipidemia-no stent bypass arrhythmia  Abdomen: No nausea, vomiting, diarrhea, constipation, ulcers, hepatitis, gallbladder disease, melena, hematochezia, hematemesis patient complaining of heartburn has EGD colonoscopy scheduled  : no UTI, renal disease, stones  GYN last menstrual.  Years ago has mammogram yearly--refuses PAP smear --I don't fool with that    MS: no fractures, O/A, lupus, rheumatoid, gout--history of chronic pain the back lumbar spine, right knee come right shoulder, history DDD lumbar history cervical spinal stenosis  Neuro: No dizziness, LOC, seizures   No diabetes, no anemia, no anxiety, no depression patient with history of bipolar schizophrenia--doing well with medication goes to Mental Health   Single, one child, disabled, lives alone     Objective:   Physical Exam:   No physical exam was done this is a virtual visit the visit from below is from a prior office visit  General: Well nourished, well developed, no acute distress +overweight   Skin: No lesions  HEENT--eyes PERRLA EOM intact nose patent, throat nonerythematous ears TMs clear  NECK: Supple, no bruits, No JVD, no nodes  Lungs: Clear, no rales, rhonchi, wheezing   Heart: Regular rate and rhythm, no murmurs, gallops, or rubs  Abdomen: flat, bowel sounds positive, no tenderness, or organomegaly  MS:  Range of motion muscle strength overall intact  Neuro: Alert, CN intact, oriented X 3 still with some anxiety and depression of issues  Extremities: No cyanosis, clubbing, or edema         Assessment:       1. Vitamin D deficiency    2. Insomnia, unspecified type    3. Obesity (BMI 35.0-39.9 without comorbidity)    4. Neoplasm of uncertain behavior of nasopharynx    5. Hyperlipidemia, unspecified hyperlipidemia type    6. Essential hypertension     7. Bronchospasm    8. Bronchitis    9. Laryngopharyngeal reflux (LPR)        Plan:       Vitamin D deficiency    Insomnia, unspecified type  -     Discontinue: zolpidem (AMBIEN) 5 MG Tab; 1 po Q O[facility] p.r.n. sleep  Dispense: 30 tablet; Refill: 2  -     zolpidem (AMBIEN) 5 MG Tab; 1 po Q O[facility] p.r.n. sleep  Dispense: 30 tablet; Refill: 2    Obesity (BMI 35.0-39.9 without comorbidity)    Neoplasm of uncertain behavior of nasopharynx    Hyperlipidemia, unspecified hyperlipidemia type    Essential hypertension    Bronchospasm    Bronchitis    Laryngopharyngeal reflux (LPR)    Other orders  -     Discontinue: clotrimazole-betamethasone 1-0.05% (LOTRISONE) cream; Apply topically 2 (two) times daily.  Dispense: 45 g; Refill: 1  -     clotrimazole-betamethasone 1-0.05% (LOTRISONE) cream; Apply topically 2 (two) times daily.  Dispense: 45 g; Refill: 1        Breast--mammogram workup was negative benign no further workup needed  Insomnia needs refill of Ambien  Skin rash on abdomen will give Lotrisone cream had no relief with Kenalog if necessary can change to alot cream  Other medical issues see below  Muscle cramps hands and legs especially at night patient on gabapentin 800 mg---will check vitamin-D/magnesium calcium potassium--told to try quinine water--Robaxin 500 mg q.h.s. p.r.n. sleep muscle cream  Multiple medical issues  See below Skin rash--near the umbilicus on the belt line--Valisone cream b.i.d. probably from the metal in her blue jeans   or belt arti--told to try heart is nail on belt buckle or make sure when she buys belt arti do not have nickel in the  Hypertension blood pressure low-sodium diet exercise as tolerated trying get to ideal body weight Needs blood pressure be 140/90 or less and greater than 90/60 blood pressure 130/80 today    Pain Clinic-for back pain sees Dr Senior   Goes to mental health--bipolar and schizophrenic  Low-sodium diet/exercise/decrease weight /exercise/decrease weight  Needs to  DC smoking  Lab due muscle cramps--CBCs CMP lipids T4 TSH magnesium level  Can do Pap smear next visit--or see OBGYN   Health maintenance needs HIV colonoscopy pneumococcal vaccine shingles Pap smear flu shot

## 2020-12-18 ENCOUNTER — PATIENT OUTREACH (OUTPATIENT)
Dept: ADMINISTRATIVE | Facility: OTHER | Age: 55
End: 2020-12-18

## 2020-12-18 ENCOUNTER — TELEPHONE (OUTPATIENT)
Dept: OTOLARYNGOLOGY | Facility: CLINIC | Age: 55
End: 2020-12-18

## 2020-12-18 NOTE — TELEPHONE ENCOUNTER
Called and spoke with pt today about Rx for Phenergan with Codeine for cough per Dr. Ricardo states prescription has been sent to pharmacy. Patient states thanks so much.

## 2020-12-18 NOTE — TELEPHONE ENCOUNTER
----- Message from DWAYNE Ricardo MD sent at 12/17/2020  3:32 PM CST -----  Please fax prescription for Phenergan with codeine to patient's pharmacy.  Notify her that it has been sent  ----- Message -----  From: Valorie Kunz MA  Sent: 12/16/2020  10:23 AM CST  To: DWAYNE Ricardo MD      ----- Message -----  From: Lester Cormier  Sent: 12/16/2020  10:12 AM CST  To: Davion Parr Staff    Can the clinic reply in [facility]:     Please refill the medication(s) listed below. The patient can be reached at this phone number once it is called into the pharmacy.    Medication #1 promethazine codeine     Medication #2    Preferred Pharmacy: [facility] #[id number]  [place] - [address] [place] [address]

## 2020-12-18 NOTE — PROGRESS NOTES
LINKS immunization registry not responding  Care Everywhere updated  Health Maintenance updated  Chart reviewed for overdue Proactive [facility] Encounters (MARTY) health maintenance testing (CRS, Breast Ca, Diabetic Eye Exam)   Orders entered:N/A

## 2021-01-04 ENCOUNTER — PATIENT MESSAGE (OUTPATIENT)
Dept: ADMINISTRATIVE | Facility: HOSPITAL | Age: 56
End: 2021-01-04

## 2021-01-12 ENCOUNTER — HOSPITAL ENCOUNTER (OUTPATIENT)
Facility: HOSPITAL | Age: 56
Discharge: HOME | End: 2021-01-12
Attending: STUDENT IN AN ORGANIZED HEALTH CARE EDUCATION/TRAINING PROGRAM | Admitting: STUDENT IN AN ORGANIZED HEALTH CARE EDUCATION/TRAINING PROGRAM
Payer: MEDICARE

## 2021-01-12 ENCOUNTER — ANESTHESIA (OUTPATIENT)
Dept: ENDOSCOPY | Facility: HOSPITAL | Age: 56
End: 2021-01-12
Payer: MEDICARE

## 2021-01-12 ENCOUNTER — ANESTHESIA EVENT (OUTPATIENT)
Dept: ENDOSCOPY | Facility: HOSPITAL | Age: 56
End: 2021-01-12
Payer: MEDICARE

## 2021-01-12 VITALS
SYSTOLIC BLOOD PRESSURE: 163 MMHG | BODY MASS INDEX: 39.21 KG/M2 | RESPIRATION RATE: 18 BRPM | HEIGHT: 66 IN | TEMPERATURE: 98 F | HEART RATE: 66 BPM | WEIGHT: 244 LBS | DIASTOLIC BLOOD PRESSURE: 78 MMHG | OXYGEN SATURATION: 100 %

## 2021-01-12 DIAGNOSIS — R13.10 DYSPHAGIA, UNSPECIFIED TYPE: Primary | ICD-10-CM

## 2021-01-12 DIAGNOSIS — R13.10 DYSPHAGIA: ICD-10-CM

## 2021-01-12 PROCEDURE — 25000003 PHARM REV CODE 250: Performed by: STUDENT IN AN ORGANIZED HEALTH CARE EDUCATION/TRAINING PROGRAM

## 2021-01-12 PROCEDURE — 63600175 PHARM REV CODE 636 W HCPCS: Performed by: NURSE ANESTHETIST, CERTIFIED REGISTERED

## 2021-01-12 PROCEDURE — 45380 COLONOSCOPY AND BIOPSY: CPT | Performed by: STUDENT IN AN ORGANIZED HEALTH CARE EDUCATION/TRAINING PROGRAM

## 2021-01-12 PROCEDURE — 27201012 HC FORCEPS, HOT/COLD, DISP: Performed by: STUDENT IN AN ORGANIZED HEALTH CARE EDUCATION/TRAINING PROGRAM

## 2021-01-12 PROCEDURE — 45380 PR COLONOSCOPY,BIOPSY: ICD-10-PCS | Mod: PT,,, | Performed by: STUDENT IN AN ORGANIZED HEALTH CARE EDUCATION/TRAINING PROGRAM

## 2021-01-12 PROCEDURE — E9220 PRA ENDO ANESTHESIA: ICD-10-PCS | Mod: ,,, | Performed by: NURSE ANESTHETIST, CERTIFIED REGISTERED

## 2021-01-12 PROCEDURE — 88305 TISSUE EXAM BY PATHOLOGIST: CPT | Mod: 26,,, | Performed by: PATHOLOGY

## 2021-01-12 PROCEDURE — 45380 COLONOSCOPY AND BIOPSY: CPT | Mod: PT,,, | Performed by: STUDENT IN AN ORGANIZED HEALTH CARE EDUCATION/TRAINING PROGRAM

## 2021-01-12 PROCEDURE — 37000008 HC ANESTHESIA 1ST 15 MINUTES: Performed by: STUDENT IN AN ORGANIZED HEALTH CARE EDUCATION/TRAINING PROGRAM

## 2021-01-12 PROCEDURE — 43239 EGD BIOPSY SINGLE/MULTIPLE: CPT | Mod: 51,,, | Performed by: STUDENT IN AN ORGANIZED HEALTH CARE EDUCATION/TRAINING PROGRAM

## 2021-01-12 PROCEDURE — 43239 PR EGD, FLEX, W/BIOPSY, SGL/MULTI: ICD-10-PCS | Mod: 51,,, | Performed by: STUDENT IN AN ORGANIZED HEALTH CARE EDUCATION/TRAINING PROGRAM

## 2021-01-12 PROCEDURE — 88305 TISSUE EXAM BY PATHOLOGIST: ICD-10-PCS | Mod: 26,,, | Performed by: PATHOLOGY

## 2021-01-12 PROCEDURE — E9220 PRA ENDO ANESTHESIA: HCPCS | Mod: ,,, | Performed by: NURSE ANESTHETIST, CERTIFIED REGISTERED

## 2021-01-12 PROCEDURE — 43239 EGD BIOPSY SINGLE/MULTIPLE: CPT | Performed by: STUDENT IN AN ORGANIZED HEALTH CARE EDUCATION/TRAINING PROGRAM

## 2021-01-12 PROCEDURE — 37000009 HC ANESTHESIA EA ADD 15 MINS: Performed by: STUDENT IN AN ORGANIZED HEALTH CARE EDUCATION/TRAINING PROGRAM

## 2021-01-12 PROCEDURE — 88305 TISSUE EXAM BY PATHOLOGIST: CPT | Mod: 59 | Performed by: PATHOLOGY

## 2021-01-12 RX ORDER — SODIUM CHLORIDE 9 MG/ML
INJECTION, SOLUTION INTRAVENOUS CONTINUOUS
Status: DISCONTINUED | OUTPATIENT
Start: 2021-01-12 | End: 2021-01-12 | Stop reason: HOSPADM

## 2021-01-12 RX ORDER — PROPOFOL 10 MG/ML
INJECTION, EMULSION INTRAVENOUS
Status: DISCONTINUED | OUTPATIENT
Start: 2021-01-12 | End: 2021-01-12

## 2021-01-12 RX ORDER — LIDOCAINE HCL/PF 100 MG/5ML
SYRINGE (ML) INTRAVENOUS
Status: DISCONTINUED | OUTPATIENT
Start: 2021-01-12 | End: 2021-01-12

## 2021-01-12 RX ORDER — PROPOFOL 10 MG/ML
INJECTION, EMULSION INTRAVENOUS CONTINUOUS PRN
Status: DISCONTINUED | OUTPATIENT
Start: 2021-01-12 | End: 2021-01-12

## 2021-01-12 RX ADMIN — Medication 100 MG: at 10:01

## 2021-01-12 RX ADMIN — PROPOFOL 80 MG: 10 INJECTION, EMULSION INTRAVENOUS at 10:01

## 2021-01-12 RX ADMIN — PROPOFOL 20 MG: 10 INJECTION, EMULSION INTRAVENOUS at 10:01

## 2021-01-12 RX ADMIN — PROPOFOL 200 MCG/KG/MIN: 10 INJECTION, EMULSION INTRAVENOUS at 10:01

## 2021-01-12 RX ADMIN — SODIUM CHLORIDE: 0.9 INJECTION, SOLUTION INTRAVENOUS at 11:01

## 2021-01-12 RX ADMIN — SODIUM CHLORIDE 10 ML/HR: 0.9 INJECTION, SOLUTION INTRAVENOUS at 10:01

## 2021-01-19 ENCOUNTER — OFFICE VISIT (OUTPATIENT)
Dept: OTOLARYNGOLOGY | Facility: CLINIC | Age: 56
End: 2021-01-19
Payer: MEDICARE

## 2021-01-19 ENCOUNTER — TELEPHONE (OUTPATIENT)
Dept: OTOLARYNGOLOGY | Facility: CLINIC | Age: 56
End: 2021-01-19

## 2021-01-19 DIAGNOSIS — R13.10 DYSPHAGIA, UNSPECIFIED TYPE: Primary | ICD-10-CM

## 2021-01-19 DIAGNOSIS — J30.9 ALLERGIC RHINITIS, UNSPECIFIED SEASONALITY, UNSPECIFIED TRIGGER: ICD-10-CM

## 2021-01-19 DIAGNOSIS — K21.9 LARYNGOPHARYNGEAL REFLUX (LPR): ICD-10-CM

## 2021-01-19 DIAGNOSIS — R05.3 CHRONIC COUGH: ICD-10-CM

## 2021-01-19 DIAGNOSIS — Z01.812 PRE-PROCEDURE LAB EXAM: ICD-10-CM

## 2021-01-19 PROBLEM — K63.5 COLONIC POLYP: Status: ACTIVE | Noted: 2021-01-19

## 2021-01-19 PROBLEM — K57.90 DIVERTICULOSIS: Status: ACTIVE | Noted: 2021-01-19

## 2021-01-19 PROCEDURE — 99213 OFFICE O/P EST LOW 20 MIN: CPT | Mod: 95,,, | Performed by: SPECIALIST

## 2021-01-19 PROCEDURE — 99213 PR OFFICE/OUTPT VISIT, EST, LEVL III, 20-29 MIN: ICD-10-PCS | Mod: 95,,, | Performed by: SPECIALIST

## 2021-01-19 RX ORDER — PROMETHAZINE HYDROCHLORIDE AND CODEINE PHOSPHATE 6.25; 1 MG/5ML; MG/5ML
5 SOLUTION ORAL EVERY 4 HOURS PRN
Qty: 240 ML | Refills: 0 | Status: SHIPPED | OUTPATIENT
Start: 2021-01-19 | End: 2021-01-29

## 2021-01-21 LAB
FINAL PATHOLOGIC DIAGNOSIS: NORMAL
GROSS: NORMAL
Lab: NORMAL
MICROSCOPIC EXAM: NORMAL

## 2021-01-25 ENCOUNTER — PATIENT OUTREACH (OUTPATIENT)
Dept: ADMINISTRATIVE | Facility: OTHER | Age: 56
End: 2021-01-25

## 2021-01-26 ENCOUNTER — OFFICE VISIT (OUTPATIENT)
Dept: GASTROENTEROLOGY | Facility: CLINIC | Age: 56
End: 2021-01-26
Payer: MEDICARE

## 2021-01-26 DIAGNOSIS — R05.9 COUGH: ICD-10-CM

## 2021-01-26 DIAGNOSIS — R13.10 DYSPHAGIA, UNSPECIFIED TYPE: Primary | ICD-10-CM

## 2021-01-26 PROCEDURE — 99213 PR OFFICE/OUTPT VISIT, EST, LEVL III, 20-29 MIN: ICD-10-PCS | Mod: 95,,, | Performed by: STUDENT IN AN ORGANIZED HEALTH CARE EDUCATION/TRAINING PROGRAM

## 2021-01-26 PROCEDURE — 99213 OFFICE O/P EST LOW 20 MIN: CPT | Mod: 95,,, | Performed by: STUDENT IN AN ORGANIZED HEALTH CARE EDUCATION/TRAINING PROGRAM

## 2021-02-03 NOTE — TELEPHONE ENCOUNTER
----- Message from Ana Luisa Montana sent at 10/26/2018 11:53 AM CDT -----  Contact: Patient  Type: Needs Medical Advice    Who Called:  Elizabeth, patient  Symptoms (please be specific): N/A  How long has patient had these symptoms:  N/A  Pharmacy name and phone #:    [facility] [place] [place] - [address] AT [address] [place] [address]  [facility] [address]  Phone: [phone] Fax: [phone]  Best Call Back Number: [phone]  Additional Information: Calling to have all her prescriptions transferred to mail order. Please call her. Thanks.    
Is This A New Presentation, Or A Follow-Up?: Skin Lesions
Spoke with patient notified her that all maintenance meds sent to [facility]. States understanding  
How Severe Is Your Skin Lesion?: moderate

## 2021-02-17 PROBLEM — M53.3 SACRAL LESION: Status: ACTIVE | Noted: 2021-02-17

## 2021-02-17 PROBLEM — R91.1 PULMONARY NODULE: Status: ACTIVE | Noted: 2021-02-17

## 2021-02-18 ENCOUNTER — TELEPHONE (OUTPATIENT)
Dept: OTOLARYNGOLOGY | Facility: CLINIC | Age: 56
End: 2021-02-18

## 2021-02-19 ENCOUNTER — TELEPHONE (OUTPATIENT)
Dept: OTOLARYNGOLOGY | Facility: CLINIC | Age: 56
End: 2021-02-19

## 2021-02-19 ENCOUNTER — OFFICE VISIT (OUTPATIENT)
Dept: OTOLARYNGOLOGY | Facility: CLINIC | Age: 56
End: 2021-02-19
Payer: MEDICAID

## 2021-02-19 VITALS
HEIGHT: 66 IN | WEIGHT: 246.13 LBS | BODY MASS INDEX: 39.55 KG/M2 | HEART RATE: 82 BPM | TEMPERATURE: 98 F | DIASTOLIC BLOOD PRESSURE: 70 MMHG | SYSTOLIC BLOOD PRESSURE: 120 MMHG

## 2021-02-19 DIAGNOSIS — R09.82 PND (POST-NASAL DRIP): ICD-10-CM

## 2021-02-19 DIAGNOSIS — R13.10 DYSPHAGIA, UNSPECIFIED TYPE: ICD-10-CM

## 2021-02-19 DIAGNOSIS — J30.9 ALLERGIC RHINITIS, UNSPECIFIED SEASONALITY, UNSPECIFIED TRIGGER: ICD-10-CM

## 2021-02-19 DIAGNOSIS — H93.A1 PULSATILE TINNITUS OF RIGHT EAR: ICD-10-CM

## 2021-02-19 DIAGNOSIS — K21.9 LARYNGOPHARYNGEAL REFLUX (LPR): ICD-10-CM

## 2021-02-19 DIAGNOSIS — Z01.812 PRE-PROCEDURE LAB EXAM: ICD-10-CM

## 2021-02-19 DIAGNOSIS — R06.2 WHEEZING: ICD-10-CM

## 2021-02-19 DIAGNOSIS — J34.2 NASAL SEPTAL DEVIATION: ICD-10-CM

## 2021-02-19 DIAGNOSIS — R05.3 CHRONIC COUGH: Primary | ICD-10-CM

## 2021-02-19 PROCEDURE — 99214 PR OFFICE/OUTPT VISIT, EST, LEVL IV, 30-39 MIN: ICD-10-PCS | Mod: S$GLB,,, | Performed by: SPECIALIST

## 2021-02-19 PROCEDURE — 99999 PR PBB SHADOW E&M-EST. PATIENT-LVL V: ICD-10-PCS | Mod: PBBFAC,,, | Performed by: SPECIALIST

## 2021-02-19 PROCEDURE — 99499 UNLISTED E&M SERVICE: CPT | Mod: S$GLB,,, | Performed by: SPECIALIST

## 2021-02-19 PROCEDURE — 99499 RISK ADDL DX/OHS AUDIT: ICD-10-PCS | Mod: S$GLB,,, | Performed by: SPECIALIST

## 2021-02-19 PROCEDURE — 99214 OFFICE O/P EST MOD 30 MIN: CPT | Mod: S$GLB,,, | Performed by: SPECIALIST

## 2021-02-19 PROCEDURE — 99215 OFFICE O/P EST HI 40 MIN: CPT | Mod: PBBFAC,PN | Performed by: SPECIALIST

## 2021-02-19 PROCEDURE — 99999 PR PBB SHADOW E&M-EST. PATIENT-LVL V: CPT | Mod: PBBFAC,,, | Performed by: SPECIALIST

## 2021-02-19 RX ORDER — FAMOTIDINE 20 MG/1
20 TABLET, FILM COATED ORAL 2 TIMES DAILY
Qty: 60 TABLET | Refills: 11 | Status: SHIPPED | OUTPATIENT
Start: 2021-02-19 | End: 2021-04-06

## 2021-02-19 RX ORDER — PROMETHAZINE HYDROCHLORIDE AND CODEINE PHOSPHATE 6.25; 1 MG/5ML; MG/5ML
5 SOLUTION ORAL EVERY 4 HOURS PRN
Qty: 240 ML | Refills: 0 | Status: SHIPPED | OUTPATIENT
Start: 2021-02-19 | End: 2021-03-01

## 2021-02-19 RX ORDER — PROMETHAZINE HYDROCHLORIDE AND DEXTROMETHORPHAN HYDROBROMIDE 6.25; 15 MG/5ML; MG/5ML
1.25 SYRUP ORAL EVERY 4 HOURS PRN
Qty: 360 ML | Refills: 2 | Status: SHIPPED | OUTPATIENT
Start: 2021-02-19 | End: 2021-03-01

## 2021-03-12 ENCOUNTER — IMMUNIZATION (OUTPATIENT)
Dept: PRIMARY CARE CLINIC | Facility: CLINIC | Age: 56
End: 2021-03-12
Payer: MEDICAID

## 2021-03-12 DIAGNOSIS — Z23 NEED FOR VACCINATION: Primary | ICD-10-CM

## 2021-03-12 PROCEDURE — 91301 COVID-19, MRNA, LNP-S, PF, 100 MCG/0.5 ML DOSE VACCINE: ICD-10-PCS | Mod: S$GLB,,, | Performed by: EMERGENCY MEDICINE

## 2021-03-12 PROCEDURE — 91301 COVID-19, MRNA, LNP-S, PF, 100 MCG/0.5 ML DOSE VACCINE: CPT | Mod: S$GLB,,, | Performed by: EMERGENCY MEDICINE

## 2021-03-12 PROCEDURE — 0011A COVID-19, MRNA, LNP-S, PF, 100 MCG/0.5 ML DOSE VACCINE: ICD-10-PCS | Mod: CV19,S$GLB,, | Performed by: EMERGENCY MEDICINE

## 2021-03-12 PROCEDURE — 0011A COVID-19, MRNA, LNP-S, PF, 100 MCG/0.5 ML DOSE VACCINE: CPT | Mod: CV19,S$GLB,, | Performed by: EMERGENCY MEDICINE

## 2021-03-17 ENCOUNTER — PATIENT OUTREACH (OUTPATIENT)
Dept: ADMINISTRATIVE | Facility: OTHER | Age: 56
End: 2021-03-17

## 2021-03-18 ENCOUNTER — TELEPHONE (OUTPATIENT)
Dept: OTOLARYNGOLOGY | Facility: CLINIC | Age: 56
End: 2021-03-18

## 2021-03-18 ENCOUNTER — DOCUMENTATION ONLY (OUTPATIENT)
Dept: BARIATRICS | Facility: CLINIC | Age: 56
End: 2021-03-18

## 2021-03-19 ENCOUNTER — OFFICE VISIT (OUTPATIENT)
Dept: OTOLARYNGOLOGY | Facility: CLINIC | Age: 56
End: 2021-03-19
Payer: MEDICARE

## 2021-03-19 VITALS
WEIGHT: 247.38 LBS | SYSTOLIC BLOOD PRESSURE: 137 MMHG | BODY MASS INDEX: 39.76 KG/M2 | HEART RATE: 72 BPM | TEMPERATURE: 98 F | DIASTOLIC BLOOD PRESSURE: 80 MMHG | HEIGHT: 66 IN

## 2021-03-19 DIAGNOSIS — J30.9 ALLERGIC RHINITIS, UNSPECIFIED SEASONALITY, UNSPECIFIED TRIGGER: ICD-10-CM

## 2021-03-19 DIAGNOSIS — R09.89 CHOKING IN ADULT: ICD-10-CM

## 2021-03-19 DIAGNOSIS — K21.9 LARYNGOPHARYNGEAL REFLUX (LPR): ICD-10-CM

## 2021-03-19 DIAGNOSIS — Z13.89 SCREENING FOR MULTIPLE CONDITIONS: ICD-10-CM

## 2021-03-19 DIAGNOSIS — J45.21 MILD INTERMITTENT ASTHMA WITH ACUTE EXACERBATION: ICD-10-CM

## 2021-03-19 DIAGNOSIS — R13.10 DYSPHAGIA, UNSPECIFIED TYPE: Primary | ICD-10-CM

## 2021-03-19 DIAGNOSIS — H93.A1 PULSATILE TINNITUS OF RIGHT EAR: ICD-10-CM

## 2021-03-19 DIAGNOSIS — R05.3 CHRONIC COUGH: ICD-10-CM

## 2021-03-19 PROCEDURE — 31575 DIAGNOSTIC LARYNGOSCOPY: CPT | Mod: PBBFAC,PN | Performed by: SPECIALIST

## 2021-03-19 PROCEDURE — 99215 OFFICE O/P EST HI 40 MIN: CPT | Mod: PBBFAC,PN | Performed by: SPECIALIST

## 2021-03-19 PROCEDURE — 99499 UNLISTED E&M SERVICE: CPT | Mod: S$GLB,,, | Performed by: SPECIALIST

## 2021-03-19 PROCEDURE — 99999 PR PBB SHADOW E&M-EST. PATIENT-LVL V: ICD-10-PCS | Mod: PBBFAC,,, | Performed by: SPECIALIST

## 2021-03-19 PROCEDURE — 99214 OFFICE O/P EST MOD 30 MIN: CPT | Mod: 25,S$GLB,, | Performed by: SPECIALIST

## 2021-03-19 PROCEDURE — 99214 PR OFFICE/OUTPT VISIT, EST, LEVL IV, 30-39 MIN: ICD-10-PCS | Mod: 25,S$GLB,, | Performed by: SPECIALIST

## 2021-03-19 PROCEDURE — 99499 RISK ADDL DX/OHS AUDIT: ICD-10-PCS | Mod: S$GLB,,, | Performed by: SPECIALIST

## 2021-03-19 PROCEDURE — 99999 PR PBB SHADOW E&M-EST. PATIENT-LVL V: CPT | Mod: PBBFAC,,, | Performed by: SPECIALIST

## 2021-03-19 PROCEDURE — 31575 LARYNGOSCOPY: ICD-10-PCS | Mod: S$GLB,,, | Performed by: SPECIALIST

## 2021-03-22 ENCOUNTER — TELEPHONE (OUTPATIENT)
Dept: OTOLARYNGOLOGY | Facility: CLINIC | Age: 56
End: 2021-03-22

## 2021-03-22 RX ORDER — PROMETHAZINE HYDROCHLORIDE AND CODEINE PHOSPHATE 6.25; 1 MG/5ML; MG/5ML
5 SOLUTION ORAL EVERY 4 HOURS PRN
Qty: 210 ML | Refills: 0 | Status: SHIPPED | OUTPATIENT
Start: 2021-03-22 | End: 2021-04-01

## 2021-03-29 ENCOUNTER — TELEPHONE (OUTPATIENT)
Dept: BARIATRICS | Facility: CLINIC | Age: 56
End: 2021-03-29

## 2021-03-30 ENCOUNTER — TELEPHONE (OUTPATIENT)
Dept: INTERNAL MEDICINE | Facility: CLINIC | Age: 56
End: 2021-03-30

## 2021-04-05 ENCOUNTER — PATIENT MESSAGE (OUTPATIENT)
Dept: ADMINISTRATIVE | Facility: HOSPITAL | Age: 56
End: 2021-04-05

## 2021-04-05 ENCOUNTER — TELEPHONE (OUTPATIENT)
Dept: OTOLARYNGOLOGY | Facility: CLINIC | Age: 56
End: 2021-04-05

## 2021-04-06 ENCOUNTER — OFFICE VISIT (OUTPATIENT)
Dept: PRIMARY CARE CLINIC | Facility: CLINIC | Age: 56
End: 2021-04-06
Payer: MEDICARE

## 2021-04-06 ENCOUNTER — TELEPHONE (OUTPATIENT)
Dept: OTOLARYNGOLOGY | Facility: CLINIC | Age: 56
End: 2021-04-06

## 2021-04-06 VITALS
HEIGHT: 66 IN | WEIGHT: 247.81 LBS | OXYGEN SATURATION: 99 % | HEART RATE: 90 BPM | DIASTOLIC BLOOD PRESSURE: 76 MMHG | BODY MASS INDEX: 39.82 KG/M2 | RESPIRATION RATE: 18 BRPM | TEMPERATURE: 99 F | SYSTOLIC BLOOD PRESSURE: 114 MMHG

## 2021-04-06 DIAGNOSIS — E78.5 HYPERLIPIDEMIA, UNSPECIFIED HYPERLIPIDEMIA TYPE: ICD-10-CM

## 2021-04-06 DIAGNOSIS — I10 ESSENTIAL HYPERTENSION: Primary | ICD-10-CM

## 2021-04-06 DIAGNOSIS — E07.9 THYROID MASS: ICD-10-CM

## 2021-04-06 DIAGNOSIS — F31.9 BIPOLAR 1 DISORDER: ICD-10-CM

## 2021-04-06 DIAGNOSIS — H93.19 TINNITUS, UNSPECIFIED LATERALITY: ICD-10-CM

## 2021-04-06 DIAGNOSIS — M50.30 DDD (DEGENERATIVE DISC DISEASE), CERVICAL: ICD-10-CM

## 2021-04-06 DIAGNOSIS — Z79.891 LONG TERM (CURRENT) USE OF OPIATE ANALGESIC: ICD-10-CM

## 2021-04-06 DIAGNOSIS — G89.4 CHRONIC PAIN SYNDROME: ICD-10-CM

## 2021-04-06 DIAGNOSIS — E66.9 OBESITY (BMI 35.0-39.9 WITHOUT COMORBIDITY): ICD-10-CM

## 2021-04-06 DIAGNOSIS — Z72.0 TOBACCO ABUSE: ICD-10-CM

## 2021-04-06 DIAGNOSIS — K57.90 DIVERTICULOSIS: ICD-10-CM

## 2021-04-06 DIAGNOSIS — G47.00 INSOMNIA, UNSPECIFIED TYPE: ICD-10-CM

## 2021-04-06 DIAGNOSIS — J40 BRONCHITIS: ICD-10-CM

## 2021-04-06 DIAGNOSIS — S29.012A STRAIN OF THORACIC BACK REGION: ICD-10-CM

## 2021-04-06 DIAGNOSIS — L30.9 ECZEMA, UNSPECIFIED TYPE: ICD-10-CM

## 2021-04-06 DIAGNOSIS — H90.A31 MIXED CONDUCTIVE AND SENSORINEURAL HEARING LOSS OF RIGHT EAR WITH RESTRICTED HEARING OF LEFT EAR: ICD-10-CM

## 2021-04-06 DIAGNOSIS — H93.A1 PULSATILE TINNITUS OF RIGHT EAR: ICD-10-CM

## 2021-04-06 DIAGNOSIS — M54.50 LUMBAR SPINE PAIN: ICD-10-CM

## 2021-04-06 DIAGNOSIS — E55.9 VITAMIN D DEFICIENCY: ICD-10-CM

## 2021-04-06 DIAGNOSIS — F20.9 SCHIZOPHRENIA, UNSPECIFIED TYPE: ICD-10-CM

## 2021-04-06 DIAGNOSIS — J98.01 BRONCHOSPASM: ICD-10-CM

## 2021-04-06 DIAGNOSIS — R05.3 CHRONIC COUGH: ICD-10-CM

## 2021-04-06 LAB — GLUCOSE SERPL-MCNC: 105 MG/DL (ref 70–110)

## 2021-04-06 PROCEDURE — 3074F PR MOST RECENT SYSTOLIC BLOOD PRESSURE < 130 MM HG: ICD-10-PCS | Mod: CPTII,S$GLB,, | Performed by: FAMILY MEDICINE

## 2021-04-06 PROCEDURE — 1126F PR PAIN SEVERITY QUANTIFIED, NO PAIN PRESENT: ICD-10-PCS | Mod: S$GLB,,, | Performed by: FAMILY MEDICINE

## 2021-04-06 PROCEDURE — 99214 PR OFFICE/OUTPT VISIT, EST, LEVL IV, 30-39 MIN: ICD-10-PCS | Mod: S$GLB,,, | Performed by: FAMILY MEDICINE

## 2021-04-06 PROCEDURE — 3078F PR MOST RECENT DIASTOLIC BLOOD PRESSURE < 80 MM HG: ICD-10-PCS | Mod: CPTII,S$GLB,, | Performed by: FAMILY MEDICINE

## 2021-04-06 PROCEDURE — 3008F PR BODY MASS INDEX (BMI) DOCUMENTED: ICD-10-PCS | Mod: CPTII,S$GLB,, | Performed by: FAMILY MEDICINE

## 2021-04-06 PROCEDURE — 3078F DIAST BP <80 MM HG: CPT | Mod: CPTII,S$GLB,, | Performed by: FAMILY MEDICINE

## 2021-04-06 PROCEDURE — 99999 PR PBB SHADOW E&M-EST. PATIENT-LVL IV: CPT | Mod: PBBFAC,,, | Performed by: FAMILY MEDICINE

## 2021-04-06 PROCEDURE — 99499 UNLISTED E&M SERVICE: CPT | Mod: S$GLB,,, | Performed by: FAMILY MEDICINE

## 2021-04-06 PROCEDURE — 3008F BODY MASS INDEX DOCD: CPT | Mod: CPTII,S$GLB,, | Performed by: FAMILY MEDICINE

## 2021-04-06 PROCEDURE — 99214 OFFICE O/P EST MOD 30 MIN: CPT | Mod: S$GLB,,, | Performed by: FAMILY MEDICINE

## 2021-04-06 PROCEDURE — 3074F SYST BP LT 130 MM HG: CPT | Mod: CPTII,S$GLB,, | Performed by: FAMILY MEDICINE

## 2021-04-06 PROCEDURE — 99999 PR PBB SHADOW E&M-EST. PATIENT-LVL IV: ICD-10-PCS | Mod: PBBFAC,,, | Performed by: FAMILY MEDICINE

## 2021-04-06 PROCEDURE — 99499 RISK ADDL DX/OHS AUDIT: ICD-10-PCS | Mod: S$GLB,,, | Performed by: FAMILY MEDICINE

## 2021-04-06 PROCEDURE — 1126F AMNT PAIN NOTED NONE PRSNT: CPT | Mod: S$GLB,,, | Performed by: FAMILY MEDICINE

## 2021-04-06 RX ORDER — MELOXICAM 15 MG/1
15 TABLET ORAL DAILY
Qty: 10 TABLET | Refills: 0 | Status: SHIPPED | OUTPATIENT
Start: 2021-04-06 | End: 2021-07-06

## 2021-04-06 RX ORDER — ZIPRASIDONE HYDROCHLORIDE 40 MG/1
40 CAPSULE ORAL DAILY
Qty: 30 CAPSULE | Refills: 5 | Status: CANCELLED | OUTPATIENT
Start: 2021-04-06

## 2021-04-06 RX ORDER — ALBUTEROL SULFATE 0.83 MG/ML
2.5 SOLUTION RESPIRATORY (INHALATION) EVERY 4 HOURS PRN
Qty: 1 BOX | Refills: 1 | Status: SHIPPED | OUTPATIENT
Start: 2021-04-06 | End: 2021-11-18 | Stop reason: SDUPTHER

## 2021-04-06 RX ORDER — POTASSIUM CHLORIDE 750 MG/1
10 CAPSULE, EXTENDED RELEASE ORAL DAILY
Qty: 90 CAPSULE | Refills: 1 | Status: SHIPPED | OUTPATIENT
Start: 2021-04-06 | End: 2021-12-09

## 2021-04-06 RX ORDER — NAPROXEN 500 MG/1
500 TABLET ORAL 2 TIMES DAILY WITH MEALS
Qty: 30 TABLET | Refills: 0 | Status: CANCELLED | OUTPATIENT
Start: 2021-04-06

## 2021-04-06 RX ORDER — BUSPIRONE HYDROCHLORIDE 30 MG/1
30 TABLET ORAL 2 TIMES DAILY
Qty: 60 TABLET | Refills: 1 | Status: SHIPPED | OUTPATIENT
Start: 2021-04-06 | End: 2021-07-04

## 2021-04-06 RX ORDER — BETAMETHASONE VALERATE 1.2 MG/G
CREAM TOPICAL
Qty: 60 G | Refills: 2 | Status: SHIPPED | OUTPATIENT
Start: 2021-04-06 | End: 2022-03-21

## 2021-04-06 RX ORDER — AMLODIPINE BESYLATE 2.5 MG/1
TABLET ORAL
Qty: 90 TABLET | Refills: 3 | Status: SHIPPED | OUTPATIENT
Start: 2021-04-06 | End: 2022-02-02 | Stop reason: SDUPTHER

## 2021-04-06 RX ORDER — CODEINE PHOSPHATE AND GUAIFENESIN 10; 100 MG/5ML; MG/5ML
5 SOLUTION ORAL EVERY 6 HOURS PRN
Qty: 180 ML | Refills: 0 | Status: SHIPPED | OUTPATIENT
Start: 2021-04-06 | End: 2021-07-06

## 2021-04-06 RX ORDER — ERGOCALCIFEROL 1.25 MG/1
50000 CAPSULE ORAL
Qty: 12 CAPSULE | Refills: 3 | Status: SHIPPED | OUTPATIENT
Start: 2021-04-06 | End: 2021-12-08

## 2021-04-06 RX ORDER — MONTELUKAST SODIUM 10 MG/1
10 TABLET ORAL DAILY
Qty: 90 TABLET | Refills: 1 | Status: SHIPPED | OUTPATIENT
Start: 2021-04-06 | End: 2021-08-19

## 2021-04-06 RX ORDER — LOSARTAN POTASSIUM 100 MG/1
100 TABLET ORAL DAILY
Qty: 90 TABLET | Refills: 1 | Status: SHIPPED | OUTPATIENT
Start: 2021-04-06 | End: 2022-02-02 | Stop reason: SDUPTHER

## 2021-04-06 RX ORDER — SERTRALINE HYDROCHLORIDE 100 MG/1
100 TABLET, FILM COATED ORAL DAILY
Qty: 90 TABLET | Refills: 1 | Status: SHIPPED | OUTPATIENT
Start: 2021-04-06 | End: 2021-12-09

## 2021-04-06 RX ORDER — ZOLPIDEM TARTRATE 5 MG/1
TABLET ORAL
Qty: 30 TABLET | Refills: 2 | Status: SHIPPED | OUTPATIENT
Start: 2021-04-06 | End: 2021-07-06 | Stop reason: SDUPTHER

## 2021-04-06 RX ORDER — TIZANIDINE 4 MG/1
4 TABLET ORAL EVERY 8 HOURS
Qty: 30 TABLET | Refills: 0 | Status: SHIPPED | OUTPATIENT
Start: 2021-04-06 | End: 2021-07-06 | Stop reason: SDUPTHER

## 2021-04-06 RX ORDER — TRAZODONE HYDROCHLORIDE 100 MG/1
100 TABLET ORAL NIGHTLY PRN
Qty: 30 TABLET | Refills: 0 | Status: SHIPPED | OUTPATIENT
Start: 2021-04-06 | End: 2021-05-06

## 2021-04-06 RX ORDER — HYDROXYZINE PAMOATE 25 MG/1
CAPSULE ORAL
Qty: 60 CAPSULE | Refills: 5 | Status: SHIPPED | OUTPATIENT
Start: 2021-04-06 | End: 2021-06-29

## 2021-04-06 RX ORDER — ZIPRASIDONE HYDROCHLORIDE 80 MG/1
80 CAPSULE ORAL DAILY
Qty: 30 CAPSULE | Refills: 5 | Status: CANCELLED | OUTPATIENT
Start: 2021-04-06

## 2021-04-06 RX ORDER — CLOTRIMAZOLE AND BETAMETHASONE DIPROPIONATE 10; .64 MG/G; MG/G
CREAM TOPICAL 2 TIMES DAILY PRN
Qty: 15 G | Refills: 1 | Status: SHIPPED | OUTPATIENT
Start: 2021-04-06 | End: 2022-02-02 | Stop reason: SDUPTHER

## 2021-04-06 RX ORDER — ROSUVASTATIN CALCIUM 5 MG/1
5 TABLET, COATED ORAL DAILY
Qty: 90 TABLET | Refills: 1 | Status: SHIPPED | OUTPATIENT
Start: 2021-04-06 | End: 2021-08-19

## 2021-04-06 RX ORDER — TRIAMCINOLONE ACETONIDE 1 MG/G
CREAM TOPICAL 2 TIMES DAILY
Qty: 15 G | Refills: 2 | Status: CANCELLED | OUTPATIENT
Start: 2021-04-06

## 2021-04-06 RX ORDER — HYDROCODONE BITARTRATE AND ACETAMINOPHEN 7.5; 325 MG/1; MG/1
TABLET ORAL
Qty: 30 TABLET | Refills: 0 | Status: CANCELLED | OUTPATIENT
Start: 2021-04-06

## 2021-04-06 RX ORDER — GABAPENTIN 800 MG/1
800 TABLET ORAL 3 TIMES DAILY
Qty: 90 TABLET | Refills: 1 | Status: SHIPPED | OUTPATIENT
Start: 2021-04-06 | End: 2021-07-29

## 2021-04-06 RX ORDER — IPRATROPIUM BROMIDE 21 UG/1
2 SPRAY, METERED NASAL 2 TIMES DAILY
Qty: 30 ML | Refills: 11 | Status: SHIPPED | OUTPATIENT
Start: 2021-04-06 | End: 2022-02-02 | Stop reason: SDUPTHER

## 2021-04-06 RX ORDER — BUDESONIDE AND FORMOTEROL FUMARATE DIHYDRATE 160; 4.5 UG/1; UG/1
2 AEROSOL RESPIRATORY (INHALATION) EVERY 12 HOURS
Qty: 1 INHALER | Refills: 11 | Status: SHIPPED | OUTPATIENT
Start: 2021-04-06 | End: 2021-12-21 | Stop reason: SDUPTHER

## 2021-04-06 RX ORDER — DIPHENOXYLATE HYDROCHLORIDE AND ATROPINE SULFATE 2.5; .025 MG/1; MG/1
TABLET ORAL
Qty: 30 TABLET | Refills: 2 | Status: SHIPPED | OUTPATIENT
Start: 2021-04-06 | End: 2021-07-08

## 2021-04-09 ENCOUNTER — IMMUNIZATION (OUTPATIENT)
Dept: PRIMARY CARE CLINIC | Facility: CLINIC | Age: 56
End: 2021-04-09
Payer: MEDICARE

## 2021-04-09 DIAGNOSIS — Z23 NEED FOR VACCINATION: Primary | ICD-10-CM

## 2021-04-09 PROCEDURE — 0012A COVID-19, MRNA, LNP-S, PF, 100 MCG/0.5 ML DOSE VACCINE: CPT | Mod: PBBFAC,PN

## 2021-04-12 ENCOUNTER — TELEPHONE (OUTPATIENT)
Dept: OTOLARYNGOLOGY | Facility: CLINIC | Age: 56
End: 2021-04-12

## 2021-04-13 ENCOUNTER — TELEPHONE (OUTPATIENT)
Dept: OTOLARYNGOLOGY | Facility: CLINIC | Age: 56
End: 2021-04-13

## 2021-04-13 ENCOUNTER — TELEPHONE (OUTPATIENT)
Dept: PRIMARY CARE CLINIC | Facility: CLINIC | Age: 56
End: 2021-04-13

## 2021-04-22 ENCOUNTER — TELEPHONE (OUTPATIENT)
Dept: OTOLARYNGOLOGY | Facility: CLINIC | Age: 56
End: 2021-04-22

## 2021-04-23 RX ORDER — PROMETHAZINE HYDROCHLORIDE AND CODEINE PHOSPHATE 6.25; 1 MG/5ML; MG/5ML
5 SOLUTION ORAL EVERY 4 HOURS PRN
Qty: 210 ML | Refills: 0 | Status: SHIPPED | OUTPATIENT
Start: 2021-04-23 | End: 2021-05-03

## 2021-05-10 ENCOUNTER — TELEPHONE (OUTPATIENT)
Dept: PRIMARY CARE CLINIC | Facility: CLINIC | Age: 56
End: 2021-05-10

## 2021-05-10 DIAGNOSIS — M50.30 DDD (DEGENERATIVE DISC DISEASE), CERVICAL: Primary | ICD-10-CM

## 2021-05-10 DIAGNOSIS — M54.50 LUMBAR SPINE PAIN: ICD-10-CM

## 2021-05-19 ENCOUNTER — PATIENT OUTREACH (OUTPATIENT)
Dept: ADMINISTRATIVE | Facility: OTHER | Age: 56
End: 2021-05-19

## 2021-05-21 ENCOUNTER — OFFICE VISIT (OUTPATIENT)
Dept: OTOLARYNGOLOGY | Facility: CLINIC | Age: 56
End: 2021-05-21
Payer: MEDICARE

## 2021-05-21 VITALS
WEIGHT: 246.94 LBS | TEMPERATURE: 98 F | DIASTOLIC BLOOD PRESSURE: 88 MMHG | HEART RATE: 74 BPM | HEIGHT: 66 IN | SYSTOLIC BLOOD PRESSURE: 156 MMHG | BODY MASS INDEX: 39.69 KG/M2

## 2021-05-21 DIAGNOSIS — H93.A1 PULSATILE TINNITUS OF RIGHT EAR: ICD-10-CM

## 2021-05-21 DIAGNOSIS — R05.3 CHRONIC COUGH: ICD-10-CM

## 2021-05-21 DIAGNOSIS — J45.21 MILD INTERMITTENT ASTHMA WITH ACUTE EXACERBATION: ICD-10-CM

## 2021-05-21 DIAGNOSIS — R09.89 CHOKING IN ADULT: ICD-10-CM

## 2021-05-21 DIAGNOSIS — K21.9 LARYNGOPHARYNGEAL REFLUX (LPR): ICD-10-CM

## 2021-05-21 DIAGNOSIS — R13.10 DYSPHAGIA, UNSPECIFIED TYPE: Primary | ICD-10-CM

## 2021-05-21 PROCEDURE — 1125F PR PAIN SEVERITY QUANTIFIED, PAIN PRESENT: ICD-10-PCS | Mod: S$GLB,,, | Performed by: SPECIALIST

## 2021-05-21 PROCEDURE — 99999 PR PBB SHADOW E&M-EST. PATIENT-LVL V: CPT | Mod: PBBFAC,,, | Performed by: SPECIALIST

## 2021-05-21 PROCEDURE — 3008F PR BODY MASS INDEX (BMI) DOCUMENTED: ICD-10-PCS | Mod: CPTII,S$GLB,, | Performed by: SPECIALIST

## 2021-05-21 PROCEDURE — 3008F BODY MASS INDEX DOCD: CPT | Mod: CPTII,S$GLB,, | Performed by: SPECIALIST

## 2021-05-21 PROCEDURE — 99214 OFFICE O/P EST MOD 30 MIN: CPT | Mod: S$GLB,,, | Performed by: SPECIALIST

## 2021-05-21 PROCEDURE — 99214 PR OFFICE/OUTPT VISIT, EST, LEVL IV, 30-39 MIN: ICD-10-PCS | Mod: S$GLB,,, | Performed by: SPECIALIST

## 2021-05-21 PROCEDURE — 99999 PR PBB SHADOW E&M-EST. PATIENT-LVL V: ICD-10-PCS | Mod: PBBFAC,,, | Performed by: SPECIALIST

## 2021-05-21 PROCEDURE — 99499 RISK ADDL DX/OHS AUDIT: ICD-10-PCS | Mod: S$GLB,,, | Performed by: SPECIALIST

## 2021-05-21 PROCEDURE — 1125F AMNT PAIN NOTED PAIN PRSNT: CPT | Mod: S$GLB,,, | Performed by: SPECIALIST

## 2021-05-21 PROCEDURE — 99499 UNLISTED E&M SERVICE: CPT | Mod: S$GLB,,, | Performed by: SPECIALIST

## 2021-05-21 RX ORDER — PROMETHAZINE HYDROCHLORIDE AND CODEINE PHOSPHATE 6.25; 1 MG/5ML; MG/5ML
5 SOLUTION ORAL EVERY 4 HOURS PRN
Qty: 210 ML | Refills: 0 | Status: SHIPPED | OUTPATIENT
Start: 2021-05-21 | End: 2021-05-31

## 2021-05-21 RX ORDER — FAMOTIDINE 20 MG/1
20 TABLET, FILM COATED ORAL 2 TIMES DAILY
Qty: 60 TABLET | Refills: 11 | Status: SHIPPED | OUTPATIENT
Start: 2021-05-21 | End: 2022-05-31

## 2021-05-29 ENCOUNTER — PATIENT MESSAGE (OUTPATIENT)
Dept: ENDOSCOPY | Facility: HOSPITAL | Age: 56
End: 2021-05-29

## 2021-06-18 ENCOUNTER — PATIENT MESSAGE (OUTPATIENT)
Dept: OTOLARYNGOLOGY | Facility: CLINIC | Age: 56
End: 2021-06-18

## 2021-06-18 ENCOUNTER — TELEPHONE (OUTPATIENT)
Dept: OTOLARYNGOLOGY | Facility: CLINIC | Age: 56
End: 2021-06-18

## 2021-06-21 ENCOUNTER — TELEPHONE (OUTPATIENT)
Dept: OTOLARYNGOLOGY | Facility: CLINIC | Age: 56
End: 2021-06-21

## 2021-06-21 RX ORDER — PROMETHAZINE HYDROCHLORIDE AND CODEINE PHOSPHATE 6.25; 1 MG/5ML; MG/5ML
5 SOLUTION ORAL EVERY 4 HOURS PRN
Qty: 210 ML | Refills: 0 | Status: SHIPPED | OUTPATIENT
Start: 2021-06-21 | End: 2021-07-01

## 2021-06-22 ENCOUNTER — TELEPHONE (OUTPATIENT)
Dept: OTOLARYNGOLOGY | Facility: CLINIC | Age: 56
End: 2021-06-22

## 2021-06-22 RX ORDER — PROMETHAZINE HYDROCHLORIDE AND CODEINE PHOSPHATE 6.25; 1 MG/5ML; MG/5ML
SOLUTION ORAL
Qty: 210 ML | Refills: 0 | OUTPATIENT
Start: 2021-06-22

## 2021-06-29 ENCOUNTER — TELEPHONE (OUTPATIENT)
Dept: OTOLARYNGOLOGY | Facility: CLINIC | Age: 56
End: 2021-06-29

## 2021-07-06 ENCOUNTER — OFFICE VISIT (OUTPATIENT)
Dept: PRIMARY CARE CLINIC | Facility: CLINIC | Age: 56
End: 2021-07-06
Payer: MEDICARE

## 2021-07-06 ENCOUNTER — PATIENT MESSAGE (OUTPATIENT)
Dept: ADMINISTRATIVE | Facility: HOSPITAL | Age: 56
End: 2021-07-06

## 2021-07-06 VITALS
HEIGHT: 66 IN | HEART RATE: 68 BPM | OXYGEN SATURATION: 98 % | WEIGHT: 248.88 LBS | DIASTOLIC BLOOD PRESSURE: 80 MMHG | RESPIRATION RATE: 16 BRPM | BODY MASS INDEX: 40 KG/M2 | SYSTOLIC BLOOD PRESSURE: 132 MMHG

## 2021-07-06 DIAGNOSIS — L30.9 ECZEMA, UNSPECIFIED TYPE: ICD-10-CM

## 2021-07-06 DIAGNOSIS — R91.1 PULMONARY NODULE: ICD-10-CM

## 2021-07-06 DIAGNOSIS — F20.9 SCHIZOPHRENIA, UNSPECIFIED TYPE: ICD-10-CM

## 2021-07-06 DIAGNOSIS — R13.10 DYSPHAGIA, UNSPECIFIED TYPE: ICD-10-CM

## 2021-07-06 DIAGNOSIS — R25.2 MUSCLE CRAMPS AT NIGHT: ICD-10-CM

## 2021-07-06 DIAGNOSIS — G89.4 CHRONIC PAIN SYNDROME: ICD-10-CM

## 2021-07-06 DIAGNOSIS — E78.5 HYPERLIPIDEMIA, UNSPECIFIED HYPERLIPIDEMIA TYPE: ICD-10-CM

## 2021-07-06 DIAGNOSIS — K57.90 DIVERTICULOSIS: ICD-10-CM

## 2021-07-06 DIAGNOSIS — H90.A31 MIXED CONDUCTIVE AND SENSORINEURAL HEARING LOSS OF RIGHT EAR WITH RESTRICTED HEARING OF LEFT EAR: ICD-10-CM

## 2021-07-06 DIAGNOSIS — Z72.0 TOBACCO ABUSE: Primary | ICD-10-CM

## 2021-07-06 DIAGNOSIS — F31.9 BIPOLAR 1 DISORDER: ICD-10-CM

## 2021-07-06 DIAGNOSIS — E55.9 VITAMIN D DEFICIENCY: ICD-10-CM

## 2021-07-06 DIAGNOSIS — J98.01 BRONCHOSPASM: ICD-10-CM

## 2021-07-06 DIAGNOSIS — G47.00 INSOMNIA, UNSPECIFIED TYPE: ICD-10-CM

## 2021-07-06 PROCEDURE — 3075F PR MOST RECENT SYSTOLIC BLOOD PRESS GE 130-139MM HG: ICD-10-PCS | Mod: CPTII,S$GLB,, | Performed by: FAMILY MEDICINE

## 2021-07-06 PROCEDURE — 99999 PR PBB SHADOW E&M-EST. PATIENT-LVL III: CPT | Mod: PBBFAC,,, | Performed by: FAMILY MEDICINE

## 2021-07-06 PROCEDURE — 3079F DIAST BP 80-89 MM HG: CPT | Mod: CPTII,S$GLB,, | Performed by: FAMILY MEDICINE

## 2021-07-06 PROCEDURE — 1126F PR PAIN SEVERITY QUANTIFIED, NO PAIN PRESENT: ICD-10-PCS | Mod: CPTII,S$GLB,, | Performed by: FAMILY MEDICINE

## 2021-07-06 PROCEDURE — 99499 UNLISTED E&M SERVICE: CPT | Mod: S$GLB,,, | Performed by: FAMILY MEDICINE

## 2021-07-06 PROCEDURE — 3079F PR MOST RECENT DIASTOLIC BLOOD PRESSURE 80-89 MM HG: ICD-10-PCS | Mod: CPTII,S$GLB,, | Performed by: FAMILY MEDICINE

## 2021-07-06 PROCEDURE — 99999 PR PBB SHADOW E&M-EST. PATIENT-LVL III: ICD-10-PCS | Mod: PBBFAC,,, | Performed by: FAMILY MEDICINE

## 2021-07-06 PROCEDURE — 1126F AMNT PAIN NOTED NONE PRSNT: CPT | Mod: CPTII,S$GLB,, | Performed by: FAMILY MEDICINE

## 2021-07-06 PROCEDURE — 3008F PR BODY MASS INDEX (BMI) DOCUMENTED: ICD-10-PCS | Mod: CPTII,S$GLB,, | Performed by: FAMILY MEDICINE

## 2021-07-06 PROCEDURE — 99214 OFFICE O/P EST MOD 30 MIN: CPT | Mod: S$GLB,,, | Performed by: FAMILY MEDICINE

## 2021-07-06 PROCEDURE — 3008F BODY MASS INDEX DOCD: CPT | Mod: CPTII,S$GLB,, | Performed by: FAMILY MEDICINE

## 2021-07-06 PROCEDURE — 3075F SYST BP GE 130 - 139MM HG: CPT | Mod: CPTII,S$GLB,, | Performed by: FAMILY MEDICINE

## 2021-07-06 PROCEDURE — 99499 RISK ADDL DX/OHS AUDIT: ICD-10-PCS | Mod: S$GLB,,, | Performed by: FAMILY MEDICINE

## 2021-07-06 PROCEDURE — 99214 PR OFFICE/OUTPT VISIT, EST, LEVL IV, 30-39 MIN: ICD-10-PCS | Mod: S$GLB,,, | Performed by: FAMILY MEDICINE

## 2021-07-06 RX ORDER — TIZANIDINE 4 MG/1
4 TABLET ORAL EVERY 8 HOURS
Qty: 30 TABLET | Refills: 2 | Status: SHIPPED | OUTPATIENT
Start: 2021-07-06 | End: 2021-12-08 | Stop reason: SDUPTHER

## 2021-07-06 RX ORDER — ZOLPIDEM TARTRATE 5 MG/1
TABLET ORAL
Qty: 30 TABLET | Refills: 2 | Status: SHIPPED | OUTPATIENT
Start: 2021-07-06 | End: 2021-10-26 | Stop reason: SDUPTHER

## 2021-07-06 RX ORDER — PROMETHAZINE HYDROCHLORIDE AND CODEINE PHOSPHATE 6.25; 1 MG/5ML; MG/5ML
5 SOLUTION ORAL EVERY 6 HOURS PRN
Qty: 180 ML | Refills: 0 | Status: SHIPPED | OUTPATIENT
Start: 2021-07-06 | End: 2021-07-23

## 2021-07-17 RX ORDER — PROMETHAZINE HYDROCHLORIDE AND CODEINE PHOSPHATE 6.25; 1 MG/5ML; MG/5ML
5 SOLUTION ORAL EVERY 6 HOURS PRN
Qty: 180 ML | Refills: 0 | OUTPATIENT
Start: 2021-07-17

## 2021-07-21 ENCOUNTER — TELEPHONE (OUTPATIENT)
Dept: PRIMARY CARE CLINIC | Facility: CLINIC | Age: 56
End: 2021-07-21

## 2021-07-23 ENCOUNTER — OFFICE VISIT (OUTPATIENT)
Dept: OTOLARYNGOLOGY | Facility: CLINIC | Age: 56
End: 2021-07-23
Payer: MEDICARE

## 2021-07-23 VITALS
HEART RATE: 64 BPM | TEMPERATURE: 98 F | SYSTOLIC BLOOD PRESSURE: 135 MMHG | WEIGHT: 248.44 LBS | BODY MASS INDEX: 40.1 KG/M2 | DIASTOLIC BLOOD PRESSURE: 74 MMHG

## 2021-07-23 DIAGNOSIS — K21.9 LARYNGOPHARYNGEAL REFLUX (LPR): ICD-10-CM

## 2021-07-23 DIAGNOSIS — R09.89 CHOKING IN ADULT: ICD-10-CM

## 2021-07-23 DIAGNOSIS — J34.2 NASAL SEPTAL DEVIATION: ICD-10-CM

## 2021-07-23 DIAGNOSIS — R13.10 DYSPHAGIA, UNSPECIFIED TYPE: Primary | ICD-10-CM

## 2021-07-23 DIAGNOSIS — R05.3 CHRONIC COUGH: ICD-10-CM

## 2021-07-23 DIAGNOSIS — J30.9 ALLERGIC RHINITIS, UNSPECIFIED SEASONALITY, UNSPECIFIED TRIGGER: ICD-10-CM

## 2021-07-23 PROCEDURE — 31575 LARYNGOSCOPY: ICD-10-PCS | Mod: S$GLB,,, | Performed by: SPECIALIST

## 2021-07-23 PROCEDURE — 99999 PR PBB SHADOW E&M-EST. PATIENT-LVL IV: ICD-10-PCS | Mod: PBBFAC,,, | Performed by: SPECIALIST

## 2021-07-23 PROCEDURE — 99214 OFFICE O/P EST MOD 30 MIN: CPT | Mod: PBBFAC,PN,25 | Performed by: SPECIALIST

## 2021-07-23 PROCEDURE — 31575 DIAGNOSTIC LARYNGOSCOPY: CPT | Mod: PBBFAC,PN | Performed by: SPECIALIST

## 2021-07-23 PROCEDURE — 99214 PR OFFICE/OUTPT VISIT, EST, LEVL IV, 30-39 MIN: ICD-10-PCS | Mod: 25,S$GLB,, | Performed by: SPECIALIST

## 2021-07-23 PROCEDURE — 99499 RISK ADDL DX/OHS AUDIT: ICD-10-PCS | Mod: S$GLB,,, | Performed by: SPECIALIST

## 2021-07-23 PROCEDURE — 99999 PR PBB SHADOW E&M-EST. PATIENT-LVL IV: CPT | Mod: PBBFAC,,, | Performed by: SPECIALIST

## 2021-07-23 PROCEDURE — 99499 UNLISTED E&M SERVICE: CPT | Mod: S$GLB,,, | Performed by: SPECIALIST

## 2021-07-23 PROCEDURE — 99214 OFFICE O/P EST MOD 30 MIN: CPT | Mod: 25,S$GLB,, | Performed by: SPECIALIST

## 2021-07-23 RX ORDER — PROMETHAZINE HYDROCHLORIDE AND CODEINE PHOSPHATE 6.25; 1 MG/5ML; MG/5ML
5 SOLUTION ORAL EVERY 4 HOURS PRN
Qty: 210 ML | Refills: 0 | Status: SHIPPED | OUTPATIENT
Start: 2021-07-23 | End: 2021-08-02

## 2021-08-17 ENCOUNTER — TELEPHONE (OUTPATIENT)
Dept: PRIMARY CARE CLINIC | Facility: CLINIC | Age: 56
End: 2021-08-17

## 2021-08-19 ENCOUNTER — OFFICE VISIT (OUTPATIENT)
Dept: PRIMARY CARE CLINIC | Facility: CLINIC | Age: 56
End: 2021-08-19
Payer: MEDICARE

## 2021-08-19 DIAGNOSIS — I10 ESSENTIAL HYPERTENSION: ICD-10-CM

## 2021-08-19 DIAGNOSIS — K57.90 DIVERTICULOSIS: ICD-10-CM

## 2021-08-19 DIAGNOSIS — L30.9 ECZEMA, UNSPECIFIED TYPE: ICD-10-CM

## 2021-08-19 DIAGNOSIS — I12.9 HYPERTENSIVE CHRONIC KIDNEY DISEASE WITH STAGE 1 THROUGH STAGE 4 CHRONIC KIDNEY DISEASE, OR UNSPECIFIED CHRONIC KIDNEY DISEASE: ICD-10-CM

## 2021-08-19 DIAGNOSIS — R63.5 WEIGHT GAIN: Primary | ICD-10-CM

## 2021-08-19 DIAGNOSIS — E55.9 VITAMIN D DEFICIENCY: ICD-10-CM

## 2021-08-19 DIAGNOSIS — E66.9 OBESITY (BMI 35.0-39.9 WITHOUT COMORBIDITY): ICD-10-CM

## 2021-08-19 DIAGNOSIS — E78.5 HYPERLIPIDEMIA, UNSPECIFIED HYPERLIPIDEMIA TYPE: ICD-10-CM

## 2021-08-19 PROCEDURE — 3044F HG A1C LEVEL LT 7.0%: CPT | Mod: CPTII,95,, | Performed by: FAMILY MEDICINE

## 2021-08-19 PROCEDURE — 3044F PR MOST RECENT HEMOGLOBIN A1C LEVEL <7.0%: ICD-10-PCS | Mod: CPTII,95,, | Performed by: FAMILY MEDICINE

## 2021-08-19 PROCEDURE — 99443 PR PHYSICIAN TELEPHONE EVALUATION 21-30 MIN: CPT | Mod: 95,,, | Performed by: FAMILY MEDICINE

## 2021-08-19 PROCEDURE — 99443 PR PHYSICIAN TELEPHONE EVALUATION 21-30 MIN: ICD-10-PCS | Mod: 95,,, | Performed by: FAMILY MEDICINE

## 2021-08-23 ENCOUNTER — TELEPHONE (OUTPATIENT)
Dept: OTOLARYNGOLOGY | Facility: CLINIC | Age: 56
End: 2021-08-23

## 2021-08-23 RX ORDER — PROMETHAZINE HYDROCHLORIDE AND CODEINE PHOSPHATE 6.25; 1 MG/5ML; MG/5ML
5 SOLUTION ORAL EVERY 4 HOURS PRN
Qty: 210 ML | Refills: 0 | Status: SHIPPED | OUTPATIENT
Start: 2021-08-23 | End: 2021-09-02

## 2021-09-17 ENCOUNTER — TELEPHONE (OUTPATIENT)
Dept: OTOLARYNGOLOGY | Facility: CLINIC | Age: 56
End: 2021-09-17

## 2021-09-23 ENCOUNTER — OFFICE VISIT (OUTPATIENT)
Dept: OTOLARYNGOLOGY | Facility: CLINIC | Age: 56
End: 2021-09-23
Payer: MEDICARE

## 2021-09-23 VITALS
BODY MASS INDEX: 40.33 KG/M2 | WEIGHT: 249.88 LBS | TEMPERATURE: 98 F | SYSTOLIC BLOOD PRESSURE: 143 MMHG | DIASTOLIC BLOOD PRESSURE: 80 MMHG | HEART RATE: 73 BPM

## 2021-09-23 DIAGNOSIS — H93.A1 PULSATILE TINNITUS OF RIGHT EAR: Primary | ICD-10-CM

## 2021-09-23 DIAGNOSIS — J30.9 ALLERGIC RHINITIS, UNSPECIFIED SEASONALITY, UNSPECIFIED TRIGGER: ICD-10-CM

## 2021-09-23 DIAGNOSIS — R13.10 DYSPHAGIA, UNSPECIFIED TYPE: ICD-10-CM

## 2021-09-23 DIAGNOSIS — J34.2 NASAL SEPTAL DEVIATION: ICD-10-CM

## 2021-09-23 DIAGNOSIS — K21.9 LARYNGOPHARYNGEAL REFLUX (LPR): ICD-10-CM

## 2021-09-23 DIAGNOSIS — R05.3 CHRONIC COUGH: ICD-10-CM

## 2021-09-23 DIAGNOSIS — J45.21 MILD INTERMITTENT ASTHMA WITH ACUTE EXACERBATION: ICD-10-CM

## 2021-09-23 DIAGNOSIS — G93.2 PSEUDOTUMOR CEREBRI SYNDROME: ICD-10-CM

## 2021-09-23 PROCEDURE — 99499 RISK ADDL DX/OHS AUDIT: ICD-10-PCS | Mod: S$GLB,,, | Performed by: SPECIALIST

## 2021-09-23 PROCEDURE — 99215 OFFICE O/P EST HI 40 MIN: CPT | Mod: PBBFAC,PN | Performed by: SPECIALIST

## 2021-09-23 PROCEDURE — 99214 PR OFFICE/OUTPT VISIT, EST, LEVL IV, 30-39 MIN: ICD-10-PCS | Mod: S$GLB,,, | Performed by: SPECIALIST

## 2021-09-23 PROCEDURE — 99999 PR PBB SHADOW E&M-EST. PATIENT-LVL V: ICD-10-PCS | Mod: PBBFAC,,, | Performed by: SPECIALIST

## 2021-09-23 PROCEDURE — 99999 PR PBB SHADOW E&M-EST. PATIENT-LVL V: CPT | Mod: PBBFAC,,, | Performed by: SPECIALIST

## 2021-09-23 PROCEDURE — 99214 OFFICE O/P EST MOD 30 MIN: CPT | Mod: S$GLB,,, | Performed by: SPECIALIST

## 2021-09-23 PROCEDURE — 99499 UNLISTED E&M SERVICE: CPT | Mod: S$GLB,,, | Performed by: SPECIALIST

## 2021-09-23 RX ORDER — PROMETHAZINE HYDROCHLORIDE AND CODEINE PHOSPHATE 6.25; 1 MG/5ML; MG/5ML
5 SOLUTION ORAL EVERY 4 HOURS PRN
Qty: 210 ML | Refills: 0 | Status: SHIPPED | OUTPATIENT
Start: 2021-09-23 | End: 2021-10-03

## 2021-09-23 NOTE — TELEPHONE ENCOUNTER
----- Message from Toña Warren sent at 11/19/2018  1:27 PM CST -----  Contact: Self  Patient is requesting to speak to the nurse     Please call back [phone] (home)     
74 male with BPH, presenting with RLE prox femoral fracture. Recently diagnosed with prostate Ca    Femoral fracture- s/p hip repair post op day #1  ortho following  - pt consult pending     BPH/Prostate Ca with suspected metastatic bony involvement  Continue Bicalutamide 50 mg daily    tamsulosin, approved by oncology  hold finasteride given cancer  - patient is requesting bone scan total body - ordered    
MRI of cervical spine showed multilevel cervical degenerative disc disease with foraminal and central canal stenosis.  I can refer patient to Dr. Perea for interventional pain management if she would like, or she can follow up with Dr. Brock to discuss further.  
Please review patients results of MRI Cspine from out side of Epic   
Spoke with patient, MRI results given. Patient verbalized understanding. Patient has seen Dr. Perea in the past. Patient will call to schedule an appointment. Verbalized understanding.   
74 male with BPH, presenting with RLE prox femoral fracture. Recently diagnosed with prostate Ca    Plan       Transfer to Medicine from Trauma service for further management     Femoral fracture  plan for OR today  ORIF per Ortho  DVT ppx once cleared by ortho for AC    BPH/Prostate Ca with suspected metastatic bony involvement  Continue Bicalutamide 50 mg daily   Finasteride / tamsulosin  D/w oncology. OK to continue Bicalutamide. Will need to determine pts Oncologist and clarify further w/u plan (pt believes his Orthopedic MD is also managing his prostate Ca)      DVT ppx : Pending ortho clearance   Full code

## 2021-09-24 ENCOUNTER — TELEPHONE (OUTPATIENT)
Dept: OTOLARYNGOLOGY | Facility: CLINIC | Age: 56
End: 2021-09-24

## 2021-09-27 ENCOUNTER — TELEPHONE (OUTPATIENT)
Dept: NEUROSURGERY | Facility: CLINIC | Age: 56
End: 2021-09-27

## 2021-10-05 ENCOUNTER — PATIENT MESSAGE (OUTPATIENT)
Dept: ADMINISTRATIVE | Facility: HOSPITAL | Age: 56
End: 2021-10-05

## 2021-10-15 ENCOUNTER — TELEPHONE (OUTPATIENT)
Dept: NEUROSURGERY | Facility: CLINIC | Age: 56
End: 2021-10-15

## 2021-10-19 DIAGNOSIS — G47.00 INSOMNIA, UNSPECIFIED TYPE: ICD-10-CM

## 2021-10-20 ENCOUNTER — TELEPHONE (OUTPATIENT)
Dept: PRIMARY CARE CLINIC | Facility: CLINIC | Age: 56
End: 2021-10-20

## 2021-10-20 RX ORDER — ZOLPIDEM TARTRATE 5 MG/1
TABLET ORAL
Qty: 30 TABLET | Refills: 2 | OUTPATIENT
Start: 2021-10-20

## 2021-10-21 DIAGNOSIS — G47.00 INSOMNIA, UNSPECIFIED TYPE: ICD-10-CM

## 2021-10-21 RX ORDER — ZOLPIDEM TARTRATE 5 MG/1
TABLET ORAL
Qty: 30 TABLET | Refills: 2 | Status: CANCELLED | OUTPATIENT
Start: 2021-10-21

## 2021-10-22 ENCOUNTER — TELEPHONE (OUTPATIENT)
Dept: OTOLARYNGOLOGY | Facility: CLINIC | Age: 56
End: 2021-10-22

## 2021-10-22 RX ORDER — PROMETHAZINE HYDROCHLORIDE AND CODEINE PHOSPHATE 6.25; 1 MG/5ML; MG/5ML
5 SOLUTION ORAL EVERY 4 HOURS PRN
Qty: 210 ML | Refills: 0 | Status: SHIPPED | OUTPATIENT
Start: 2021-10-22 | End: 2021-11-01

## 2021-10-26 ENCOUNTER — OFFICE VISIT (OUTPATIENT)
Dept: PRIMARY CARE CLINIC | Facility: CLINIC | Age: 56
End: 2021-10-26
Payer: MEDICARE

## 2021-10-26 ENCOUNTER — TELEPHONE (OUTPATIENT)
Dept: PRIMARY CARE CLINIC | Facility: CLINIC | Age: 56
End: 2021-10-26
Payer: MEDICARE

## 2021-10-26 DIAGNOSIS — F31.9 BIPOLAR 1 DISORDER: ICD-10-CM

## 2021-10-26 DIAGNOSIS — F20.9 SCHIZOPHRENIA, UNSPECIFIED TYPE: ICD-10-CM

## 2021-10-26 DIAGNOSIS — R05.3 CHRONIC COUGH: ICD-10-CM

## 2021-10-26 DIAGNOSIS — M50.30 DDD (DEGENERATIVE DISC DISEASE), CERVICAL: Primary | ICD-10-CM

## 2021-10-26 DIAGNOSIS — E78.5 HYPERLIPIDEMIA, UNSPECIFIED HYPERLIPIDEMIA TYPE: ICD-10-CM

## 2021-10-26 DIAGNOSIS — Z12.31 VISIT FOR SCREENING MAMMOGRAM: ICD-10-CM

## 2021-10-26 DIAGNOSIS — G47.00 INSOMNIA, UNSPECIFIED TYPE: ICD-10-CM

## 2021-10-26 DIAGNOSIS — I10 ESSENTIAL HYPERTENSION: ICD-10-CM

## 2021-10-26 DIAGNOSIS — R13.10 DYSPHAGIA, UNSPECIFIED TYPE: ICD-10-CM

## 2021-10-26 DIAGNOSIS — K57.90 DIVERTICULOSIS: ICD-10-CM

## 2021-10-26 DIAGNOSIS — Z72.0 TOBACCO ABUSE: ICD-10-CM

## 2021-10-26 PROCEDURE — 99443 PR PHYSICIAN TELEPHONE EVALUATION 21-30 MIN: ICD-10-PCS | Mod: 95,,, | Performed by: FAMILY MEDICINE

## 2021-10-26 PROCEDURE — 99443 PR PHYSICIAN TELEPHONE EVALUATION 21-30 MIN: CPT | Mod: 95,,, | Performed by: FAMILY MEDICINE

## 2021-10-26 RX ORDER — ZOLPIDEM TARTRATE 5 MG/1
TABLET ORAL
Qty: 30 TABLET | Refills: 2 | Status: SHIPPED | OUTPATIENT
Start: 2021-10-26 | End: 2021-12-08 | Stop reason: SDUPTHER

## 2021-10-26 RX ORDER — LORAZEPAM 1 MG/1
1 TABLET ORAL EVERY 6 HOURS PRN
Qty: 30 TABLET | Refills: 2 | Status: SHIPPED | OUTPATIENT
Start: 2021-10-26 | End: 2021-12-08 | Stop reason: SDUPTHER

## 2021-10-26 RX ORDER — DIPHENOXYLATE HYDROCHLORIDE AND ATROPINE SULFATE 2.5; .025 MG/1; MG/1
TABLET ORAL
Qty: 30 TABLET | Refills: 2 | Status: SHIPPED | OUTPATIENT
Start: 2021-10-26 | End: 2022-02-02 | Stop reason: SDUPTHER

## 2021-11-09 ENCOUNTER — HOSPITAL ENCOUNTER (OUTPATIENT)
Dept: RADIOLOGY | Facility: OTHER | Age: 56
Discharge: HOME | End: 2021-11-09
Attending: FAMILY MEDICINE
Payer: MEDICARE

## 2021-11-09 DIAGNOSIS — Z12.31 VISIT FOR SCREENING MAMMOGRAM: ICD-10-CM

## 2021-11-09 PROCEDURE — 77063 BREAST TOMOSYNTHESIS BI: CPT | Mod: 26,,, | Performed by: RADIOLOGY

## 2021-11-09 PROCEDURE — 77067 MAMMO DIGITAL SCREENING BILAT WITH TOMO: ICD-10-PCS | Mod: 26,,, | Performed by: RADIOLOGY

## 2021-11-09 PROCEDURE — 77067 SCR MAMMO BI INCL CAD: CPT | Mod: TC

## 2021-11-09 PROCEDURE — 77063 MAMMO DIGITAL SCREENING BILAT WITH TOMO: ICD-10-PCS | Mod: 26,,, | Performed by: RADIOLOGY

## 2021-11-09 PROCEDURE — 77067 SCR MAMMO BI INCL CAD: CPT | Mod: 26,,, | Performed by: RADIOLOGY

## 2021-11-17 ENCOUNTER — TELEPHONE (OUTPATIENT)
Dept: PRIMARY CARE CLINIC | Facility: CLINIC | Age: 56
End: 2021-11-17
Payer: MEDICARE

## 2021-11-17 DIAGNOSIS — J98.01 BRONCHOSPASM: Primary | ICD-10-CM

## 2021-11-18 DIAGNOSIS — R05.3 CHRONIC COUGH: ICD-10-CM

## 2021-11-18 DIAGNOSIS — J40 BRONCHITIS: ICD-10-CM

## 2021-11-20 RX ORDER — ALBUTEROL SULFATE 0.83 MG/ML
2.5 SOLUTION RESPIRATORY (INHALATION) EVERY 4 HOURS PRN
Qty: 1 EACH | Refills: 1 | Status: SHIPPED | OUTPATIENT
Start: 2021-11-20 | End: 2022-02-02 | Stop reason: SDUPTHER

## 2021-11-22 ENCOUNTER — TELEPHONE (OUTPATIENT)
Dept: OTOLARYNGOLOGY | Facility: CLINIC | Age: 56
End: 2021-11-22
Payer: MEDICARE

## 2021-11-22 RX ORDER — PROMETHAZINE HYDROCHLORIDE AND CODEINE PHOSPHATE 6.25; 1 MG/5ML; MG/5ML
5 SOLUTION ORAL EVERY 4 HOURS PRN
Qty: 210 ML | Refills: 0 | Status: SHIPPED | OUTPATIENT
Start: 2021-11-22 | End: 2021-12-02

## 2021-11-24 ENCOUNTER — TELEPHONE (OUTPATIENT)
Dept: OTOLARYNGOLOGY | Facility: CLINIC | Age: 56
End: 2021-11-24
Payer: MEDICARE

## 2021-11-24 ENCOUNTER — PATIENT MESSAGE (OUTPATIENT)
Dept: OTOLARYNGOLOGY | Facility: CLINIC | Age: 56
End: 2021-11-24
Payer: MEDICARE

## 2021-12-02 ENCOUNTER — IMMUNIZATION (OUTPATIENT)
Dept: PRIMARY CARE CLINIC | Facility: CLINIC | Age: 56
End: 2021-12-02
Payer: MEDICAID

## 2021-12-02 DIAGNOSIS — Z23 NEED FOR VACCINATION: Primary | ICD-10-CM

## 2021-12-02 PROCEDURE — 0064A COVID-19, MRNA, LNP-S, PF, 100 MCG/0.25 ML DOSE VACCINE (MODERNA BOOSTER): CPT | Mod: PBBFAC | Performed by: EMERGENCY MEDICINE

## 2021-12-08 ENCOUNTER — OFFICE VISIT (OUTPATIENT)
Dept: PRIMARY CARE CLINIC | Facility: CLINIC | Age: 56
End: 2021-12-08
Payer: MEDICARE

## 2021-12-08 VITALS
SYSTOLIC BLOOD PRESSURE: 136 MMHG | WEIGHT: 248.69 LBS | DIASTOLIC BLOOD PRESSURE: 82 MMHG | TEMPERATURE: 98 F | RESPIRATION RATE: 18 BRPM | OXYGEN SATURATION: 99 % | HEIGHT: 66 IN | HEART RATE: 76 BPM | BODY MASS INDEX: 39.97 KG/M2

## 2021-12-08 DIAGNOSIS — G47.00 INSOMNIA, UNSPECIFIED TYPE: ICD-10-CM

## 2021-12-08 DIAGNOSIS — H93.A1 PULSATILE TINNITUS OF RIGHT EAR: ICD-10-CM

## 2021-12-08 DIAGNOSIS — E87.6 HYPOKALEMIA: ICD-10-CM

## 2021-12-08 DIAGNOSIS — J98.01 BRONCHOSPASM: ICD-10-CM

## 2021-12-08 DIAGNOSIS — M50.30 DDD (DEGENERATIVE DISC DISEASE), CERVICAL: Primary | ICD-10-CM

## 2021-12-08 DIAGNOSIS — E78.5 HYPERLIPIDEMIA, UNSPECIFIED HYPERLIPIDEMIA TYPE: ICD-10-CM

## 2021-12-08 DIAGNOSIS — I10 ESSENTIAL HYPERTENSION: ICD-10-CM

## 2021-12-08 DIAGNOSIS — R91.1 PULMONARY NODULE: ICD-10-CM

## 2021-12-08 DIAGNOSIS — F20.9 SCHIZOPHRENIA, UNSPECIFIED TYPE: ICD-10-CM

## 2021-12-08 DIAGNOSIS — D37.05 NEOPLASM OF UNCERTAIN BEHAVIOR OF NASOPHARYNX: ICD-10-CM

## 2021-12-08 DIAGNOSIS — F31.9 BIPOLAR 1 DISORDER: ICD-10-CM

## 2021-12-08 DIAGNOSIS — L30.9 ECZEMA, UNSPECIFIED TYPE: ICD-10-CM

## 2021-12-08 DIAGNOSIS — G89.4 CHRONIC PAIN SYNDROME: ICD-10-CM

## 2021-12-08 DIAGNOSIS — M94.9 DISORDER OF CARTILAGE, UNSPECIFIED: ICD-10-CM

## 2021-12-08 DIAGNOSIS — J40 BRONCHITIS: ICD-10-CM

## 2021-12-08 PROCEDURE — 99999 PR PBB SHADOW E&M-EST. PATIENT-LVL V: CPT | Mod: PBBFAC,,, | Performed by: FAMILY MEDICINE

## 2021-12-08 PROCEDURE — 4010F ACE/ARB THERAPY RXD/TAKEN: CPT | Mod: CPTII,S$GLB,, | Performed by: FAMILY MEDICINE

## 2021-12-08 PROCEDURE — 99214 OFFICE O/P EST MOD 30 MIN: CPT | Mod: S$GLB,,, | Performed by: FAMILY MEDICINE

## 2021-12-08 PROCEDURE — 4010F PR ACE/ARB THEARPY RXD/TAKEN: ICD-10-PCS | Mod: CPTII,S$GLB,, | Performed by: FAMILY MEDICINE

## 2021-12-08 PROCEDURE — 99499 RISK ADDL DX/OHS AUDIT: ICD-10-PCS | Mod: S$GLB,,, | Performed by: FAMILY MEDICINE

## 2021-12-08 PROCEDURE — 99499 UNLISTED E&M SERVICE: CPT | Mod: S$GLB,,, | Performed by: FAMILY MEDICINE

## 2021-12-08 PROCEDURE — 99999 PR PBB SHADOW E&M-EST. PATIENT-LVL V: ICD-10-PCS | Mod: PBBFAC,,, | Performed by: FAMILY MEDICINE

## 2021-12-08 PROCEDURE — 99214 PR OFFICE/OUTPT VISIT, EST, LEVL IV, 30-39 MIN: ICD-10-PCS | Mod: S$GLB,,, | Performed by: FAMILY MEDICINE

## 2021-12-08 RX ORDER — TIZANIDINE 4 MG/1
4 TABLET ORAL EVERY 8 HOURS
Qty: 30 TABLET | Refills: 2 | Status: CANCELLED | OUTPATIENT
Start: 2021-12-08

## 2021-12-08 RX ORDER — AZITHROMYCIN 250 MG/1
TABLET, FILM COATED ORAL
Qty: 6 TABLET | Refills: 0 | Status: SHIPPED | OUTPATIENT
Start: 2021-12-08 | End: 2021-12-12

## 2021-12-08 RX ORDER — LORAZEPAM 1 MG/1
1 TABLET ORAL EVERY 6 HOURS PRN
Qty: 30 TABLET | Status: CANCELLED | OUTPATIENT
Start: 2021-12-08 | End: 2022-01-07

## 2021-12-08 RX ORDER — ZOLPIDEM TARTRATE 5 MG/1
TABLET ORAL
Status: CANCELLED | OUTPATIENT
Start: 2021-12-08

## 2021-12-08 RX ORDER — PROMETHAZINE HYDROCHLORIDE AND CODEINE PHOSPHATE 6.25; 1 MG/5ML; MG/5ML
5 SOLUTION ORAL EVERY 6 HOURS PRN
Qty: 180 ML | Refills: 0 | Status: SHIPPED | OUTPATIENT
Start: 2021-12-08 | End: 2021-12-21 | Stop reason: SDUPTHER

## 2021-12-08 RX ORDER — LORAZEPAM 1 MG/1
1 TABLET ORAL EVERY 6 HOURS PRN
Qty: 30 TABLET | Refills: 2 | Status: SHIPPED | OUTPATIENT
Start: 2021-12-08 | End: 2022-02-02 | Stop reason: SDUPTHER

## 2021-12-08 RX ORDER — TIZANIDINE 4 MG/1
4 TABLET ORAL EVERY 8 HOURS
Qty: 30 TABLET | Refills: 2 | Status: SHIPPED | OUTPATIENT
Start: 2021-12-08 | End: 2022-04-28 | Stop reason: SDUPTHER

## 2021-12-08 RX ORDER — AMLODIPINE BESYLATE 2.5 MG/1
TABLET ORAL
Qty: 90 TABLET | Refills: 1 | Status: CANCELLED | OUTPATIENT
Start: 2021-12-08

## 2021-12-08 RX ORDER — ZOLPIDEM TARTRATE 5 MG/1
TABLET ORAL
Qty: 30 TABLET | Refills: 2 | Status: SHIPPED | OUTPATIENT
Start: 2021-12-08 | End: 2022-02-02 | Stop reason: SDUPTHER

## 2021-12-08 RX ORDER — PREDNISONE 5 MG/1
TABLET ORAL
Qty: 20 TABLET | Refills: 0 | Status: SHIPPED | OUTPATIENT
Start: 2021-12-08 | End: 2021-12-21 | Stop reason: ALTCHOICE

## 2021-12-08 RX ORDER — SERTRALINE HYDROCHLORIDE 100 MG/1
100 TABLET, FILM COATED ORAL DAILY
Qty: 90 TABLET | Refills: 1 | Status: CANCELLED | OUTPATIENT
Start: 2021-12-08

## 2021-12-08 RX ORDER — POTASSIUM CHLORIDE 750 MG/1
10 CAPSULE, EXTENDED RELEASE ORAL DAILY
Qty: 90 CAPSULE | Refills: 1 | Status: CANCELLED | OUTPATIENT
Start: 2021-12-08

## 2021-12-08 RX ORDER — LOSARTAN POTASSIUM 100 MG/1
100 TABLET ORAL DAILY
Qty: 90 TABLET | Refills: 1 | Status: CANCELLED | OUTPATIENT
Start: 2021-12-08

## 2021-12-09 RX ORDER — SERTRALINE HYDROCHLORIDE 100 MG/1
TABLET, FILM COATED ORAL
Qty: 90 TABLET | Refills: 3 | Status: SHIPPED | OUTPATIENT
Start: 2021-12-09 | End: 2022-02-02 | Stop reason: SDUPTHER

## 2021-12-09 RX ORDER — POTASSIUM CHLORIDE 750 MG/1
10 CAPSULE, EXTENDED RELEASE ORAL DAILY
Qty: 90 CAPSULE | Refills: 2 | Status: SHIPPED | OUTPATIENT
Start: 2021-12-09 | End: 2022-02-02 | Stop reason: SDUPTHER

## 2021-12-18 RX ORDER — ERGOCALCIFEROL 1.25 MG/1
50000 CAPSULE ORAL
Qty: 12 CAPSULE | Refills: 3 | Status: SHIPPED | OUTPATIENT
Start: 2021-12-18 | End: 2022-02-02 | Stop reason: SDUPTHER

## 2021-12-21 ENCOUNTER — OFFICE VISIT (OUTPATIENT)
Dept: OTOLARYNGOLOGY | Facility: CLINIC | Age: 56
End: 2021-12-21
Payer: MEDICARE

## 2021-12-21 DIAGNOSIS — G93.2 PSEUDOTUMOR CEREBRI SYNDROME: ICD-10-CM

## 2021-12-21 DIAGNOSIS — R13.10 DYSPHAGIA, UNSPECIFIED TYPE: ICD-10-CM

## 2021-12-21 DIAGNOSIS — J30.9 ALLERGIC RHINITIS, UNSPECIFIED SEASONALITY, UNSPECIFIED TRIGGER: ICD-10-CM

## 2021-12-21 DIAGNOSIS — K21.9 LARYNGOPHARYNGEAL REFLUX (LPR): ICD-10-CM

## 2021-12-21 DIAGNOSIS — J34.89 NASAL VESTIBULITIS: Primary | ICD-10-CM

## 2021-12-21 DIAGNOSIS — R09.89 CHOKING IN ADULT: ICD-10-CM

## 2021-12-21 DIAGNOSIS — H93.A1 PULSATILE TINNITUS OF RIGHT EAR: ICD-10-CM

## 2021-12-21 PROCEDURE — 1160F RVW MEDS BY RX/DR IN RCRD: CPT | Mod: CPTII,S$GLB,, | Performed by: SPECIALIST

## 2021-12-21 PROCEDURE — 99499 UNLISTED E&M SERVICE: CPT | Mod: S$GLB,,, | Performed by: SPECIALIST

## 2021-12-21 PROCEDURE — 1160F PR REVIEW ALL MEDS BY PRESCRIBER/CLIN PHARMACIST DOCUMENTED: ICD-10-PCS | Mod: CPTII,S$GLB,, | Performed by: SPECIALIST

## 2021-12-21 PROCEDURE — 1159F MED LIST DOCD IN RCRD: CPT | Mod: CPTII,S$GLB,, | Performed by: SPECIALIST

## 2021-12-21 PROCEDURE — 99214 OFFICE O/P EST MOD 30 MIN: CPT | Mod: S$GLB,,, | Performed by: SPECIALIST

## 2021-12-21 PROCEDURE — 99499 RISK ADDL DX/OHS AUDIT: ICD-10-PCS | Mod: S$GLB,,, | Performed by: SPECIALIST

## 2021-12-21 PROCEDURE — 1159F PR MEDICATION LIST DOCUMENTED IN MEDICAL RECORD: ICD-10-PCS | Mod: CPTII,S$GLB,, | Performed by: SPECIALIST

## 2021-12-21 PROCEDURE — 4010F PR ACE/ARB THEARPY RXD/TAKEN: ICD-10-PCS | Mod: CPTII,S$GLB,, | Performed by: SPECIALIST

## 2021-12-21 PROCEDURE — 99214 PR OFFICE/OUTPT VISIT, EST, LEVL IV, 30-39 MIN: ICD-10-PCS | Mod: S$GLB,,, | Performed by: SPECIALIST

## 2021-12-21 PROCEDURE — 4010F ACE/ARB THERAPY RXD/TAKEN: CPT | Mod: CPTII,S$GLB,, | Performed by: SPECIALIST

## 2021-12-21 PROCEDURE — 99999 PR PBB SHADOW E&M-EST. PATIENT-LVL IV: ICD-10-PCS | Mod: PBBFAC,,, | Performed by: SPECIALIST

## 2021-12-21 PROCEDURE — 99999 PR PBB SHADOW E&M-EST. PATIENT-LVL IV: CPT | Mod: PBBFAC,,, | Performed by: SPECIALIST

## 2021-12-21 PROCEDURE — 3044F HG A1C LEVEL LT 7.0%: CPT | Mod: CPTII,S$GLB,, | Performed by: SPECIALIST

## 2021-12-21 PROCEDURE — 3044F PR MOST RECENT HEMOGLOBIN A1C LEVEL <7.0%: ICD-10-PCS | Mod: CPTII,S$GLB,, | Performed by: SPECIALIST

## 2021-12-21 RX ORDER — SULFAMETHOXAZOLE AND TRIMETHOPRIM 800; 160 MG/1; MG/1
1 TABLET ORAL 2 TIMES DAILY
Qty: 20 TABLET | Refills: 0 | Status: SHIPPED | OUTPATIENT
Start: 2021-12-21 | End: 2021-12-31

## 2021-12-21 RX ORDER — ZIPRASIDONE HYDROCHLORIDE 60 MG/1
60 CAPSULE ORAL 2 TIMES DAILY
COMMUNITY
Start: 2021-12-03 | End: 2022-02-02 | Stop reason: SDUPTHER

## 2021-12-21 RX ORDER — NAPROXEN 375 MG/1
TABLET ORAL
COMMUNITY
Start: 2021-10-30 | End: 2022-02-02 | Stop reason: SDUPTHER

## 2021-12-21 RX ORDER — MUPIROCIN 20 MG/G
OINTMENT TOPICAL
Qty: 22 G | Refills: 3 | Status: SHIPPED | OUTPATIENT
Start: 2021-12-21 | End: 2022-02-02 | Stop reason: SDUPTHER

## 2021-12-21 RX ORDER — LEVOCETIRIZINE DIHYDROCHLORIDE 5 MG/1
5 TABLET, FILM COATED ORAL NIGHTLY
Qty: 30 TABLET | Refills: 11 | Status: SHIPPED | OUTPATIENT
Start: 2021-12-21 | End: 2022-02-02 | Stop reason: SDUPTHER

## 2021-12-21 RX ORDER — BUDESONIDE AND FORMOTEROL FUMARATE DIHYDRATE 160; 4.5 UG/1; UG/1
2 AEROSOL RESPIRATORY (INHALATION) EVERY 12 HOURS
Qty: 10.2 G | Refills: 11 | Status: SHIPPED | OUTPATIENT
Start: 2021-12-21 | End: 2022-02-02 | Stop reason: SDUPTHER

## 2021-12-21 RX ORDER — PANTOPRAZOLE SODIUM 40 MG/1
40 TABLET, DELAYED RELEASE ORAL DAILY
COMMUNITY
Start: 2021-12-19 | End: 2022-02-02 | Stop reason: SDUPTHER

## 2021-12-21 RX ORDER — PROMETHAZINE HYDROCHLORIDE AND CODEINE PHOSPHATE 6.25; 1 MG/5ML; MG/5ML
5 SOLUTION ORAL EVERY 4 HOURS PRN
Qty: 210 ML | Refills: 0 | Status: SHIPPED | OUTPATIENT
Start: 2021-12-21 | End: 2022-01-21 | Stop reason: SDUPTHER

## 2021-12-24 RX ORDER — HYDROXYZINE PAMOATE 25 MG/1
CAPSULE ORAL
Qty: 360 CAPSULE | Refills: 1 | Status: SHIPPED | OUTPATIENT
Start: 2021-12-24 | End: 2022-02-02 | Stop reason: SDUPTHER

## 2021-12-24 RX ORDER — BUSPIRONE HYDROCHLORIDE 30 MG/1
30 TABLET ORAL 2 TIMES DAILY
Qty: 180 TABLET | Refills: 1 | Status: SHIPPED | OUTPATIENT
Start: 2021-12-24 | End: 2022-02-02 | Stop reason: SDUPTHER

## 2022-01-04 ENCOUNTER — OFFICE VISIT (OUTPATIENT)
Dept: NEUROSURGERY | Facility: CLINIC | Age: 57
End: 2022-01-04
Payer: MEDICARE

## 2022-01-04 DIAGNOSIS — G93.2 PSEUDOTUMOR CEREBRI SYNDROME: ICD-10-CM

## 2022-01-04 DIAGNOSIS — H93.A1 PULSATILE TINNITUS OF RIGHT EAR: ICD-10-CM

## 2022-01-04 PROCEDURE — 4010F ACE/ARB THERAPY RXD/TAKEN: CPT | Mod: CPTII,S$GLB,, | Performed by: PHYSICIAN ASSISTANT

## 2022-01-04 PROCEDURE — 99499 UNLISTED E&M SERVICE: CPT | Mod: S$GLB,,, | Performed by: PHYSICIAN ASSISTANT

## 2022-01-04 PROCEDURE — 99999 PR PBB SHADOW E&M-EST. PATIENT-LVL I: CPT | Mod: PBBFAC,,, | Performed by: PHYSICIAN ASSISTANT

## 2022-01-04 PROCEDURE — 99999 PR PBB SHADOW E&M-EST. PATIENT-LVL I: ICD-10-PCS | Mod: PBBFAC,,, | Performed by: PHYSICIAN ASSISTANT

## 2022-01-04 PROCEDURE — 99499 NO LOS: ICD-10-PCS | Mod: S$GLB,,, | Performed by: PHYSICIAN ASSISTANT

## 2022-01-04 PROCEDURE — 4010F PR ACE/ARB THEARPY RXD/TAKEN: ICD-10-PCS | Mod: CPTII,S$GLB,, | Performed by: PHYSICIAN ASSISTANT

## 2022-01-21 ENCOUNTER — OFFICE VISIT (OUTPATIENT)
Dept: OTOLARYNGOLOGY | Facility: CLINIC | Age: 57
End: 2022-01-21
Payer: MEDICARE

## 2022-01-21 VITALS
BODY MASS INDEX: 40.85 KG/M2 | WEIGHT: 253.06 LBS | SYSTOLIC BLOOD PRESSURE: 148 MMHG | TEMPERATURE: 98 F | DIASTOLIC BLOOD PRESSURE: 66 MMHG | HEART RATE: 82 BPM

## 2022-01-21 DIAGNOSIS — R05.3 CHRONIC COUGH: ICD-10-CM

## 2022-01-21 DIAGNOSIS — K21.9 LARYNGOPHARYNGEAL REFLUX (LPR): ICD-10-CM

## 2022-01-21 DIAGNOSIS — J34.2 NASAL SEPTAL DEVIATION: ICD-10-CM

## 2022-01-21 DIAGNOSIS — J30.9 ALLERGIC RHINITIS, UNSPECIFIED SEASONALITY, UNSPECIFIED TRIGGER: ICD-10-CM

## 2022-01-21 DIAGNOSIS — R09.89 CHOKING IN ADULT: ICD-10-CM

## 2022-01-21 DIAGNOSIS — Z01.818 PRE-OP TESTING: ICD-10-CM

## 2022-01-21 DIAGNOSIS — J45.21 MILD INTERMITTENT ASTHMA WITH ACUTE EXACERBATION: ICD-10-CM

## 2022-01-21 DIAGNOSIS — G93.2 PSEUDOTUMOR CEREBRI SYNDROME: Primary | ICD-10-CM

## 2022-01-21 DIAGNOSIS — R13.10 DYSPHAGIA, UNSPECIFIED TYPE: ICD-10-CM

## 2022-01-21 DIAGNOSIS — H93.A1 PULSATILE TINNITUS OF RIGHT EAR: ICD-10-CM

## 2022-01-21 PROCEDURE — 99999 PR PBB SHADOW E&M-EST. PATIENT-LVL III: ICD-10-PCS | Mod: PBBFAC,,, | Performed by: SPECIALIST

## 2022-01-21 PROCEDURE — 99213 OFFICE O/P EST LOW 20 MIN: CPT | Mod: S$GLB,,, | Performed by: SPECIALIST

## 2022-01-21 PROCEDURE — 99999 PR PBB SHADOW E&M-EST. PATIENT-LVL III: CPT | Mod: PBBFAC,,, | Performed by: SPECIALIST

## 2022-01-21 PROCEDURE — 99213 PR OFFICE/OUTPT VISIT, EST, LEVL III, 20-29 MIN: ICD-10-PCS | Mod: S$GLB,,, | Performed by: SPECIALIST

## 2022-01-21 RX ORDER — PROMETHAZINE HYDROCHLORIDE AND CODEINE PHOSPHATE 6.25; 1 MG/5ML; MG/5ML
5 SOLUTION ORAL EVERY 4 HOURS PRN
Qty: 210 ML | Refills: 0 | Status: SHIPPED | OUTPATIENT
Start: 2022-01-21 | End: 2022-02-18 | Stop reason: SDUPTHER

## 2022-01-21 NOTE — PROGRESS NOTES
Subjective:       Patient ID: Trina Marie Collette is a 57 y.o. female.    Chief Complaint: Follow-up, Sinus Problem, and Cough    Follow-up visit:  The patient is coming in for a follow-up visit.  There are multiple issues to discuss:  1.  Laryngopharyngeal reflux:   She is taking famotidine twice daily.  She does have some throat clearing and phlegm in the throat.  Overall, her symptoms have been stable and slowly improving.  2.  Allergic rhinitis:  She continues to have nasal congestion, rhinorrhea and postnasal drip.  Nasal secretions are clear.  She takes Singulair at night and use ipratropium bromide as needed for postnasal drip.    3.  Pulsatile tinnitus right ear:  She continues to have pulsatile tinnitus in her right ear.  She had an appointment with the neurosurgeon on January 4th that she was unable to keep because of personal reasons.  4.  Asthma/Chronic cough:  She continues to have coughing both day and night.  The Phenergan with codeine is the only thing that stops her nighttime cough.      Review of Systems   Constitutional: Positive for fatigue. Negative for activity change, appetite change, chills, fever and unexpected weight change.   HENT: Positive for congestion, ear pain, postnasal drip, rhinorrhea, sore throat, tinnitus and trouble swallowing. Negative for ear discharge, facial swelling, hearing loss, mouth sores, sinus pressure, sinus pain, sneezing and voice change.    Eyes: Negative for photophobia, pain, discharge, redness, itching and visual disturbance.   Respiratory: Positive for cough, shortness of breath and wheezing. Negative for apnea and choking.    Cardiovascular: Negative for chest pain and palpitations.   Gastrointestinal: Negative for abdominal distention, abdominal pain, nausea and vomiting.   Musculoskeletal: Negative for arthralgias, myalgias, neck pain and neck stiffness.   Skin: Negative.  Negative for color change, pallor and rash.   Allergic/Immunologic: Positive for  environmental allergies. Negative for food allergies and immunocompromised state.   Neurological: Positive for headaches. Negative for dizziness, facial asymmetry, speech difficulty, weakness, light-headedness and numbness.   Hematological: Negative for adenopathy. Does not bruise/bleed easily.   Psychiatric/Behavioral: Negative for confusion, decreased concentration and sleep disturbance.       Objective:      Physical Exam  Constitutional:       Appearance: She is well-developed.   HENT:      Head: Normocephalic.      Right Ear: Ear canal and external ear normal. Tympanic membrane is retracted.      Left Ear: Ear canal and external ear normal. Tympanic membrane is retracted.      Nose: Septal deviation (To the left), nasal tenderness (Scabbing and erythema in the piriform aperture area of the right nostril vestibular skin), mucosal edema (cyanotic, boggy inferior turbinates bilaterally) and rhinorrhea (clear mucus bilaterally) present. No nasal deformity. Rhinorrhea is clear.      Mouth/Throat:      Mouth: No oral lesions.      Pharynx: Uvula midline.   Eyes:      General: Lids are normal.         Right eye: No discharge.         Left eye: No discharge.      Conjunctiva/sclera:      Right eye: Right conjunctiva is injected. No exudate.     Left eye: Left conjunctiva is injected. No exudate.     Pupils: Pupils are equal, round, and reactive to light.   Neck:      Thyroid: No thyroid mass or thyromegaly.      Vascular: No carotid bruit.      Trachea: Trachea normal. No tracheal deviation.        Comments: Neck-compression of the vascular sheath at the angle the mandible on the right as noted above results and complete cessation of her pulsatile tinnitus.  Cardiovascular:      Rate and Rhythm: Normal rate and regular rhythm.      Pulses: Normal pulses.      Heart sounds: Normal heart sounds.   Pulmonary:      Effort: Pulmonary effort is normal.      Breath sounds: No stridor. Examination of the right-lower field  reveals wheezing. Examination of the left-lower field reveals wheezing. Wheezing ( mild expiratory wheezing in all lung fields) present. No decreased breath sounds, rhonchi or rales.   Abdominal:      General: Bowel sounds are normal.      Palpations: Abdomen is soft.      Tenderness: There is no abdominal tenderness.   Musculoskeletal:         General: Normal range of motion.      Cervical back: Normal range of motion. No muscular tenderness.   Lymphadenopathy:      Head:      Right side of head: No submental, submandibular, preauricular, posterior auricular or occipital adenopathy.      Left side of head: No submental, submandibular, preauricular, posterior auricular or occipital adenopathy.      Cervical: No cervical adenopathy.   Skin:     General: Skin is warm and dry.      Findings: No petechiae or rash.      Nails: There is no clubbing.   Neurological:      Mental Status: She is alert and oriented to person, place, and time.      Cranial Nerves: No cranial nerve deficit.      Sensory: No sensory deficit.      Gait: Gait normal.   Psychiatric:         Speech: Speech normal.         Behavior: Behavior normal. Behavior is cooperative.         Thought Content: Thought content normal.         Judgment: Judgment normal.         Assessment:       1. Pseudotumor cerebri syndrome    2. Pulsatile tinnitus of right ear    3. Allergic rhinitis, unspecified seasonality, unspecified trigger    4. Laryngopharyngeal reflux (LPR)    5. Dysphagia, unspecified type    6. Choking in adult    7. Chronic cough    8. Mild intermittent asthma with acute exacerbation    9. Nasal septal deviation        Plan:       I will send a message to the neurosurgeon's office to attempt to get them to reschedule her appointment.  She will continue with her current medical regimen.  I will recheck her in 4 weeks.  At that visit she will need undergo flexible nasolaryngoscopy, so she will need COVID-19 testing done 3 days before the visit.     not observed

## 2022-01-28 ENCOUNTER — TELEPHONE (OUTPATIENT)
Dept: PRIMARY CARE CLINIC | Facility: CLINIC | Age: 57
End: 2022-01-28
Payer: MEDICARE

## 2022-01-28 ENCOUNTER — PATIENT MESSAGE (OUTPATIENT)
Dept: PRIMARY CARE CLINIC | Facility: CLINIC | Age: 57
End: 2022-01-28
Payer: MEDICARE

## 2022-01-28 NOTE — TELEPHONE ENCOUNTER
----- Message from Karen Rodríguez sent at 1/28/2022  2:20 PM CST -----  Contact: [phone]  Pt states [facility] is going to be calling she needs a lot of her medications refilled but she states she will also need this one as well  promethazine-codeine 6.25-10 mg/5 ml (PHENERGAN WITH CODEINE) 6.25-10 mg/5 mL syrup please advise and give a return call

## 2022-01-28 NOTE — TELEPHONE ENCOUNTER
----- Message from Ana Luisa Montana sent at 1/28/2022  8:08 AM CST -----  Contact: Patient, [phone]  Requesting an RX refill or new RX.  Is this a refill or new RX: Refill  RX name and strength (copy/paste from chart):  promethazine-codeine 6.25-10 mg/5 ml (PHENERGAN WITH CODEINE) 6.25-10 mg/5 mL syrup  Is this a 30 day or 90 day RX:   Patient advised that in the future they can use their [facility] account to request a refill?:  Yes  Patient advised that RX refills can take up to 72 hours?:  Yes  Pharmacy name and phone # (copy/paste from chart):    [facility]/pharmacy #[id number] - [place] - [address] [facility]  [address] [place] [address]  Phone: [phone] Fax: [phone]  Comments: Please advise. Thanks.

## 2022-01-28 NOTE — TELEPHONE ENCOUNTER
Patient was already advised earlier today that she needs an appointment for a controlled substance. We will not be automatically filling Phenergan with Codeine for her.

## 2022-02-02 ENCOUNTER — OFFICE VISIT (OUTPATIENT)
Dept: PRIMARY CARE CLINIC | Facility: CLINIC | Age: 57
End: 2022-02-02
Payer: MEDICARE

## 2022-02-02 ENCOUNTER — TELEPHONE (OUTPATIENT)
Dept: PRIMARY CARE CLINIC | Facility: CLINIC | Age: 57
End: 2022-02-02

## 2022-02-02 VITALS
DIASTOLIC BLOOD PRESSURE: 78 MMHG | BODY MASS INDEX: 39.79 KG/M2 | RESPIRATION RATE: 18 BRPM | WEIGHT: 247.56 LBS | HEART RATE: 88 BPM | SYSTOLIC BLOOD PRESSURE: 124 MMHG | OXYGEN SATURATION: 100 % | HEIGHT: 66 IN

## 2022-02-02 DIAGNOSIS — G89.4 CHRONIC PAIN SYNDROME: ICD-10-CM

## 2022-02-02 DIAGNOSIS — I10 ESSENTIAL HYPERTENSION: ICD-10-CM

## 2022-02-02 DIAGNOSIS — F20.9 SCHIZOPHRENIA, UNSPECIFIED TYPE: ICD-10-CM

## 2022-02-02 DIAGNOSIS — Z72.0 TOBACCO ABUSE: ICD-10-CM

## 2022-02-02 DIAGNOSIS — J40 BRONCHITIS: ICD-10-CM

## 2022-02-02 DIAGNOSIS — J44.9 CHRONIC OBSTRUCTIVE PULMONARY DISEASE, UNSPECIFIED COPD TYPE: ICD-10-CM

## 2022-02-02 DIAGNOSIS — M50.30 DDD (DEGENERATIVE DISC DISEASE), CERVICAL: ICD-10-CM

## 2022-02-02 DIAGNOSIS — M54.50 LUMBAR SPINE PAIN: ICD-10-CM

## 2022-02-02 DIAGNOSIS — E07.9 THYROID MASS: ICD-10-CM

## 2022-02-02 DIAGNOSIS — M46.96 UNSPECIFIED INFLAMMATORY SPONDYLOPATHY, LUMBAR REGION: Primary | ICD-10-CM

## 2022-02-02 DIAGNOSIS — J98.01 BRONCHOSPASM: ICD-10-CM

## 2022-02-02 DIAGNOSIS — H93.19 TINNITUS, UNSPECIFIED LATERALITY: ICD-10-CM

## 2022-02-02 DIAGNOSIS — E78.5 HYPERLIPIDEMIA, UNSPECIFIED HYPERLIPIDEMIA TYPE: ICD-10-CM

## 2022-02-02 DIAGNOSIS — R05.3 CHRONIC COUGH: ICD-10-CM

## 2022-02-02 DIAGNOSIS — G47.00 INSOMNIA, UNSPECIFIED TYPE: ICD-10-CM

## 2022-02-02 DIAGNOSIS — F31.9 BIPOLAR 1 DISORDER: ICD-10-CM

## 2022-02-02 PROCEDURE — 99214 OFFICE O/P EST MOD 30 MIN: CPT | Mod: S$GLB,,, | Performed by: FAMILY MEDICINE

## 2022-02-02 PROCEDURE — 99499 UNLISTED E&M SERVICE: CPT | Mod: S$GLB,,, | Performed by: FAMILY MEDICINE

## 2022-02-02 PROCEDURE — 3078F PR MOST RECENT DIASTOLIC BLOOD PRESSURE < 80 MM HG: ICD-10-PCS | Mod: CPTII,S$GLB,, | Performed by: FAMILY MEDICINE

## 2022-02-02 PROCEDURE — 3078F DIAST BP <80 MM HG: CPT | Mod: CPTII,S$GLB,, | Performed by: FAMILY MEDICINE

## 2022-02-02 PROCEDURE — 3008F PR BODY MASS INDEX (BMI) DOCUMENTED: ICD-10-PCS | Mod: CPTII,S$GLB,, | Performed by: FAMILY MEDICINE

## 2022-02-02 PROCEDURE — 99999 PR PBB SHADOW E&M-EST. PATIENT-LVL III: ICD-10-PCS | Mod: PBBFAC,,, | Performed by: FAMILY MEDICINE

## 2022-02-02 PROCEDURE — 1159F MED LIST DOCD IN RCRD: CPT | Mod: CPTII,S$GLB,, | Performed by: FAMILY MEDICINE

## 2022-02-02 PROCEDURE — 3074F SYST BP LT 130 MM HG: CPT | Mod: CPTII,S$GLB,, | Performed by: FAMILY MEDICINE

## 2022-02-02 PROCEDURE — 99499 RISK ADDL DX/OHS AUDIT: ICD-10-PCS | Mod: S$GLB,,, | Performed by: FAMILY MEDICINE

## 2022-02-02 PROCEDURE — 99999 PR PBB SHADOW E&M-EST. PATIENT-LVL III: CPT | Mod: PBBFAC,,, | Performed by: FAMILY MEDICINE

## 2022-02-02 PROCEDURE — 3008F BODY MASS INDEX DOCD: CPT | Mod: CPTII,S$GLB,, | Performed by: FAMILY MEDICINE

## 2022-02-02 PROCEDURE — 3074F PR MOST RECENT SYSTOLIC BLOOD PRESSURE < 130 MM HG: ICD-10-PCS | Mod: CPTII,S$GLB,, | Performed by: FAMILY MEDICINE

## 2022-02-02 PROCEDURE — 99214 PR OFFICE/OUTPT VISIT, EST, LEVL IV, 30-39 MIN: ICD-10-PCS | Mod: S$GLB,,, | Performed by: FAMILY MEDICINE

## 2022-02-02 PROCEDURE — 1159F PR MEDICATION LIST DOCUMENTED IN MEDICAL RECORD: ICD-10-PCS | Mod: CPTII,S$GLB,, | Performed by: FAMILY MEDICINE

## 2022-02-02 RX ORDER — CYCLOBENZAPRINE HCL 10 MG
10 TABLET ORAL 3 TIMES DAILY PRN
Qty: 30 TABLET | Refills: 5 | Status: SHIPPED | OUTPATIENT
Start: 2022-02-02 | End: 2022-02-12

## 2022-02-02 RX ORDER — LOSARTAN POTASSIUM 100 MG/1
100 TABLET ORAL DAILY
Qty: 90 TABLET | Refills: 1 | Status: SHIPPED | OUTPATIENT
Start: 2022-02-02 | End: 2022-06-23 | Stop reason: SDUPTHER

## 2022-02-02 RX ORDER — LORAZEPAM 1 MG/1
1 TABLET ORAL EVERY 6 HOURS PRN
Qty: 30 TABLET | Refills: 2 | Status: SHIPPED | OUTPATIENT
Start: 2022-02-02 | End: 2022-09-01 | Stop reason: SDUPTHER

## 2022-02-02 RX ORDER — POTASSIUM CHLORIDE 750 MG/1
10 CAPSULE, EXTENDED RELEASE ORAL DAILY
Qty: 90 CAPSULE | Refills: 1 | Status: SHIPPED | OUTPATIENT
Start: 2022-02-02 | End: 2022-06-23

## 2022-02-02 RX ORDER — BUSPIRONE HYDROCHLORIDE 30 MG/1
30 TABLET ORAL 2 TIMES DAILY
Qty: 180 TABLET | Refills: 1 | Status: SHIPPED | OUTPATIENT
Start: 2022-02-02 | End: 2022-07-24

## 2022-02-02 RX ORDER — ERGOCALCIFEROL 1.25 MG/1
50000 CAPSULE ORAL
Qty: 12 CAPSULE | Refills: 3 | Status: SHIPPED | OUTPATIENT
Start: 2022-02-02 | End: 2022-09-01

## 2022-02-02 RX ORDER — FAMOTIDINE 20 MG/1
20 TABLET, FILM COATED ORAL 2 TIMES DAILY
Qty: 90 TABLET | Refills: 1 | Status: CANCELLED | OUTPATIENT
Start: 2022-02-02 | End: 2023-02-02

## 2022-02-02 RX ORDER — LEVOCETIRIZINE DIHYDROCHLORIDE 5 MG/1
5 TABLET, FILM COATED ORAL NIGHTLY
Qty: 90 TABLET | Refills: 1 | Status: SHIPPED | OUTPATIENT
Start: 2022-02-02 | End: 2022-06-27

## 2022-02-02 RX ORDER — TRAZODONE HYDROCHLORIDE 100 MG/1
100 TABLET ORAL NIGHTLY PRN
Qty: 90 TABLET | Refills: 1 | Status: SHIPPED | OUTPATIENT
Start: 2022-02-02 | End: 2022-07-06

## 2022-02-02 RX ORDER — BUDESONIDE AND FORMOTEROL FUMARATE DIHYDRATE 160; 4.5 UG/1; UG/1
2 AEROSOL RESPIRATORY (INHALATION) EVERY 12 HOURS
Qty: 10.2 G | Refills: 11 | Status: SHIPPED | OUTPATIENT
Start: 2022-02-02 | End: 2022-06-23 | Stop reason: SDUPTHER

## 2022-02-02 RX ORDER — HYDROXYZINE PAMOATE 25 MG/1
CAPSULE ORAL
Qty: 360 CAPSULE | Refills: 1 | Status: SHIPPED | OUTPATIENT
Start: 2022-02-02 | End: 2022-07-06

## 2022-02-02 RX ORDER — NAPROXEN 375 MG/1
375 TABLET ORAL 2 TIMES DAILY WITH MEALS
Qty: 180 TABLET | Refills: 1 | OUTPATIENT
Start: 2022-02-02 | End: 2022-03-06

## 2022-02-02 RX ORDER — ZIPRASIDONE HYDROCHLORIDE 60 MG/1
60 CAPSULE ORAL 2 TIMES DAILY
Qty: 30 CAPSULE | Refills: 5 | Status: SHIPPED | OUTPATIENT
Start: 2022-02-02 | End: 2022-06-23 | Stop reason: SDUPTHER

## 2022-02-02 RX ORDER — MONTELUKAST SODIUM 10 MG/1
10 TABLET ORAL DAILY
Qty: 90 TABLET | Refills: 1 | Status: SHIPPED | OUTPATIENT
Start: 2022-02-02 | End: 2022-06-23 | Stop reason: SDUPTHER

## 2022-02-02 RX ORDER — ZOLPIDEM TARTRATE 5 MG/1
TABLET ORAL
Qty: 30 TABLET | Refills: 2 | Status: SHIPPED | OUTPATIENT
Start: 2022-02-02 | End: 2022-03-16 | Stop reason: SDUPTHER

## 2022-02-02 RX ORDER — GABAPENTIN 800 MG/1
800 TABLET ORAL 3 TIMES DAILY
Qty: 90 TABLET | Refills: 1 | Status: SHIPPED | OUTPATIENT
Start: 2022-02-02 | End: 2022-08-15

## 2022-02-02 RX ORDER — PROMETHAZINE HYDROCHLORIDE AND CODEINE PHOSPHATE 6.25; 1 MG/5ML; MG/5ML
5 SOLUTION ORAL EVERY 4 HOURS PRN
Qty: 210 ML | Refills: 0 | Status: CANCELLED | OUTPATIENT
Start: 2022-02-02

## 2022-02-02 RX ORDER — ZIPRASIDONE HYDROCHLORIDE 80 MG/1
80 CAPSULE ORAL DAILY
Qty: 30 CAPSULE | Refills: 5 | Status: SHIPPED | OUTPATIENT
Start: 2022-02-02 | End: 2022-06-23 | Stop reason: SDUPTHER

## 2022-02-02 RX ORDER — AMLODIPINE BESYLATE 2.5 MG/1
TABLET ORAL
Qty: 90 TABLET | Refills: 1 | Status: SHIPPED | OUTPATIENT
Start: 2022-02-02 | End: 2022-06-23 | Stop reason: SDUPTHER

## 2022-02-02 RX ORDER — TRIAMCINOLONE ACETONIDE 1 MG/G
CREAM TOPICAL 2 TIMES DAILY
Qty: 15 G | Refills: 2 | Status: SHIPPED | OUTPATIENT
Start: 2022-02-02 | End: 2022-06-06

## 2022-02-02 RX ORDER — ALBUTEROL SULFATE 0.83 MG/ML
2.5 SOLUTION RESPIRATORY (INHALATION) EVERY 4 HOURS PRN
Qty: 1 EACH | Refills: 1 | Status: SHIPPED | OUTPATIENT
Start: 2022-02-02 | End: 2023-02-22 | Stop reason: SDUPTHER

## 2022-02-02 RX ORDER — DIPHENOXYLATE HYDROCHLORIDE AND ATROPINE SULFATE 2.5; .025 MG/1; MG/1
TABLET ORAL
Qty: 30 TABLET | Refills: 2 | Status: SHIPPED | OUTPATIENT
Start: 2022-02-02 | End: 2022-04-28 | Stop reason: SDUPTHER

## 2022-02-02 RX ORDER — BETAMETHASONE VALERATE 1.2 MG/G
CREAM TOPICAL
Qty: 60 G | Refills: 2 | Status: CANCELLED | OUTPATIENT
Start: 2022-02-02

## 2022-02-02 RX ORDER — CLOTRIMAZOLE AND BETAMETHASONE DIPROPIONATE 10; .64 MG/G; MG/G
CREAM TOPICAL 2 TIMES DAILY PRN
Qty: 15 G | Refills: 1 | Status: SHIPPED | OUTPATIENT
Start: 2022-02-02 | End: 2023-06-06 | Stop reason: SDUPTHER

## 2022-02-02 RX ORDER — SERTRALINE HYDROCHLORIDE 100 MG/1
100 TABLET, FILM COATED ORAL DAILY
Qty: 90 TABLET | Refills: 1 | Status: SHIPPED | OUTPATIENT
Start: 2022-02-02 | End: 2022-06-23 | Stop reason: SDUPTHER

## 2022-02-02 RX ORDER — PANTOPRAZOLE SODIUM 40 MG/1
40 TABLET, DELAYED RELEASE ORAL DAILY
Qty: 90 TABLET | Refills: 1 | Status: SHIPPED | OUTPATIENT
Start: 2022-02-02 | End: 2022-07-05

## 2022-02-02 RX ORDER — ROSUVASTATIN CALCIUM 5 MG/1
5 TABLET, COATED ORAL DAILY
Qty: 90 TABLET | Refills: 1 | Status: SHIPPED | OUTPATIENT
Start: 2022-02-02 | End: 2022-05-30

## 2022-02-02 RX ORDER — IPRATROPIUM BROMIDE 21 UG/1
2 SPRAY, METERED NASAL 2 TIMES DAILY
Qty: 30 ML | Refills: 11 | Status: SHIPPED | OUTPATIENT
Start: 2022-02-02 | End: 2022-06-23 | Stop reason: SDUPTHER

## 2022-02-02 RX ORDER — MUPIROCIN 20 MG/G
OINTMENT TOPICAL
Qty: 22 G | Refills: 3 | Status: SHIPPED | OUTPATIENT
Start: 2022-02-02 | End: 2023-06-06 | Stop reason: SDUPTHER

## 2022-02-02 NOTE — TELEPHONE ENCOUNTER
----- Message from Iliana Chinchilla sent at 2/2/2022 12:03 PM CST -----  Contact: Pt [phone]  Requesting an RX refill or new RX.  Is this a refill or new RX: Refill  RX name and strength (copy/paste from chart):  zolpidem (AMBIEN) 5 MG Tab  Is this a 30 day or 90 day RX: 30  Pharmacy name and phone # (copy/paste from chart):  Patient will  hard copy    Requesting an RX refill or new RX.  Is this a refill or new RX: Refill  RX name and strength (copy/paste from chart):  promethazine-codeine 6.25-10 mg/5 ml (PHENERGAN WITH CODEINE) 6.25-10 mg/5 mL syrup  Is this a 30 day or 90 day RX: 30  Pharmacy name and phone # (copy/paste from chart): Patient will  hard copy    Comment:  Patient is on her way back to pick these prescriptions up.    Please call and advise.    Thank You

## 2022-02-02 NOTE — TELEPHONE ENCOUNTER
Patient came into the office today demanding her scripts for promethazine-codeine.  Per Dr. Brock patient is not getting the controlled substance due to being in pain management. Patient was not happy and wanted to speak with Dr. Brock but he is with patients in clinic and she will have to schedule another appointment to discuss it with him.  Patient scheduled a virtual appointment with Dr. Brock.  Also, Ambien was escribed to the pharmacy but did not go through so I called the RX into the pharmacy

## 2022-02-02 NOTE — PROGRESS NOTES
53 yo BF sees Dr Gama Rg on [facility]  [facility]  --Has appt with  Pulmonary MD  sees pulmonary MD . Pt states needs good night sleep.   Subjective:       Patient ID: Trina Marie Collette is a 57 y.o. female.    Chief Complaint:   HPI: 58 yo BF --lost brother New Year's Halima --55  sleep--??blood clot or MI still depressed .   Mother still alive having problems'  Eating OK--+ BM--ambulation--legs hurt real bad--unable to walk long distances--has use [employer]. Goes pain managerment--due bad disc in lumbar spine.     ROS:  Skin: no psoriasis, eczema, skin cancer--  HEENT:Occas  headache, ocular pain, blurred vision, diplopia, epistaxis, hoarseness change in voice, thyroid trouble--history of tinnitus/chronic sinus problem/nasal septal deviation/hearing loss has hearing aid  Lung: No pneumonia, asthma, Tb, wheezing, SOB, smoking 1/4 ppd trying to quit  --history of bronchospasm in the past uses albuterol  Heart: No chest pain, ankle edema, +occas palpitations, MI, river murmur, +hypertension blood pressure   + hyperlipidemia-no stent bypass arrhythmia  Abdomen: No nausea, vomiting, diarrhea, constipation, ulcers, hepatitis, gallbladder disease, melena, hematochezia, hematemesis patient complaining of heartburn had EGD colonoscopy hx diarrhea immodium Hx Gerd on protonix comes and goes  : no UTI, renal disease, stones  GYN last menstrual.  Years ago has mammogram yearly--refuses PAP smear --I don't fool with that    MS: no fractures, O/A, lupus, rheumatoid, gout--history of chronic pain the back lumbar spine, right knee come right shoulder, history DDD lumbar history cervical spinal stenosis  Neuro: No dizziness, LOC, seizures   No diabetes, no anemia, no anxiety, no depression patient with history of bipolar schizophrenia--doing well with medication goes to Mental Health   Single, one child, disabled, lives alone     Objective:   Physical Exam:    General: Well nourished, well developed, no acute  distress +overweight   Skin: No lesions  HEENT--eyes PERRLA EOM intact nose clear , throat nonerythematous ears TMs clear  NECK: Supple, no bruits, No JVD, no nodes  Lungs: Clear, no rales, rhonchi, wheezing   Heart: Regular rate and rhythm, no murmurs, gallops, or rubs  Abdomen: flat, bowel sounds positive, no tenderness, or organomegaly  MS:  Back pain anterior flexion 10° extension 10° lateral flexion rotation 10° able squat snf down hard to arise  Neuro: Alert, CN intact, oriented X 3 still with some anxiety and depression of issues  Extremities: No cyanosis, clubbing, or edema         Assessment:       1. Unspecified inflammatory spondylopathy, lumbar region    2. Chronic cough    3. Bronchitis    4. Hyperlipidemia, unspecified hyperlipidemia type    5. Lumbar spine pain    6. Tinnitus, unspecified laterality    7. Thyroid mass    8. Insomnia, unspecified type    9. Chronic obstructive pulmonary disease, unspecified COPD type    10. Schizophrenia, unspecified type        Plan:       Unspecified inflammatory spondylopathy, lumbar region    Chronic cough    Bronchitis    Hyperlipidemia, unspecified hyperlipidemia type    Lumbar spine pain    Tinnitus, unspecified laterality    Thyroid mass    Insomnia, unspecified type    Chronic obstructive pulmonary disease, unspecified COPD type    Schizophrenia, unspecified type        Main Reason here    Refills--patient told on too many medications needs to bring in list in 3 months and we will take at least 2 medications off for visit to get down to around 12 medicine   Sees Dr. Senior for pain medication--DDD cervical and lumbar spine  Hypertension on Norvasc and Cozaar  Hyperlipidemia on Crestor  Multiple myalgias arthralgias on Zanaflex Mobic  Vitamin D deficiency on vitamin-D  COPD on Symbicort and albuterol  Anxiety depression bipolar schizophrenia on Ambien Ativan  Zoloft trazodone Geodon--goes to mental health  Lab due Jan  --lCBCs CMP lipids T4 TSH hemoglobin  A1c vitamin-D--Lab done in December all basically normal except vitamin D so patient on vitamin  Health maintenance up-to-date

## 2022-02-04 ENCOUNTER — PATIENT OUTREACH (OUTPATIENT)
Dept: ADMINISTRATIVE | Facility: HOSPITAL | Age: 57
End: 2022-02-04
Payer: MEDICARE

## 2022-02-04 NOTE — PROGRESS NOTES
There are no preventive care reminders to display for this patient.    Triggered LINKS.  Updated Care Everywhere.  Chart was reviewed as part of the PHN Attestation for 2021.

## 2022-02-16 ENCOUNTER — PATIENT MESSAGE (OUTPATIENT)
Dept: OTOLARYNGOLOGY | Facility: CLINIC | Age: 57
End: 2022-02-16
Payer: MEDICARE

## 2022-02-18 ENCOUNTER — OFFICE VISIT (OUTPATIENT)
Dept: OTOLARYNGOLOGY | Facility: CLINIC | Age: 57
End: 2022-02-18
Payer: MEDICARE

## 2022-02-18 VITALS
WEIGHT: 250.88 LBS | SYSTOLIC BLOOD PRESSURE: 161 MMHG | HEART RATE: 75 BPM | TEMPERATURE: 98 F | BODY MASS INDEX: 40.49 KG/M2 | DIASTOLIC BLOOD PRESSURE: 84 MMHG

## 2022-02-18 DIAGNOSIS — J45.21 MILD INTERMITTENT ASTHMA WITH ACUTE EXACERBATION: ICD-10-CM

## 2022-02-18 DIAGNOSIS — K21.9 LARYNGOPHARYNGEAL REFLUX (LPR): ICD-10-CM

## 2022-02-18 DIAGNOSIS — H93.A1 PULSATILE TINNITUS OF RIGHT EAR: Primary | ICD-10-CM

## 2022-02-18 DIAGNOSIS — R09.82 PND (POST-NASAL DRIP): ICD-10-CM

## 2022-02-18 DIAGNOSIS — R13.10 DYSPHAGIA, UNSPECIFIED TYPE: ICD-10-CM

## 2022-02-18 DIAGNOSIS — J34.2 NASAL SEPTAL DEVIATION: ICD-10-CM

## 2022-02-18 DIAGNOSIS — J30.9 ALLERGIC RHINITIS, UNSPECIFIED SEASONALITY, UNSPECIFIED TRIGGER: ICD-10-CM

## 2022-02-18 DIAGNOSIS — G93.2 PSEUDOTUMOR CEREBRI SYNDROME: ICD-10-CM

## 2022-02-18 PROCEDURE — 99214 PR OFFICE/OUTPT VISIT, EST, LEVL IV, 30-39 MIN: ICD-10-PCS | Mod: 25,S$GLB,, | Performed by: SPECIALIST

## 2022-02-18 PROCEDURE — 99999 PR PBB SHADOW E&M-EST. PATIENT-LVL III: CPT | Mod: PBBFAC,,, | Performed by: SPECIALIST

## 2022-02-18 PROCEDURE — 4010F PR ACE/ARB THEARPY RXD/TAKEN: ICD-10-PCS | Mod: CPTII,S$GLB,, | Performed by: SPECIALIST

## 2022-02-18 PROCEDURE — 1159F PR MEDICATION LIST DOCUMENTED IN MEDICAL RECORD: ICD-10-PCS | Mod: CPTII,S$GLB,, | Performed by: SPECIALIST

## 2022-02-18 PROCEDURE — 99214 OFFICE O/P EST MOD 30 MIN: CPT | Mod: 25,S$GLB,, | Performed by: SPECIALIST

## 2022-02-18 PROCEDURE — 99999 PR PBB SHADOW E&M-EST. PATIENT-LVL III: ICD-10-PCS | Mod: PBBFAC,,, | Performed by: SPECIALIST

## 2022-02-18 PROCEDURE — 3008F PR BODY MASS INDEX (BMI) DOCUMENTED: ICD-10-PCS | Mod: CPTII,S$GLB,, | Performed by: SPECIALIST

## 2022-02-18 PROCEDURE — 1159F MED LIST DOCD IN RCRD: CPT | Mod: CPTII,S$GLB,, | Performed by: SPECIALIST

## 2022-02-18 PROCEDURE — 4010F ACE/ARB THERAPY RXD/TAKEN: CPT | Mod: CPTII,S$GLB,, | Performed by: SPECIALIST

## 2022-02-18 PROCEDURE — 31575 PR LARYNGOSCOPY, FLEXIBLE; DIAGNOSTIC: ICD-10-PCS | Mod: S$GLB,,, | Performed by: SPECIALIST

## 2022-02-18 PROCEDURE — 3079F PR MOST RECENT DIASTOLIC BLOOD PRESSURE 80-89 MM HG: ICD-10-PCS | Mod: CPTII,S$GLB,, | Performed by: SPECIALIST

## 2022-02-18 PROCEDURE — 1160F RVW MEDS BY RX/DR IN RCRD: CPT | Mod: CPTII,S$GLB,, | Performed by: SPECIALIST

## 2022-02-18 PROCEDURE — 3077F PR MOST RECENT SYSTOLIC BLOOD PRESSURE >= 140 MM HG: ICD-10-PCS | Mod: CPTII,S$GLB,, | Performed by: SPECIALIST

## 2022-02-18 PROCEDURE — 31575 DIAGNOSTIC LARYNGOSCOPY: CPT | Mod: S$GLB,,, | Performed by: SPECIALIST

## 2022-02-18 PROCEDURE — 3077F SYST BP >= 140 MM HG: CPT | Mod: CPTII,S$GLB,, | Performed by: SPECIALIST

## 2022-02-18 PROCEDURE — 3079F DIAST BP 80-89 MM HG: CPT | Mod: CPTII,S$GLB,, | Performed by: SPECIALIST

## 2022-02-18 PROCEDURE — 1160F PR REVIEW ALL MEDS BY PRESCRIBER/CLIN PHARMACIST DOCUMENTED: ICD-10-PCS | Mod: CPTII,S$GLB,, | Performed by: SPECIALIST

## 2022-02-18 PROCEDURE — 3008F BODY MASS INDEX DOCD: CPT | Mod: CPTII,S$GLB,, | Performed by: SPECIALIST

## 2022-02-18 RX ORDER — PROMETHAZINE HYDROCHLORIDE AND CODEINE PHOSPHATE 6.25; 1 MG/5ML; MG/5ML
5 SOLUTION ORAL EVERY 4 HOURS PRN
Qty: 210 ML | Refills: 0 | Status: SHIPPED | OUTPATIENT
Start: 2022-02-18 | End: 2022-03-16

## 2022-02-18 NOTE — PROCEDURES
"Laryngoscopy    Date/Time: 2/18/2022 9:00 AM  Performed by: DWAYNE Ricardo MD  Authorized by: DWAYNE Ricardo MD     Time out: Immediately prior to procedure a "time out" was called to verify the correct patient, procedure, equipment, support staff and site/side marked as required.    Consent Done?:  Yes (Verbal)  Anesthesia:     Local anesthetic:  4% Xylocaine spray with Yan-Synephrine (Topical aerosol)    Patient tolerance:  Patient tolerated the procedure well with no immediate complications    Decongestion performed?: Yes    Laryngoscopy:     Areas examined:  Vocal cords, larynx, hypopharynx, oropharynx, nasopharynx and nasal cavities    Laryngoscope size:  4 mm (Flexible video nasolaryngoscopy)  Nose External:      No external nasal deformity  Nose Intranasal:      Mucosa no polyps     Mucosa ulcers not present     No mucosa lesions present (Clear mucus in the nasal passages)     Septum gross deformity ( septum deviated to the right anteriorly the left far posteriorly)     Enlarged turbinates ( inferior turbinates enlarged bilaterally)  Nasopharynx:      No mucosa lesions     Adenoids not present     Posterior choanae patent     Eustachian tube patent  Larynx/hypopharynx:      No epiglottis lesions     No epiglottis edema     No AE folds lesions     No vocal cord polyps     Equal and normal bilateral ( vocal cords move symmetrically, no lesions noted)     No hypopharynx lesions     No piriform sinus pooling     No piriform sinus lesions     Post cricoid edema ( minimal, no change or improved from last endoscopy)     Post cricoid erythema ( minimal, no change or improved from last endoscopy)     Flexible video nasolaryngoscopy-nasal septal deviation and nasal changes allergic rhinitis; laryngeal changes of laryngopharyngeal reflux that is controlled well with H2 agonist therapy      "

## 2022-03-11 ENCOUNTER — TELEPHONE (OUTPATIENT)
Dept: PRIMARY CARE CLINIC | Facility: CLINIC | Age: 57
End: 2022-03-11
Payer: MEDICARE

## 2022-03-11 NOTE — TELEPHONE ENCOUNTER
----- Message from Tami Waller sent at 3/11/2022  2:48 PM CST -----  Contact: Elizabeth Johnson called for an appointment for an ER follow up, please give her a call back at [phone]      Thanks  kb

## 2022-03-14 ENCOUNTER — TELEPHONE (OUTPATIENT)
Dept: PRIMARY CARE CLINIC | Facility: CLINIC | Age: 57
End: 2022-03-14
Payer: MEDICARE

## 2022-03-14 NOTE — TELEPHONE ENCOUNTER
Called and spoke with patient-- earliest available appointment is not until Wednesday so Appointment scheduled and patient notified if cannot wait until Wednesday advised to go back to ER

## 2022-03-16 ENCOUNTER — OFFICE VISIT (OUTPATIENT)
Dept: PRIMARY CARE CLINIC | Facility: CLINIC | Age: 57
End: 2022-03-16
Payer: MEDICARE

## 2022-03-16 VITALS
HEIGHT: 66 IN | HEART RATE: 70 BPM | SYSTOLIC BLOOD PRESSURE: 136 MMHG | RESPIRATION RATE: 18 BRPM | WEIGHT: 253.31 LBS | BODY MASS INDEX: 40.71 KG/M2 | OXYGEN SATURATION: 100 % | DIASTOLIC BLOOD PRESSURE: 88 MMHG

## 2022-03-16 DIAGNOSIS — E78.00 PURE HYPERCHOLESTEROLEMIA: ICD-10-CM

## 2022-03-16 DIAGNOSIS — G47.00 INSOMNIA, UNSPECIFIED TYPE: ICD-10-CM

## 2022-03-16 DIAGNOSIS — J45.21 MILD INTERMITTENT ASTHMA WITH ACUTE EXACERBATION: ICD-10-CM

## 2022-03-16 DIAGNOSIS — S39.012A LUMBOSACRAL STRAIN, INITIAL ENCOUNTER: ICD-10-CM

## 2022-03-16 DIAGNOSIS — I10 ESSENTIAL HYPERTENSION: Primary | ICD-10-CM

## 2022-03-16 DIAGNOSIS — F31.9 BIPOLAR 1 DISORDER: ICD-10-CM

## 2022-03-16 DIAGNOSIS — M50.30 DDD (DEGENERATIVE DISC DISEASE), CERVICAL: ICD-10-CM

## 2022-03-16 DIAGNOSIS — S39.012D SACROILIAC STRAIN, SUBSEQUENT ENCOUNTER: ICD-10-CM

## 2022-03-16 DIAGNOSIS — F20.3 UNDIFFERENTIATED SCHIZOPHRENIA: ICD-10-CM

## 2022-03-16 DIAGNOSIS — L20.84 INTRINSIC ECZEMA: ICD-10-CM

## 2022-03-16 PROCEDURE — 3079F PR MOST RECENT DIASTOLIC BLOOD PRESSURE 80-89 MM HG: ICD-10-PCS | Mod: CPTII,S$GLB,, | Performed by: FAMILY MEDICINE

## 2022-03-16 PROCEDURE — 4010F ACE/ARB THERAPY RXD/TAKEN: CPT | Mod: CPTII,S$GLB,, | Performed by: FAMILY MEDICINE

## 2022-03-16 PROCEDURE — 96372 THER/PROPH/DIAG INJ SC/IM: CPT | Mod: S$GLB,,, | Performed by: FAMILY MEDICINE

## 2022-03-16 PROCEDURE — 99499 UNLISTED E&M SERVICE: CPT | Mod: S$GLB,,, | Performed by: FAMILY MEDICINE

## 2022-03-16 PROCEDURE — 3075F SYST BP GE 130 - 139MM HG: CPT | Mod: CPTII,S$GLB,, | Performed by: FAMILY MEDICINE

## 2022-03-16 PROCEDURE — 1159F MED LIST DOCD IN RCRD: CPT | Mod: CPTII,S$GLB,, | Performed by: FAMILY MEDICINE

## 2022-03-16 PROCEDURE — 3079F DIAST BP 80-89 MM HG: CPT | Mod: CPTII,S$GLB,, | Performed by: FAMILY MEDICINE

## 2022-03-16 PROCEDURE — 99999 PR PBB SHADOW E&M-EST. PATIENT-LVL V: ICD-10-PCS | Mod: PBBFAC,,, | Performed by: FAMILY MEDICINE

## 2022-03-16 PROCEDURE — 96372 PR INJECTION,THERAP/PROPH/DIAG2ST, IM OR SUBCUT: ICD-10-PCS | Mod: S$GLB,,, | Performed by: FAMILY MEDICINE

## 2022-03-16 PROCEDURE — 3075F PR MOST RECENT SYSTOLIC BLOOD PRESS GE 130-139MM HG: ICD-10-PCS | Mod: CPTII,S$GLB,, | Performed by: FAMILY MEDICINE

## 2022-03-16 PROCEDURE — 99214 PR OFFICE/OUTPT VISIT, EST, LEVL IV, 30-39 MIN: ICD-10-PCS | Mod: 25,S$GLB,, | Performed by: FAMILY MEDICINE

## 2022-03-16 PROCEDURE — 1159F PR MEDICATION LIST DOCUMENTED IN MEDICAL RECORD: ICD-10-PCS | Mod: CPTII,S$GLB,, | Performed by: FAMILY MEDICINE

## 2022-03-16 PROCEDURE — 3008F BODY MASS INDEX DOCD: CPT | Mod: CPTII,S$GLB,, | Performed by: FAMILY MEDICINE

## 2022-03-16 PROCEDURE — 4010F PR ACE/ARB THEARPY RXD/TAKEN: ICD-10-PCS | Mod: CPTII,S$GLB,, | Performed by: FAMILY MEDICINE

## 2022-03-16 PROCEDURE — 99499 RISK ADDL DX/OHS AUDIT: ICD-10-PCS | Mod: S$GLB,,, | Performed by: FAMILY MEDICINE

## 2022-03-16 PROCEDURE — 99999 PR PBB SHADOW E&M-EST. PATIENT-LVL V: CPT | Mod: PBBFAC,,, | Performed by: FAMILY MEDICINE

## 2022-03-16 PROCEDURE — 99214 OFFICE O/P EST MOD 30 MIN: CPT | Mod: 25,S$GLB,, | Performed by: FAMILY MEDICINE

## 2022-03-16 PROCEDURE — 3008F PR BODY MASS INDEX (BMI) DOCUMENTED: ICD-10-PCS | Mod: CPTII,S$GLB,, | Performed by: FAMILY MEDICINE

## 2022-03-16 RX ORDER — METHYLPREDNISOLONE 4 MG/1
TABLET ORAL
Qty: 1 EACH | Refills: 0 | Status: SHIPPED | OUTPATIENT
Start: 2022-03-16 | End: 2022-09-01

## 2022-03-16 RX ORDER — CYCLOBENZAPRINE HCL 10 MG
TABLET ORAL
Qty: 60 TABLET | Refills: 5 | Status: SHIPPED | OUTPATIENT
Start: 2022-03-16 | End: 2022-06-23 | Stop reason: SDUPTHER

## 2022-03-16 RX ORDER — CYCLOBENZAPRINE HCL 10 MG
10 TABLET ORAL 2 TIMES DAILY PRN
COMMUNITY
End: 2022-03-16 | Stop reason: SDUPTHER

## 2022-03-16 RX ORDER — MELOXICAM 15 MG/1
TABLET ORAL
Qty: 30 TABLET | Refills: 5 | Status: SHIPPED | OUTPATIENT
Start: 2022-03-16 | End: 2022-08-22

## 2022-03-16 RX ORDER — TRIAMCINOLONE ACETONIDE 40 MG/ML
40 INJECTION, SUSPENSION INTRA-ARTICULAR; INTRAMUSCULAR ONCE
Status: COMPLETED | OUTPATIENT
Start: 2022-03-16 | End: 2022-03-16

## 2022-03-16 RX ORDER — ZOLPIDEM TARTRATE 5 MG/1
TABLET ORAL
Qty: 30 TABLET | Refills: 2 | Status: SHIPPED | OUTPATIENT
Start: 2022-03-16 | End: 2022-04-28 | Stop reason: SDUPTHER

## 2022-03-16 RX ADMIN — TRIAMCINOLONE ACETONIDE 40 MG: 40 INJECTION, SUSPENSION INTRA-ARTICULAR; INTRAMUSCULAR at 02:03

## 2022-03-16 NOTE — PROGRESS NOTES
Subjective:       Patient ID: Trina Marie Collette is a 57 y.o. female.    Chief Complaint:   HPI: 58 yo BF --seen in the emergency room 3-6-2022--seen for acute right hip pain--started approximately 03/02/2022--patient had x-ray--had x-ray of the hip which was normal--given injection--and prescription of meloxicam.  Pain in the right hip area from the iliac crest to anterior axillary line posterior axillary line to the but to upper thigh.  Pain comes and goes randomly--when there is significant---quality--unable to describe--severity 8-9/10--frequency 4 times per day--past duration--3 minutes.  Stays in 1 spot no radiation.  No falling spells.  In pain comes patient jessi FRANK    ROS:-  Skin: no psoriasis, eczema, skin cancer--  HEENT:Occas  headache, ocular pain, blurred vision, diplopia, epistaxis, hoarseness change in voice, thyroid trouble--history of tinnitus/chronic sinus problem/nasal septal deviation/hearing loss has hearing aid  Lung: No pneumonia, asthma, Tb, wheezing, SOB, smoking 1/4 ppd trying to quit  --history of bronchospasm in the past uses albuterol  Heart: No chest pain, ankle edema, +occas palpitations, MI, river murmur, +hypertension blood pressure   + hyperlipidemia-no stent bypass arrhythmia  Abdomen: No nausea, vomiting, diarrhea, constipation, ulcers, hepatitis, gallbladder disease, melena, hematochezia, hematemesis patient complaining of heartburn had EGD colonoscopy hx diarrhea immodium Hx Gerd on protonix comes and goes  : no UTI, renal disease, stones  GYN last menstrual.  Years ago has mammogram yearly--refuses PAP smear --I don't fool with that    MS: no fractures, O/A, lupus, rheumatoid, gout--history of chronic pain the back lumbar spine, right knee come right shoulder, history DDD lumbar history cervical spinal stenosis  Neuro: No dizziness, LOC, seizures   No diabetes, no anemia, no anxiety, no depression patient with history of bipolar schizophrenia--doing well with medication  goes to Mental [facility]   Single, one child, disabled, lives alone     Objective:   Physical Exam:    General: Well nourished, well developed, no acute distress +overweight   Skin: No lesions  HEENT--eyes PERRLA EOM intact nose clear , throat nonerythematous ears TMs clear  NECK: Supple, no bruits, No JVD, no nodes  Lungs: Clear, no rales, rhonchi, wheezing   Heart: Regular rate and rhythm, no murmurs, gallops, or rubs  Abdomen: flat, bowel sounds positive, no tenderness, or organomegaly  MS:  Back pain anterior flexion 10° extension 10° lateral flexion rotation 10° able squat longterm down hard to arise  Neuro: Alert, CN intact, oriented X 3 still with some anxiety and depression of issues  Extremities: No cyanosis, clubbing, or edema         Assessment:       1. Essential hypertension    2. Insomnia, unspecified type    3. Lumbosacral strain, initial encounter    4. Sacroiliac strain, subsequent encounter    5. Pure hypercholesterolemia    6. Mild intermittent asthma with acute exacerbation    7. Intrinsic eczema    8. Bipolar 1 disorder    9. Undifferentiated schizophrenia    10. DDD (degenerative disc disease), cervical        Plan:       Essential hypertension    Insomnia, unspecified type  -     zolpidem (AMBIEN) 5 MG Tab; 1 po Q [facility] p.r.n. sleep  Dispense: 30 tablet; Refill: 2    Lumbosacral strain, initial encounter  -     X-Ray Lumbar Spine 5 View; Future; Expected date: 03/16/2022    Sacroiliac strain, subsequent encounter    Pure hypercholesterolemia    Mild intermittent asthma with acute exacerbation    Intrinsic eczema    Bipolar 1 disorder    Undifferentiated schizophrenia    DDD (degenerative disc disease), cervical    Other orders  -     meloxicam (MOBIC) 15 MG tablet; Start after Medrol Dosepak complete Mobic 7.5 mg 1 p.o. b.i.d. p.r.n. swelling or pain no other NSAIDs can take with Tylenol  Dispense: 30 tablet; Refill: 5  -     cyclobenzaprine (FLEXERIL) 10 MG tablet; One p.o. b.i.d. may increase to  q.8 hours if needed  Dispense: 60 tablet; Refill: 5  -     triamcinolone acetonide injection 40 mg  -     methylPREDNISolone (MEDROL DOSEPACK) 4 mg tablet; Start Thursday Medrol Dosepak use as directed  Dispense: 1 each; Refill: 0        Main Reason here    Right lower back/right sacroiliac and right hip pain--seen in emergency room 03/06/2022--x-rays of the hip negative--will x-ray lumbar spine--Kenalog/Medrol/Mobic/Flexeril/has norco 10 mg for breakthrough pain---return 6 weeks if no better will do physical therapy--after 12 weeks may get MRI lumbar spine and right hip  Sees Dr. Senior for pain medication--DDD cervical and lumbar spine  Hypertension on Norvasc and Cozaar blood pressure 136/88  Hyperlipidemia on Crestor  Other medical issues  Vitamin D deficiency on vitamin-D  COPD on Symbicort and albuterol  Anxiety depression bipolar schizophrenia on Ambien Ativan  Zoloft trazodone Geodon--goes to mental health  Lab done in ER CBCs CMP decreased--routine lab done in December  Lab June CBCs CMP lipid  Health maintenance Pap smear

## 2022-03-17 ENCOUNTER — PATIENT MESSAGE (OUTPATIENT)
Dept: ADMINISTRATIVE | Facility: HOSPITAL | Age: 57
End: 2022-03-17
Payer: MEDICARE

## 2022-03-18 ENCOUNTER — TELEPHONE (OUTPATIENT)
Dept: OTOLARYNGOLOGY | Facility: CLINIC | Age: 57
End: 2022-03-18
Payer: MEDICARE

## 2022-03-18 ENCOUNTER — TELEPHONE (OUTPATIENT)
Dept: PRIMARY CARE CLINIC | Facility: CLINIC | Age: 57
End: 2022-03-18
Payer: MEDICARE

## 2022-03-18 RX ORDER — PROMETHAZINE HYDROCHLORIDE AND CODEINE PHOSPHATE 6.25; 1 MG/5ML; MG/5ML
5 SOLUTION ORAL EVERY 4 HOURS PRN
Qty: 210 ML | Refills: 0 | Status: SHIPPED | OUTPATIENT
Start: 2022-03-18 | End: 2022-03-28

## 2022-03-18 NOTE — TELEPHONE ENCOUNTER
----- Message from Ana Luisa Montana sent at 3/18/2022  8:51 AM CDT -----  Contact: Patient, [phone]  Calling to get test results.  Name of test (lab, x-ray): Xray  Date of test: 03/16/2022  Where was the test performed: [facility]  Would you like a call back, or a response through your [facility] portal?:   Call back  Comments: Please call her. Thanks.

## 2022-03-18 NOTE — TELEPHONE ENCOUNTER
Returned pt call. Informed pt that Rx was faxed to pharmacy.       ----- Message from Suzanne Carvalho RN sent at 3/18/2022 12:56 PM CDT -----  Regarding: FW: refill  Contact: pt @ [phone]  Wanted to send this to you.  Not sure why it showed up since I don't have Davion checked for in basket.  I know you all have issues with this patient so just wanted to bring to your attention.    ----- Message -----  From: Gayla Edge  Sent: 3/18/2022  12:14 PM CDT  To: [facility] Staff  Subject: refill                                           Pt calling regarding rx: promethazine-codeine 6.25-10 mg/5 ml (PHENERGAN WITH CODEINE) 6.25-10 mg/5 mL syrup, is was not sent to her pharmacy, in the system it shows it was not escribed to her pharmacy.. please call.    [facility]/pharmacy #[id number] - [place] - [address]   [address] [place] [address]  Phone: [phone] Fax: [phone]

## 2022-03-18 NOTE — TELEPHONE ENCOUNTER
No new care gaps identified.  Powered by [employer]. Reference number: [id number].   3/18/2022 3:13:17 PM CDT

## 2022-03-21 RX ORDER — BETAMETHASONE VALERATE 1.2 MG/G
CREAM TOPICAL
Qty: 180 G | Refills: 0 | Status: SHIPPED | OUTPATIENT
Start: 2022-03-21 | End: 2022-04-06

## 2022-03-21 NOTE — TELEPHONE ENCOUNTER
Refill Authorization Note   Trina Collette  is requesting a refill authorization.  Brief Assessment and Rationale for Refill:  Approve     Medication Therapy Plan:       Medication Reconciliation Completed: No   Comments:   --->Care Gap information included below if applicable.       Requested Prescriptions   Pending Prescriptions Disp Refills    betamethasone valerate 0.1% (VALISONE) 0.1 % Crea [Pharmacy Med Name: BETAMETHASONE VA 0.1% CREAM] 180 g 0     Sig: APPLIED TO THE ABDOMEN TWICE A DAY AS NEEDED FOR SKIN RASH OR ITCH       Dermatology:  Corticosteroids Passed - 3/18/2022  3:12 PM        Passed - Patient is at least 18 years old        Passed - Valid encounter within last 15 months     Recent Visits  Date Type Provider Dept   03/16/22 Office Visit Davon Brock MD [facility]   02/02/22 Office Visit Davon Brock MD [facility]   12/08/21 Office Visit Davon Brock MD [facility]   10/26/21 Office Visit Davon Brock MD [facility]   08/19/21 Office Visit Davon Brock MD [facility]   07/06/21 Office Visit Davon Brock MD [facility]   04/06/21 Office Visit Davon Brock MD [facility]   12/17/20 Office Visit Davon Brock MD [facility]   09/03/20 Office Visit Davon Brock MD [facility]   06/01/20 Office Visit Davon Brock MD [facility]   Showing recent visits within past 720 days and meeting all other requirements  Future Appointments  No visits were found meeting these conditions.  Showing future appointments within next 150 days and meeting all other requirements      Future Appointments              In 1 month MD [facility]    In 1 month Davon Brock MD [facility] [address]                Passed - Manual Review: Max refill duration of 3  months.            Appointments  past 12m or future 3m with PCP    Date Provider   Last Visit   3/16/2022 Davon Brock MD   Next Visit   4/28/2022 Davon Brock MD   ED visits in past 90 days: 1     Note composed:11:35 AM 03/21/2022

## 2022-04-21 ENCOUNTER — TELEPHONE (OUTPATIENT)
Dept: OTOLARYNGOLOGY | Facility: CLINIC | Age: 57
End: 2022-04-21
Payer: MEDICARE

## 2022-04-21 NOTE — TELEPHONE ENCOUNTER
Returned pt call. Changed pt's appointment to virtual due to pt not having transportation.       ----- Message from Lianna Thapa sent at 4/21/2022  3:27 PM CDT -----  Regarding: pt called  Name of Who is Calling: COLLETTE, TRINA MARIE [[id number]]      What is the request in detail: pt is requesting to change her appt to a virtual visit tomorrow . Please advise       Can the clinic reply by [facility]: No      What [facility] to [facility] Back [facility][phone]

## 2022-04-22 ENCOUNTER — TELEPHONE (OUTPATIENT)
Dept: PRIMARY CARE CLINIC | Facility: CLINIC | Age: 57
End: 2022-04-22
Payer: MEDICARE

## 2022-04-22 ENCOUNTER — TELEPHONE (OUTPATIENT)
Dept: OTOLARYNGOLOGY | Facility: CLINIC | Age: 57
End: 2022-04-22

## 2022-04-22 ENCOUNTER — OFFICE VISIT (OUTPATIENT)
Dept: OTOLARYNGOLOGY | Facility: CLINIC | Age: 57
End: 2022-04-22
Payer: MEDICARE

## 2022-04-22 DIAGNOSIS — J45.21 MILD INTERMITTENT ASTHMA WITH ACUTE EXACERBATION: ICD-10-CM

## 2022-04-22 DIAGNOSIS — H93.A1 PULSATILE TINNITUS OF RIGHT EAR: ICD-10-CM

## 2022-04-22 DIAGNOSIS — J30.9 ALLERGIC RHINITIS, UNSPECIFIED SEASONALITY, UNSPECIFIED TRIGGER: Primary | ICD-10-CM

## 2022-04-22 DIAGNOSIS — R09.1 PLEURISY: ICD-10-CM

## 2022-04-22 DIAGNOSIS — K21.9 LARYNGOPHARYNGEAL REFLUX (LPR): ICD-10-CM

## 2022-04-22 DIAGNOSIS — R05.3 CHRONIC COUGH: ICD-10-CM

## 2022-04-22 PROCEDURE — 99212 OFFICE O/P EST SF 10 MIN: CPT | Mod: 95,,, | Performed by: SPECIALIST

## 2022-04-22 PROCEDURE — 1159F MED LIST DOCD IN RCRD: CPT | Mod: CPTII,95,, | Performed by: SPECIALIST

## 2022-04-22 PROCEDURE — 4010F ACE/ARB THERAPY RXD/TAKEN: CPT | Mod: CPTII,95,, | Performed by: SPECIALIST

## 2022-04-22 PROCEDURE — 1159F PR MEDICATION LIST DOCUMENTED IN MEDICAL RECORD: ICD-10-PCS | Mod: CPTII,95,, | Performed by: SPECIALIST

## 2022-04-22 PROCEDURE — 99212 PR OFFICE/OUTPT VISIT, EST, LEVL II, 10-19 MIN: ICD-10-PCS | Mod: 95,,, | Performed by: SPECIALIST

## 2022-04-22 PROCEDURE — 1160F PR REVIEW ALL MEDS BY PRESCRIBER/CLIN PHARMACIST DOCUMENTED: ICD-10-PCS | Mod: CPTII,95,, | Performed by: SPECIALIST

## 2022-04-22 PROCEDURE — 4010F PR ACE/ARB THEARPY RXD/TAKEN: ICD-10-PCS | Mod: CPTII,95,, | Performed by: SPECIALIST

## 2022-04-22 PROCEDURE — 1160F RVW MEDS BY RX/DR IN RCRD: CPT | Mod: CPTII,95,, | Performed by: SPECIALIST

## 2022-04-22 RX ORDER — PROMETHAZINE HYDROCHLORIDE AND CODEINE PHOSPHATE 6.25; 1 MG/5ML; MG/5ML
5 SOLUTION ORAL EVERY 4 HOURS PRN
Qty: 210 ML | Refills: 0 | Status: SHIPPED | OUTPATIENT
Start: 2022-04-22 | End: 2022-05-02

## 2022-04-22 NOTE — PROGRESS NOTES
The patient location is:  patient's personal residence  The chief complaint leading to consultation is:  Coughing chest pain  Visit type: Virtual visit with synchronous audio and video  Total time spent with patient:  12 minute  Each patient to whom he or she provides medical services by telemedicine is:  (1) informed of the relationship between the physician and patient and the respective role of any other health care provider with respect to management of the patient; and (2) notified that he or she may decline to receive medical services by telemedicine and may withdraw from such care at any time.    Past Medical History:   Diagnosis Date    Allergic rhinitis     Anxiety     Bipolar 1 disorder     Chronic cough     Depression     Hypertension     Laryngopharyngeal reflux (LPR)     Lower back pain     Tinnitus of right ear        Past Surgical History:   Procedure Laterality Date    COLONOSCOPY N/A 1/12/2021    Procedure: COLONOSCOPY;  Surgeon: Vitaliy Hammer MD;  Location: [address]);  Service: Endoscopy;  Laterality: N/A;    ESOPHAGOGASTRODUODENOSCOPY N/A 1/12/2021    Procedure: ESOPHAGOGASTRODUODENOSCOPY (EGD);  Surgeon: Vitaliy Hammer MD;  Location: [address]);  Service: Endoscopy;  Laterality: N/A;  prep ins. mailed - COVID screening on 1/9/21 [facility]    FUNCTIONAL ENDOSCOPIC SINUS SURGERY (FESS) USING COMPUTER-ASSISTED NAVIGATION Bilateral 2/3/2020    Procedure: FESS, USING COMPUTER-ASSISTED NAVIGATION;  Surgeon: DWAYNE Ricardo MD;  Location: [facility];  Service: ENT;  Laterality: Bilateral;    VAGINAL DELIVERY  1986       No family history on file.    Social History     Socioeconomic History    Marital status: Single   Tobacco Use    Smoking status: Current Every Day Smoker     Packs/day: 0.25     Years: 10.00     Pack years: 2.50     Types: Cigarettes    Smokeless tobacco: Never Used   Substance and Sexual Activity    Alcohol use: Not Currently    Drug  use: Yes     Types: Marijuana    Sexual activity: Yes     Partners: Male       Current Outpatient Medications   Medication Sig Dispense Refill    albuterol (PROVENTIL) 2.5 mg /3 mL (0.083 %) nebulizer solution Take 3 mLs (2.5 mg total) by nebulization every 4 (four) hours as needed for Wheezing or Shortness of Breath. Rescue 1 each 1    amLODIPine (NORVASC) 2.5 MG tablet TAKE 1 TABLET BY MOUTH AT BEDTIME FOR HIGH BLOOD PRESSURE. CONTINUE LOSARTAN 100 MG IN THE MORNING 90 tablet 1    betamethasone valerate 0.1% (VALISONE) 0.1 % Crea APPLIED TO THE ABDOMEN TWICE A DAY AS NEEDED FOR SKIN RASH OR ITCH 180 g 0    budesonide-formoterol 160-4.5 mcg (SYMBICORT) 160-4.5 mcg/actuation HFAA Inhale 2 puffs into the lungs every 12 (twelve) hours. Controller. Rinse mouth after using. 10.2 g 11    busPIRone (BUSPAR) 30 MG Tab Take 1 tablet (30 mg total) by mouth 2 (two) times daily. 180 tablet 1    clotrimazole-betamethasone 1-0.05% (LOTRISONE) cream Apply topically 2 (two) times daily as needed. 15 g 1    cyclobenzaprine (FLEXERIL) 10 MG tablet One p.o. b.i.d. may increase to q.8 hours if needed 60 tablet 5    diphenoxylate-atropine 2.5-0.025 mg (LOMOTIL) 2.5-0.025 mg per tablet TAKE 1 TAB BY MOUTH AFTER EACH LOOSE BOWEL MOVEMENT PER DAY 30 tablet 2    ergocalciferol (ERGOCALCIFEROL) 50,000 unit Cap Take 1 capsule (50,000 Units total) by mouth every 7 days. 12 capsule 3    famotidine (PEPCID) 20 MG tablet Take 1 tablet (20 mg total) by mouth 2 (two) times daily. 60 tablet 11    gabapentin (NEURONTIN) 800 MG tablet Take 1 tablet (800 mg total) by mouth 3 (three) times daily. 90 tablet 1    HYDROcodone-acetaminophen (NORCO)  mg per tablet Take 1 tablet by mouth every 8 (eight) hours as needed.   0    hydrOXYzine pamoate (VISTARIL) 25 MG Cap TAKE 1 CAPSULE BY MOUTH EVERY 8 HOURS OR 1 CAP AT BEDTIME FOR ANXIETY TO HELP PREVENT PALPITATIONS 360 capsule 1    ipratropium (ATROVENT) 21 mcg (0.03 %) nasal spray 2  sprays by Nasal route 2 (two) times daily. May be use more often if needed 30 mL 11    levocetirizine (XYZAL) 5 MG tablet Take 1 tablet (5 mg total) by mouth every evening. 90 tablet 1    LORazepam (ATIVAN) 1 MG tablet Take 1 tablet (1 mg total) by mouth every 6 (six) hours as needed for Anxiety. 30 tablet 2    losartan (COZAAR) 100 MG tablet Take 1 tablet (100 mg total) by mouth once daily. 90 tablet 1    meloxicam (MOBIC) 15 MG tablet Start after Medrol Dosepak complete Mobic 7.5 mg 1 p.o. b.i.d. p.r.n. swelling or pain no other NSAIDs can take with Tylenol 30 tablet 5    methylPREDNISolone (MEDROL DOSEPACK) 4 mg tablet Start Thursday Medrol Dosepak use as directed 1 each 0    montelukast (SINGULAIR) 10 mg tablet Take 1 tablet (10 mg total) by mouth once daily. 90 tablet 1    mupirocin (BACTROBAN) 2 % ointment Apply to nose 3 times daily on a Q-tip for crusting or sores inside the nose 22 g 3    pantoprazole (PROTONIX) 40 MG tablet Take 1 tablet (40 mg total) by mouth once daily. 90 tablet 1    potassium chloride (MICRO-K) 10 MEQ CpSR Take 1 capsule (10 mEq total) by mouth once daily. 90 capsule 1    rosuvastatin (CRESTOR) 5 MG tablet Take 1 tablet (5 mg total) by mouth once daily. 90 tablet 1    sertraline (ZOLOFT) 100 MG tablet Take 1 tablet (100 mg total) by mouth once daily. 90 tablet 1    tiZANidine (ZANAFLEX) 4 MG tablet Take 1 tablet (4 mg total) by mouth every 8 (eight) hours. 30 tablet 2    traZODone (DESYREL) 100 MG tablet Take 1 tablet (100 mg total) by mouth nightly as needed for Insomnia. 90 tablet 1    triamcinolone acetonide 0.1% (KENALOG) 0.1 % cream Apply topically 2 (two) times daily. 15 g 2    ziprasidone (GEODON) 60 MG Cap Take 1 capsule (60 mg total) by mouth 2 (two) times daily. 30 capsule 5    ziprasidone (GEODON) 80 MG capsule Take 1 capsule (80 mg total) by mouth once daily. 30 capsule 5    zolpidem (AMBIEN) 5 MG Tab 1 po Q [facility] p.r.n. sleep 30 tablet 2     No current  facility-administered medications for this visit.       Review of patient's allergies indicates:  No Known Allergies     HISTORY:   The patient is having multiple issues to discuss:  1. Asthma/Cough:  The patient is having increase in her cough.  When she coughs now she has a sharp pain in the left upper back/shoulder.  She is producing some sputum which is either clear or white.  She is not having fever.  She has using a Symbicort inhaler on an as-needed basis only.  She uses Phenergan with codeine to control her coughing at night.  2. Laryngopharyngeal reflux:  She has been taking famotidine twice daily.  Symptoms are minimal.  3.  Pulsatile tinnitus right ear-no change  4. Allergic rhinitis:  She is having congestion and some rhinorrhea and postnasal drip.  Nasal secretions are clear.  She is taking Singulair at night and using ipratropium bromide spray during the day.      PHYSICAL EXAMINATION:  General-Well developed adult female in no acute distress, oriented x3, alert  HEENT-puffiness and bluish discoloration of the lower eyelids bilaterally, no obvious nasal secretions, neck supple  Chest-no wheezing or retractions      ICD-10-CM ICD-9-CM   1. Allergic rhinitis, unspecified seasonality, unspecified trigger  J30.9 477.9   2. Laryngopharyngeal reflux (LPR)  K21.9 478.79   3. Pulsatile tinnitus of right ear  H93.A1 388.30   4. Mild intermittent asthma with acute exacerbation  J45.21 493.92   5. Chronic cough  R05.3 786.2         RECOMMENDATIONS    I am advising patient to resume twice daily use of her Symbicort on an everyday basis.  I will have her go for a chest x-ray to rule out pneumonia because of the pleurisy.  I am refilling her Phenergan with codeine.  I will recheck her in 6 weeks, or sooner on an as-needed basis.    Answers for HPI/ROS submitted by the patient on 4/22/2022  None of these: Yes  hearing loss: Yes  sore throat: Yes  trouble swallowing: Yes  None of these : Yes  Snoring?: Yes  shortness  of breath: Yes  wheezing: Yes  cough: Yes  None of these : Yes  heartburn: Yes  Acid Reflux?: Yes  None of these: Yes  Muscle aches / pain?: Yes  back pain: Yes  neck pain: Yes  None of these : Yes  Seasonal Allergies?: Yes  Cold all of the time? : Yes  None of these: Yes  None of these: Yes  None of these: Yes

## 2022-04-22 NOTE — TELEPHONE ENCOUNTER
Returned pt call. Provided pt with number to imaging center, informed pt that Rx was faxed to pharmacy on file, and scheduled pt a follow up visit with provider.       ----- Message from Marci Dyson sent at 4/22/2022  3:57 PM CDT -----  Regarding: pt  Pt is calling to speak with the nurse pt had a virtual visit today and a prescription was suppose to be called in pt said she didn't get it yet can you please call pt at [phone].    ALBARO

## 2022-04-22 NOTE — TELEPHONE ENCOUNTER
----- Message from Reji Laguerre sent at 4/22/2022  3:50 PM CDT -----  Contact: self [phone]  Patient would like to get a referral.  Referral to what specialty:  vascular  Does the patient want the [facility] with a [facility]  [facility] the [facility]:    Reason (be specific):  calf muscle pain  Does the patient already have the specialty clinic appointment scheduled:    If yes, what date is the appointment scheduled:   yes  Is the insurance listed in Epic correct? (this is important for a referral):  yes  Advised patient that once provider approves this either a nurse or  will return their call?:   Would the patient like a call back, or a response through their [facility] portal?:  call back  Comments:       Please call and advise

## 2022-04-25 ENCOUNTER — TELEPHONE (OUTPATIENT)
Dept: OTOLARYNGOLOGY | Facility: CLINIC | Age: 57
End: 2022-04-25
Payer: MEDICARE

## 2022-04-25 DIAGNOSIS — R25.2 LEG CRAMPS: Primary | ICD-10-CM

## 2022-04-25 NOTE — TELEPHONE ENCOUNTER
----- Message from DWAYNE Ricardo MD sent at 4/25/2022  1:23 PM CDT -----  Please inform patient that her chest x-ray results are normal. Please have them follow up as scheduled.   Called and spoke with patient today letting her know the chest X-ray results per Dr. Ricardo.

## 2022-04-28 ENCOUNTER — OFFICE VISIT (OUTPATIENT)
Dept: PRIMARY CARE CLINIC | Facility: CLINIC | Age: 57
End: 2022-04-28
Payer: MEDICARE

## 2022-04-28 VITALS
BODY MASS INDEX: 39.76 KG/M2 | HEART RATE: 76 BPM | SYSTOLIC BLOOD PRESSURE: 124 MMHG | OXYGEN SATURATION: 98 % | HEIGHT: 66 IN | WEIGHT: 247.38 LBS | DIASTOLIC BLOOD PRESSURE: 80 MMHG | RESPIRATION RATE: 18 BRPM

## 2022-04-28 DIAGNOSIS — Z72.0 TOBACCO ABUSE: ICD-10-CM

## 2022-04-28 DIAGNOSIS — S39.012D SACROILIAC STRAIN, SUBSEQUENT ENCOUNTER: ICD-10-CM

## 2022-04-28 DIAGNOSIS — G47.00 INSOMNIA, UNSPECIFIED TYPE: ICD-10-CM

## 2022-04-28 DIAGNOSIS — S39.012D LUMBOSACRAL STRAIN, SUBSEQUENT ENCOUNTER: ICD-10-CM

## 2022-04-28 DIAGNOSIS — E55.9 VITAMIN D DEFICIENCY: ICD-10-CM

## 2022-04-28 DIAGNOSIS — G89.29 CHRONIC LEFT SHOULDER PAIN: ICD-10-CM

## 2022-04-28 DIAGNOSIS — L20.84 INTRINSIC ECZEMA: ICD-10-CM

## 2022-04-28 DIAGNOSIS — I10 ESSENTIAL HYPERTENSION: Primary | ICD-10-CM

## 2022-04-28 DIAGNOSIS — D37.05 NEOPLASM OF UNCERTAIN BEHAVIOR OF NASOPHARYNX: ICD-10-CM

## 2022-04-28 DIAGNOSIS — M25.512 CHRONIC LEFT SHOULDER PAIN: ICD-10-CM

## 2022-04-28 DIAGNOSIS — E66.9 OBESITY (BMI 35.0-39.9 WITHOUT COMORBIDITY): ICD-10-CM

## 2022-04-28 DIAGNOSIS — E78.00 PURE HYPERCHOLESTEROLEMIA: ICD-10-CM

## 2022-04-28 PROCEDURE — 99999 PR PBB SHADOW E&M-EST. PATIENT-LVL V: ICD-10-PCS | Mod: PBBFAC,,, | Performed by: FAMILY MEDICINE

## 2022-04-28 PROCEDURE — 4010F PR ACE/ARB THEARPY RXD/TAKEN: ICD-10-PCS | Mod: CPTII,,, | Performed by: FAMILY MEDICINE

## 2022-04-28 PROCEDURE — 3079F DIAST BP 80-89 MM HG: CPT | Mod: CPTII,,, | Performed by: FAMILY MEDICINE

## 2022-04-28 PROCEDURE — 1159F MED LIST DOCD IN RCRD: CPT | Mod: CPTII,,, | Performed by: FAMILY MEDICINE

## 2022-04-28 PROCEDURE — 3074F SYST BP LT 130 MM HG: CPT | Mod: CPTII,,, | Performed by: FAMILY MEDICINE

## 2022-04-28 PROCEDURE — 1159F PR MEDICATION LIST DOCUMENTED IN MEDICAL RECORD: ICD-10-PCS | Mod: CPTII,,, | Performed by: FAMILY MEDICINE

## 2022-04-28 PROCEDURE — 3008F PR BODY MASS INDEX (BMI) DOCUMENTED: ICD-10-PCS | Mod: CPTII,,, | Performed by: FAMILY MEDICINE

## 2022-04-28 PROCEDURE — 99499 RISK ADDL DX/OHS AUDIT: ICD-10-PCS | Mod: S$PBB,,, | Performed by: FAMILY MEDICINE

## 2022-04-28 PROCEDURE — 4010F ACE/ARB THERAPY RXD/TAKEN: CPT | Mod: CPTII,,, | Performed by: FAMILY MEDICINE

## 2022-04-28 PROCEDURE — 99499 UNLISTED E&M SERVICE: CPT | Mod: S$GLB,,, | Performed by: FAMILY MEDICINE

## 2022-04-28 PROCEDURE — 99214 OFFICE O/P EST MOD 30 MIN: CPT | Mod: S$PBB,,, | Performed by: FAMILY MEDICINE

## 2022-04-28 PROCEDURE — 3074F PR MOST RECENT SYSTOLIC BLOOD PRESSURE < 130 MM HG: ICD-10-PCS | Mod: CPTII,,, | Performed by: FAMILY MEDICINE

## 2022-04-28 PROCEDURE — 3079F PR MOST RECENT DIASTOLIC BLOOD PRESSURE 80-89 MM HG: ICD-10-PCS | Mod: CPTII,,, | Performed by: FAMILY MEDICINE

## 2022-04-28 PROCEDURE — 99999 PR PBB SHADOW E&M-EST. PATIENT-LVL V: CPT | Mod: PBBFAC,,, | Performed by: FAMILY MEDICINE

## 2022-04-28 PROCEDURE — 99214 PR OFFICE/OUTPT VISIT, EST, LEVL IV, 30-39 MIN: ICD-10-PCS | Mod: S$PBB,,, | Performed by: FAMILY MEDICINE

## 2022-04-28 PROCEDURE — 3008F BODY MASS INDEX DOCD: CPT | Mod: CPTII,,, | Performed by: FAMILY MEDICINE

## 2022-04-28 RX ORDER — DIPHENOXYLATE HYDROCHLORIDE AND ATROPINE SULFATE 2.5; .025 MG/1; MG/1
TABLET ORAL
Qty: 30 TABLET | Refills: 2 | Status: SHIPPED | OUTPATIENT
Start: 2022-04-28 | End: 2023-02-22 | Stop reason: SDUPTHER

## 2022-04-28 RX ORDER — TIZANIDINE 4 MG/1
4 TABLET ORAL EVERY 8 HOURS
Qty: 30 TABLET | Refills: 2 | Status: SHIPPED | OUTPATIENT
Start: 2022-04-28 | End: 2022-06-23 | Stop reason: SDUPTHER

## 2022-04-28 RX ORDER — ZOLPIDEM TARTRATE 5 MG/1
TABLET ORAL
Qty: 30 TABLET | Refills: 2 | Status: SHIPPED | OUTPATIENT
Start: 2022-04-28 | End: 2022-06-22 | Stop reason: SDUPTHER

## 2022-04-28 RX ORDER — NAPROXEN 375 MG/1
375 TABLET ORAL 2 TIMES DAILY PRN
COMMUNITY
Start: 2022-04-22 | End: 2022-06-27

## 2022-04-28 NOTE — PROGRESS NOTES
Subjective:       Patient ID: Trina Marie Collette is a 57 y.o. female.    Chief Complaint:   HPI:  57-year-old female in for 6 week follow-up medication refills.      Pt with superficial varicose veins wants see surgeon      Right hip pain from lower back doing much better.  Able ambulate okay able go up and down steps able squat arise,      Eating well--+BM-constipation --ambualtion OK              Office visit 03/16/2022  58 yo BF --seen in the emergency room 3-6-2022--seen for acute right hip pain--started approximately 03/02/2022--patient had x-ray--had x-ray of the hip which was normal--given injection--and prescription of meloxicam.  Pain in the right hip area from the iliac crest to anterior axillary line posterior axillary line to the but to upper thigh.  Pain comes and goes randomly--when there is significant---quality--unable to describe--severity 8-9/10--frequency 4 times per day--past duration--3 minutes.  Stays in 1 spot no radiation.  No falling spells.  In pain comes patient jessi FRANK    ROS:-  Skin: no psoriasis, eczema, skin cancer--  HEENT:Occas  headache, ocular pain, blurred vision, diplopia, epistaxis, hoarseness change in voice, thyroid trouble--history of tinnitus/chronic sinus problem/nasal septal deviation/hearing loss has hearing aid  Lung: No pneumonia, asthma, Tb, wheezing, SOB, smoking 1/4 ppd trying to quit  --history of bronchospasm in the past uses albuterol--had chest x-ray which is normal  Heart: No chest pain, ankle edema, +occas palpitations, MI, river murmur, +hypertension blood pressure   + hyperlipidemia-no stent bypass arrhythmia  Abdomen: No nausea, vomiting, diarrhea, constipation, ulcers, hepatitis, gallbladder disease, melena, hematochezia, hematemesis patient complaining of heartburn had EGD colonoscopy hx diarrhea immodium Hx Gerd on protonix comes and goes  : no UTI, renal disease, stones--h  GYN last menstrual.  Years ago has mammogram yearly--refuses PAP smear --I  don't fool with that    MS: no fractures, O/A, lupus, rheumatoid, gout--history of chronic pain the back lumbar spine, right knee come right shoulder, history DDD lumbar history cervical spinal stenosis  Neuro: No dizziness, LOC, seizures   No diabetes, no anemia, no anxiety, no depression patient with history of bipolar schizophrenia--doing well with medication goes to Mental Health   Single, one child, disabled, lives alone     Objective:   Physical Exam:    General: Well nourished, well developed, no acute distress +overweight   Skin: No lesions  HEENT--eyes PERRLA EOM intact nose clear , throat nonerythematous ears TMs clear  NECK: Supple, no bruits, No JVD, no nodes  Lungs: Clear, no rales, rhonchi, wheezing   Heart: Regular rate and rhythm, no murmurs, gallops, or rubs  Abdomen: flat, bowel sounds positive, no tenderness, or organomegaly  MS:  Back pain same left shoulder sore with rotation raising left arm overhead   Neuro: Alert, CN intact, oriented X 3 still with some anxiety and depression of issues  Extremities: No cyanosis, clubbing, or edema         Assessment:       1. Essential hypertension    2. Insomnia, unspecified type    3. Chronic left shoulder pain    4. Lumbosacral strain, subsequent encounter    5. Sacroiliac strain, subsequent encounter    6. Tobacco abuse    7. Obesity (BMI 35.0-39.9 without comorbidity)    8. 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 Pure hypercholesterolemia    11. Intrinsic eczema        Plan:       Essential hypertension    Insomnia, unspecified type  -     zolpidem (AMBIEN) 5 MG Tab; 1 po Q [facility] p.r.n. sleep  Dispense: 30 tablet; Refill: 2    Chronic left shoulder pain  -     X-Ray Shoulder Trauma 3 view Left; Future; Expected date: 04/28/2022    Lumbosacral strain, subsequent encounter    Sacroiliac strain, subsequent encounter    Tobacco abuse    Obesity (BMI 35.0-39.9 without comorbidity)    Vitamin D deficiency  -     Vitamin D;  Future; Expected date: 10/28/2022    Neoplasm of uncertain behavior of nasopharynx    Pure hypercholesterolemia  -     CBC Auto Differential; Future; Expected date: 10/28/2022  -     Comprehensive Metabolic Panel; Future; Expected date: 10/28/2022  -     Lipid Panel; Future; Expected date: 10/28/2022  -     TSH; Future; Expected date: 10/28/2022    Intrinsic eczema    Other orders  -     diphenoxylate-atropine 2.5-0.025 mg (LOMOTIL) 2.5-0.025 mg per tablet; TAKE 1 TAB BY MOUTH AFTER EACH LOOSE BOWEL MOVEMENT PER DAY  Dispense: 30 tablet; Refill: 2  -     tiZANidine (ZANAFLEX) 4 MG tablet; Take 1 tablet (4 mg total) by mouth every 8 (eight) hours.  Dispense: 30 tablet; Refill: 2        Main Reason here    Right lower back/right sacroiliac and right hip pain--hip better still with back pain --had epidural steroid injections in the past--on Norco Zanaflex Neurontin Mobic--Moist heat theragesic range of motion exercise  Sees Dr. Senior for pain medication--DDD cervical and lumbar spine  Hypertension on Norvasc and Cozaar blood pressure 124/80  Hyperlipidemia on Crestor  Vitamin D deficiency on vitamin-D  COPD on Symbicort and albuterol  Anxiety depression bipolar schizophrenia on Ambien Ativan  Zoloft trazodone Geodon--goes to mental health  Lab done March needs lab q 6 month   Health maintenance Pap smear

## 2022-05-05 ENCOUNTER — PATIENT MESSAGE (OUTPATIENT)
Dept: SMOKING CESSATION | Facility: CLINIC | Age: 57
End: 2022-05-05
Payer: MEDICARE

## 2022-05-17 ENCOUNTER — TELEPHONE (OUTPATIENT)
Dept: PRIMARY CARE CLINIC | Facility: CLINIC | Age: 57
End: 2022-05-17
Payer: MEDICARE

## 2022-05-17 DIAGNOSIS — I83.90 VARICOSE VEINS OF LOWER EXTREMITY, UNSPECIFIED LATERALITY, UNSPECIFIED WHETHER COMPLICATED: Primary | ICD-10-CM

## 2022-05-17 NOTE — TELEPHONE ENCOUNTER
----- Message from Kimmy Bond sent at 5/17/2022 10:52 AM CDT -----  Contact: pt [phone]  Calling to get test results.  Name of test:   Date of test: 05/12/2022  Where was the test performed: [facility]  Would you like a call back, or a response through your [facility] portal?:   call  Comments:     Please call and advise.    Thank You

## 2022-05-17 NOTE — TELEPHONE ENCOUNTER
Returned patients call. Reviewed results. Patient stated that Dr. Brock said he wanted her to see someone for the vein issue she is having. I don't see a referral in. Will pend a referral to Dr. Brock.

## 2022-05-18 NOTE — TELEPHONE ENCOUNTER
----- Message from Mita Roe sent at 5/18/2022 12:19 PM CDT -----  Regarding: follow up  Contact: aundrea [phone]  Following up on request for vein specialist. Please call and advise.

## 2022-05-19 ENCOUNTER — TELEPHONE (OUTPATIENT)
Dept: OTOLARYNGOLOGY | Facility: CLINIC | Age: 57
End: 2022-05-19
Payer: MEDICARE

## 2022-05-19 NOTE — TELEPHONE ENCOUNTER
Returned pt call. Informed pt that Rx will be sent on tomorrow 5/20/22.       ----- Message from Eleuterio Trujillo sent at 5/19/2022  8:54 AM CDT -----  Regarding: Prescription Refill  promethazine-codeine 6.25-10 mg/5 ml (PHENERGAN WITH CODEINE) 6.25-10 mg/5 mL syrup        [facility]/pharmacy #[id number] - [place], [place] - [address] (Ph: [phone])      Pt called and advised she needs a refill on the above medication.

## 2022-05-20 RX ORDER — PROMETHAZINE HYDROCHLORIDE AND CODEINE PHOSPHATE 6.25; 1 MG/5ML; MG/5ML
5 SOLUTION ORAL EVERY 4 HOURS PRN
Qty: 210 ML | Refills: 0 | Status: SHIPPED | OUTPATIENT
Start: 2022-05-20 | End: 2022-05-30

## 2022-05-25 DIAGNOSIS — E07.9 THYROID MASS: ICD-10-CM

## 2022-05-25 DIAGNOSIS — H93.19 TINNITUS, UNSPECIFIED LATERALITY: ICD-10-CM

## 2022-05-25 DIAGNOSIS — E78.5 HYPERLIPIDEMIA, UNSPECIFIED HYPERLIPIDEMIA TYPE: ICD-10-CM

## 2022-05-25 DIAGNOSIS — M54.50 LUMBAR SPINE PAIN: ICD-10-CM

## 2022-05-25 NOTE — TELEPHONE ENCOUNTER
No new care gaps identified.  [facility] Embedded Care Gaps. Reference number: [id number]. 5/25/2022   12:14:13 PM CDT

## 2022-05-30 ENCOUNTER — TELEPHONE (OUTPATIENT)
Dept: VASCULAR SURGERY | Facility: CLINIC | Age: 57
End: 2022-05-30
Payer: MEDICARE

## 2022-05-30 DIAGNOSIS — I83.90 VARICOSE VEINS OF LOWER EXTREMITY, UNSPECIFIED LATERALITY, UNSPECIFIED WHETHER COMPLICATED: Primary | ICD-10-CM

## 2022-05-30 RX ORDER — NAPROXEN 375 MG/1
TABLET ORAL
Qty: 180 TABLET | Refills: 1 | OUTPATIENT
Start: 2022-05-30

## 2022-05-30 RX ORDER — ROSUVASTATIN CALCIUM 5 MG/1
TABLET, COATED ORAL
Qty: 90 TABLET | Refills: 1 | Status: SHIPPED | OUTPATIENT
Start: 2022-05-30 | End: 2022-06-23 | Stop reason: SDUPTHER

## 2022-05-30 NOTE — TELEPHONE ENCOUNTER
Spoke with the pt and informed her that Dr Odom is not in clinic on Mondays.Reiterated to her that the purpose of the of having the u/s done to determine if she has reflux or venous insufficiency.Pt stated that the only reason she was coming to the clinic was to have her veins injected.Pt informed that she would have to be seen at the vein clinic because that is not a procedure that's done by Dr Odom.Also informed the pt that appt will be cancelled and she verbalized understanding of information received.Message sent to the vein clinic to contact the pt to schedule an appt.  ----- Message from Julieta Justice sent at 5/30/2022  1:20 PM CDT -----  Name Of Caller: Elizabeth     Provider Name: Abrahan Odom,    Does patient feel the need to be seen today? No    Relationship to the Pt?:     Contact Preference?: [phone]    What is the nature of the call?:Pt called and has appointment on 6/8/22 and would like to see if you can see her on 6/6/22 instead please call back. It said no solutions when I tried please call back

## 2022-05-31 ENCOUNTER — PATIENT MESSAGE (OUTPATIENT)
Dept: ADMINISTRATIVE | Facility: HOSPITAL | Age: 57
End: 2022-05-31
Payer: MEDICARE

## 2022-05-31 DIAGNOSIS — I83.893 VARICOSE VEINS OF LEG WITH EDEMA, BILATERAL: Primary | ICD-10-CM

## 2022-05-31 DIAGNOSIS — I83.893 VARICOSE VEINS OF LEG WITH SWELLING, BILATERAL: ICD-10-CM

## 2022-05-31 DIAGNOSIS — I83.813 VARICOSE VEINS OF LEG WITH PAIN, BILATERAL: ICD-10-CM

## 2022-05-31 RX ORDER — FAMOTIDINE 20 MG/1
TABLET, FILM COATED ORAL
Qty: 180 TABLET | Refills: 3 | Status: SHIPPED | OUTPATIENT
Start: 2022-05-31 | End: 2022-09-01 | Stop reason: SDUPTHER

## 2022-06-02 NOTE — TELEPHONE ENCOUNTER
----- Message from Karen Rodríguez sent at 3/14/2022 11:16 AM CDT -----  Contact: [phone]  Pt states she was seen at the hospital on the 6 th for right side pain and she states she feels like it is getting worse and is asking if she should go back to ED she has an appt with you all on 03/23 please advise and give return call      Price (Do Not Change): 0.00 Instructions: This plan will send the code FBSE to the PM system.  DO NOT or CHANGE the price. Detail Level: Simple

## 2022-06-03 ENCOUNTER — OFFICE VISIT (OUTPATIENT)
Dept: OTOLARYNGOLOGY | Facility: CLINIC | Age: 57
End: 2022-06-03
Payer: MEDICARE

## 2022-06-03 VITALS
DIASTOLIC BLOOD PRESSURE: 83 MMHG | BODY MASS INDEX: 40.1 KG/M2 | WEIGHT: 248.44 LBS | SYSTOLIC BLOOD PRESSURE: 127 MMHG | HEART RATE: 87 BPM | TEMPERATURE: 98 F

## 2022-06-03 DIAGNOSIS — R05.3 CHRONIC COUGH: ICD-10-CM

## 2022-06-03 DIAGNOSIS — H93.A1 PULSATILE TINNITUS OF RIGHT EAR: ICD-10-CM

## 2022-06-03 DIAGNOSIS — R09.89 CHOKING IN ADULT: ICD-10-CM

## 2022-06-03 DIAGNOSIS — R13.10 DYSPHAGIA, UNSPECIFIED TYPE: ICD-10-CM

## 2022-06-03 DIAGNOSIS — J34.2 NASAL SEPTAL DEVIATION: ICD-10-CM

## 2022-06-03 DIAGNOSIS — K21.9 LARYNGOPHARYNGEAL REFLUX (LPR): Primary | ICD-10-CM

## 2022-06-03 DIAGNOSIS — J45.21 MILD INTERMITTENT ASTHMA WITH ACUTE EXACERBATION: ICD-10-CM

## 2022-06-03 DIAGNOSIS — J30.9 ALLERGIC RHINITIS, UNSPECIFIED SEASONALITY, UNSPECIFIED TRIGGER: ICD-10-CM

## 2022-06-03 PROCEDURE — 1160F RVW MEDS BY RX/DR IN RCRD: CPT | Mod: CPTII,S$GLB,, | Performed by: SPECIALIST

## 2022-06-03 PROCEDURE — 1159F MED LIST DOCD IN RCRD: CPT | Mod: CPTII,S$GLB,, | Performed by: SPECIALIST

## 2022-06-03 PROCEDURE — 99999 PR PBB SHADOW E&M-EST. PATIENT-LVL V: CPT | Mod: PBBFAC,,, | Performed by: SPECIALIST

## 2022-06-03 PROCEDURE — 3008F BODY MASS INDEX DOCD: CPT | Mod: CPTII,S$GLB,, | Performed by: SPECIALIST

## 2022-06-03 PROCEDURE — 3008F PR BODY MASS INDEX (BMI) DOCUMENTED: ICD-10-PCS | Mod: CPTII,S$GLB,, | Performed by: SPECIALIST

## 2022-06-03 PROCEDURE — 3074F SYST BP LT 130 MM HG: CPT | Mod: CPTII,S$GLB,, | Performed by: SPECIALIST

## 2022-06-03 PROCEDURE — 1160F PR REVIEW ALL MEDS BY PRESCRIBER/CLIN PHARMACIST DOCUMENTED: ICD-10-PCS | Mod: CPTII,S$GLB,, | Performed by: SPECIALIST

## 2022-06-03 PROCEDURE — 4010F PR ACE/ARB THEARPY RXD/TAKEN: ICD-10-PCS | Mod: CPTII,S$GLB,, | Performed by: SPECIALIST

## 2022-06-03 PROCEDURE — 99215 OFFICE O/P EST HI 40 MIN: CPT | Mod: PBBFAC,PN | Performed by: SPECIALIST

## 2022-06-03 PROCEDURE — 99214 OFFICE O/P EST MOD 30 MIN: CPT | Mod: 25,S$GLB,, | Performed by: SPECIALIST

## 2022-06-03 PROCEDURE — 99999 PR PBB SHADOW E&M-EST. PATIENT-LVL V: ICD-10-PCS | Mod: PBBFAC,,, | Performed by: SPECIALIST

## 2022-06-03 PROCEDURE — 3079F PR MOST RECENT DIASTOLIC BLOOD PRESSURE 80-89 MM HG: ICD-10-PCS | Mod: CPTII,S$GLB,, | Performed by: SPECIALIST

## 2022-06-03 PROCEDURE — 4010F ACE/ARB THERAPY RXD/TAKEN: CPT | Mod: CPTII,S$GLB,, | Performed by: SPECIALIST

## 2022-06-03 PROCEDURE — 31575 DIAGNOSTIC LARYNGOSCOPY: CPT | Mod: PBBFAC,PN | Performed by: SPECIALIST

## 2022-06-03 PROCEDURE — 3079F DIAST BP 80-89 MM HG: CPT | Mod: CPTII,S$GLB,, | Performed by: SPECIALIST

## 2022-06-03 PROCEDURE — 3074F PR MOST RECENT SYSTOLIC BLOOD PRESSURE < 130 MM HG: ICD-10-PCS | Mod: CPTII,S$GLB,, | Performed by: SPECIALIST

## 2022-06-03 PROCEDURE — 1159F PR MEDICATION LIST DOCUMENTED IN MEDICAL RECORD: ICD-10-PCS | Mod: CPTII,S$GLB,, | Performed by: SPECIALIST

## 2022-06-03 PROCEDURE — 99214 PR OFFICE/OUTPT VISIT, EST, LEVL IV, 30-39 MIN: ICD-10-PCS | Mod: 25,S$GLB,, | Performed by: SPECIALIST

## 2022-06-03 PROCEDURE — 31575 LARYNGOSCOPY: ICD-10-PCS | Mod: S$GLB,,, | Performed by: SPECIALIST

## 2022-06-03 RX ORDER — PROMETHAZINE HYDROCHLORIDE AND CODEINE PHOSPHATE 6.25; 1 MG/5ML; MG/5ML
5 SOLUTION ORAL EVERY 4 HOURS PRN
Qty: 210 ML | Refills: 0 | Status: SHIPPED | OUTPATIENT
Start: 2022-06-03 | End: 2022-06-13

## 2022-06-03 RX ORDER — PROMETHAZINE HYDROCHLORIDE AND CODEINE PHOSPHATE 6.25; 1 MG/5ML; MG/5ML
5 SOLUTION ORAL EVERY 4 HOURS PRN
Qty: 210 ML | Refills: 0 | Status: SHIPPED | OUTPATIENT
Start: 2022-06-03 | End: 2022-06-03 | Stop reason: SDUPTHER

## 2022-06-03 NOTE — PROCEDURES
"Laryngoscopy    Date/Time: 6/3/2022 11:00 AM  Performed by: DWAYNE Ricardo MD  Authorized by: DWAYNE Ricardo MD     Time out: Immediately prior to procedure a "time out" was called to verify the correct patient, procedure, equipment, support staff and site/side marked as required.    Consent Done?:  Yes (Verbal)  Anesthesia:     Local anesthetic: Topical aerosol.    Patient tolerance:  Patient tolerated the procedure well with no immediate complications    Decongestion performed?: Yes    Laryngoscopy:     Areas examined:  Vocal cords, larynx, hypopharynx, oropharynx, nasopharynx and nasal cavities    Laryngoscope size:  4 mm (Flexible video nasolaryngoscopy)  Nose External:      No external nasal deformity  Nose Intranasal:      Mucosa no polyps     Mucosa ulcers not present     No mucosa lesions present ( clear mucus in the nasal passages bilaterally)     Septum gross deformity ( septum deviated anteriorly to nasal spine to the right posteriorly midportion and posteriorly to the left)     Enlarged turbinates (Inferior turbinates enlarged bilaterally)  Nasopharynx:      No mucosa lesions     Adenoids not present     Posterior choanae patent     Eustachian tube patent  Larynx/hypopharynx:      No epiglottis lesions     No epiglottis edema     No AE folds lesions     No vocal cord polyps     Equal and normal bilateral (Vocal cords move symmetrically, no mucosal lesions noted)     No hypopharynx lesions     No piriform sinus pooling     No piriform sinus lesions     Post cricoid edema ( minimal, no change from last endoscopy)     Post cricoid erythema ( minimal, no change from last endoscopy)     Flexible video nasolaryngoscopy-nasal septal deviation and nasal changes allergic rhinitis; laryngeal changes consistent with laryngopharyngeal reflux that is stable on H2 agonist therapy.      "

## 2022-06-03 NOTE — PROGRESS NOTES
Subjective:       Patient ID: Trina Marie Collette is a 57 y.o. female.    Chief Complaint: Follow-up    Follow-up visit:  The patient is coming in for a follow-up visit.  There are multiple issues to discuss:  1.  Laryngopharyngeal reflux:   She is taking famotidine twice daily.  She has not noticed any detrimental changes since switching from pantoprazole to famotidine.  She continues to have choking episodes once or twice every week and episodes of phlegm in her throat and throat clearing.    2.  Allergic rhinitis:  She continues to have nasal congestion, rhinorrhea and postnasal drip.  Nasal secretions are clear.  She takes Singulair at night and use ipratropium bromide as needed for postnasal drip.    3.  Pulsatile tinnitus right ear:  She continues to have pulsatile tinnitus in her right ear.  4.  Asthma/Chronic cough:  She continues to have coughing both day and night.  The Phenergan with codeine is the only thing that stops her nighttime cough.      Review of Systems   Constitutional: Positive for fatigue. Negative for activity change, appetite change, chills, fever and unexpected weight change.   HENT: Positive for congestion, ear pain, postnasal drip, rhinorrhea, sore throat, tinnitus and trouble swallowing. Negative for ear discharge, facial swelling, hearing loss, mouth sores, sinus pressure, sinus pain, sneezing and voice change.    Eyes: Negative for photophobia, pain, discharge, redness, itching and visual disturbance.   Respiratory: Positive for cough, shortness of breath and wheezing. Negative for apnea and choking.    Cardiovascular: Negative for chest pain and palpitations.   Gastrointestinal: Negative for abdominal distention, abdominal pain, nausea and vomiting.   Musculoskeletal: Negative for arthralgias, myalgias, neck pain and neck stiffness.   Skin: Negative.  Negative for color change, pallor and rash.   Allergic/Immunologic: Positive for environmental allergies. Negative for food allergies  and immunocompromised state.   Neurological: Positive for headaches. Negative for dizziness, facial asymmetry, speech difficulty, weakness, light-headedness and numbness.   Hematological: Negative for adenopathy. Does not bruise/bleed easily.   Psychiatric/Behavioral: Negative for confusion, decreased concentration and sleep disturbance.       Objective:      Physical Exam  Constitutional:       Appearance: She is well-developed.   HENT:      Head: Normocephalic.      Right Ear: Ear canal and external ear normal. Tympanic membrane is retracted.      Left Ear: Ear canal and external ear normal. Tympanic membrane is retracted.      Nose: Septal deviation (To the left), nasal tenderness (Scabbing and erythema in the piriform aperture area of the right nostril vestibular skin), mucosal edema (cyanotic, boggy inferior turbinates bilaterally) and rhinorrhea (clear mucus bilaterally) present. No nasal deformity. Rhinorrhea is clear.      Mouth/Throat:      Mouth: No oral lesions.      Pharynx: Uvula midline.   Eyes:      General: Lids are normal.         Right eye: No discharge.         Left eye: No discharge.      Conjunctiva/sclera:      Right eye: Right conjunctiva is injected. No exudate.     Left eye: Left conjunctiva is injected. No exudate.     Pupils: Pupils are equal, round, and reactive to light.   Neck:      Thyroid: No thyroid mass or thyromegaly.      Vascular: No carotid bruit.      Trachea: Trachea normal. No tracheal deviation.        Comments: Neck-compression of the vascular sheath at the angle the mandible on the right as noted above results and complete cessation of her pulsatile tinnitus.  Cardiovascular:      Rate and Rhythm: Normal rate and regular rhythm.      Pulses: Normal pulses.      Heart sounds: Normal heart sounds.   Pulmonary:      Effort: Pulmonary effort is normal.      Breath sounds: No stridor. Examination of the right-lower field reveals wheezing. Examination of the left-lower field  reveals wheezing. Wheezing ( mild expiratory wheezing in all lung fields) present. No decreased breath sounds, rhonchi or rales.   Abdominal:      General: Bowel sounds are normal.      Palpations: Abdomen is soft.      Tenderness: There is no abdominal tenderness.   Musculoskeletal:         General: Normal range of motion.      Cervical back: Normal range of motion. No muscular tenderness.   Lymphadenopathy:      Head:      Right side of head: No submental, submandibular, preauricular, posterior auricular or occipital adenopathy.      Left side of head: No submental, submandibular, preauricular, posterior auricular or occipital adenopathy.      Cervical: No cervical adenopathy.   Skin:     General: Skin is warm and dry.      Findings: No petechiae or rash.      Nails: There is no clubbing.   Neurological:      Mental Status: She is alert and oriented to person, place, and time.      Cranial Nerves: No cranial nerve deficit.      Sensory: No sensory deficit.      Gait: Gait normal.   Psychiatric:         Speech: Speech normal.         Behavior: Behavior normal. Behavior is cooperative.         Thought Content: Thought content normal.         Judgment: Judgment normal.         Flexible video nasolaryngoscopy:  Nasal septal deviation and nasal changes allergic rhinitis; laryngeal changes consistent with laryngopharyngeal reflux that is responding well to H2 agonist therapy    Assessment:       1. Mild intermittent asthma with acute exacerbation    2. Chronic cough    3. Allergic rhinitis, unspecified seasonality, unspecified trigger    4. Choking in adult        Plan:       I  will have the patient continue with her famotidine and current allergy drug regimen.  I will recheck her in 2 2 months, or sooner on an as-needed basis.  She is leaving for a cruise in several weeks.  It will be interesting to see if her symptom levels change while she is on the cruise.

## 2022-06-21 ENCOUNTER — TELEPHONE (OUTPATIENT)
Dept: PRIMARY CARE CLINIC | Facility: CLINIC | Age: 57
End: 2022-06-21
Payer: MEDICARE

## 2022-06-21 NOTE — TELEPHONE ENCOUNTER
----- Message from Pumajohn Shade sent at 6/21/2022  9:12 AM CDT -----  Contact: self [phone]  Pt stated she is covid positive and requesting an RX for symptoms cough, fever, chills and sweating.pt also requesting a call for advice.      [facility]/pharmacy #[id number] - [place] - [address]  [address]  [place] [address]  Phone: [phone] Fax: [phone]    Please call and advise

## 2022-06-21 NOTE — TELEPHONE ENCOUNTER
----- Message from Karen Rodríguez sent at 6/21/2022  2:59 PM CDT -----  Contact: [phone]  Pt is calling she tested positive for covid and has a really bad cough and she is needing her cough syrup     COREEN Alvarenga    6/21/22 9:30 AM  Note  ----- Message from Reji Shade sent at 6/21/2022  9:12 AM CDT -----  Contact: self [phone]  Pt stated she is covid positive and requesting an RX for symptoms cough, fever, chills and sweating.pt also requesting a call for advice.        [facility]/pharmacy #[id number] - [place] - [address]  [address]  [place] [address]  Phone: [phone] Fax: [phone]     Please call and advise     She ois asking for Promethazine with codeine she states she gets this once a month and it is not in her chart and she is also asking for someone to call her back please advise

## 2022-06-22 ENCOUNTER — OFFICE VISIT (OUTPATIENT)
Dept: PRIMARY CARE CLINIC | Facility: CLINIC | Age: 57
End: 2022-06-22
Payer: MEDICARE

## 2022-06-22 ENCOUNTER — TELEPHONE (OUTPATIENT)
Dept: PRIMARY CARE CLINIC | Facility: CLINIC | Age: 57
End: 2022-06-22
Payer: MEDICARE

## 2022-06-22 DIAGNOSIS — F31.9 BIPOLAR 1 DISORDER: ICD-10-CM

## 2022-06-22 DIAGNOSIS — Z72.0 TOBACCO ABUSE: ICD-10-CM

## 2022-06-22 DIAGNOSIS — E78.5 HYPERLIPIDEMIA, UNSPECIFIED HYPERLIPIDEMIA TYPE: ICD-10-CM

## 2022-06-22 DIAGNOSIS — I10 ESSENTIAL HYPERTENSION: ICD-10-CM

## 2022-06-22 DIAGNOSIS — E55.9 VITAMIN D DEFICIENCY: ICD-10-CM

## 2022-06-22 DIAGNOSIS — M54.50 LUMBAR SPINE PAIN: ICD-10-CM

## 2022-06-22 DIAGNOSIS — G47.00 INSOMNIA, UNSPECIFIED TYPE: ICD-10-CM

## 2022-06-22 DIAGNOSIS — E07.9 THYROID MASS: ICD-10-CM

## 2022-06-22 DIAGNOSIS — H93.19 TINNITUS, UNSPECIFIED LATERALITY: ICD-10-CM

## 2022-06-22 DIAGNOSIS — U07.1 COVID-19: Primary | ICD-10-CM

## 2022-06-22 DIAGNOSIS — E78.00 PURE HYPERCHOLESTEROLEMIA: ICD-10-CM

## 2022-06-22 DIAGNOSIS — G89.4 CHRONIC PAIN SYNDROME: ICD-10-CM

## 2022-06-22 DIAGNOSIS — F20.9 SCHIZOPHRENIA, UNSPECIFIED TYPE: ICD-10-CM

## 2022-06-22 DIAGNOSIS — M50.30 DDD (DEGENERATIVE DISC DISEASE), CERVICAL: ICD-10-CM

## 2022-06-22 PROCEDURE — 99443 PR PHYSICIAN TELEPHONE EVALUATION 21-30 MIN: ICD-10-PCS | Mod: 95,,, | Performed by: FAMILY MEDICINE

## 2022-06-22 PROCEDURE — 99443 PR PHYSICIAN TELEPHONE EVALUATION 21-30 MIN: CPT | Mod: 95,,, | Performed by: FAMILY MEDICINE

## 2022-06-22 PROCEDURE — 4010F PR ACE/ARB THEARPY RXD/TAKEN: ICD-10-PCS | Mod: CPTII,95,, | Performed by: FAMILY MEDICINE

## 2022-06-22 PROCEDURE — 4010F ACE/ARB THERAPY RXD/TAKEN: CPT | Mod: CPTII,95,, | Performed by: FAMILY MEDICINE

## 2022-06-22 RX ORDER — PROMETHAZINE HYDROCHLORIDE AND CODEINE PHOSPHATE 6.25; 1 MG/5ML; MG/5ML
5 SOLUTION ORAL EVERY 6 HOURS PRN
Qty: 180 ML | Refills: 0 | Status: SHIPPED | OUTPATIENT
Start: 2022-06-22 | End: 2022-07-22 | Stop reason: SDUPTHER

## 2022-06-22 RX ORDER — ZOLPIDEM TARTRATE 5 MG/1
TABLET ORAL
Qty: 30 TABLET | Refills: 2 | Status: SHIPPED | OUTPATIENT
Start: 2022-06-22 | End: 2022-09-01 | Stop reason: SDUPTHER

## 2022-06-22 RX ORDER — AZITHROMYCIN 250 MG/1
TABLET, FILM COATED ORAL
Qty: 6 TABLET | Refills: 0 | Status: SHIPPED | OUTPATIENT
Start: 2022-06-22 | End: 2022-06-26

## 2022-06-22 RX ORDER — PREDNISONE 20 MG/1
20 TABLET ORAL DAILY
Qty: 7 TABLET | Refills: 0 | Status: SHIPPED | OUTPATIENT
Start: 2022-06-22 | End: 2022-06-29

## 2022-06-22 NOTE — PROGRESS NOTES
Subjective:       Patient ID: Trina Marie Collette is a 57 y.o. female.    Chief Complaint:   HPI:  57-year-old female -- positive for COVID--when on a 7 day cruise and came back on Sunday--sister had bad symptoms and patient was in the same cabin.  Monday sister tested positive patient went back later in with tested positive also.  Subjective fever cold sweats--+runny stuffy nose--headache in the frontal area--+sore throat--+cough--when cough has a bowel movement--+phlegm--clear.  No pneumonia asthma TB--+smoke 1/2.  Had nausea no vomiting or diarrhea.  Had COVID shots and booster         ROSkin: no psoriasis, eczema, skin cancer--  HEENT:Occas  headache, ocular pain, blurred vision, diplopia, epistaxis, hoarseness change in voice, thyroid trouble--history of tinnitus/chronic sinus problem/nasal septal deviation/hearing loss has hearing aid  Lung: No pneumonia, asthma, Tb, wheezing, SOB, smoking 1/4 ppd trying to quit  --history of bronchospasm in the past uses albuterol--had chest x-ray which is normal  Heart: No chest pain, ankle edema, +occas palpitations, MI, river murmur, +hypertension blood pressure   + hyperlipidemia-no stent bypass arrhythmia  Abdomen: No nausea, vomiting, diarrhea, constipation, ulcers, hepatitis, gallbladder disease, melena, hematochezia, hematemesis patient complaining of heartburn had EGD colonoscopy hx diarrhea immodium Hx Gerd on protonix comes and goes  : no UTI, renal disease, stones--h  GYN last menstrual.  Years ago has mammogram yearly--refuses PAP smear --I don't fool with that    MS: no fractures, O/A, lupus, rheumatoid, gout--history of chronic pain the back lumbar spine, right knee come right shoulder, history DDD lumbar history cervical spinal stenosis  Neuro: No dizziness, LOC, seizures   No diabetes, no anemia, no anxiety, no depression patient with history of bipolar schizophrenia--doing well with medication goes to [facility]   Single, one child, disabled, lives  alone     Objective:   Physical Exam:    General: Well nourished, well developed, no acute distress +overweight   Skin: No lesions  HEENT--eyes PERRLA EOM intact nose clear , throat nonerythematous ears TMs clear  NECK: Supple, no bruits, No JVD, no nodes  Lungs: Clear, no rales, rhonchi, wheezing   Heart: Regular rate and rhythm, no murmurs, gallops, or rubs  Abdomen: flat, bowel sounds positive, no tenderness, or organomegaly  MS:  Back pain same left shoulder sore with rotation raising left arm overhead   Neuro: Alert, CN intact, oriented X 3 still with some anxiety and depression of issues  Extremities: No cyanosis, clubbing, or edema         Assessment:       1. COVID-19    2. Insomnia, unspecified type    3. Chronic pain syndrome    4. DDD (degenerative disc disease), cervical    5. Bipolar 1 disorder    6. Schizophrenia, unspecified type    7. Pure hypercholesterolemia    8. Essential hypertension    9. Vitamin D deficiency    10. Tobacco abuse    11. Hyperlipidemia, unspecified hyperlipidemia type    12. Lumbar spine pain    13. Tinnitus, unspecified laterality    14. Thyroid mass        Plan:       COVID-19    Insomnia, unspecified type  -     zolpidem (AMBIEN) 5 MG Tab; 1 po Q OHS p.r.n. sleep  Dispense: 30 tablet; Refill: 2    Chronic pain syndrome    DDD (degenerative disc disease), cervical    Bipolar 1 disorder    Schizophrenia, unspecified type    Pure hypercholesterolemia    Essential hypertension    Vitamin D deficiency    Tobacco abuse    Hyperlipidemia, unspecified hyperlipidemia type  -     rosuvastatin (CRESTOR) 5 MG tablet; Take 1 tablet (5 mg total) by mouth once daily.  Dispense: 90 tablet; Refill: 1    Lumbar spine pain  -     rosuvastatin (CRESTOR) 5 MG tablet; Take 1 tablet (5 mg total) by mouth once daily.  Dispense: 90 tablet; Refill: 1    Tinnitus, unspecified laterality  -     rosuvastatin (CRESTOR) 5 MG tablet; Take 1 tablet (5 mg total) by mouth once daily.  Dispense: 90 tablet;  Refill: 1    Thyroid mass  -     rosuvastatin (CRESTOR) 5 MG tablet; Take 1 tablet (5 mg total) by mouth once daily.  Dispense: 90 tablet; Refill: 1    Other orders  -     azithromycin (Z-LUIS) 250 MG tablet; 2 tabs by mouth day 1, then 1 tab by mouth daily x 4 days  Dispense: 6 tablet; Refill: 0  -     promethazine-codeine 6.25-10 mg/5 ml (PHENERGAN WITH CODEINE) 6.25-10 mg/5 mL syrup; Take 5 mLs by mouth every 6 (six) hours as needed for Cough.  Dispense: 180 mL; Refill: 0  -     predniSONE (DELTASONE) 20 MG tablet; Take 1 tablet (20 mg total) by mouth once daily. for 7 days  Dispense: 7 tablet; Refill: 0  -     ziprasidone (GEODON) 80 MG capsule; Take 1 capsule (80 mg total) by mouth once daily.  Dispense: 30 capsule; Refill: 5  -     ziprasidone (GEODON) 60 MG Cap; Take 1 capsule (60 mg total) by mouth 2 (two) times daily.  Dispense: 30 capsule; Refill: 5  -     amLODIPine (NORVASC) 2.5 MG tablet; TAKE 1 TABLET BY MOUTH AT BEDTIME FOR HIGH BLOOD PRESSURE. CONTINUE LOSARTAN 100 MG IN THE MORNING  Dispense: 90 tablet; Refill: 1  -     losartan (COZAAR) 100 MG tablet; Take 1 tablet (100 mg total) by mouth once daily.  Dispense: 90 tablet; Refill: 1  -     sertraline (ZOLOFT) 100 MG tablet; Take 1 tablet (100 mg total) by mouth once daily.  Dispense: 90 tablet; Refill: 1  -     tiZANidine (ZANAFLEX) 4 MG tablet; Take 1 tablet (4 mg total) by mouth every 8 (eight) hours.  Dispense: 30 tablet; Refill: 2  -     potassium chloride (MICRO-K) 10 MEQ CpSR; Take 1 capsule (10 mEq total) by mouth once daily.  Dispense: 90 capsule; Refill: 1  -     cyclobenzaprine (FLEXERIL) 10 MG tablet; One p.o. b.i.d. may increase to q.8 hours if needed  Dispense: 60 tablet; Refill: 5  -     montelukast (SINGULAIR) 10 mg tablet; Take 1 tablet (10 mg total) by mouth once daily.  Dispense: 90 tablet; Refill: 1  -     budesonide-formoterol 160-4.5 mcg (SYMBICORT) 160-4.5 mcg/actuation HFAA; Inhale 2 puffs into the lungs every 12 (twelve)  hours. Controller. Rinse mouth after using.  Dispense: 10.2 g; Refill: 11  -     ipratropium (ATROVENT) 21 mcg (0.03 %) nasal spray; 2 sprays by Nasal route 2 (two) times daily. May be use more often if needed  Dispense: 30 mL; Refill: 11        Main Reason here    COVID--was on a cruise sister got sick patient got sick afterward--treat with Zithromax/prednisone/Phenergan with codeine  Needs refills of medication asking for Ambien will get nurse to put in other medications tomorrow  Other medical issues  Right lower back/right sacroiliac and right hip pain--hip better still with back pain --had epidural steroid injections in the past--on Norco Zanaflex Neurontin Mobic--Moist heat theragesic range of motion exercise  Sees Dr. Senior for pain medication--DDD cervical and lumbar spine  Hypertension on Norvasc and Cozaar blood pressure 124/80  Hyperlipidemia on Crestor  Vitamin D deficiency on vitamin-D  COPD on Symbicort and albuterol  Anxiety depression bipolar schizophrenia on Ambien Ativan  Zoloft trazodone Geodon--goes to mental health  Lab done March needs lab q 6 month   Health maintenance Pap smear                                        Established Patient - Audio Only Telehealth Visit     The patient location is:  Home  The chief complaint leading to consultation is:  COVID needs all medications refilled  Visit type: Virtual visit with audio only (telephone)  Total time spent with patient:  30 minute       The reason for the audio only service rather than synchronous audio and video virtual visit was related to technical difficulties or patient preference/necessity.     Each patient to whom I provide medical services by telemedicine is:  (1) informed of the relationship between the physician and patient and the respective role of any other health care provider with respect to management of the patient; and (2) notified that they may decline to receive medical services by telemedicine and may withdraw from such  care at any time. Patient verbally consented to receive this service via voice-only telephone call.       HPI:  See history of present illness above     Assessment and plan:  See plan above                        This service was not originating from a related E/M service provided within the previous 7 days nor will  to an E/M service or procedure within the next 24 hours or my soonest available appointment.  Prevailing standard of care was able to be met in this audio-only visit.

## 2022-06-22 NOTE — TELEPHONE ENCOUNTER
Patient tested positive for covid and canceled her appt for today. I put patient on for an audio visit so she can get refills of her medications.

## 2022-06-22 NOTE — TELEPHONE ENCOUNTER
----- Message from Socorro Roberts sent at 2022  9:45 AM CDT -----  Regardinnd message  Contact: [phone]  Patient called, stated that no one has call her back in regards yesterday, would like to talk with Dr Brock's nurse or medical assistant in regards a possible virtual appointment- unable to find an open spot with pcp. Thank you

## 2022-06-23 RX ORDER — IPRATROPIUM BROMIDE 21 UG/1
2 SPRAY, METERED NASAL 2 TIMES DAILY
Qty: 30 ML | Refills: 11 | Status: SHIPPED | OUTPATIENT
Start: 2022-06-23 | End: 2023-06-06 | Stop reason: SDUPTHER

## 2022-06-23 RX ORDER — LOSARTAN POTASSIUM 100 MG/1
100 TABLET ORAL DAILY
Qty: 90 TABLET | Refills: 1 | Status: SHIPPED | OUTPATIENT
Start: 2022-06-23 | End: 2022-10-25 | Stop reason: SDUPTHER

## 2022-06-23 RX ORDER — MONTELUKAST SODIUM 10 MG/1
10 TABLET ORAL DAILY
Qty: 90 TABLET | Refills: 1 | Status: SHIPPED | OUTPATIENT
Start: 2022-06-23 | End: 2022-10-25 | Stop reason: SDUPTHER

## 2022-06-23 RX ORDER — POTASSIUM CHLORIDE 750 MG/1
10 CAPSULE, EXTENDED RELEASE ORAL DAILY
Qty: 90 CAPSULE | Refills: 1 | OUTPATIENT
Start: 2022-06-23 | End: 2022-11-11

## 2022-06-23 RX ORDER — ZIPRASIDONE HYDROCHLORIDE 60 MG/1
60 CAPSULE ORAL 2 TIMES DAILY
Qty: 30 CAPSULE | Refills: 5 | Status: SHIPPED | OUTPATIENT
Start: 2022-06-23 | End: 2023-06-06 | Stop reason: SDUPTHER

## 2022-06-23 RX ORDER — ZIPRASIDONE HYDROCHLORIDE 80 MG/1
80 CAPSULE ORAL DAILY
Qty: 30 CAPSULE | Refills: 5 | Status: SHIPPED | OUTPATIENT
Start: 2022-06-23 | End: 2023-02-02

## 2022-06-23 RX ORDER — TIZANIDINE 4 MG/1
4 TABLET ORAL EVERY 8 HOURS
Qty: 30 TABLET | Refills: 2 | Status: SHIPPED | OUTPATIENT
Start: 2022-06-23 | End: 2022-11-21 | Stop reason: SDUPTHER

## 2022-06-23 RX ORDER — BUDESONIDE AND FORMOTEROL FUMARATE DIHYDRATE 160; 4.5 UG/1; UG/1
2 AEROSOL RESPIRATORY (INHALATION) EVERY 12 HOURS
Qty: 10.2 G | Refills: 11 | Status: SHIPPED | OUTPATIENT
Start: 2022-06-23 | End: 2022-10-03

## 2022-06-23 RX ORDER — AMLODIPINE BESYLATE 2.5 MG/1
TABLET ORAL
Qty: 90 TABLET | Refills: 1 | Status: SHIPPED | OUTPATIENT
Start: 2022-06-23 | End: 2022-10-25 | Stop reason: SDUPTHER

## 2022-06-23 RX ORDER — ROSUVASTATIN CALCIUM 5 MG/1
5 TABLET, COATED ORAL DAILY
Qty: 90 TABLET | Refills: 1 | Status: SHIPPED | OUTPATIENT
Start: 2022-06-23 | End: 2022-10-25 | Stop reason: SDUPTHER

## 2022-06-23 RX ORDER — SERTRALINE HYDROCHLORIDE 100 MG/1
100 TABLET, FILM COATED ORAL DAILY
Qty: 90 TABLET | Refills: 1 | Status: SHIPPED | OUTPATIENT
Start: 2022-06-23 | End: 2022-10-25 | Stop reason: SDUPTHER

## 2022-06-23 RX ORDER — CYCLOBENZAPRINE HCL 10 MG
TABLET ORAL
Qty: 60 TABLET | Refills: 5 | Status: SHIPPED | OUTPATIENT
Start: 2022-06-23 | End: 2023-02-22 | Stop reason: SDUPTHER

## 2022-07-02 NOTE — PROGRESS NOTES
Patient, Trina Marie Collette (MRN #[id number]), presented with a recorded BMI of 39.92 kg/m^2 and a documented comorbidity(s):  - Hypertension  - Hyperlipidemia  to which the severe obesity is a contributing factor. This is consistent with the definition of severe obesity (BMI 35.0-39.9) with comorbidity (ICD-10 E66.01, Z68.35). The patient's severe obesity was monitored, evaluated, addressed and/or treated. This addendum to the medical record is made on 07/02/2022.

## 2022-07-06 ENCOUNTER — TELEPHONE (OUTPATIENT)
Dept: OTOLARYNGOLOGY | Facility: CLINIC | Age: 57
End: 2022-07-06
Payer: MEDICARE

## 2022-07-06 NOTE — TELEPHONE ENCOUNTER
----- Message from DWAYNE Ricardo MD sent at 7/6/2022 11:01 AM CDT -----  Regarding: RE: medication refill  Contact: [phone]  The patient received a prescription for promethazine with codeine from Dr. FELIX on 06/22/2022.  I will not conrado her another refill until 07/20/2022 since she received additional medication from another.  ----- Message -----  From: Ritchie Taylor MA  Sent: 7/6/2022  10:33 AM CDT  To: DWAYNE Ricardo MD  Subject: medication refill                                Good morning Dr. Ricardo, patient states she went on a salma when she returned to [place] on June 19th had a bad cough that sent her to Urgent Care. However, she had covid she spent her time quarantine from everybody. Patient states she need a refill on promethazine 6.25-10 mg/5 ml. Thanks so much Dr. Ricardo.   ----- Message -----  From: Susan Gonzáles  Sent: 7/6/2022   9:38 AM CDT  To: [facility] Staff    Requesting an RX refill or new RX.  Is this a refill or new RX: refill  RX name and strength (copy/paste from chart):promethazine-codeine 6.25-10 mg/5 ml    Is this a 30 day or 90 day RX: 30 day   Pharmacy name and phone # (copy/paste from chart):    [facility]/pharmacy #[id number] - [place], [place] - [address]  [address] [address]  Phone: [phone] Fax: [phone]  The doctors have asked that we provide their patients with the following 2 reminders -- prescription refills can take up to 72 hours, and a friendly reminder that in the future you can use your [facility] account to request refills: aware                   Called and spoke with patient today letting her know per Dr. Ricardo statement in the above message her request for refill is denied.

## 2022-07-06 NOTE — TELEPHONE ENCOUNTER
----- Message from Susan Gonzáles sent at 7/6/2022  9:37 AM CDT -----  Contact: [phone]  Requesting an RX refill or new RX.  Is this a refill or new RX: refill  RX name and strength (copy/paste from chart):promethazine-codeine 6.25-10 mg/5 ml    Is this a 30 day or 90 day RX: 30 day   Pharmacy name and phone # (copy/paste from chart):    [facility]/pharmacy #[id number] - [place] - [address]  [address] [place] [address]  Phone: [phone] Fax: [phone]  The doctors have asked that we provide their patients with the following 2 reminders -- prescription refills can take up to 72 hours, and a friendly reminder that in the future you can use your [facility] account to request refills: aware     Called and spoke with patient today about promethazine refill from Dr. Ricardo. Message sent to doctor.

## 2022-07-11 ENCOUNTER — PATIENT MESSAGE (OUTPATIENT)
Dept: ADMINISTRATIVE | Facility: HOSPITAL | Age: 57
End: 2022-07-11
Payer: MEDICARE

## 2022-07-14 ENCOUNTER — TELEPHONE (OUTPATIENT)
Dept: OTOLARYNGOLOGY | Facility: CLINIC | Age: 57
End: 2022-07-14
Payer: MEDICARE

## 2022-07-14 NOTE — TELEPHONE ENCOUNTER
Returned pt call. Informed pt that appointment was changed to audio only per her request.       ----- Message from Halie Suh sent at 7/14/2022  2:30 PM CDT -----  Regarding: appt  Contact: PT @ [phone]  Pt is calling to speak to someone in Dr. Ricardo's office to discuss appt on 8/4/22. Pt is asking to change appt to a audio appt, if possible instead of a virtual. No available appts on 8/4/22, to be changed to audio. Pt is asking for a return call to advise if she can change appt. Please call.

## 2022-07-20 ENCOUNTER — TELEPHONE (OUTPATIENT)
Dept: PODIATRY | Facility: CLINIC | Age: 57
End: 2022-07-20
Payer: MEDICARE

## 2022-07-21 ENCOUNTER — OFFICE VISIT (OUTPATIENT)
Dept: VASCULAR SURGERY | Facility: CLINIC | Age: 57
End: 2022-07-21
Payer: MEDICARE

## 2022-07-21 ENCOUNTER — HOSPITAL ENCOUNTER (OUTPATIENT)
Dept: RADIOLOGY | Facility: OTHER | Age: 57
Discharge: HOME | End: 2022-07-21
Attending: SURGERY
Payer: MEDICARE

## 2022-07-21 VITALS
HEART RATE: 89 BPM | BODY MASS INDEX: 39.86 KG/M2 | WEIGHT: 248 LBS | HEIGHT: 66 IN | DIASTOLIC BLOOD PRESSURE: 69 MMHG | SYSTOLIC BLOOD PRESSURE: 129 MMHG

## 2022-07-21 DIAGNOSIS — I83.899 SYMPTOMATIC SPIDER VARICOSE VEIN: Primary | ICD-10-CM

## 2022-07-21 DIAGNOSIS — I80.03 SUPERFICIAL PHLEBITIS AND THROMBOPHLEBITIS OF BOTH LOWER EXTREMITIES: ICD-10-CM

## 2022-07-21 DIAGNOSIS — I83.813 VARICOSE VEINS OF LEG WITH PAIN, BILATERAL: ICD-10-CM

## 2022-07-21 DIAGNOSIS — I78.1 SPIDER VEIN, SYMPTOMATIC: Primary | ICD-10-CM

## 2022-07-21 DIAGNOSIS — I83.893 VARICOSE VEINS OF LEG WITH EDEMA, BILATERAL: ICD-10-CM

## 2022-07-21 DIAGNOSIS — I83.899: ICD-10-CM

## 2022-07-21 DIAGNOSIS — I83.899 SYMPTOMATIC RETICULAR VEINS: Primary | ICD-10-CM

## 2022-07-21 DIAGNOSIS — I83.893 VARICOSE VEINS OF LEG WITH SWELLING, BILATERAL: ICD-10-CM

## 2022-07-21 PROCEDURE — 93970 EXTREMITY STUDY: CPT | Mod: 26,,, | Performed by: RADIOLOGY

## 2022-07-21 PROCEDURE — 1159F PR MEDICATION LIST DOCUMENTED IN MEDICAL RECORD: ICD-10-PCS | Mod: CPTII,S$GLB,, | Performed by: SURGERY

## 2022-07-21 PROCEDURE — 3078F DIAST BP <80 MM HG: CPT | Mod: CPTII,S$GLB,, | Performed by: SURGERY

## 2022-07-21 PROCEDURE — 93970 US LOWER EXTREMITY VEINS BILATERAL INSUFFICIENCY: ICD-10-PCS | Mod: 26,,, | Performed by: RADIOLOGY

## 2022-07-21 PROCEDURE — 3074F SYST BP LT 130 MM HG: CPT | Mod: CPTII,S$GLB,, | Performed by: SURGERY

## 2022-07-21 PROCEDURE — 4010F ACE/ARB THERAPY RXD/TAKEN: CPT | Mod: CPTII,S$GLB,, | Performed by: SURGERY

## 2022-07-21 PROCEDURE — 99202 OFFICE O/P NEW SF 15 MIN: CPT | Mod: S$GLB,,, | Performed by: SURGERY

## 2022-07-21 PROCEDURE — 99202 PR OFFICE/OUTPT VISIT, NEW, LEVL II, 15-29 MIN: ICD-10-PCS | Mod: S$GLB,,, | Performed by: SURGERY

## 2022-07-21 PROCEDURE — 4010F PR ACE/ARB THEARPY RXD/TAKEN: ICD-10-PCS | Mod: CPTII,S$GLB,, | Performed by: SURGERY

## 2022-07-21 PROCEDURE — 3074F PR MOST RECENT SYSTOLIC BLOOD PRESSURE < 130 MM HG: ICD-10-PCS | Mod: CPTII,S$GLB,, | Performed by: SURGERY

## 2022-07-21 PROCEDURE — 93970 EXTREMITY STUDY: CPT | Mod: TC

## 2022-07-21 PROCEDURE — 3008F BODY MASS INDEX DOCD: CPT | Mod: CPTII,S$GLB,, | Performed by: SURGERY

## 2022-07-21 PROCEDURE — 3078F PR MOST RECENT DIASTOLIC BLOOD PRESSURE < 80 MM HG: ICD-10-PCS | Mod: CPTII,S$GLB,, | Performed by: SURGERY

## 2022-07-21 PROCEDURE — 1159F MED LIST DOCD IN RCRD: CPT | Mod: CPTII,S$GLB,, | Performed by: SURGERY

## 2022-07-21 PROCEDURE — 3008F PR BODY MASS INDEX (BMI) DOCUMENTED: ICD-10-PCS | Mod: CPTII,S$GLB,, | Performed by: SURGERY

## 2022-07-21 NOTE — PROGRESS NOTES
VASCULAR SURGERY CLINIC NOTE    Patient ID: Trina Marie Collette is a 57 y.o. female.    I. HISTORY     Chief Complaint: spider veins    HPI: Trina Marie Collette is a 57 y.o. female who is here today for evaluation of reticular veins.  Symptoms include pain/throbbing at the veins. Symptoms have been present for weeks. First noticed the veins years ago but symptoms only began recently. She has been wearing compression stockings for more than a year but now is having discomfort at the sites of the veins.  Location is bilateral calves. Symptoms are none. Patient is wearing compression stockings daily. Patient has no history of DVT. History of venous interventions includes none.  NHo family history of venous disease.  Symptoms do not currently limit patient's functional status and daily activities.      Migraine with aura: No  PFO/ASD/right to left shunt:  no    MI: no  Stroke: No  Seizure Disorder: No      Past Medical History:   Diagnosis Date    Allergic rhinitis     Anxiety     Bipolar 1 disorder     Chronic cough     Depression     Hypertension     Laryngopharyngeal reflux (LPR)     Lower back pain     Tinnitus of right ear         Past Surgical History:   Procedure Laterality Date    COLONOSCOPY N/A 1/12/2021    Procedure: COLONOSCOPY;  Surgeon: Vitaliy Hammer MD;  Location: [address]);  Service: Endoscopy;  Laterality: N/A;    ESOPHAGOGASTRODUODENOSCOPY N/A 1/12/2021    Procedure: ESOPHAGOGASTRODUODENOSCOPY (EGD);  Surgeon: Vitaliy Hammer MD;  Location: [address]);  Service: Endoscopy;  Laterality: N/A;  prep ins. mailed - COVID screening on 1/9/21 [facility]    FUNCTIONAL ENDOSCOPIC SINUS SURGERY (FESS) USING COMPUTER-ASSISTED NAVIGATION Bilateral 2/3/2020    Procedure: FESS, USING COMPUTER-ASSISTED NAVIGATION;  Surgeon: DWAYNE Ricardo MD;  Location[facility];  Service: ENT;  Laterality: Bilateral;    VAGINAL DELIVERY  1986       Social History     Occupational  History    Not on file   Tobacco Use    Smoking status: Current Every Day Smoker     Packs/day: 0.25     Years: 10.00     Pack years: 2.50     Types: Cigarettes    Smokeless tobacco: Never Used   Substance and Sexual Activity    Alcohol use: Not Currently    Drug use: Yes     Types: Marijuana    Sexual activity: Yes     Partners: Male         Current Outpatient Medications:     albuterol (PROVENTIL) 2.5 mg /3 mL (0.083 %) nebulizer solution, Take 3 mLs (2.5 mg total) by nebulization every 4 (four) hours as needed for Wheezing or Shortness of Breath. Rescue, Disp: 1 each, Rfl: 1    amLODIPine (NORVASC) 2.5 MG tablet, TAKE 1 TABLET BY MOUTH AT BEDTIME FOR HIGH BLOOD PRESSURE. CONTINUE LOSARTAN 100 MG IN THE MORNING, Disp: 90 tablet, Rfl: 1    betamethasone valerate 0.1% (VALISONE) 0.1 % Crea, APPLIED TO THE ABDOMEN TWICE A DAY AS NEEDED FOR SKIN RASH OR ITCH, Disp: 180 g, Rfl: 0    budesonide-formoterol 160-4.5 mcg (SYMBICORT) 160-4.5 mcg/actuation HFAA, Inhale 2 puffs into the lungs every 12 (twelve) hours. Controller. Rinse mouth after using., Disp: 10.2 g, Rfl: 11    busPIRone (BUSPAR) 30 MG Tab, Take 1 tablet (30 mg total) by mouth 2 (two) times daily., Disp: 180 tablet, Rfl: 1    clotrimazole-betamethasone 1-0.05% (LOTRISONE) cream, Apply topically 2 (two) times daily as needed., Disp: 15 g, Rfl: 1    cyclobenzaprine (FLEXERIL) 10 MG tablet, One p.o. b.i.d. may increase to q.8 hours if needed, Disp: 60 tablet, Rfl: 5    diphenoxylate-atropine 2.5-0.025 mg (LOMOTIL) 2.5-0.025 mg per tablet, TAKE 1 TAB BY MOUTH AFTER EACH LOOSE BOWEL MOVEMENT PER DAY, Disp: 30 tablet, Rfl: 2    ergocalciferol (ERGOCALCIFEROL) 50,000 unit Cap, Take 1 capsule (50,000 Units total) by mouth every 7 days., Disp: 12 capsule, Rfl: 3    famotidine (PEPCID) 20 MG tablet, TAKE 1 TABLET BY MOUTH TWICE A DAY, Disp: 180 tablet, Rfl: 3    gabapentin (NEURONTIN) 800 MG tablet, Take 1 tablet (800 mg total) by mouth 3 (three) times  daily., Disp: 90 tablet, Rfl: 1    HYDROcodone-acetaminophen (NORCO)  mg per tablet, Take 1 tablet by mouth every 8 (eight) hours as needed. , Disp: , Rfl: 0    hydrOXYzine pamoate (VISTARIL) 25 MG Cap, TAKE 1 CAPSULE BY MOUTH EVERY 8 HOURS OR 1 CAP AT BEDTIME FOR ANXIETY TO HELP PREVENT PALPITATIONS, Disp: 360 capsule, Rfl: 1    ipratropium (ATROVENT) 21 mcg (0.03 %) nasal spray, 2 sprays by Nasal route 2 (two) times daily. May be use more often if needed, Disp: 30 mL, Rfl: 11    levocetirizine (XYZAL) 5 MG tablet, TAKE 1 TABLET BY MOUTH EVERY DAY IN THE EVENING, Disp: 90 tablet, Rfl: 3    losartan (COZAAR) 100 MG tablet, Take 1 tablet (100 mg total) by mouth once daily., Disp: 90 tablet, Rfl: 1    meloxicam (MOBIC) 15 MG tablet, Start after Medrol Dosepak complete Mobic 7.5 mg 1 p.o. b.i.d. p.r.n. swelling or pain no other NSAIDs can take with Tylenol, Disp: 30 tablet, Rfl: 5    methylPREDNISolone (MEDROL DOSEPACK) 4 mg tablet, Start Thursday Medrol Dosepak use as directed, Disp: 1 each, Rfl: 0    montelukast (SINGULAIR) 10 mg tablet, Take 1 tablet (10 mg total) by mouth once daily., Disp: 90 tablet, Rfl: 1    mupirocin (BACTROBAN) 2 % ointment, Apply to nose 3 times daily on a Q-tip for crusting or sores inside the nose, Disp: 22 g, Rfl: 3    naproxen (NAPROSYN) 375 MG tablet, TAKE 1 TABLET BY MOUTH 2 TIMES DAILY WITH MEALS., Disp: 180 tablet, Rfl: 1    pantoprazole (PROTONIX) 40 MG tablet, TAKE 1 TABLET BY MOUTH EVERY DAY, Disp: 90 tablet, Rfl: 3    potassium chloride (MICRO-K) 10 MEQ CpSR, TAKE 1 CAPSULE BY MOUTH ONCE DAILY., Disp: 90 capsule, Rfl: 3    potassium chloride (MICRO-K) 10 MEQ CpSR, Take 1 capsule (10 mEq total) by mouth once daily., Disp: 90 capsule, Rfl: 1    rosuvastatin (CRESTOR) 5 MG tablet, Take 1 tablet (5 mg total) by mouth once daily., Disp: 90 tablet, Rfl: 1    sertraline (ZOLOFT) 100 MG tablet, Take 1 tablet (100 mg total) by mouth once daily., Disp: 90 tablet, Rfl:  1    tiZANidine (ZANAFLEX) 4 MG tablet, Take 1 tablet (4 mg total) by mouth every 8 (eight) hours., Disp: 30 tablet, Rfl: 2    traZODone (DESYREL) 100 MG tablet, TAKE 1 TABLET BY MOUTH NIGHTLY AS NEEDED FOR INSOMNIA., Disp: 90 tablet, Rfl: 1    triamcinolone acetonide 0.1% (KENALOG) 0.1 % cream, APPLY TO AFFECTED AREA TWICE A DAY, Disp: 15 g, Rfl: 5    ziprasidone (GEODON) 60 MG Cap, Take 1 capsule (60 mg total) by mouth 2 (two) times daily., Disp: 30 capsule, Rfl: 5    ziprasidone (GEODON) 80 MG capsule, Take 1 capsule (80 mg total) by mouth once daily., Disp: 30 capsule, Rfl: 5    zolpidem (AMBIEN) 5 MG Tab, 1 po Q OHS p.r.n. sleep, Disp: 30 tablet, Rfl: 2    LORazepam (ATIVAN) 1 MG tablet, Take 1 tablet (1 mg total) by mouth every 6 (six) hours as needed for Anxiety., Disp: 30 tablet, Rfl: 2    promethazine-codeine 6.25-10 mg/5 ml (PHENERGAN WITH CODEINE) 6.25-10 mg/5 mL syrup, Take 5 mLs by mouth every 4 (four) hours as needed for Cough., Disp: 240 mL, Rfl: 0    Review of Systems   Constitutional: Negative for weight loss.   HENT: Negative for ear pain and nosebleeds.    Eyes: Negative for discharge and pain.   Cardiovascular: Negative for chest pain and palpitations.   Respiratory: Negative for cough, shortness of breath and wheezing.    Endocrine: Negative for cold intolerance, heat intolerance and polyphagia.   Hematologic/Lymphatic: Negative for adenopathy. Does not bruise/bleed easily.   Skin: Negative for itching and rash.   Musculoskeletal: Negative for joint swelling and muscle cramps.   Gastrointestinal: Negative for abdominal pain, diarrhea, nausea and vomiting.   Genitourinary: Negative for dysuria and flank pain.   Neurological: Negative for numbness and seizures.         II. PHYSICAL EXAM     Physical Exam  Constitutional:       General: She is not in acute distress.     Appearance: Normal appearance. She is not ill-appearing or diaphoretic.   HENT:      Head: Normocephalic and atraumatic.    Eyes:      General: No scleral icterus.        Right eye: No discharge.         Left eye: No discharge.      Extraocular Movements: Extraocular movements intact.      Conjunctiva/sclera: Conjunctivae normal.   Cardiovascular:      Rate and Rhythm: Normal rate and regular rhythm.      Pulses: Normal pulses.   Pulmonary:      Effort: Pulmonary effort is normal. No respiratory distress.   Abdominal:      General: Abdomen is flat. There is no distension.      Palpations: Abdomen is soft.      Tenderness: There is no abdominal tenderness.   Musculoskeletal:         General: Normal range of motion.      Right lower leg: No edema.      Left lower leg: No edema.   Skin:     General: Skin is warm and dry.      Coloration: Skin is not jaundiced or pale.      Findings: No erythema or rash.   Neurological:      General: No focal deficit present.      Mental Status: She is alert and oriented to person, place, and time.   Psychiatric:         Mood and Affect: Mood normal.         Behavior: Behavior normal.         Reticular/Spider veins noted:  RLE: posterior and lateral calf  LLE: posterior and lateral calf    Varicose veins noted:  RLE: none  LLE:  none    Imaging Results: (I have personally reviewed the images/studies and provided my interpretation below)  BLE Venous Duplex ultrasound Impression:   Right Leg: Deep Venous system: no DVT, no reflux. GSV: no reflux. LSV: no reflux  Left Leg: Deep Venous system:  no DVT, no reflux. GSV: no reflux. LSV: no reflux    III. ASSESSMENT & PLAN (MEDICAL DECISION MAKING)     1. Symptomatic reticular veins    2. Symptomatic reticular veins, unspecified laterality      Venous Clinical Severity Score  Pain:3=Daily, severe limiting activities or requiring regular use of analgesics  Varicose Veins: 0=None  Venous Edema: 1=Evening ankle edema only  Pigmentation: 0=None or focal, low intensity (tan)  Inflammation: 0=None  Induration: 0=None  Number of Active Ulcers: 0=0  Active Ulceration,  Duration: 0=None  Active Ulcer Size: 0=None  Compressive Therapy: 3=Full compliance, stockings + elevation  Total Score: 7      Assessment/Diagnosis and Plan:  57 y.o. female here  for evaluation of lower extremity veins. CEAP class 1. VCSS 7. Ultrasound of lower extremities reveals no evidence of venous insufficiency.  The patients symptoms have failed to improve despite compression/elevation. She would benefit from BLE sclerotherapy to improve her symptoms.    -Recommend compression with 20-30mmHg Rx stockings, elevation  -Exercise regularly  -Plan BLE sclerotherapy for symptomatic spider/reticular veins    DOUG Jimenes II, MD, [facility]  Vascular Surgery  [facility]  [phone]

## 2022-07-22 ENCOUNTER — TELEPHONE (OUTPATIENT)
Dept: VASCULAR SURGERY | Facility: CLINIC | Age: 57
End: 2022-07-22
Payer: MEDICARE

## 2022-07-22 ENCOUNTER — TELEPHONE (OUTPATIENT)
Dept: OTOLARYNGOLOGY | Facility: CLINIC | Age: 57
End: 2022-07-22
Payer: MEDICARE

## 2022-07-22 RX ORDER — PROMETHAZINE HYDROCHLORIDE AND CODEINE PHOSPHATE 6.25; 1 MG/5ML; MG/5ML
5 SOLUTION ORAL EVERY 4 HOURS PRN
Qty: 240 ML | Refills: 0 | Status: SHIPPED | OUTPATIENT
Start: 2022-07-22 | End: 2022-08-02

## 2022-07-22 NOTE — TELEPHONE ENCOUNTER
Returned pt call. Informed pt that Rx was sent to pharmacy of choice.     ----- Message from Tami Wood sent at 7/22/2022  4:34 PM CDT -----  Contact: pt  Rx refill         promethazine-codeine 6.25-10 mg/5 ml (PHENERGAN WITH CODEINE) 6.25-10 mg/5 mL syrup              Confirmed contact below:  Contact Name:Elizabetha Collette  Phone Number: [phone]           [facility]/pharmacy #[id number] - [place] - [address]  [address] [place] [address]  Phone: [phone] Fax: [phone]

## 2022-07-22 NOTE — TELEPHONE ENCOUNTER
Spoke with patient and informed that Medicare does not cover sclerotherapy.  (denied code when attempting to book procedure). Patient offered self pay pricing, but patient states she cannot afford it at this time. Informed patient to call me anytime if she her insurance changes or if she is in a position to do the self pay.

## 2022-07-25 ENCOUNTER — TELEPHONE (OUTPATIENT)
Dept: OTOLARYNGOLOGY | Facility: CLINIC | Age: 57
End: 2022-07-25
Payer: MEDICARE

## 2022-07-25 NOTE — TELEPHONE ENCOUNTER
----- Message from Maryse Olivas sent at 7/25/2022  8:19 AM CDT -----  Contact: pt  Refill request.     Pt states that pharmacy has not received rx.     promethazine-codeine 6.25-10 mg/5 ml (PHENERGAN WITH CODEINE) 6.25-10 mg/5 mL syrup    [facility]/pharmacy #[id number] - [place], [place] - [address]  [address] [address]  Phone: [phone] Fax: [phone]    Please call pt when rx is sent over.     Called and spoke with patient today letting [facility] know [facility] Rx for promethazine 6.25-10 mg/5 ml with codeine.   The Ms. Johnson, states she will  prescription today;07/25/22 from Dr. Ricardo office.

## 2022-08-04 ENCOUNTER — OFFICE VISIT (OUTPATIENT)
Dept: OTOLARYNGOLOGY | Facility: CLINIC | Age: 57
End: 2022-08-04
Payer: MEDICAID

## 2022-08-04 DIAGNOSIS — H93.A1 PULSATILE TINNITUS OF RIGHT EAR: ICD-10-CM

## 2022-08-04 DIAGNOSIS — J30.9 ALLERGIC RHINITIS, UNSPECIFIED SEASONALITY, UNSPECIFIED TRIGGER: ICD-10-CM

## 2022-08-04 DIAGNOSIS — R09.89 CHOKING IN ADULT: ICD-10-CM

## 2022-08-04 DIAGNOSIS — K21.9 LARYNGOPHARYNGEAL REFLUX (LPR): ICD-10-CM

## 2022-08-04 DIAGNOSIS — R05.9 COUGH: Primary | ICD-10-CM

## 2022-08-04 DIAGNOSIS — R05.3 CHRONIC COUGH: ICD-10-CM

## 2022-08-04 DIAGNOSIS — J45.21 MILD INTERMITTENT ASTHMA WITH ACUTE EXACERBATION: ICD-10-CM

## 2022-08-04 PROCEDURE — 4010F PR ACE/ARB THEARPY RXD/TAKEN: ICD-10-PCS | Mod: CPTII,95,, | Performed by: SPECIALIST

## 2022-08-04 PROCEDURE — 1160F RVW MEDS BY RX/DR IN RCRD: CPT | Mod: CPTII,95,, | Performed by: SPECIALIST

## 2022-08-04 PROCEDURE — 1160F PR REVIEW ALL MEDS BY PRESCRIBER/CLIN PHARMACIST DOCUMENTED: ICD-10-PCS | Mod: CPTII,95,, | Performed by: SPECIALIST

## 2022-08-04 PROCEDURE — 99441 PR PHYSICIAN TELEPHONE EVALUATION 5-10 MIN: ICD-10-PCS | Mod: 95,,, | Performed by: SPECIALIST

## 2022-08-04 PROCEDURE — 1159F PR MEDICATION LIST DOCUMENTED IN MEDICAL RECORD: ICD-10-PCS | Mod: CPTII,95,, | Performed by: SPECIALIST

## 2022-08-04 PROCEDURE — 1159F MED LIST DOCD IN RCRD: CPT | Mod: CPTII,95,, | Performed by: SPECIALIST

## 2022-08-04 PROCEDURE — 99441 PR PHYSICIAN TELEPHONE EVALUATION 5-10 MIN: CPT | Mod: 95,,, | Performed by: SPECIALIST

## 2022-08-04 PROCEDURE — 4010F ACE/ARB THERAPY RXD/TAKEN: CPT | Mod: CPTII,95,, | Performed by: SPECIALIST

## 2022-08-04 RX ORDER — PROMETHAZINE HYDROCHLORIDE AND CODEINE PHOSPHATE 6.25; 1 MG/5ML; MG/5ML
5 SOLUTION ORAL EVERY 4 HOURS PRN
Qty: 210 ML | Refills: 0 | Status: CANCELLED | OUTPATIENT
Start: 2022-08-04 | End: 2022-08-14

## 2022-08-04 RX ORDER — PROMETHAZINE HYDROCHLORIDE AND CODEINE PHOSPHATE 6.25; 1 MG/5ML; MG/5ML
5 SOLUTION ORAL EVERY 4 HOURS PRN
Qty: 210 ML | Refills: 0 | Status: SHIPPED | OUTPATIENT
Start: 2022-08-04 | End: 2022-08-14

## 2022-08-04 NOTE — PROGRESS NOTES
Established Patient - Audio Only Telehealth Visit     The patient location is:  Her personal residence 10 minute d may withdraw from such care at any time. Patient verbally consented to receive this service via voice-only telephone call.       HPI:  The patient is having chronic coughing, it is worse at night when she lays down.  It occurs in paroxysms and keeps her awake.  When severe she will use Phenergan with codeine which does calm a cough and allow her to sleep     Assessment and plan:    Assessment:  1. Asthma, 2. Chronic cough, 3. Allergic rhinitis, 4. Choking in an adult  Recommendations:  I will give the patient a small prescription for are Phenergan with codeine to use.  She needs to follow up in 6 weeks with Jarod Mir DNP, at the [facility] for repeat evaluation and flexible nasolaryngoscopy.  Will recheck her in November.                          This service was not originating from a related E/M service provided within the previous 7 days nor will  to an E/M service or procedure within the next 24 hours or my soonest available appointment.  Prevailing standard of care was able to be met in this audio-only visit.

## 2022-09-01 ENCOUNTER — OFFICE VISIT (OUTPATIENT)
Dept: PRIMARY CARE CLINIC | Facility: CLINIC | Age: 57
End: 2022-09-01
Payer: MEDICARE

## 2022-09-01 VITALS
RESPIRATION RATE: 18 BRPM | WEIGHT: 251.63 LBS | HEART RATE: 84 BPM | BODY MASS INDEX: 40.44 KG/M2 | SYSTOLIC BLOOD PRESSURE: 138 MMHG | TEMPERATURE: 98 F | DIASTOLIC BLOOD PRESSURE: 88 MMHG | HEIGHT: 66 IN | OXYGEN SATURATION: 96 %

## 2022-09-01 DIAGNOSIS — I10 ESSENTIAL HYPERTENSION: ICD-10-CM

## 2022-09-01 DIAGNOSIS — E78.00 PURE HYPERCHOLESTEROLEMIA: ICD-10-CM

## 2022-09-01 DIAGNOSIS — M25.562 CHRONIC PAIN OF BOTH KNEES: ICD-10-CM

## 2022-09-01 DIAGNOSIS — J44.9 CHRONIC OBSTRUCTIVE PULMONARY DISEASE, UNSPECIFIED COPD TYPE: ICD-10-CM

## 2022-09-01 DIAGNOSIS — G89.29 CHRONIC PAIN OF BOTH KNEES: ICD-10-CM

## 2022-09-01 DIAGNOSIS — G47.00 INSOMNIA, UNSPECIFIED TYPE: Primary | ICD-10-CM

## 2022-09-01 DIAGNOSIS — M25.561 CHRONIC PAIN OF BOTH KNEES: ICD-10-CM

## 2022-09-01 DIAGNOSIS — S39.012D LUMBOSACRAL STRAIN, SUBSEQUENT ENCOUNTER: ICD-10-CM

## 2022-09-01 DIAGNOSIS — Z72.0 TOBACCO ABUSE: ICD-10-CM

## 2022-09-01 DIAGNOSIS — K57.90 DIVERTICULOSIS: ICD-10-CM

## 2022-09-01 DIAGNOSIS — R05.3 CHRONIC COUGH: ICD-10-CM

## 2022-09-01 DIAGNOSIS — E55.9 VITAMIN D DEFICIENCY: ICD-10-CM

## 2022-09-01 PROCEDURE — 3079F DIAST BP 80-89 MM HG: CPT | Mod: CPTII,S$GLB,, | Performed by: FAMILY MEDICINE

## 2022-09-01 PROCEDURE — 99499 RISK ADDL DX/OHS AUDIT: ICD-10-PCS | Mod: S$GLB,,, | Performed by: FAMILY MEDICINE

## 2022-09-01 PROCEDURE — 3075F SYST BP GE 130 - 139MM HG: CPT | Mod: CPTII,S$GLB,, | Performed by: FAMILY MEDICINE

## 2022-09-01 PROCEDURE — 3008F BODY MASS INDEX DOCD: CPT | Mod: CPTII,S$GLB,, | Performed by: FAMILY MEDICINE

## 2022-09-01 PROCEDURE — 3008F PR BODY MASS INDEX (BMI) DOCUMENTED: ICD-10-PCS | Mod: CPTII,S$GLB,, | Performed by: FAMILY MEDICINE

## 2022-09-01 PROCEDURE — 99214 PR OFFICE/OUTPT VISIT, EST, LEVL IV, 30-39 MIN: ICD-10-PCS | Mod: S$GLB,,, | Performed by: FAMILY MEDICINE

## 2022-09-01 PROCEDURE — 3079F PR MOST RECENT DIASTOLIC BLOOD PRESSURE 80-89 MM HG: ICD-10-PCS | Mod: CPTII,S$GLB,, | Performed by: FAMILY MEDICINE

## 2022-09-01 PROCEDURE — 99999 PR PBB SHADOW E&M-EST. PATIENT-LVL V: CPT | Mod: PBBFAC,,, | Performed by: FAMILY MEDICINE

## 2022-09-01 PROCEDURE — 99499 UNLISTED E&M SERVICE: CPT | Mod: S$GLB,,, | Performed by: FAMILY MEDICINE

## 2022-09-01 PROCEDURE — 4010F ACE/ARB THERAPY RXD/TAKEN: CPT | Mod: CPTII,S$GLB,, | Performed by: FAMILY MEDICINE

## 2022-09-01 PROCEDURE — 4010F PR ACE/ARB THEARPY RXD/TAKEN: ICD-10-PCS | Mod: CPTII,S$GLB,, | Performed by: FAMILY MEDICINE

## 2022-09-01 PROCEDURE — 1159F PR MEDICATION LIST DOCUMENTED IN MEDICAL RECORD: ICD-10-PCS | Mod: CPTII,S$GLB,, | Performed by: FAMILY MEDICINE

## 2022-09-01 PROCEDURE — 99214 OFFICE O/P EST MOD 30 MIN: CPT | Mod: S$GLB,,, | Performed by: FAMILY MEDICINE

## 2022-09-01 PROCEDURE — 99999 PR PBB SHADOW E&M-EST. PATIENT-LVL V: ICD-10-PCS | Mod: PBBFAC,,, | Performed by: FAMILY MEDICINE

## 2022-09-01 PROCEDURE — 1159F MED LIST DOCD IN RCRD: CPT | Mod: CPTII,S$GLB,, | Performed by: FAMILY MEDICINE

## 2022-09-01 PROCEDURE — 3075F PR MOST RECENT SYSTOLIC BLOOD PRESS GE 130-139MM HG: ICD-10-PCS | Mod: CPTII,S$GLB,, | Performed by: FAMILY MEDICINE

## 2022-09-01 RX ORDER — PROMETHAZINE HYDROCHLORIDE AND CODEINE PHOSPHATE 6.25; 1 MG/5ML; MG/5ML
5 SOLUTION ORAL EVERY 6 HOURS PRN
Qty: 180 ML | Refills: 0 | Status: SHIPPED | OUTPATIENT
Start: 2022-09-01 | End: 2022-09-01

## 2022-09-01 RX ORDER — LORAZEPAM 1 MG/1
1 TABLET ORAL EVERY 6 HOURS PRN
Qty: 30 TABLET | Refills: 2 | Status: SHIPPED | OUTPATIENT
Start: 2022-09-01 | End: 2022-09-30

## 2022-09-01 RX ORDER — PROMETHAZINE HYDROCHLORIDE AND CODEINE PHOSPHATE 6.25; 1 MG/5ML; MG/5ML
5 SOLUTION ORAL EVERY 6 HOURS PRN
Qty: 180 ML | Refills: 0 | Status: SHIPPED | OUTPATIENT
Start: 2022-09-01 | End: 2022-10-11 | Stop reason: SDUPTHER

## 2022-09-01 RX ORDER — FAMOTIDINE 20 MG/1
20 TABLET, FILM COATED ORAL 2 TIMES DAILY
Qty: 180 TABLET | Refills: 3 | Status: SHIPPED | OUTPATIENT
Start: 2022-09-01 | End: 2023-06-06 | Stop reason: SDUPTHER

## 2022-09-01 RX ORDER — ZOLPIDEM TARTRATE 5 MG/1
TABLET ORAL
Qty: 30 TABLET | Refills: 2 | Status: SHIPPED | OUTPATIENT
Start: 2022-09-01 | End: 2022-10-25 | Stop reason: SDUPTHER

## 2022-09-01 NOTE — PROGRESS NOTES
Subjective:       Patient ID: Trina Marie Collette is a 57 y.o. female.    Chief Complaint:   HPI:  57-year-old female --07/21/2022 patient had venous ultrasound lower extremities negative for clock--patient states while ultrasound was being done leg was being squeezed=--notices once home had lumps posterior popliteal areas bilateral--pain was severe--not as bad now.  No trauma no excessive activity lupus rheumatoid gout history chronic pain syndrome--mainly has some neck and back problem. Hx schizophrenia   Dr Ricardo out gets promethazine with codeine cough syrup from him  Needs refills of Ambien Ativan and Pepcid         ROSkin: no psoriasis, eczema, skin cancer--  HEENT:Occas  headache, ocular pain, blurred vision, diplopia, epistaxis, hoarseness change in voice, thyroid trouble--history of tinnitus/chronic sinus problem/nasal septal deviation/hearing loss has hearing aid  Lung: No pneumonia, asthma, Tb, wheezing, SOB, smoking 1/4 ppd trying to quit  --history of bronchospasm in the past uses albuterol--had chest x-ray which is normal  Heart: No chest pain, ankle edema, +occas palpitations, MI, river murmur, +hypertension blood pressure   + hyperlipidemia-no stent bypass arrhythmia  Abdomen: No nausea, vomiting, diarrhea, constipation, ulcers, hepatitis, gallbladder disease, melena, hematochezia, hematemesis patient complaining of heartburn had EGD colonoscopy hx diarrhea immodium Hx Gerd on protonix comes and goes  : no UTI, renal disease, stones--h  GYN last menstrual.  Years ago has mammogram yearly--refuses PAP smear --I don't fool with that    MS: no fractures, O/A, lupus, rheumatoid, gout--history of chronic pain the back lumbar spine, right knee come right shoulder, history DDD lumbar history cervical spinal stenosis  Neuro: No dizziness, LOC, seizures   No diabetes, no anemia, no anxiety, no depression patient with history of bipolar schizophrenia--doing well with medication goes to Mental Health    Single, one child, disabled, lives alone     Objective:   Physical Exam:    General: Well nourished, well developed, no acute distress +overweight   Skin: No lesions  HEENT--eyes PERRLA EOM intact nose clear , throat nonerythematous ears TMs clear  NECK: Supple, no bruits, No JVD, no nodes  Lungs: Clear, no rales, rhonchi, wheezing   Heart: Regular rate and rhythm, no murmurs, gallops, or rubs  Abdomen: flat, bowel sounds positive, no tenderness, or organomegaly  MS:  Patient states swelling in the popliteal area which appear to be too fat pad occurred after venous ultrasound done--had severe pain initially pain now is much milder no treatment needed at this time  Neuro: Alert, CN intact, oriented X 3 still with some anxiety and depression of issues  Extremities: No cyanosis, clubbing, or edema         Assessment:       1. Tobacco abuse    2. Insomnia, unspecified type    3. Lumbosacral strain, subsequent encounter    4. Chronic pain of both knees    5. Diverticulosis    6. Vitamin D deficiency    7. Essential hypertension    8. Pure hypercholesterolemia    9. Chronic obstructive pulmonary disease, unspecified COPD type    10. Chronic cough        Plan:       Tobacco abuse    Insomnia, unspecified type  -     zolpidem (AMBIEN) 5 MG Tab; 1 po Q [facility] p.r.n. sleep  Dispense: 30 tablet; Refill: 2    Lumbosacral strain, subsequent encounter    Chronic pain of both knees    Diverticulosis    Vitamin D deficiency    Essential hypertension    Pure hypercholesterolemia    Chronic obstructive pulmonary disease, unspecified COPD type    Chronic cough    Other orders  -     famotidine (PEPCID) 20 MG tablet; Take 1 tablet (20 mg total) by mouth 2 (two) times daily.  Dispense: 180 tablet; Refill: 3  -     LORazepam (ATIVAN) 1 MG tablet; Take 1 tablet (1 mg total) by mouth every 6 (six) hours as needed for Anxiety.  Dispense: 30 tablet; Refill: 2  -     Discontinue: promethazine-codeine 6.25-10 mg/5 ml (PHENERGAN WITH CODEINE)  6.25-10 mg/5 mL syrup; Take 5 mLs by mouth every 6 (six) hours as needed for Cough.  Dispense: 180 mL; Refill: 0  -     promethazine-codeine 6.25-10 mg/5 ml (PHENERGAN WITH CODEINE) 6.25-10 mg/5 mL syrup; Take 5 mLs by mouth every 6 (six) hours as needed for Cough.  Dispense: 180 mL; Refill: 0      Main Reason here    COVID--was on a cruise sister got sick patient got sick afterward--treat with Zithromax/prednisone/Phenergan with codeine  Needs refills of medication asking for Ambien will get nurse to put in other medications tomorrow  Other medical issues  Right lower back/right sacroiliac and right hip pain--hip better still with back pain --had epidural steroid injections in the past--on Norco Zanaflex Neurontin Mobic--Moist heat theragesic range of motion exercise  Sees Dr. Senior for pain medication--DDD cervical and lumbar spine  Hypertension on Norvasc and Cozaar blood pressure 124/80  Hyperlipidemia on Crestor  Vitamin D deficiency on vitamin-D  COPD on Symbicort and albuterol  Anxiety depression bipolar schizophrenia on Ambien Ativan  Zoloft trazodone Geodon--goes to mental health  Lab done March needs lab q 6 month   Health maintenance Pap smear

## 2022-09-04 NOTE — PROGRESS NOTES
Patient, Trina Marie Collette (MRN #[id number]), presented with a recorded BMI of 40.62 kg/m^2 consistent with the definition of morbid obesity (ICD-10 E66.01). The patient's morbid obesity was monitored, evaluated, addressed and/or treated. This addendum to the medical record is made on 09/03/2022.

## 2022-09-23 ENCOUNTER — PATIENT MESSAGE (OUTPATIENT)
Dept: OTOLARYNGOLOGY | Facility: CLINIC | Age: 57
End: 2022-09-23
Payer: MEDICARE

## 2022-09-23 ENCOUNTER — TELEPHONE (OUTPATIENT)
Dept: OTOLARYNGOLOGY | Facility: CLINIC | Age: 57
End: 2022-09-23
Payer: MEDICARE

## 2022-09-23 ENCOUNTER — TELEPHONE (OUTPATIENT)
Dept: PRIMARY CARE CLINIC | Facility: CLINIC | Age: 57
End: 2022-09-23
Payer: MEDICARE

## 2022-09-23 NOTE — TELEPHONE ENCOUNTER
----- Message from Sonam Rand sent at 9/23/2022  9:32 AM CDT -----  Contact: self/[phone]  Caller is requesting an earlier appointment then we can schedule.  Caller is requesting a message be sent to the provider.  If this is for urgent care symptoms, did you offer other providers at this location, providers at other locations, or [facility]? (yes, no, n/a):    If this is for the patients physical, did you offer to schedule next available and put on wait list, or to see NP or PA for their physical?  (yes, no, n/a):    When is the next available appointment with their provider:  10-18-22  Reason for the appointment:  med refill  Patient preference of timeframe to be scheduled:  ASAP  Would the patient like a call back, or a response through their [facility] portal?:   call back  Comments:       Please advise

## 2022-09-23 NOTE — TELEPHONE ENCOUNTER
Returned pt call. Was unable to accommodate pt's request but rescheduled pt for an earlier date that was better for her.       ----- Message from Antonio Camara sent at 9/23/2022  9:39 AM CDT -----  Contact: @[phone]  Pt is calling in to see if she can be seen later on her appt on 11/1 like at 10 am

## 2022-09-26 RX ORDER — PREDNISONE 5 MG/1
TABLET ORAL
Qty: 20 TABLET | Refills: 0 | OUTPATIENT
Start: 2022-09-26

## 2022-09-26 NOTE — TELEPHONE ENCOUNTER
No new care gaps identified.  [facility] Embedded Care Gaps. Reference number: [id number]. 9/26/2022   4:41:50 PM CDT

## 2022-09-26 NOTE — TELEPHONE ENCOUNTER
We have no available appointments. Patient is requesting Prednisone to be sent into the pharmacy. Please advise

## 2022-09-26 NOTE — TELEPHONE ENCOUNTER
----- Message from Monica Rodríguez sent at 9/26/2022  9:37 AM CDT -----  Contact: pt   1MEDICALADVICE     Patient is calling for Medical Advice regarding:pain/ swelling left leg    How long has patient had these symptoms: 2week    Pharm[facility] and phone#:  [id number] [place] - [address]  [address] [place] [address]  Phone: [phone] Fax: [phone]      Would like response via mychart:  #phone  Comments:

## 2022-09-27 ENCOUNTER — PATIENT MESSAGE (OUTPATIENT)
Dept: PRIMARY CARE CLINIC | Facility: CLINIC | Age: 57
End: 2022-09-27

## 2022-09-27 NOTE — TELEPHONE ENCOUNTER
----- Message from Cleopatra Buckley sent at 9/27/2022  9:07 AM CDT -----  Contact: [phone] patient  1MEDICALADVICE     Patient is calling for Medical Advice regarding: L Leg Pain     How long has patient had these symptoms:2 weeks    Pharm[facility] and phone#:  [id number] [place] - [address]  [address]  [place] [address]  Phone: [phone] Fax: [phone]      Would like response via [employer]:  Call back Please. The pt would like to speak to the nurse. Thank you    Comments:

## 2022-09-27 NOTE — TELEPHONE ENCOUNTER
Called patient and notified her Dr. Brock denied prednisone because an appointment is needed.  Patient added on schedule for today  Patient verbalized understanding

## 2022-09-30 ENCOUNTER — OFFICE VISIT (OUTPATIENT)
Dept: PRIMARY CARE CLINIC | Facility: CLINIC | Age: 57
End: 2022-09-30
Payer: MEDICARE

## 2022-09-30 VITALS
WEIGHT: 250.13 LBS | SYSTOLIC BLOOD PRESSURE: 128 MMHG | HEIGHT: 66 IN | RESPIRATION RATE: 19 BRPM | DIASTOLIC BLOOD PRESSURE: 78 MMHG | BODY MASS INDEX: 40.2 KG/M2 | OXYGEN SATURATION: 98 % | HEART RATE: 108 BPM

## 2022-09-30 DIAGNOSIS — M25.562 ACUTE PAIN OF LEFT KNEE: ICD-10-CM

## 2022-09-30 DIAGNOSIS — I83.899 SYMPTOMATIC RETICULAR VEINS: ICD-10-CM

## 2022-09-30 DIAGNOSIS — M79.662 PAIN OF LEFT CALF: ICD-10-CM

## 2022-09-30 DIAGNOSIS — R22.42 LOCALIZED SWELLING OF LEFT LOWER LEG: Primary | ICD-10-CM

## 2022-09-30 PROCEDURE — 99214 OFFICE O/P EST MOD 30 MIN: CPT | Mod: S$GLB,,, | Performed by: STUDENT IN AN ORGANIZED HEALTH CARE EDUCATION/TRAINING PROGRAM

## 2022-09-30 PROCEDURE — 1159F PR MEDICATION LIST DOCUMENTED IN MEDICAL RECORD: ICD-10-PCS | Mod: CPTII,S$GLB,, | Performed by: STUDENT IN AN ORGANIZED HEALTH CARE EDUCATION/TRAINING PROGRAM

## 2022-09-30 PROCEDURE — 99999 PR PBB SHADOW E&M-EST. PATIENT-LVL V: CPT | Mod: PBBFAC,,, | Performed by: STUDENT IN AN ORGANIZED HEALTH CARE EDUCATION/TRAINING PROGRAM

## 2022-09-30 PROCEDURE — 4010F ACE/ARB THERAPY RXD/TAKEN: CPT | Mod: CPTII,S$GLB,, | Performed by: STUDENT IN AN ORGANIZED HEALTH CARE EDUCATION/TRAINING PROGRAM

## 2022-09-30 PROCEDURE — 3074F PR MOST RECENT SYSTOLIC BLOOD PRESSURE < 130 MM HG: ICD-10-PCS | Mod: CPTII,S$GLB,, | Performed by: STUDENT IN AN ORGANIZED HEALTH CARE EDUCATION/TRAINING PROGRAM

## 2022-09-30 PROCEDURE — 4010F PR ACE/ARB THEARPY RXD/TAKEN: ICD-10-PCS | Mod: CPTII,S$GLB,, | Performed by: STUDENT IN AN ORGANIZED HEALTH CARE EDUCATION/TRAINING PROGRAM

## 2022-09-30 PROCEDURE — 3008F BODY MASS INDEX DOCD: CPT | Mod: CPTII,S$GLB,, | Performed by: STUDENT IN AN ORGANIZED HEALTH CARE EDUCATION/TRAINING PROGRAM

## 2022-09-30 PROCEDURE — 1160F PR REVIEW ALL MEDS BY PRESCRIBER/CLIN PHARMACIST DOCUMENTED: ICD-10-PCS | Mod: CPTII,S$GLB,, | Performed by: STUDENT IN AN ORGANIZED HEALTH CARE EDUCATION/TRAINING PROGRAM

## 2022-09-30 PROCEDURE — 3074F SYST BP LT 130 MM HG: CPT | Mod: CPTII,S$GLB,, | Performed by: STUDENT IN AN ORGANIZED HEALTH CARE EDUCATION/TRAINING PROGRAM

## 2022-09-30 PROCEDURE — 3078F PR MOST RECENT DIASTOLIC BLOOD PRESSURE < 80 MM HG: ICD-10-PCS | Mod: CPTII,S$GLB,, | Performed by: STUDENT IN AN ORGANIZED HEALTH CARE EDUCATION/TRAINING PROGRAM

## 2022-09-30 PROCEDURE — 1160F RVW MEDS BY RX/DR IN RCRD: CPT | Mod: CPTII,S$GLB,, | Performed by: STUDENT IN AN ORGANIZED HEALTH CARE EDUCATION/TRAINING PROGRAM

## 2022-09-30 PROCEDURE — 3078F DIAST BP <80 MM HG: CPT | Mod: CPTII,S$GLB,, | Performed by: STUDENT IN AN ORGANIZED HEALTH CARE EDUCATION/TRAINING PROGRAM

## 2022-09-30 PROCEDURE — 3008F PR BODY MASS INDEX (BMI) DOCUMENTED: ICD-10-PCS | Mod: CPTII,S$GLB,, | Performed by: STUDENT IN AN ORGANIZED HEALTH CARE EDUCATION/TRAINING PROGRAM

## 2022-09-30 PROCEDURE — 99214 PR OFFICE/OUTPT VISIT, EST, LEVL IV, 30-39 MIN: ICD-10-PCS | Mod: S$GLB,,, | Performed by: STUDENT IN AN ORGANIZED HEALTH CARE EDUCATION/TRAINING PROGRAM

## 2022-09-30 PROCEDURE — 1159F MED LIST DOCD IN RCRD: CPT | Mod: CPTII,S$GLB,, | Performed by: STUDENT IN AN ORGANIZED HEALTH CARE EDUCATION/TRAINING PROGRAM

## 2022-09-30 PROCEDURE — 99999 PR PBB SHADOW E&M-EST. PATIENT-LVL V: ICD-10-PCS | Mod: PBBFAC,,, | Performed by: STUDENT IN AN ORGANIZED HEALTH CARE EDUCATION/TRAINING PROGRAM

## 2022-09-30 RX ORDER — DICLOFENAC SODIUM 10 MG/G
2 GEL TOPICAL 4 TIMES DAILY
Qty: 100 G | Refills: 5 | Status: SHIPPED | OUTPATIENT
Start: 2022-09-30 | End: 2023-02-22

## 2022-09-30 NOTE — PROGRESS NOTES
"Subjective:       Patient ID: Trina Marie Collette is a 57 y.o. female.    Chief Complaint: Leg Pain (Left )      HPI:  57 y.o. female presents to [facility] with complaints of left leg pain    Patient reports that she has been having pain in left leg. Was seen for same following ultrasound 7/21/2022 without evidence for clot. Imaging was performed for varicose veins. Feels that pain began following US. Has been present since ultrasound. Pain is described as aching and is now causing a limp. Feels that leg is more swollen now than time of imaging. Was checked for up cyst by Dr. Brock without concern.    Elevating leg doesn't help. Biofreeze and muscle rub without relief.     Patient reports strong clot history in family.        Onset?: End of July 2022  Progression?: Yes  Inciting incident/new physical activity?: Following ultrasound of leg  Past trauma/surgery?: No  Recurrent?: No  Description?: aching pain  Radiates?: No, just in calf  Severity?: 8-9/10 at worst  Worsening factors?: Walking  Interventions?: Biofreeze and muscle rub  Did interventions help?: No      Review of Systems   Constitutional:  Negative for chills, diaphoresis, fatigue and fever.   HENT:  Negative for congestion, sinus pressure, sneezing and sore throat.    Respiratory:  Negative for cough and shortness of breath.    Cardiovascular:  Negative for chest pain and palpitations.   Gastrointestinal:  Negative for abdominal pain, diarrhea, nausea and vomiting.   Musculoskeletal:  Negative for joint swelling and myalgias.        Left calf pain   Skin:  Negative for rash and wound.   Neurological:  Negative for weakness and numbness.     Objective:      Vitals:    09/30/22 0817   BP: 128/78   BP Location: Left arm   Patient Position: Sitting   BP Method: Medium (Manual)   Pulse: 108   Resp: 19   SpO2: 98%   Weight: 113.5 kg (250 lb 1.8 oz)   Height: 5' 6" (1.676 m)     Physical Exam  Vitals reviewed.   Constitutional:       General: She is " not in acute distress.     Appearance: Normal appearance. She is not ill-appearing.   HENT:      Head: Normocephalic and atraumatic.   Eyes:      General:         Right eye: No discharge.         Left eye: No discharge.      Conjunctiva/sclera: Conjunctivae normal.   Cardiovascular:      Rate and Rhythm: Normal rate.      Comments: Left calf 3 cm larger in diameter than right  Pulmonary:      Effort: Pulmonary effort is normal.   Musculoskeletal:         General: No deformity.      Right knee: Normal.      Left knee: Crepitus present. No swelling, deformity, effusion, erythema, ecchymosis, lacerations or bony tenderness. Normal range of motion. Tenderness present over the medial joint line. No lateral joint line or patellar tendon tenderness. No LCL laxity, MCL laxity, ACL laxity or PCL laxity.Abnormal patellar mobility (limited).      Instability Tests: Anterior drawer test negative. Posterior drawer test negative.      Right lower leg: No edema.      Left lower le+ Edema present.   Skin:     Coloration: Skin is not jaundiced or pale.   Neurological:      General: No focal deficit present.      Mental Status: She is alert and oriented to person, place, and time.   Psychiatric:         Mood and Affect: Mood normal.         Behavior: Behavior normal.           Lab Results   Component Value Date     2022    K 3.5 2022     2022    CO2 24 2022    BUN 16 2022    CREATININE 0.8 2022    ANIONGAP 13 2022     Lab Results   Component Value Date    HGBA1C 5.8 (H) 2021     Lab Results   Component Value Date    BNP 26 2018       Lab Results   Component Value Date    WBC 12.05 2022    HGB 12.7 2022    HCT 41.2 2022     2022    GRAN 8.6 (H) 2022    GRAN 71.0 2022     Lab Results   Component Value Date    CHOL 164 2022    HDL 48 2022    LDLCALC 102.2 2022    TRIG 69 2022          Current  Outpatient Medications:     albuterol (PROVENTIL) 2.5 mg /3 mL (0.083 %) nebulizer solution, Take 3 mLs (2.5 mg total) by nebulization every 4 (four) hours as needed for Wheezing or Shortness of Breath. Rescue, Disp: 1 each, Rfl: 1    amLODIPine (NORVASC) 2.5 MG tablet, TAKE 1 TABLET BY MOUTH AT BEDTIME FOR HIGH BLOOD PRESSURE. CONTINUE LOSARTAN 100 MG IN THE MORNING, Disp: 90 tablet, Rfl: 1    betamethasone valerate 0.1% (VALISONE) 0.1 % Crea, APPLIED TO THE ABDOMEN TWICE A DAY AS NEEDED FOR SKIN RASH OR ITCH, Disp: 180 g, Rfl: 0    budesonide-formoterol 160-4.5 mcg (SYMBICORT) 160-4.5 mcg/actuation HFAA, Inhale 2 puffs into the lungs every 12 (twelve) hours. Controller. Rinse mouth after using., Disp: 10.2 g, Rfl: 11    busPIRone (BUSPAR) 30 MG Tab, TAKE 1 TABLET BY MOUTH 2 TIMES DAILY., Disp: 180 tablet, Rfl: 1    clotrimazole-betamethasone 1-0.05% (LOTRISONE) cream, Apply topically 2 (two) times daily as needed., Disp: 15 g, Rfl: 1    cyclobenzaprine (FLEXERIL) 10 MG tablet, One p.o. b.i.d. may increase to q.8 hours if needed, Disp: 60 tablet, Rfl: 5    diphenoxylate-atropine 2.5-0.025 mg (LOMOTIL) 2.5-0.025 mg per tablet, TAKE 1 TAB BY MOUTH AFTER EACH LOOSE BOWEL MOVEMENT PER DAY, Disp: 30 tablet, Rfl: 2    famotidine (PEPCID) 20 MG tablet, Take 1 tablet (20 mg total) by mouth 2 (two) times daily., Disp: 180 tablet, Rfl: 3    gabapentin (NEURONTIN) 800 MG tablet, TAKE 1 TABLET BY MOUTH THREE TIMES A DAY, Disp: 90 tablet, Rfl: 1    HYDROcodone-acetaminophen (NORCO)  mg per tablet, Take 1 tablet by mouth every 8 (eight) hours as needed. , Disp: , Rfl: 0    hydrOXYzine pamoate (VISTARIL) 25 MG Cap, TAKE 1 CAPSULE BY MOUTH EVERY 8 HOURS OR 1 CAP AT BEDTIME FOR ANXIETY TO HELP PREVENT PALPITATIONS, Disp: 360 capsule, Rfl: 1    ipratropium (ATROVENT) 21 mcg (0.03 %) nasal spray, 2 sprays by Nasal route 2 (two) times daily. May be use more often if needed, Disp: 30 mL, Rfl: 11    levocetirizine (XYZAL) 5 MG  tablet, TAKE 1 TABLET BY MOUTH EVERY DAY IN THE EVENING, Disp: 90 tablet, Rfl: 3    losartan (COZAAR) 100 MG tablet, Take 1 tablet (100 mg total) by mouth once daily., Disp: 90 tablet, Rfl: 1    meloxicam (MOBIC) 15 MG tablet, START AFTER MEDROL DOSEPAK COMPLETE MOBIC 7.5 MG BY MOUTH TWICE A DAY AS NEEDED FOR SWELLING OR PAIN NO OTHER NSAIDS CAN TAKE WITH TYLENOL, Disp: 30 tablet, Rfl: 5    montelukast (SINGULAIR) 10 mg tablet, Take 1 tablet (10 mg total) by mouth once daily., Disp: 90 tablet, Rfl: 1    mupirocin (BACTROBAN) 2 % ointment, Apply to nose 3 times daily on a Q-tip for crusting or sores inside the nose, Disp: 22 g, Rfl: 3    naproxen (NAPROSYN) 375 MG tablet, TAKE 1 TABLET BY MOUTH 2 TIMES DAILY WITH MEALS., Disp: 180 tablet, Rfl: 1    pantoprazole (PROTONIX) 40 MG tablet, TAKE 1 TABLET BY MOUTH EVERY DAY, Disp: 90 tablet, Rfl: 3    potassium chloride (MICRO-K) 10 MEQ CpSR, TAKE 1 CAPSULE BY MOUTH ONCE DAILY., Disp: 90 capsule, Rfl: 3    potassium chloride (MICRO-K) 10 MEQ CpSR, Take 1 capsule (10 mEq total) by mouth once daily., Disp: 90 capsule, Rfl: 1    promethazine-codeine 6.25-10 mg/5 ml (PHENERGAN WITH CODEINE) 6.25-10 mg/5 mL syrup, Take 5 mLs by mouth every 6 (six) hours as needed for Cough., Disp: 180 mL, Rfl: 0    rosuvastatin (CRESTOR) 5 MG tablet, Take 1 tablet (5 mg total) by mouth once daily., Disp: 90 tablet, Rfl: 1    sertraline (ZOLOFT) 100 MG tablet, Take 1 tablet (100 mg total) by mouth once daily., Disp: 90 tablet, Rfl: 1    tiZANidine (ZANAFLEX) 4 MG tablet, Take 1 tablet (4 mg total) by mouth every 8 (eight) hours., Disp: 30 tablet, Rfl: 2    traZODone (DESYREL) 100 MG tablet, TAKE 1 TABLET BY MOUTH NIGHTLY AS NEEDED FOR INSOMNIA., Disp: 90 tablet, Rfl: 1    triamcinolone acetonide 0.1% (KENALOG) 0.1 % cream, APPLY TO AFFECTED AREA TWICE A DAY, Disp: 15 g, Rfl: 5    ziprasidone (GEODON) 60 MG Cap, Take 1 capsule (60 mg total) by mouth 2 (two) times daily., Disp: 30 capsule, Rfl:  5    ziprasidone (GEODON) 80 MG capsule, Take 1 capsule (80 mg total) by mouth once daily., Disp: 30 capsule, Rfl: 5    zolpidem (AMBIEN) 5 MG Tab, 1 po Q [facility] p.r.n. sleep, Disp: 30 tablet, Rfl: 2    diclofenac sodium (VOLTAREN) 1 % Gel, Apply 2 g topically 4 (four) times daily., Disp: 100 g, Rfl: 5        Assessment:       1. Localized swelling of left lower leg    2. Acute pain of left knee    3. Symptomatic reticular veins    4. Pain of left calf           Plan:       Localized swelling of left lower leg  Acute pain of left knee  Symptomatic reticular veins  Pain of left calf  -     X-Ray Knee 3 View Left; Future; Expected date: 09/30/2022  -     D dimer, quantitative; Future; Expected date: 09/30/2022  -     diclofenac sodium (VOLTAREN) 1 % Gel; Apply 2 g topically 4 (four) times daily.  Dispense: 100 g; Refill: 5  -     US Lower Extremity Veins Left; Future; Expected date: 09/30/2022  - With no documentation of swelling and leg discrepancy in past seeen on chart, and patient reporting new symptom. Will repeat US today  - RICE therapy explained and recommended today  - Consider PT and/or Ortho referral if symptoms of knee pain persist in 6 weeks    RTC in 6 weeks

## 2022-10-05 ENCOUNTER — TELEPHONE (OUTPATIENT)
Dept: PRIMARY CARE CLINIC | Facility: CLINIC | Age: 57
End: 2022-10-05
Payer: MEDICARE

## 2022-10-05 ENCOUNTER — PATIENT MESSAGE (OUTPATIENT)
Dept: PRIMARY CARE CLINIC | Facility: CLINIC | Age: 57
End: 2022-10-05
Payer: MEDICARE

## 2022-10-05 DIAGNOSIS — M25.462 EFFUSION OF LEFT KNEE: ICD-10-CM

## 2022-10-05 DIAGNOSIS — M25.562 ACUTE PAIN OF LEFT KNEE: Primary | ICD-10-CM

## 2022-10-05 NOTE — TELEPHONE ENCOUNTER
----- Message from Kimmy Bond sent at 10/5/2022  8:00 AM CDT -----  Contact: pt [phone]  Patient is requesting to speak with you but would not leave details as to what it is concerning.    Please call and advise.    Thank You

## 2022-10-05 NOTE — TELEPHONE ENCOUNTER
Called and spoke with patient, she states that her knee is still swollen and that her xray showed fluid. She would like to know if there is anything that she can due about the fluid in her knee.  Please advise.

## 2022-10-10 ENCOUNTER — TELEPHONE (OUTPATIENT)
Dept: OTOLARYNGOLOGY | Facility: CLINIC | Age: 57
End: 2022-10-10
Payer: MEDICARE

## 2022-10-10 ENCOUNTER — TELEPHONE (OUTPATIENT)
Dept: ORTHOPEDICS | Facility: CLINIC | Age: 57
End: 2022-10-10
Payer: MEDICARE

## 2022-10-10 ENCOUNTER — PATIENT MESSAGE (OUTPATIENT)
Dept: ADMINISTRATIVE | Facility: HOSPITAL | Age: 57
End: 2022-10-10
Payer: MEDICARE

## 2022-10-10 NOTE — TELEPHONE ENCOUNTER
Called and spoke with patient today letting her know the schedule  appointment for 10/17/22 has been moved to 10/11/22.

## 2022-10-10 NOTE — TELEPHONE ENCOUNTER
----- Message from Pam Sanchez sent at 10/10/2022  8:53 AM CDT -----  Contact: [phone]  the patient is calling to get rescheduled for there appt. For this week  the patient would like a call back at your earliest convenience.  the patient can be reached at. [phone]

## 2022-10-10 NOTE — TELEPHONE ENCOUNTER
----- Message from Demi Bolivar sent at 10/10/2022 10:12 AM CDT -----  Contact: Elizabeth @[phone]  Pt has a referral in Epic for M25.562 (ICD-10-CM) - Acute pain of left knee  M25.462 (ICD-10-CM) - Effusion of left knee and would like a call back regarding scheduling an appt

## 2022-10-11 ENCOUNTER — OFFICE VISIT (OUTPATIENT)
Dept: OTOLARYNGOLOGY | Facility: CLINIC | Age: 57
End: 2022-10-11
Payer: MEDICARE

## 2022-10-11 VITALS
BODY MASS INDEX: 41.13 KG/M2 | SYSTOLIC BLOOD PRESSURE: 136 MMHG | DIASTOLIC BLOOD PRESSURE: 83 MMHG | WEIGHT: 254.88 LBS | HEART RATE: 96 BPM | TEMPERATURE: 98 F

## 2022-10-11 DIAGNOSIS — K21.9 LARYNGOPHARYNGEAL REFLUX (LPR): ICD-10-CM

## 2022-10-11 DIAGNOSIS — R05.3 CHRONIC COUGH: ICD-10-CM

## 2022-10-11 DIAGNOSIS — J30.9 ALLERGIC RHINITIS, UNSPECIFIED SEASONALITY, UNSPECIFIED TRIGGER: ICD-10-CM

## 2022-10-11 DIAGNOSIS — H93.A1 PULSATILE TINNITUS OF RIGHT EAR: ICD-10-CM

## 2022-10-11 DIAGNOSIS — R09.89 CHOKING IN ADULT: Primary | ICD-10-CM

## 2022-10-11 DIAGNOSIS — J34.2 NASAL SEPTAL DEVIATION: ICD-10-CM

## 2022-10-11 DIAGNOSIS — J45.21 MILD INTERMITTENT ASTHMA WITH ACUTE EXACERBATION: ICD-10-CM

## 2022-10-11 PROCEDURE — 1159F PR MEDICATION LIST DOCUMENTED IN MEDICAL RECORD: ICD-10-PCS | Mod: CPTII,S$GLB,, | Performed by: SPECIALIST

## 2022-10-11 PROCEDURE — 99214 PR OFFICE/OUTPT VISIT, EST, LEVL IV, 30-39 MIN: ICD-10-PCS | Mod: 25,S$GLB,, | Performed by: SPECIALIST

## 2022-10-11 PROCEDURE — 3079F PR MOST RECENT DIASTOLIC BLOOD PRESSURE 80-89 MM HG: ICD-10-PCS | Mod: CPTII,S$GLB,, | Performed by: SPECIALIST

## 2022-10-11 PROCEDURE — 3008F PR BODY MASS INDEX (BMI) DOCUMENTED: ICD-10-PCS | Mod: CPTII,S$GLB,, | Performed by: SPECIALIST

## 2022-10-11 PROCEDURE — 4010F ACE/ARB THERAPY RXD/TAKEN: CPT | Mod: CPTII,S$GLB,, | Performed by: SPECIALIST

## 2022-10-11 PROCEDURE — 99999 PR PBB SHADOW E&M-EST. PATIENT-LVL V: CPT | Mod: PBBFAC,,, | Performed by: SPECIALIST

## 2022-10-11 PROCEDURE — 3008F BODY MASS INDEX DOCD: CPT | Mod: CPTII,S$GLB,, | Performed by: SPECIALIST

## 2022-10-11 PROCEDURE — 1160F RVW MEDS BY RX/DR IN RCRD: CPT | Mod: CPTII,S$GLB,, | Performed by: SPECIALIST

## 2022-10-11 PROCEDURE — 99999 PR PBB SHADOW E&M-EST. PATIENT-LVL V: ICD-10-PCS | Mod: PBBFAC,,, | Performed by: SPECIALIST

## 2022-10-11 PROCEDURE — 3079F DIAST BP 80-89 MM HG: CPT | Mod: CPTII,S$GLB,, | Performed by: SPECIALIST

## 2022-10-11 PROCEDURE — 1159F MED LIST DOCD IN RCRD: CPT | Mod: CPTII,S$GLB,, | Performed by: SPECIALIST

## 2022-10-11 PROCEDURE — 3075F PR MOST RECENT SYSTOLIC BLOOD PRESS GE 130-139MM HG: ICD-10-PCS | Mod: CPTII,S$GLB,, | Performed by: SPECIALIST

## 2022-10-11 PROCEDURE — 4010F PR ACE/ARB THEARPY RXD/TAKEN: ICD-10-PCS | Mod: CPTII,S$GLB,, | Performed by: SPECIALIST

## 2022-10-11 PROCEDURE — 1160F PR REVIEW ALL MEDS BY PRESCRIBER/CLIN PHARMACIST DOCUMENTED: ICD-10-PCS | Mod: CPTII,S$GLB,, | Performed by: SPECIALIST

## 2022-10-11 PROCEDURE — 99214 OFFICE O/P EST MOD 30 MIN: CPT | Mod: 25,S$GLB,, | Performed by: SPECIALIST

## 2022-10-11 PROCEDURE — 31575 DIAGNOSTIC LARYNGOSCOPY: CPT | Mod: S$GLB,,, | Performed by: SPECIALIST

## 2022-10-11 PROCEDURE — 3075F SYST BP GE 130 - 139MM HG: CPT | Mod: CPTII,S$GLB,, | Performed by: SPECIALIST

## 2022-10-11 PROCEDURE — 31575 LARYNGOSCOPY: ICD-10-PCS | Mod: S$GLB,,, | Performed by: SPECIALIST

## 2022-10-11 RX ORDER — PROMETHAZINE HYDROCHLORIDE AND CODEINE PHOSPHATE 6.25; 1 MG/5ML; MG/5ML
5 SOLUTION ORAL EVERY 6 HOURS PRN
Qty: 210 ML | Refills: 0 | Status: SHIPPED | OUTPATIENT
Start: 2022-10-11 | End: 2022-10-25

## 2022-10-11 NOTE — PROGRESS NOTES
Subjective:       Patient ID: Trina Marie Collette is a 57 y.o. female.    Chief Complaint: Cough and Follow-up (With scope)    Follow-up visit:  The patient is coming in for a follow-up visit.  There are multiple issues to discuss:  1.  Laryngopharyngeal reflux:   She is taking famotidine twice daily.  She continues to have periodic dysphagia and a tickle in her throat that causes her to cough.    2.  Allergic rhinitis:  She continues to have nasal congestion, rhinorrhea and postnasal drip.  Postnasal drip at night cause her to have chronic coughing.  Nasal secretions are clear.  She takes Singulair at night and use ipratropium bromide as needed for postnasal drip.    3.  Pulsatile tinnitus right ear:  She is bothered by her pulsatile tinnitus.  It does lessened with compression of the soft tissue in the neck posterior to the angle of the mandible.     4.  Asthma/Chronic cough:  She continues to have coughing both day and night.  The Phenergan with codeine is the only thing that stops her nighttime cough.      Review of Systems   Constitutional:  Positive for fatigue. Negative for activity change, appetite change, chills, fever and unexpected weight change.   HENT:  Positive for congestion, ear pain, postnasal drip, rhinorrhea, sore throat, tinnitus and trouble swallowing. Negative for ear discharge, facial swelling, hearing loss, mouth sores, sinus pressure, sinus pain, sneezing and voice change.    Eyes:  Negative for photophobia, pain, discharge, redness, itching and visual disturbance.   Respiratory:  Positive for cough, shortness of breath and wheezing. Negative for apnea and choking.    Cardiovascular:  Negative for chest pain and palpitations.   Gastrointestinal:  Negative for abdominal distention, abdominal pain, nausea and vomiting.   Musculoskeletal:  Negative for arthralgias, myalgias, neck pain and neck stiffness.   Skin: Negative.  Negative for color change, pallor and rash.   Allergic/Immunologic:  Positive for environmental allergies. Negative for food allergies and immunocompromised state.   Neurological:  Positive for headaches. Negative for dizziness, facial asymmetry, speech difficulty, weakness, light-headedness and numbness.   Hematological:  Negative for adenopathy. Does not bruise/bleed easily.   Psychiatric/Behavioral:  Negative for confusion, decreased concentration and sleep disturbance.      Objective:      Physical Exam  Constitutional:       Appearance: She is well-developed.   HENT:      Head: Normocephalic.      Right Ear: Ear canal and external ear normal. Tympanic membrane is retracted.      Left Ear: Ear canal and external ear normal. Tympanic membrane is retracted.      Nose: Septal deviation (To the left), nasal tenderness (Scabbing and erythema in the piriform aperture area of the right nostril vestibular skin), mucosal edema (cyanotic, boggy inferior turbinates bilaterally) and rhinorrhea (clear mucus bilaterally) present. No nasal deformity. Rhinorrhea is clear.      Mouth/Throat:      Mouth: No oral lesions.      Pharynx: Uvula midline.   Eyes:      General: Lids are normal.         Right eye: No discharge.         Left eye: No discharge.      Conjunctiva/sclera:      Right eye: Right conjunctiva is injected. No exudate.     Left eye: Left conjunctiva is injected. No exudate.     Pupils: Pupils are equal, round, and reactive to light.   Neck:      Thyroid: No thyroid mass or thyromegaly.      Vascular: No carotid bruit.      Trachea: Trachea normal. No tracheal deviation.        Comments: Neck-compression of the vascular sheath at the angle the mandible on the right as noted above results and complete cessation of her pulsatile tinnitus.  Cardiovascular:      Rate and Rhythm: Normal rate and regular rhythm.      Pulses: Normal pulses.      Heart sounds: Normal heart sounds.   Pulmonary:      Effort: Pulmonary effort is normal.      Breath sounds: No stridor. Examination of the  right-lower field reveals wheezing. Examination of the left-lower field reveals wheezing. Wheezing ( mild expiratory wheezing in all lung fields) present. No decreased breath sounds, rhonchi or rales.   Abdominal:      General: Bowel sounds are normal.      Palpations: Abdomen is soft.      Tenderness: There is no abdominal tenderness.   Musculoskeletal:         General: Normal range of motion.      Cervical back: Normal range of motion. No muscular tenderness.   Lymphadenopathy:      Head:      Right side of head: No submental, submandibular, preauricular, posterior auricular or occipital adenopathy.      Left side of head: No submental, submandibular, preauricular, posterior auricular or occipital adenopathy.      Cervical: No cervical adenopathy.   Skin:     General: Skin is warm and dry.      Findings: No petechiae or rash.      Nails: There is no clubbing.   Neurological:      Mental Status: She is alert and oriented to person, place, and time.      Cranial Nerves: No cranial nerve deficit.      Sensory: No sensory deficit.      Gait: Gait normal.   Psychiatric:         Speech: Speech normal.         Behavior: Behavior normal. Behavior is cooperative.         Thought Content: Thought content normal.         Judgment: Judgment normal.       Flexible video nasolaryngoscopy:  Nasal septal deviation and nasal changes allergic rhinitis; laryngeal changes consistent with laryngopharyngeal reflux that is responding well to H2 agonist therapy    Assessment:       1. Choking in adult    2. Laryngopharyngeal reflux (LPR)    3. Chronic cough    4. Allergic rhinitis, unspecified seasonality, unspecified trigger    5. Nasal septal deviation    6. Pulsatile tinnitus of right ear    7. Mild intermittent asthma with acute exacerbation        Plan:       I  will have the patient continue with her famotidine and current allergy drug regimen.  I will recheck her in 3 months, or sooner on an as-needed basis.

## 2022-10-11 NOTE — PROCEDURES
"Laryngoscopy    Date/Time: 10/11/2022 11:40 AM  Performed by: DWAYNE Ricardo MD  Authorized by: DWAYNE Ricardo MD     Time out: Immediately prior to procedure a "time out" was called to verify the correct patient, procedure, equipment, support staff and site/side marked as required.    Consent Done?:  Yes (Verbal)  Anesthesia:     Local anesthetic:  Lidocaine 2% and Yan-Synephrine 1/2%    Patient tolerance:  Patient tolerated the procedure well with no immediate complications    Decongestion performed?: Yes    Laryngoscopy:     Areas examined:  Larynx, hypopharynx, oropharynx, nasopharynx, nasal cavities and vocal cords    Laryngoscope size:  4 mm (Flexible video nasolaryngoscopy)  Nose External:      No external nasal deformity  Nose Intranasal:      Mucosa no polyps     Mucosa ulcers not present     No mucosa lesions present (Profuse clear mucus in the nasal passages bilaterally)     Septum gross deformity (septum deviated to the left)     Enlarged turbinates (inferior turbinates enlarged bilaterally)  Nasopharynx:      No mucosa lesions     Adenoids not present     Posterior choanae patent     Eustachian tube patent  Larynx/hypopharynx:      No epiglottis lesions     No epiglottis edema     No AE folds lesions     No vocal cord polyps     Equal and normal bilateral (vocal cords move symmetrically, no mucosal lesions noted)     No hypopharynx lesions     No piriform sinus pooling     No piriform sinus lesions     Post cricoid edema (minimal, no significant change from last endoscopy)     Post cricoid erythema (minimal, no significant change from last endoscopy)     Flexible video nasolaryngoscopy-nasal septal deviation nasal changes allergic rhinitis; laryngeal changes consistent with laryngopharyngeal reflux that is stable and minimal with H2 agonist therapy.  "

## 2022-10-25 ENCOUNTER — OFFICE VISIT (OUTPATIENT)
Dept: PRIMARY CARE CLINIC | Facility: CLINIC | Age: 57
End: 2022-10-25
Payer: MEDICARE

## 2022-10-25 VITALS
OXYGEN SATURATION: 99 % | TEMPERATURE: 97 F | HEART RATE: 78 BPM | BODY MASS INDEX: 40.64 KG/M2 | DIASTOLIC BLOOD PRESSURE: 84 MMHG | RESPIRATION RATE: 18 BRPM | WEIGHT: 252.88 LBS | SYSTOLIC BLOOD PRESSURE: 138 MMHG | HEIGHT: 66 IN

## 2022-10-25 DIAGNOSIS — E78.5 HYPERLIPIDEMIA, UNSPECIFIED HYPERLIPIDEMIA TYPE: ICD-10-CM

## 2022-10-25 DIAGNOSIS — G44.209 TENSION-TYPE HEADACHE, NOT INTRACTABLE, UNSPECIFIED CHRONICITY PATTERN: ICD-10-CM

## 2022-10-25 DIAGNOSIS — L30.8 OTHER ECZEMA: ICD-10-CM

## 2022-10-25 DIAGNOSIS — E55.9 VITAMIN D DEFICIENCY: ICD-10-CM

## 2022-10-25 DIAGNOSIS — M54.50 LUMBAR SPINE PAIN: ICD-10-CM

## 2022-10-25 DIAGNOSIS — E66.9 OBESITY (BMI 35.0-39.9 WITHOUT COMORBIDITY): ICD-10-CM

## 2022-10-25 DIAGNOSIS — E07.9 THYROID MASS: ICD-10-CM

## 2022-10-25 DIAGNOSIS — G44.219 EPISODIC TENSION-TYPE HEADACHE, NOT INTRACTABLE: ICD-10-CM

## 2022-10-25 DIAGNOSIS — F31.9 BIPOLAR 1 DISORDER: ICD-10-CM

## 2022-10-25 DIAGNOSIS — M25.562 ACUTE PAIN OF LEFT KNEE: Primary | ICD-10-CM

## 2022-10-25 DIAGNOSIS — G47.00 INSOMNIA, UNSPECIFIED TYPE: ICD-10-CM

## 2022-10-25 DIAGNOSIS — I10 ESSENTIAL HYPERTENSION: ICD-10-CM

## 2022-10-25 DIAGNOSIS — H93.19 TINNITUS, UNSPECIFIED LATERALITY: ICD-10-CM

## 2022-10-25 PROCEDURE — 3075F SYST BP GE 130 - 139MM HG: CPT | Mod: CPTII,S$GLB,, | Performed by: FAMILY MEDICINE

## 2022-10-25 PROCEDURE — 4010F ACE/ARB THERAPY RXD/TAKEN: CPT | Mod: CPTII,S$GLB,, | Performed by: FAMILY MEDICINE

## 2022-10-25 PROCEDURE — 96372 THER/PROPH/DIAG INJ SC/IM: CPT | Mod: S$GLB,,, | Performed by: FAMILY MEDICINE

## 2022-10-25 PROCEDURE — 1159F MED LIST DOCD IN RCRD: CPT | Mod: CPTII,S$GLB,, | Performed by: FAMILY MEDICINE

## 2022-10-25 PROCEDURE — 99214 OFFICE O/P EST MOD 30 MIN: CPT | Mod: 25,S$GLB,, | Performed by: FAMILY MEDICINE

## 2022-10-25 PROCEDURE — 1159F PR MEDICATION LIST DOCUMENTED IN MEDICAL RECORD: ICD-10-PCS | Mod: CPTII,S$GLB,, | Performed by: FAMILY MEDICINE

## 2022-10-25 PROCEDURE — 3079F DIAST BP 80-89 MM HG: CPT | Mod: CPTII,S$GLB,, | Performed by: FAMILY MEDICINE

## 2022-10-25 PROCEDURE — 96372 PR INJECTION,THERAP/PROPH/DIAG2ST, IM OR SUBCUT: ICD-10-PCS | Mod: S$GLB,,, | Performed by: FAMILY MEDICINE

## 2022-10-25 PROCEDURE — 3075F PR MOST RECENT SYSTOLIC BLOOD PRESS GE 130-139MM HG: ICD-10-PCS | Mod: CPTII,S$GLB,, | Performed by: FAMILY MEDICINE

## 2022-10-25 PROCEDURE — 99499 UNLISTED E&M SERVICE: CPT | Mod: S$GLB,,, | Performed by: FAMILY MEDICINE

## 2022-10-25 PROCEDURE — 3079F PR MOST RECENT DIASTOLIC BLOOD PRESSURE 80-89 MM HG: ICD-10-PCS | Mod: CPTII,S$GLB,, | Performed by: FAMILY MEDICINE

## 2022-10-25 PROCEDURE — 99999 PR PBB SHADOW E&M-EST. PATIENT-LVL V: CPT | Mod: PBBFAC,,, | Performed by: FAMILY MEDICINE

## 2022-10-25 PROCEDURE — 4010F PR ACE/ARB THEARPY RXD/TAKEN: ICD-10-PCS | Mod: CPTII,S$GLB,, | Performed by: FAMILY MEDICINE

## 2022-10-25 PROCEDURE — 99999 PR PBB SHADOW E&M-EST. PATIENT-LVL V: ICD-10-PCS | Mod: PBBFAC,,, | Performed by: FAMILY MEDICINE

## 2022-10-25 PROCEDURE — 99499 RISK ADDL DX/OHS AUDIT: ICD-10-PCS | Mod: S$GLB,,, | Performed by: FAMILY MEDICINE

## 2022-10-25 PROCEDURE — 99214 PR OFFICE/OUTPT VISIT, EST, LEVL IV, 30-39 MIN: ICD-10-PCS | Mod: 25,S$GLB,, | Performed by: FAMILY MEDICINE

## 2022-10-25 RX ORDER — AMLODIPINE BESYLATE 2.5 MG/1
TABLET ORAL
Qty: 90 TABLET | Refills: 1 | Status: SHIPPED | OUTPATIENT
Start: 2022-10-25 | End: 2023-02-22

## 2022-10-25 RX ORDER — TRAZODONE HYDROCHLORIDE 100 MG/1
100 TABLET ORAL NIGHTLY
Qty: 90 TABLET | Refills: 1 | Status: SHIPPED | OUTPATIENT
Start: 2022-10-25 | End: 2023-02-02

## 2022-10-25 RX ORDER — ZOLPIDEM TARTRATE 5 MG/1
TABLET ORAL
Qty: 30 TABLET | Refills: 2 | Status: SHIPPED | OUTPATIENT
Start: 2022-10-25 | End: 2022-11-21 | Stop reason: SDUPTHER

## 2022-10-25 RX ORDER — ROSUVASTATIN CALCIUM 5 MG/1
5 TABLET, COATED ORAL DAILY
Qty: 90 TABLET | Refills: 1 | Status: SHIPPED | OUTPATIENT
Start: 2022-10-25 | End: 2023-02-22 | Stop reason: SDUPTHER

## 2022-10-25 RX ORDER — LOSARTAN POTASSIUM 100 MG/1
100 TABLET ORAL DAILY
Qty: 90 TABLET | Refills: 1 | Status: SHIPPED | OUTPATIENT
Start: 2022-10-25 | End: 2023-02-22 | Stop reason: SDUPTHER

## 2022-10-25 RX ORDER — SERTRALINE HYDROCHLORIDE 100 MG/1
100 TABLET, FILM COATED ORAL DAILY
Qty: 90 TABLET | Refills: 1 | Status: SHIPPED | OUTPATIENT
Start: 2022-10-25 | End: 2023-04-14

## 2022-10-25 RX ORDER — TRIAMCINOLONE ACETONIDE 40 MG/ML
40 INJECTION, SUSPENSION INTRA-ARTICULAR; INTRAMUSCULAR ONCE
Status: COMPLETED | OUTPATIENT
Start: 2022-10-25 | End: 2022-10-25

## 2022-10-25 RX ORDER — MONTELUKAST SODIUM 10 MG/1
10 TABLET ORAL DAILY
Qty: 90 TABLET | Refills: 1 | Status: SHIPPED | OUTPATIENT
Start: 2022-10-25 | End: 2023-02-22 | Stop reason: SDUPTHER

## 2022-10-25 RX ADMIN — TRIAMCINOLONE ACETONIDE 40 MG: 40 INJECTION, SUSPENSION INTRA-ARTICULAR; INTRAMUSCULAR at 01:10

## 2022-10-25 NOTE — PROGRESS NOTES
Subjective:       Patient ID: Trina Marie Collette is a 57 y.o. female.    Chief Complaint:   HPI:  57-year-old female --left leg pain/headache/medication refills  Left leg pain--started July 2022--head ultrasound left leg did not show a clot--pain in the medial aspect of the left knee--radiates to the lateral aspect--and some pain in the popliteal area--occurs occasionally with walking--pops alot--has not had an MRI are seen orthopedist---has orthopedic appointment--describes like a toothache.  Norco/gabapentin/Mobic or Naprosyn/flexible   Headache--occipital especially on the left side--constant--quality--questionable type pain--frequency constant No trauma--no flu-like symptoms or cold  Needs refills           ROSkin: no psoriasis, eczema, skin cancer--  HEENT+ headache, no ocular pain, blurred vision, diplopia, epistaxis, hoarseness change in voice, thyroid trouble--history of tinnitus/chronic sinus problem/nasal septal deviation/hearing loss has hearing aid  Lung: No pneumonia, asthma, Tb, wheezing, SOB, smoking 1/4 ppd trying to quit  --history of bronchospasm in the past uses albuterol--had chest x-ray which is normal  Heart: No chest pain, ankle edema, +occas palpitations, MI, river murmur, +hypertension blood pressure   + hyperlipidemia-no stent bypass arrhythmia  Abdomen: No nausea, vomiting, diarrhea, constipation, ulcers, hepatitis, gallbladder disease, melena, hematochezia, hematemesis patient complaining of heartburn had EGD colonoscopy hx diarrhea immodium Hx Gerd on protonix comes and goes  : no UTI, renal disease, stones-  GYN last menstrual.  Years ago has mammogram yearly--refuses PAP smear --I don't fool with that    MS: no fractures, O/A, lupus, rheumatoid, gout--history of chronic pain the back lumbar spine, right knee come right shoulder, history DDD lumbar history cervical spinal stenosis  Neuro: No dizziness, LOC, seizures   No diabetes, no anemia, no anxiety, no depression patient with  history of bipolar schizophrenia--doing well with medication goes to Mental Health   Single, one child, disabled, lives alone     Objective:   Physical Exam:    General: Well nourished, well developed, no acute distress +overweight   Skin: No lesions  HEENT--eyes PERRLA EOM intact nose clear , throat nonerythematous ears TMs clear  NECK: Supple, no bruits, No JVD, no nodes  Lungs: Clear, no rales, rhonchi, wheezing   Heart: Regular rate and rhythm, no murmurs, gallops, or rubs  Abdomen: flat, bowel sounds positive, no tenderness, or organomegaly  MS:  Patient states swelling in the popliteal area--ultrasound shows fluid around the patella--pain however appears to be medial collateral ligament and radiating to the left lateral made to the popliteal area--pain with palpation pain with squatting arising pain with ambulation but overall able to ambulate fairly well  Neuro: Alert, CN intact, oriented X 3 still with some anxiety and depression of issues--Romberg's negative heel-toe intact  Extremities: No cyanosis, clubbing, or edema         Assessment:       1. Acute pain of left knee    2. Hyperlipidemia, unspecified hyperlipidemia type    3. Lumbar spine pain    4. Tinnitus, unspecified laterality    5. Thyroid mass    6. Insomnia, unspecified type    7. Episodic tension-type headache, not intractable    8. Tension-type headache, not intractable, unspecified chronicity pattern    9. Bipolar 1 disorder    10. Other eczema    11. Essential hypertension    12. Obesity (BMI 35.0-39.9 without comorbidity)    13. Vitamin D deficiency          Plan:       Acute pain of left knee  -     MRI Knee Without Contrast Left; Future; Expected date: 10/25/2022    Hyperlipidemia, unspecified hyperlipidemia type  -     rosuvastatin (CRESTOR) 5 MG tablet; Take 1 tablet (5 mg total) by mouth once daily.  Dispense: 90 tablet; Refill: 1    Lumbar spine pain  -     rosuvastatin (CRESTOR) 5 MG tablet; Take 1 tablet (5 mg total) by mouth once  daily.  Dispense: 90 tablet; Refill: 1    Tinnitus, unspecified laterality  -     rosuvastatin (CRESTOR) 5 MG tablet; Take 1 tablet (5 mg total) by mouth once daily.  Dispense: 90 tablet; Refill: 1    Thyroid mass  -     rosuvastatin (CRESTOR) 5 MG tablet; Take 1 tablet (5 mg total) by mouth once daily.  Dispense: 90 tablet; Refill: 1    Insomnia, unspecified type  -     zolpidem (AMBIEN) 5 MG Tab; 1 po Q [facility] p.r.n. sleep  Dispense: 30 tablet; Refill: 2    Episodic tension-type headache, not intractable  -     CT Head W Wo Contrast; Future; Expected date: 10/25/2022    Tension-type headache, not intractable, unspecified chronicity pattern    Bipolar 1 disorder    Other eczema    Essential hypertension    Obesity (BMI 35.0-39.9 without comorbidity)    Vitamin D deficiency    Other orders  -     amLODIPine (NORVASC) 2.5 MG tablet; TAKE 1 TABLET BY MOUTH AT BEDTIME FOR HIGH BLOOD PRESSURE. CONTINUE LOSARTAN 100 MG IN THE MORNING  Dispense: 90 tablet; Refill: 1  -     losartan (COZAAR) 100 MG tablet; Take 1 tablet (100 mg total) by mouth once daily.  Dispense: 90 tablet; Refill: 1  -     montelukast (SINGULAIR) 10 mg tablet; Take 1 tablet (10 mg total) by mouth once daily.  Dispense: 90 tablet; Refill: 1  -     sertraline (ZOLOFT) 100 MG tablet; Take 1 tablet (100 mg total) by mouth once daily.  Dispense: 90 tablet; Refill: 1  -     traZODone (DESYREL) 100 MG tablet; Take 1 tablet (100 mg total) by mouth every evening.  Dispense: 90 tablet; Refill: 1        Main Reason here    COVID--was on a cruise sister got sick patient got sick afterward--treat with Zithromax/prednisone/Phenergan with codeine  Needs refills of medication asking for Ambien will get nurse to put in other medications tomorrow  Other medical issues  Right lower back/right sacroiliac and right hip pain--hip better still with back pain --had epidural steroid injections in the past--on Norco Zanaflex Neurontin Mobic--Moist heat theragesic range of motion  exercise  Sees Dr. Senior for pain medication--DDD cervical and lumbar spine  Hypertension on Norvasc and Cozaar blood pressure 124/80  Hyperlipidemia on Crestor  Vitamin D deficiency on vitamin-D  COPD on Symbicort and albuterol  Anxiety depression bipolar schizophrenia on Ambien Ativan  Zoloft trazodone Geodon--goes to mental health  Lab done March needs lab q 6 month   Health maintenance Pap smear

## 2022-10-25 NOTE — PATIENT INSTRUCTIONS
Main Reason for Visit   Left knee pain--pain has been present since 07/20/2022 since patient had ultrasound--had repeat ultrasound showing some fluid around the patella--still with constant pain specially medial aspect radiating to the lateral aspect of the knee to the popliteal area--has appointment with orthopedist beginning of November--MRI of the left knee so Orthopedics will be able to evaluate the pain needs to be open MRI   Headache CT scan of the brain--pain mainly in the occipital area left side has history of DDD cervical spine   For pain continue gabapentin/Flexeril/Mobic or Naprosyn/[place] from pain clinic   Return 6 weeks check headache and knee pain

## 2022-11-08 ENCOUNTER — TELEPHONE (OUTPATIENT)
Dept: OTOLARYNGOLOGY | Facility: CLINIC | Age: 57
End: 2022-11-08
Payer: MEDICARE

## 2022-11-08 NOTE — TELEPHONE ENCOUNTER
It will not be 1 month since her last refill of promethazine with codeine until 11/11 /22.  I will refill it on that date.

## 2022-11-08 NOTE — TELEPHONE ENCOUNTER
----- Message from Halie Suh sent at 11/8/2022  2:10 PM CST -----  Regarding: RX Refill  Contact: PT @ [phone]  Rx Refill/Request    Is this a Refill or New Rx: Refill    Rx Name and Strength: promethazine-codeine 6.25-10 mg/5 ml (PHENERGAN WITH CODEINE) 6.25-10 mg/5 mL syrup    Preferred Pharmacy with phone number:    [facility] [address] [place] [address]  Phone: [phone] Fax: [phone]     Communication Preference: PT @ [phone]    Additional Information: Pt states that she is completely out of rx. Pt is asking for a call back first, before submitting the refill request. Thanks.

## 2022-11-09 ENCOUNTER — TELEPHONE (OUTPATIENT)
Dept: PRIMARY CARE CLINIC | Facility: CLINIC | Age: 57
End: 2022-11-09
Payer: MEDICARE

## 2022-11-09 ENCOUNTER — OFFICE VISIT (OUTPATIENT)
Dept: ORTHOPEDICS | Facility: CLINIC | Age: 57
End: 2022-11-09
Payer: MEDICARE

## 2022-11-09 VITALS
HEART RATE: 80 BPM | DIASTOLIC BLOOD PRESSURE: 81 MMHG | WEIGHT: 249.69 LBS | HEIGHT: 66 IN | BODY MASS INDEX: 40.13 KG/M2 | SYSTOLIC BLOOD PRESSURE: 134 MMHG

## 2022-11-09 DIAGNOSIS — M25.562 ACUTE PAIN OF LEFT KNEE: ICD-10-CM

## 2022-11-09 DIAGNOSIS — M25.462 EFFUSION OF LEFT KNEE: ICD-10-CM

## 2022-11-09 DIAGNOSIS — M71.22 BAKER'S CYST, LEFT: Primary | ICD-10-CM

## 2022-11-09 PROCEDURE — 3075F SYST BP GE 130 - 139MM HG: CPT | Mod: CPTII,S$GLB,,

## 2022-11-09 PROCEDURE — 1160F PR REVIEW ALL MEDS BY PRESCRIBER/CLIN PHARMACIST DOCUMENTED: ICD-10-PCS | Mod: CPTII,S$GLB,,

## 2022-11-09 PROCEDURE — 3008F PR BODY MASS INDEX (BMI) DOCUMENTED: ICD-10-PCS | Mod: CPTII,S$GLB,,

## 2022-11-09 PROCEDURE — 99204 PR OFFICE/OUTPT VISIT, NEW, LEVL IV, 45-59 MIN: ICD-10-PCS | Mod: 25,S$GLB,,

## 2022-11-09 PROCEDURE — 99999 PR PBB SHADOW E&M-EST. PATIENT-LVL III: ICD-10-PCS | Mod: PBBFAC,,,

## 2022-11-09 PROCEDURE — 4010F PR ACE/ARB THEARPY RXD/TAKEN: ICD-10-PCS | Mod: CPTII,S$GLB,,

## 2022-11-09 PROCEDURE — 99999 PR PBB SHADOW E&M-EST. PATIENT-LVL III: CPT | Mod: PBBFAC,,,

## 2022-11-09 PROCEDURE — 1159F PR MEDICATION LIST DOCUMENTED IN MEDICAL RECORD: ICD-10-PCS | Mod: CPTII,S$GLB,,

## 2022-11-09 PROCEDURE — 3079F DIAST BP 80-89 MM HG: CPT | Mod: CPTII,S$GLB,,

## 2022-11-09 PROCEDURE — 99204 OFFICE O/P NEW MOD 45 MIN: CPT | Mod: 25,S$GLB,,

## 2022-11-09 PROCEDURE — 20610 LARGE JOINT ASPIRATION/INJECTION: L KNEE: ICD-10-PCS | Mod: LT,S$GLB,,

## 2022-11-09 PROCEDURE — 1160F RVW MEDS BY RX/DR IN RCRD: CPT | Mod: CPTII,S$GLB,,

## 2022-11-09 PROCEDURE — 1159F MED LIST DOCD IN RCRD: CPT | Mod: CPTII,S$GLB,,

## 2022-11-09 PROCEDURE — 3079F PR MOST RECENT DIASTOLIC BLOOD PRESSURE 80-89 MM HG: ICD-10-PCS | Mod: CPTII,S$GLB,,

## 2022-11-09 PROCEDURE — 20610 DRAIN/INJ JOINT/BURSA W/O US: CPT | Mod: LT,S$GLB,,

## 2022-11-09 PROCEDURE — 3075F PR MOST RECENT SYSTOLIC BLOOD PRESS GE 130-139MM HG: ICD-10-PCS | Mod: CPTII,S$GLB,,

## 2022-11-09 PROCEDURE — 3008F BODY MASS INDEX DOCD: CPT | Mod: CPTII,S$GLB,,

## 2022-11-09 PROCEDURE — 4010F ACE/ARB THERAPY RXD/TAKEN: CPT | Mod: CPTII,S$GLB,,

## 2022-11-09 RX ORDER — TRIAMCINOLONE ACETONIDE 40 MG/ML
40 INJECTION, SUSPENSION INTRA-ARTICULAR; INTRAMUSCULAR
Status: COMPLETED | OUTPATIENT
Start: 2022-11-09 | End: 2022-11-09

## 2022-11-09 RX ORDER — TRIAMCINOLONE ACETONIDE 40 MG/ML
40 INJECTION, SUSPENSION INTRA-ARTICULAR; INTRAMUSCULAR
Status: DISCONTINUED | OUTPATIENT
Start: 2022-11-09 | End: 2022-11-09

## 2022-11-09 RX ADMIN — TRIAMCINOLONE ACETONIDE 40 MG: 40 INJECTION, SUSPENSION INTRA-ARTICULAR; INTRAMUSCULAR at 11:11

## 2022-11-09 RX ADMIN — TRIAMCINOLONE ACETONIDE 40 MG: 40 INJECTION, SUSPENSION INTRA-ARTICULAR; INTRAMUSCULAR at 10:11

## 2022-11-09 NOTE — TELEPHONE ENCOUNTER
Returned call to patient. Informed patient he hasn't reviewed his results just yet. I let her know that we will contact her as soon as he does review them

## 2022-11-09 NOTE — TELEPHONE ENCOUNTER
----- Message from Joan Tompkins sent at 11/9/2022  8:40 AM CST -----  Contact: Pt [phone]  Type: Test Results    What test was performed? 11/7/22    When and where were the test performed?  CT Head and MRI Knee

## 2022-11-09 NOTE — PROCEDURES
Large Joint Aspiration/Injection: L knee    Date/Time: 11/9/2022 10:00 AM  Performed by: Vanessa Villarreal PA-C  Authorized by: Vanessa Villarreal PA-C     Consent Done?:  Yes (Verbal)  Indications:  Arthritis and pain  Site marked: the procedure site was marked    Timeout: prior to procedure the correct patient, procedure, and site was verified    Prep: patient was prepped and draped in usual sterile fashion    Local anesthetic:  Bupivacaine 0.25% without epinephrine and topical anesthetic    Details:  Needle Size:  22 G  Ultrasonic Guidance for needle placement?: No    Approach:  Anterolateral  Location:  Knee  Site:  L knee  Medications:  40 mg triamcinolone acetonide 40 mg/mL  Patient tolerance:  Patient tolerated the procedure well with no immediate complications

## 2022-11-09 NOTE — PROGRESS NOTES
"Patient ID: Trina Marie Collette is a 57 y.o. female    Pain of the Left Knee      History of Present Illness:    Trina Marie Collette   With significant past medical history including COPD and chronic back pain presents to clinic for Left knee pain. Patient denies known SHELLY.  She states the pain has been present since August when she got an ultrasound and felt like the tech was "squeezing" on her legs,  no evidence of DVT. The pain started 3 months ago and is becoming progressively worse.  Pain is located over (points to) posterior left knee. She reports that the pain is a 7 /10 sharp pain today. The pain is affecting ADLs and limiting desired level of activity. Denies numbness, tingling, radiation and inability to bear weight.   Her primary care  ordered a left knee MRI.  She has brought the disc today with no report.    She has tried muscular injection from primary care, Mobic,, and takes Norco 10 for lower back pain with some improvement.    Mechanical symptoms: -  Instability: -    Occupation: unemployed    Ambulating: unassisted  Diabetic: no  Smoking: current  Hx of DVT/PE: no     PAST MEDICAL HISTORY:   Past Medical History:   Diagnosis Date    Allergic rhinitis     Anxiety     Bipolar 1 disorder     Chronic cough     Depression     Hypertension     Laryngopharyngeal reflux (LPR)     Lower back pain     Tinnitus of right ear      PAST SURGICAL HISTORY:   Past Surgical History:   Procedure Laterality Date    COLONOSCOPY N/A 1/12/2021    Procedure: COLONOSCOPY;  Surgeon: Vitaliy Hammer MD;  Location: [address]);  Service: Endoscopy;  Laterality: N/A;    ESOPHAGOGASTRODUODENOSCOPY N/A 1/12/2021    Procedure: ESOPHAGOGASTRODUODENOSCOPY (EGD);  Surgeon: Vitaliy Hammer MD;  Location: [address]);  Service: Endoscopy;  Laterality: N/A;  prep ins. mailed - COVID screening on 1/9/21 [facility]    FUNCTIONAL ENDOSCOPIC SINUS SURGERY (FESS) USING COMPUTER-ASSISTED NAVIGATION Bilateral " 2/3/2020    Procedure: FESS, USING COMPUTER-ASSISTED NAVIGATION;  Surgeon: DWAYNE Ricardo MD;  Location: [facility];  Service: ENT;  Laterality: Bilateral;    VAGINAL DELIVERY  1986     FAMILY HISTORY: No family history on file.  SOCIAL HISTORY:   Social History     Occupational History    Not on file   Tobacco Use    Smoking status: Every Day     Packs/day: 0.25     Years: 10.00     Pack years: 2.50     Types: Cigarettes    Smokeless tobacco: Never   Substance and Sexual Activity    Alcohol use: Not Currently    Drug use: Yes     Types: Marijuana    Sexual activity: Yes     Partners: Male        MEDICATIONS:   Current Outpatient Medications:     albuterol (PROVENTIL) 2.5 mg /3 mL (0.083 %) nebulizer solution, Take 3 mLs (2.5 mg total) by nebulization every 4 (four) hours as needed for Wheezing or Shortness of Breath. Rescue, Disp: 1 each, Rfl: 1    amLODIPine (NORVASC) 2.5 MG tablet, TAKE 1 TABLET BY MOUTH AT BEDTIME FOR HIGH BLOOD PRESSURE. CONTINUE LOSARTAN 100 MG IN THE MORNING, Disp: 90 tablet, Rfl: 1    betamethasone valerate 0.1% (VALISONE) 0.1 % Crea, APPLIED TO THE ABDOMEN TWICE A DAY AS NEEDED FOR SKIN RASH OR ITCH, Disp: 180 g, Rfl: 0    budesonide-formoterol 160-4.5 mcg (SYMBICORT) 160-4.5 mcg/actuation HFAA, INHALE 2 PUFFS INTO THE LUNGS EVERY 12 (TWELVE) HOURS. CONTROLLER. RINSE MOUTH AFTER USING., Disp: 10.2 g, Rfl: 11    busPIRone (BUSPAR) 30 MG Tab, TAKE 1 TABLET BY MOUTH 2 TIMES DAILY., Disp: 180 tablet, Rfl: 1    clotrimazole-betamethasone 1-0.05% (LOTRISONE) cream, Apply topically 2 (two) times daily as needed., Disp: 15 g, Rfl: 1    cyclobenzaprine (FLEXERIL) 10 MG tablet, One p.o. b.i.d. may increase to q.8 hours if needed, Disp: 60 tablet, Rfl: 5    diclofenac sodium (VOLTAREN) 1 % Gel, Apply 2 g topically 4 (four) times daily., Disp: 100 g, Rfl: 5    diphenoxylate-atropine 2.5-0.025 mg (LOMOTIL) 2.5-0.025 mg per tablet, TAKE 1 TAB BY MOUTH AFTER EACH LOOSE BOWEL MOVEMENT PER DAY, Disp: 30  tablet, Rfl: 2    famotidine (PEPCID) 20 MG tablet, Take 1 tablet (20 mg total) by mouth 2 (two) times daily., Disp: 180 tablet, Rfl: 3    gabapentin (NEURONTIN) 800 MG tablet, TAKE 1 TABLET BY MOUTH THREE TIMES A DAY, Disp: 90 tablet, Rfl: 1    HYDROcodone-acetaminophen (NORCO)  mg per tablet, Take 1 tablet by mouth every 8 (eight) hours as needed. , Disp: , Rfl: 0    hydrOXYzine pamoate (VISTARIL) 25 MG Cap, TAKE 1 CAPSULE BY MOUTH EVERY 8 HOURS OR 1 CAP AT BEDTIME FOR ANXIETY TO HELP PREVENT PALPITATIONS, Disp: 360 capsule, Rfl: 1    ipratropium (ATROVENT) 21 mcg (0.03 %) nasal spray, 2 sprays by Nasal route 2 (two) times daily. May be use more often if needed, Disp: 30 mL, Rfl: 11    levocetirizine (XYZAL) 5 MG tablet, TAKE 1 TABLET BY MOUTH EVERY DAY IN THE EVENING, Disp: 90 tablet, Rfl: 3    losartan (COZAAR) 100 MG tablet, Take 1 tablet (100 mg total) by mouth once daily., Disp: 90 tablet, Rfl: 1    meloxicam (MOBIC) 15 MG tablet, START AFTER MEDROL DOSEPAK COMPLETE MOBIC 7.5 MG BY MOUTH TWICE A DAY AS NEEDED FOR SWELLING OR PAIN NO OTHER NSAIDS CAN TAKE WITH TYLENOL, Disp: 30 tablet, Rfl: 5    montelukast (SINGULAIR) 10 mg tablet, Take 1 tablet (10 mg total) by mouth once daily., Disp: 90 tablet, Rfl: 1    mupirocin (BACTROBAN) 2 % ointment, Apply to nose 3 times daily on a Q-tip for crusting or sores inside the nose, Disp: 22 g, Rfl: 3    naproxen (NAPROSYN) 375 MG tablet, TAKE 1 TABLET BY MOUTH 2 TIMES DAILY WITH MEALS., Disp: 180 tablet, Rfl: 1    pantoprazole (PROTONIX) 40 MG tablet, TAKE 1 TABLET BY MOUTH EVERY DAY, Disp: 90 tablet, Rfl: 3    potassium chloride (MICRO-K) 10 MEQ CpSR, TAKE 1 CAPSULE BY MOUTH ONCE DAILY., Disp: 90 capsule, Rfl: 3    potassium chloride (MICRO-K) 10 MEQ CpSR, Take 1 capsule (10 mEq total) by mouth once daily., Disp: 90 capsule, Rfl: 1    rosuvastatin (CRESTOR) 5 MG tablet, Take 1 tablet (5 mg total) by mouth once daily., Disp: 90 tablet, Rfl: 1    sertraline (ZOLOFT)  100 MG tablet, Take 1 tablet (100 mg total) by mouth once daily., Disp: 90 tablet, Rfl: 1    tiZANidine (ZANAFLEX) 4 MG tablet, Take 1 tablet (4 mg total) by mouth every 8 (eight) hours., Disp: 30 tablet, Rfl: 2    traZODone (DESYREL) 100 MG tablet, Take 1 tablet (100 mg total) by mouth every evening., Disp: 90 tablet, Rfl: 1    triamcinolone acetonide 0.1% (KENALOG) 0.1 % cream, APPLY TO AFFECTED AREA TWICE A DAY, Disp: 15 g, Rfl: 5    ziprasidone (GEODON) 60 MG Cap, Take 1 capsule (60 mg total) by mouth 2 (two) times daily., Disp: 30 capsule, Rfl: 5    ziprasidone (GEODON) 80 MG capsule, Take 1 capsule (80 mg total) by mouth once daily., Disp: 30 capsule, Rfl: 5    zolpidem (AMBIEN) 5 MG Tab, 1 po Q OHS p.r.n. sleep, Disp: 30 tablet, Rfl: 2  ALLERGIES: Review of patient's allergies indicates:  No Known Allergies      Physical Exam     Vitals:    11/09/22 1006   BP: 134/81   Pulse: 80     Alert and oriented to person, place and time. No acute distress. Well-groomed, not ill appearing. Pupils round and reactive, normal respiratory effort, no audible wheezing.     OBSERVATION / INSPECTION   Gait:   Antalgic  Alignment:  Neutral   Scars:   None   Muscle atrophy: None  Effusion:  None   Warmth:  None   Discoloration:   None   Baker's cyst present    TENDERNESS                 Patella     Negative    Peripatellar medial    Negative   Peripatellar lateral   Negative   Patellar tendon   Negative   Quad tendon     Negative    Prepatellar Bursa   Negative     Tibial tubercle    Positive    Pes anserine/HS   Negative      ITB     Negative      LCL     Negative  MFC     Negative  LFC     Negative  MCL      Negative  Medial Joint Line    Positive   Lateral Joint Line   Positive  Quadriceps    Negative  Hamstring     Negative            Range of  Motion:        Left knee:   0-140°  Right knee:    0-140°      Patellofemoral examination:     Patella position    Centered  Crepitus (PF):    Present   Lateral tilt:    Normal    J-sign:     None   Patellofemoral grind:   No pain       MENISCAL SIGNS:     Pain on terminal extension:  Negative  Pain on terminal flexion:  Negative  Lauris maneuver:  Negative     LIGAMENT EXAMINATION:  ACL / Lachman:  normal   PCL-Post.  drawer: normal 0 to 2mm  MCL- Valgus:  normal 0 to 2mm  LCL- Varus:    normal 0 to 2mm    Dial Test: difference c/w other side  At 30° flexion: normal (< 5°)   At 90° flexion: normal (< 5°)       STRENGTH: (* = with pain)   Quadricep   5/5  Hamstrin/5    EXTREMITY NEURO-VASCULAR EXAMINATION:   Sensation:  Grossly intact to light touch all dermatomal regions.   Motor Function:  Fully intact motor function at hip, knee, foot and ankle    DTRs;  quadriceps and  achilles 2+.  No clonus and downgoing Babinski.    Vascular status:  DP and PT pulses 2+, brisk capillary refill, symmetric.     Imaging:     Left knee X-rays ordered/reviewed by me showing possible subchondral cyst is present in the distal femur within the patellofemoral compartment.  No fracture or dislocation.  Medial compartment joint space narrowing. No joint effusion or other soft tissue abnormality.    Outside MRI reviewed showing possible Baker's cyst and possible meniscus degeneration.    Assessment & Plan    Acute pain of left knee  -     Ambulatory referral/consult to Orthopedics    Effusion of left knee  -     Ambulatory referral/consult to Orthopedics       I made the decision to obtain old records of the patient including previous notes and imaging. New imaging was ordered today of the extremity or extremities evaluated. I independently reviewed and interpreted the radiographs and/or MRIs/CT scan today as well as prior imaging.    We discussed at length different treatment options including conservative vs surgical management. These include anti-inflammatories, acetaminophen, rest, ice, heat, formal physical therapy including strengthening and stretching exercises, home exercise programs,  injections, dry needling, and finally surgical intervention.      Discussed case with supervising physician. Imaging reviewed by myself as well as supervising physician, Phil Bob MD.   Difficult to review images as patient did not have MRI report with her.   Possible Ling cyst as well as meniscus tear.    Patient would like to proceed with a trial of CSI, Mobic and weight loss.    CSI left knee given--   Instructed this may increase her blood pressure and blood sugar.  Labs reviewed.   Continue Mobic per PCP  Ice compress to the affected area 2-3x a day for 15-20 minutes as needed for pain management  Ambulatory referral to physical therapy recommended.  Patient declined at this time.  Patient brought MRI disc with her but did not have report.  She will follow-up in 3 months with Dr. Bob and was instructed to bring her MRI disc as well as the physical report with her to the next visit for further evaluation by him.    Follow up:  3 months  X-rays next visit:  none    All questions were answered and patient is agreeable to the above plan.

## 2022-11-09 NOTE — TELEPHONE ENCOUNTER
Called mert advised patient about her Rx. Per Dr. Ricardo states she will get prescription by 11/11/2022. Pt states ok thanks.

## 2022-11-11 ENCOUNTER — TELEPHONE (OUTPATIENT)
Dept: OTOLARYNGOLOGY | Facility: CLINIC | Age: 57
End: 2022-11-11
Payer: MEDICARE

## 2022-11-11 ENCOUNTER — NURSE TRIAGE (OUTPATIENT)
Dept: ADMINISTRATIVE | Facility: CLINIC | Age: 57
End: 2022-11-11
Payer: MEDICARE

## 2022-11-11 RX ORDER — PROMETHAZINE HYDROCHLORIDE AND CODEINE PHOSPHATE 6.25; 1 MG/5ML; MG/5ML
5 SOLUTION ORAL EVERY 4 HOURS PRN
Qty: 210 ML | Refills: 0 | Status: SHIPPED | OUTPATIENT
Start: 2022-11-11 | End: 2022-11-21

## 2022-11-11 RX ORDER — TRIAMCINOLONE ACETONIDE 40 MG/ML
40 INJECTION, SUSPENSION INTRA-ARTICULAR; INTRAMUSCULAR
Status: DISCONTINUED | OUTPATIENT
Start: 2022-11-09 | End: 2022-11-11 | Stop reason: HOSPADM

## 2022-11-11 NOTE — TELEPHONE ENCOUNTER
Patient states c/o elevated blood pressure reading of 179/99. Patient denies neurological symptoms at this time.    Care Advice given to See PCP within 3 days or to visit an Urgent Care Center today for evaluation/treatment. Patient states understanding of care advice.    Reason for Disposition   Systolic BP >= 160 OR Diastolic >= 100    Additional Information   Negative: Sounds like a life-threatening emergency to the triager   Negative: Symptom is main concern (e.g., headache, chest pain)   Negative: Low blood pressure is main concern   Negative: Systolic BP >= 160 OR Diastolic >= 100, and any cardiac or neurologic symptoms (e.g., chest pain, difficulty breathing, unsteady gait, blurred vision)   Negative: Pregnant 20 or more weeks (or postpartum < 6 weeks) with new hand or face swelling   Negative: Pregnant 20 or more weeks (or postpartum < 6 weeks) and Systolic BP >= 160 OR Diastolic >= 100   Negative: Patient sounds very sick or weak to the triager   Negative: Systolic BP >= 200 OR Diastolic >= 120 and having NO cardiac or neurologic symptoms   Negative: Pregnant 20 or more weeks (or postpartum < 6 weeks) with Systolic BP >= 140 OR Diastolic >= 90   Negative: Systolic BP >= 180 OR Diastolic >= 110, and missed most recent dose of blood pressure medication   Negative: Systolic BP >= 180 OR Diastolic >= 110   Negative: Patient wants to be seen   Negative: Ran out of BP medications   Negative: Taking BP medications and feels is having side effects (e.g., impotence, cough, dizziness)    Protocols used: Blood Pressure - High-A-OH

## 2022-11-14 ENCOUNTER — TELEPHONE (OUTPATIENT)
Dept: PRIMARY CARE CLINIC | Facility: CLINIC | Age: 57
End: 2022-11-14
Payer: MEDICARE

## 2022-11-14 DIAGNOSIS — G44.219 EPISODIC TENSION-TYPE HEADACHE, NOT INTRACTABLE: Primary | ICD-10-CM

## 2022-11-15 DIAGNOSIS — G93.2 PSEUDOTUMOR CEREBRI: Primary | ICD-10-CM

## 2022-11-15 NOTE — TELEPHONE ENCOUNTER
----- Message from Ary Jarquin sent at 11/15/2022  9:02 AM CST -----  I do not see a referral in for neuro for this patient please check this

## 2022-11-21 ENCOUNTER — OFFICE VISIT (OUTPATIENT)
Dept: PRIMARY CARE CLINIC | Facility: CLINIC | Age: 57
End: 2022-11-21
Payer: MEDICARE

## 2022-11-21 VITALS
DIASTOLIC BLOOD PRESSURE: 88 MMHG | HEIGHT: 66 IN | BODY MASS INDEX: 40.29 KG/M2 | TEMPERATURE: 97 F | SYSTOLIC BLOOD PRESSURE: 122 MMHG | HEART RATE: 77 BPM | OXYGEN SATURATION: 99 % | WEIGHT: 250.69 LBS | RESPIRATION RATE: 18 BRPM

## 2022-11-21 DIAGNOSIS — J44.9 CHRONIC OBSTRUCTIVE PULMONARY DISEASE, UNSPECIFIED COPD TYPE: ICD-10-CM

## 2022-11-21 DIAGNOSIS — F20.0 PARANOID SCHIZOPHRENIA: ICD-10-CM

## 2022-11-21 DIAGNOSIS — M50.30 DDD (DEGENERATIVE DISC DISEASE), CERVICAL: ICD-10-CM

## 2022-11-21 DIAGNOSIS — R94.31 ABNORMAL EKG: ICD-10-CM

## 2022-11-21 DIAGNOSIS — F31.9 BIPOLAR 1 DISORDER: ICD-10-CM

## 2022-11-21 DIAGNOSIS — M94.20 CHONDROMALACIA: ICD-10-CM

## 2022-11-21 DIAGNOSIS — G89.4 CHRONIC PAIN SYNDROME: ICD-10-CM

## 2022-11-21 DIAGNOSIS — I10 HYPERTENSION: ICD-10-CM

## 2022-11-21 DIAGNOSIS — I10 ESSENTIAL HYPERTENSION: ICD-10-CM

## 2022-11-21 DIAGNOSIS — L30.9 ECZEMA, UNSPECIFIED TYPE: ICD-10-CM

## 2022-11-21 DIAGNOSIS — Z12.31 VISIT FOR SCREENING MAMMOGRAM: ICD-10-CM

## 2022-11-21 DIAGNOSIS — G44.209 TENSION-TYPE HEADACHE, NOT INTRACTABLE, UNSPECIFIED CHRONICITY PATTERN: ICD-10-CM

## 2022-11-21 DIAGNOSIS — D37.05 NEOPLASM OF UNCERTAIN BEHAVIOR OF NASOPHARYNX: ICD-10-CM

## 2022-11-21 DIAGNOSIS — E78.00 PURE HYPERCHOLESTEROLEMIA: Primary | ICD-10-CM

## 2022-11-21 DIAGNOSIS — G47.00 INSOMNIA, UNSPECIFIED TYPE: ICD-10-CM

## 2022-11-21 DIAGNOSIS — E23.6 EMPTY SELLA SYNDROME: ICD-10-CM

## 2022-11-21 PROBLEM — L98.9 SKIN LESION: Status: RESOLVED | Noted: 2018-06-08 | Resolved: 2022-11-21

## 2022-11-21 PROBLEM — M71.22 SYNOVIAL CYST OF LEFT POPLITEAL SPACE: Status: ACTIVE | Noted: 2022-11-21

## 2022-11-21 PROCEDURE — 99499 UNLISTED E&M SERVICE: CPT | Mod: S$GLB,,, | Performed by: FAMILY MEDICINE

## 2022-11-21 PROCEDURE — 1159F PR MEDICATION LIST DOCUMENTED IN MEDICAL RECORD: ICD-10-PCS | Mod: CPTII,S$GLB,, | Performed by: FAMILY MEDICINE

## 2022-11-21 PROCEDURE — 3008F PR BODY MASS INDEX (BMI) DOCUMENTED: ICD-10-PCS | Mod: CPTII,S$GLB,, | Performed by: FAMILY MEDICINE

## 2022-11-21 PROCEDURE — 99214 OFFICE O/P EST MOD 30 MIN: CPT | Mod: S$GLB,,, | Performed by: FAMILY MEDICINE

## 2022-11-21 PROCEDURE — 99214 PR OFFICE/OUTPT VISIT, EST, LEVL IV, 30-39 MIN: ICD-10-PCS | Mod: S$GLB,,, | Performed by: FAMILY MEDICINE

## 2022-11-21 PROCEDURE — 99999 PR PBB SHADOW E&M-EST. PATIENT-LVL V: CPT | Mod: PBBFAC,,, | Performed by: FAMILY MEDICINE

## 2022-11-21 PROCEDURE — 4010F ACE/ARB THERAPY RXD/TAKEN: CPT | Mod: CPTII,S$GLB,, | Performed by: FAMILY MEDICINE

## 2022-11-21 PROCEDURE — 99999 PR PBB SHADOW E&M-EST. PATIENT-LVL V: ICD-10-PCS | Mod: PBBFAC,,, | Performed by: FAMILY MEDICINE

## 2022-11-21 PROCEDURE — 3079F DIAST BP 80-89 MM HG: CPT | Mod: CPTII,S$GLB,, | Performed by: FAMILY MEDICINE

## 2022-11-21 PROCEDURE — 1159F MED LIST DOCD IN RCRD: CPT | Mod: CPTII,S$GLB,, | Performed by: FAMILY MEDICINE

## 2022-11-21 PROCEDURE — 3074F SYST BP LT 130 MM HG: CPT | Mod: CPTII,S$GLB,, | Performed by: FAMILY MEDICINE

## 2022-11-21 PROCEDURE — 3008F BODY MASS INDEX DOCD: CPT | Mod: CPTII,S$GLB,, | Performed by: FAMILY MEDICINE

## 2022-11-21 PROCEDURE — 99499 RISK ADDL DX/OHS AUDIT: ICD-10-PCS | Mod: S$PBB,,, | Performed by: FAMILY MEDICINE

## 2022-11-21 PROCEDURE — 4010F PR ACE/ARB THEARPY RXD/TAKEN: ICD-10-PCS | Mod: CPTII,S$GLB,, | Performed by: FAMILY MEDICINE

## 2022-11-21 PROCEDURE — 3074F PR MOST RECENT SYSTOLIC BLOOD PRESSURE < 130 MM HG: ICD-10-PCS | Mod: CPTII,S$GLB,, | Performed by: FAMILY MEDICINE

## 2022-11-21 PROCEDURE — 3079F PR MOST RECENT DIASTOLIC BLOOD PRESSURE 80-89 MM HG: ICD-10-PCS | Mod: CPTII,S$GLB,, | Performed by: FAMILY MEDICINE

## 2022-11-21 RX ORDER — TIZANIDINE 4 MG/1
4 TABLET ORAL EVERY 8 HOURS
Qty: 30 TABLET | Refills: 2 | Status: SHIPPED | OUTPATIENT
Start: 2022-11-21 | End: 2023-02-22 | Stop reason: SDUPTHER

## 2022-11-21 RX ORDER — BUSPIRONE HYDROCHLORIDE 30 MG/1
30 TABLET ORAL 2 TIMES DAILY
Qty: 180 TABLET | Refills: 1 | Status: SHIPPED | OUTPATIENT
Start: 2022-11-21 | End: 2023-04-15

## 2022-11-21 RX ORDER — ZOLPIDEM TARTRATE 5 MG/1
TABLET ORAL
Qty: 30 TABLET | Refills: 2 | Status: SHIPPED | OUTPATIENT
Start: 2022-11-21 | End: 2023-02-22 | Stop reason: SDUPTHER

## 2022-11-21 RX ORDER — HYDROXYZINE PAMOATE 25 MG/1
CAPSULE ORAL
Qty: 360 CAPSULE | Refills: 1 | Status: SHIPPED | OUTPATIENT
Start: 2022-11-21 | End: 2023-04-15

## 2022-11-21 RX ORDER — FUROSEMIDE 40 MG/1
40 TABLET ORAL DAILY
Qty: 90 TABLET | Refills: 2 | Status: SHIPPED | OUTPATIENT
Start: 2022-11-21 | End: 2023-04-28

## 2022-11-21 NOTE — PROGRESS NOTES
Subjective:       Patient ID: Trina Marie Collette is a 57 y.o. female.    Chief Complaint:   HPI:  57-year-old black female in for 6 week checkup refills  Patient had blood pressure go up to 193/101--went to the emergency room given medication to bring blood pressure down -- today 122/88--needs results of CT scan brain shows empty sella syndrome which is often associated with idiopathic intracranial hypertension--pt referred to neurologist   MRI the knee chondromalacia with subchondral cyst versus low-grade osteochondral defect in the medial trochlea-4 cm popliteal cyst-small joint effusion suprapatellar area  --pt saw PA and pt felt she was lost--did give injection in knee --and Baker's cyst --suprapellar fluid      Office visit 10/25/2022 57-year-old female --left leg pain/headache/medication refills  Left leg pain--started July 2022--head ultrasound left leg did not show a clot--pain in the medial aspect of the left knee--radiates to the lateral aspect--and some pain in the popliteal area--occurs occasionally with walking--pops alot--has not had an MRI are seen orthopedist---has orthopedic appointment--describes like a toothache.  Norco/gabapentin/Mobic or Naprosyn/flexible   Headache--occipital especially on the left side--constant--quality--questionable type pain--frequency constant No trauma--no flu-like symptoms or cold  Needs refills           ROS--no significant change other than history of present illness   Skin: no psoriasis, eczema, skin cancer--  HEENT+ headache, no ocular pain, blurred vision, diplopia, epistaxis, hoarseness change in voice, thyroid trouble--history of tinnitus/chronic sinus problem/nasal septal deviation/hearing loss has hearing aid  Lung: No pneumonia, asthma, Tb, wheezing, SOB, smoking 1/4 ppd trying to quit  --history of bronchospasm in the past uses albuterol--had chest x-ray which is normal  Heart: No chest pain, ankle edema, +occas palpitations, MI, river murmur,  +hypertension blood pressure   + hyperlipidemia-no stent bypass arrhythmia  Abdomen: No nausea, vomiting, diarrhea, constipation, ulcers, hepatitis, gallbladder disease, melena, hematochezia, hematemesis patient complaining of heartburn had EGD colonoscopy hx diarrhea immodium Hx Gerd on protonix comes and goes  : no UTI, renal disease, stones-  GYN last menstrual.  Years ago has mammogram yearly--refuses PAP smear --I don't fool with that    MS: no fractures, O/A, lupus, rheumatoid, gout--history of chronic pain the back lumbar spine, right knee come right shoulder, history DDD lumbar history cervical spinal stenosis  Neuro: No dizziness, LOC, seizures   No diabetes, no anemia, no anxiety, no depression patient with history of bipolar schizophrenia--doing well with medication goes to Mental Health   Single, one child, disabled, lives alone     Objective:   Physical Exam:    General: Well nourished, well developed, no acute distress +overweight   Skin: No lesions  HEENT--eyes PERRLA EOM intact nose clear , throat nonerythematous ears TMs clear  NECK: Supple, no bruits, No JVD, no nodes  Lungs: Clear, no rales, rhonchi, wheezing   Heart: Regular rate and rhythm, no murmurs, gallops, or rubs  Abdomen: flat, bowel sounds positive, no tenderness, or organomegaly  MS:  Patient states swelling in the popliteal area--ultrasound shows fluid around the patella--pain however appears to be medial collateral ligament and radiating to the left lateral made to the popliteal area--pain with palpation pain with squatting arising pain with ambulation but overall able to ambulate fairly well  Neuro: Alert, CN intact, oriented X 3 still with some anxiety and depression of issues--Romberg's negative heel-toe intact  Extremities: No cyanosis, clubbing, or edema         Assessment:       1. Pure hypercholesterolemia    2. Essential hypertension    3. Abnormal EKG    4. Chondromalacia    5. Empty sella syndrome    6. Chronic obstructive  pulmonary disease, unspecified COPD type    7. Eczema, unspecified type    8. Paranoid schizophrenia    9. Bipolar 1 disorder    10. Tension-type headache, not intractable, unspecified chronicity pattern    11. Chronic pain syndrome    12. 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 Visit for screening mammogram            Plan:       Pure hypercholesterolemia  -     CBC Auto Differential; Future; Expected date: 05/21/2023  -     Comprehensive Metabolic Panel; Future; Expected date: 05/21/2023  -     Lipid Panel; Future; Expected date: 05/21/2023    Essential hypertension  -     TSH; Future; Expected date: 05/21/2023    Abnormal EKG  -     Ambulatory referral/consult to Cardiology; Future; Expected date: 11/28/2022    Chondromalacia  -     Ambulatory referral/consult to Orthopedics; Future; Expected date: 11/28/2022    Empty sella syndrome  -     Ambulatory referral/consult to Neurology; Future; Expected date: 11/28/2022    Chronic obstructive pulmonary disease, unspecified COPD type    Eczema, unspecified type    Paranoid schizophrenia    Bipolar 1 disorder  -     TSH; Future; Expected date: 05/21/2023    Tension-type headache, not intractable, unspecified chronicity pattern    Chronic pain syndrome    DDD (degenerative disc disease), cervical    Neoplasm of uncertain behavior of nasopharynx    Visit for screening mammogram          Main Reason here    COVID--was on a cruise sister got sick patient got sick afterward--treat with Zithromax/prednisone/Phenergan with codeine  Needs refills of medication asking for Ambien will get nurse to put in other medications tomorrow  Other medical issues  Right lower back/right sacroiliac and right hip pain--hip better still with back pain --had epidural steroid injections in the past--on Norco Zanaflex Neurontin Mobic--Moist heat theragesic range of motion exercise  Sees Dr. Senior for pain medication--DDD cervical and  lumbar spine  Hypertension on Norvasc and Cozaar blood pressure 124/80  Hyperlipidemia on Crestor  Vitamin D deficiency on vitamin-D  COPD on Symbicort and albuterol  Anxiety depression bipolar schizophrenia on Ambien Ativan  Zoloft trazodone Geodon--goes to mental health  Lab done March needs lab q 6 month   Health maintenance Pap smear

## 2022-11-22 ENCOUNTER — TELEPHONE (OUTPATIENT)
Dept: PRIMARY CARE CLINIC | Facility: CLINIC | Age: 57
End: 2022-11-22
Payer: MEDICARE

## 2022-11-22 NOTE — TELEPHONE ENCOUNTER
----- Message from Ary Jarquin sent at 11/22/2022  8:59 AM CST -----   called to schedule Cardiology appointment patient ask for call back needs to know why she needs this appointment

## 2022-12-12 ENCOUNTER — TELEPHONE (OUTPATIENT)
Dept: OTOLARYNGOLOGY | Facility: CLINIC | Age: 57
End: 2022-12-12
Payer: MEDICARE

## 2022-12-12 RX ORDER — PROMETHAZINE HYDROCHLORIDE AND CODEINE PHOSPHATE 6.25; 1 MG/5ML; MG/5ML
5 SOLUTION ORAL EVERY 4 HOURS PRN
Qty: 210 ML | Refills: 0 | Status: SHIPPED | OUTPATIENT
Start: 2022-12-12 | End: 2022-12-13 | Stop reason: SDUPTHER

## 2022-12-12 NOTE — TELEPHONE ENCOUNTER
Pt accepted to [facility] per Amira with [id number] [facility]    Number for nurse report [phone]    MAC Martinez Fearing aware Returned pt call. Informed pt that provider is out today but refill request has been forwarded to him.       ----- Message from Yi Zabala MA sent at 12/12/2022  8:13 AM CST -----  Regarding: refill  Contact: pt   Rx Refill/Request    Is this a Refill or New Rx:  Refill   Rx Name and Strength:  promethazine-codeine 6.25-10 mg/5 ml (PHENERGAN WITH CODEINE) 6.25-10 mg/5 mL syrup     Preferred Pharmacy with phone number:[facility]     Communication Preference:pt   Additional Information: would like to know if she can pick it up today please call

## 2022-12-13 RX ORDER — PROMETHAZINE HYDROCHLORIDE AND CODEINE PHOSPHATE 6.25; 1 MG/5ML; MG/5ML
5 SOLUTION ORAL EVERY 4 HOURS PRN
Qty: 210 ML | Refills: 0 | Status: SHIPPED | OUTPATIENT
Start: 2022-12-13 | End: 2022-12-23

## 2022-12-16 NOTE — PATIENT INSTRUCTIONS
-You will be scheduled for an EGD/Colonoscopy  -Continue taking your acid reflux medications  Colonoscopy     A camera attached to a flexible tube with a viewing lens is used to take video pictures.     Colonoscopy is a test to view the inside of your lower digestive tract (colon and rectum). Sometimes it can show the last part of the small intestine (ileum). During the test, small pieces of tissue may be removed for testing. This is called a biopsy. Small growths, such as polyps, may also be removed.   Why is colonoscopy done?  The test is done to help look for colon cancer. And it can help find the source of abdominal pain, bleeding, and changes in bowel habits. It may be needed once a year, depending on factors such as your:  · Age  · Health history  · Family health history  · Symptoms  · Results from any prior colonoscopy  Risks and possible complications  These include:  · Bleeding               · A puncture or tear in the colon   · Risks of anesthesia  · A cancer lesion not being seen  Getting ready   To prepare for the test:  · Talk with your healthcare provider about the risks of the test (see below). Also ask your healthcare provider about alternatives to the test.  · Tell your healthcare provider about any medicines you take. Also tell him or her about any health conditions you may have.  · Make sure your rectum and colon are empty for the test. Follow the diet and bowel prep instructions exactly. If you dont, the test may need to be rescheduled.  · Plan for a friend or family member to drive you home after the test.     Colonoscopy provides an inside view of the entire colon.     You may discuss the results with your doctor right away or at a future visit.  During the test   The test is usually done in the hospital on an outpatient basis. This means you go home the same day. The procedure takes about 30 minutes. During that time:  · You are given relaxing (sedating) medicine through an IV line. You may  be drowsy, or fully asleep.  · The healthcare provider will first give you a physical exam to check for anal and rectal problems.  · Then the anus is lubricated and the scope inserted.  · If you are awake, you may have a feeling similar to needing to have a bowel movement. You may also feel pressure as air is pumped into the colon. Its OK to pass gas during the procedure.  · Biopsy, polyp removal, or other treatments may be done during the test.  After the test   You may have gas right after the test. It can help to try to pass it to help prevent later bloating. Your healthcare provider may discuss the results with you right away. Or you may need to schedule a follow-up visit to talk about the results. After the test, you can go back to your normal eating and other activities. You may be tired from the sedation and need to rest for a few hours.  Date Last Reviewed: 11/1/2016 © 2000-2017 [employer]. [address] [place] [address]. All rights reserved. This information is not intended as a substitute for professional medical care. Always follow your healthcare professional's instructions.        Upper GI Endoscopy     During endoscopy, a long, flexible tube is used to view the inside of your upper GI tract.      Upper GI endoscopy allows your healthcare provider to look directly into the beginning of your gastrointestinal (GI) tract. The esophagus, stomach, and duodenum (the first part of the small intestine) make up the upper GI tract.   Before the exam  Follow these and any other instructions you are given before your endoscopy. If you dont follow the healthcare providers instructions carefully, the test may need to be canceled or done over:  · Don't eat or drink anything after midnight the night before your exam. If your exam is in the afternoon, drink only clear liquids in the morning. Don't eat or drink anything for 8 hours before the exam. In some cases, you may be able to take  56 year old female with PMHx of Asthma, Anemia, Bladder cancer with metastases to the lungs, Bradycardia, Hypertension, Dyslipidemia, Hydronephrosis, R-nephrostomy tube, Liver cyst, Parathyroid tumor, Chronic pain and PPM in-situ coming to ED c/o SOB for 2 day admitted for further management.      Problem/Plan - 1:  ·  Problem: dyspnea.   ·  Plan: Chest xray showing metastatic disease unchanged from prior : most likely cause of dyspnea ( although anemia contributing)   high risk for DVT/PE but with contraindication for AC due to active chronic vaginal bleeding due to cancer and patient has been saturating well and not tachycardic   home medication morphine for dyspnea and pain.  as above asking for home O2 and trying to set up on her own , stating she was approved for it before !!      Problem/Plan - 2:  ·  Problem: Anemia.   post 2 Units PRBC with good results   CBC stable.   hem following       12-02-22 @ 05:45  Iron:     16 ug/dL  Iron %:   9 %  TIBC:     180 ug/dL  iron supplement   ( could not get IV iron as does not have IV access)     *** unclear cause of leukocytosis ... ? reactive    monitor for now     improved      Problem/Plan - 3:  ·  Problem: Vaginal bleeding.   ·  Plan: hx of vaginal bleeding, declined obgyn assessment at [facility]  Hx of bladder cancer likely causing bleeding   pt seen by GYN at [facility]: no intervention possible at this time..   pt will need treatment for cancer      Problem/Plan - 4:  ·  Problem: Hyponatremia. improved  encourage PO intake      Problem/Plan - 5:  ·  Problem: Bladder cancer.   ·  Plan: Hx of Bladder Ca with mets  last chemotherapy in may of 2022.  hem onc following  palliative appreciated   continue current pain mgmt  patient very resistant in trying new medications    **nephrostomy tube overdue for exchange>>> changed by IR     worsened leukocytosis post pricedure / likely reactive ?     ** pt being treated by ID for recurrent UTIS !      monitor WBC    ** hypercalcemia     appreciated Hem onc eval    pt declined treatment but ended up taking the bisphosphonates      monitor     Problem/Plan - 6:  ·  Problem: Prophylactic measure.   ·  Plan: SCD  PPI.  S/S eval appreciated  patient declined taking antiacid meds / famotidine / PPI    Dispo planing home  patient not interested in hospice   supportive care   OP Onc follow up   patient medically stable for DC      medicines with sips of water until 2 hours before the procedure. Speak with your healthcare provider about this.   · Bring your X-rays and any other test results you have.  · Because you will be sedated, arrange for an adult to drive you home after the exam.  · Tell your healthcare provider before the exam if you are taking any medicines or have any medical problems.  The procedure  Here is what to expect:  · You will lie on the endoscopy table. Usually patients lie on the left side.  · You will be monitored and given oxygen.  · Your throat may be numbed with a spray or gargle. You are given medicine through an intravenous (IV) line that will help you relax and remain comfortable. You may be awake or asleep during the procedure.  · The healthcare provider will put the endoscope in your mouth and down your esophagus. It is thinner than most pieces of food that you swallow. It will not affect your breathing. The medicine helps keep you from gagging.  · Air is put into your GI tract to expand it. It can make you burp.  · During the procedure, the healthcare provider can take biopsies (tissue samples), remove abnormalities, such as polyps, or treat abnormalities through a variety of devices placed through the endoscope. You will not feel this.   · The endoscope carries images of your upper GI tract to a video screen. If you are awake, you may be able to look at the images.  · After the procedure is done, you will rest for a time. An adult must drive you home.  When to call your healthcare provider  Contact your healthcare provider if you have:  · Black or tarry stools, or blood in your stool  · Fever  · Pain in your belly that does not go away  · Nausea and vomiting, or vomiting blood   Date Last Reviewed: 7/1/2016  © 2698-3921 [employer]. [address] [place] [address], [place] [address]. All rights reserved. This information is not intended as a substitute for professional medical care. Always follow your  healthcare professional's instructions.

## 2022-12-21 PROBLEM — M79.605 LEFT LEG PAIN: Status: ACTIVE | Noted: 2022-12-21

## 2022-12-21 PROBLEM — M54.32 SCIATICA OF LEFT SIDE: Status: ACTIVE | Noted: 2022-12-21

## 2022-12-23 ENCOUNTER — TELEPHONE (OUTPATIENT)
Dept: PRIMARY CARE CLINIC | Facility: CLINIC | Age: 57
End: 2022-12-23
Payer: MEDICARE

## 2022-12-23 DIAGNOSIS — Z12.31 ENCOUNTER FOR SCREENING MAMMOGRAM FOR MALIGNANT NEOPLASM OF BREAST: Primary | ICD-10-CM

## 2022-12-23 NOTE — TELEPHONE ENCOUNTER
----- Message from Joan Tompkins sent at 12/23/2022  9:02 AM CST -----  Caller is requesting to schedule their annual screening mammogram appointment. Order is not listed in Epic.  Please enter order and contact patient to schedule.      Thank you

## 2023-01-06 ENCOUNTER — OFFICE VISIT (OUTPATIENT)
Dept: CARDIOLOGY | Facility: CLINIC | Age: 58
End: 2023-01-06
Payer: MEDICARE

## 2023-01-06 VITALS
SYSTOLIC BLOOD PRESSURE: 130 MMHG | HEART RATE: 83 BPM | OXYGEN SATURATION: 98 % | BODY MASS INDEX: 40.46 KG/M2 | WEIGHT: 251.75 LBS | DIASTOLIC BLOOD PRESSURE: 86 MMHG | HEIGHT: 66 IN

## 2023-01-06 DIAGNOSIS — I10 ESSENTIAL HYPERTENSION: Primary | ICD-10-CM

## 2023-01-06 DIAGNOSIS — R94.31 ABNORMAL EKG: ICD-10-CM

## 2023-01-06 DIAGNOSIS — R09.89 CAROTID BRUIT, UNSPECIFIED LATERALITY: ICD-10-CM

## 2023-01-06 DIAGNOSIS — E78.00 PURE HYPERCHOLESTEROLEMIA: ICD-10-CM

## 2023-01-06 PROCEDURE — 3008F BODY MASS INDEX DOCD: CPT | Mod: CPTII,S$GLB,, | Performed by: INTERNAL MEDICINE

## 2023-01-06 PROCEDURE — 3075F PR MOST RECENT SYSTOLIC BLOOD PRESS GE 130-139MM HG: ICD-10-PCS | Mod: CPTII,S$GLB,, | Performed by: INTERNAL MEDICINE

## 2023-01-06 PROCEDURE — 99999 PR PBB SHADOW E&M-EST. PATIENT-LVL V: CPT | Mod: PBBFAC,,, | Performed by: INTERNAL MEDICINE

## 2023-01-06 PROCEDURE — 99203 OFFICE O/P NEW LOW 30 MIN: CPT | Mod: S$GLB,,, | Performed by: INTERNAL MEDICINE

## 2023-01-06 PROCEDURE — 1159F PR MEDICATION LIST DOCUMENTED IN MEDICAL RECORD: ICD-10-PCS | Mod: CPTII,S$GLB,, | Performed by: INTERNAL MEDICINE

## 2023-01-06 PROCEDURE — 3075F SYST BP GE 130 - 139MM HG: CPT | Mod: CPTII,S$GLB,, | Performed by: INTERNAL MEDICINE

## 2023-01-06 PROCEDURE — 1160F PR REVIEW ALL MEDS BY PRESCRIBER/CLIN PHARMACIST DOCUMENTED: ICD-10-PCS | Mod: CPTII,S$GLB,, | Performed by: INTERNAL MEDICINE

## 2023-01-06 PROCEDURE — 99499 RISK ADDL DX/OHS AUDIT: ICD-10-PCS | Mod: S$GLB,,, | Performed by: INTERNAL MEDICINE

## 2023-01-06 PROCEDURE — 99499 UNLISTED E&M SERVICE: CPT | Mod: S$GLB,,, | Performed by: INTERNAL MEDICINE

## 2023-01-06 PROCEDURE — 3079F DIAST BP 80-89 MM HG: CPT | Mod: CPTII,S$GLB,, | Performed by: INTERNAL MEDICINE

## 2023-01-06 PROCEDURE — 99203 PR OFFICE/OUTPT VISIT, NEW, LEVL III, 30-44 MIN: ICD-10-PCS | Mod: S$GLB,,, | Performed by: INTERNAL MEDICINE

## 2023-01-06 PROCEDURE — 1160F RVW MEDS BY RX/DR IN RCRD: CPT | Mod: CPTII,S$GLB,, | Performed by: INTERNAL MEDICINE

## 2023-01-06 PROCEDURE — 3079F PR MOST RECENT DIASTOLIC BLOOD PRESSURE 80-89 MM HG: ICD-10-PCS | Mod: CPTII,S$GLB,, | Performed by: INTERNAL MEDICINE

## 2023-01-06 PROCEDURE — 3008F PR BODY MASS INDEX (BMI) DOCUMENTED: ICD-10-PCS | Mod: CPTII,S$GLB,, | Performed by: INTERNAL MEDICINE

## 2023-01-06 PROCEDURE — 99999 PR PBB SHADOW E&M-EST. PATIENT-LVL V: ICD-10-PCS | Mod: PBBFAC,,, | Performed by: INTERNAL MEDICINE

## 2023-01-06 PROCEDURE — 1159F MED LIST DOCD IN RCRD: CPT | Mod: CPTII,S$GLB,, | Performed by: INTERNAL MEDICINE

## 2023-01-06 RX ORDER — SODIUM FLUORIDE 6 MG/ML
PASTE, DENTIFRICE DENTAL
COMMUNITY
Start: 2022-12-28

## 2023-01-06 NOTE — PROGRESS NOTES
[facility] - Cardiology [address]  Cardiology Clinic Note      Chief Complaint  Chief Complaint   Patient presents with    Abnormal ECG       HPI:    57-year-old female with past medical history tinnitus, morbid obesity, nasopharyngeal carcinoma, bipolar/schizophrenia, tobacco/COPD, presumed vasovagal syncope, hypertension, hyperlipidemia, no carotid stenosis CT 2018    Sent for evaluation of abnormal EKG see below for description  No symptoms    Medications  Current Outpatient Medications   Medication Sig Dispense Refill    albuterol (PROVENTIL) 2.5 mg /3 mL (0.083 %) nebulizer solution Take 3 mLs (2.5 mg total) by nebulization every 4 (four) hours as needed for Wheezing or Shortness of Breath. Rescue 1 each 1    amLODIPine (NORVASC) 2.5 MG tablet TAKE 1 TABLET BY MOUTH AT BEDTIME FOR HIGH BLOOD PRESSURE. CONTINUE LOSARTAN 100 MG IN THE MORNING 90 tablet 1    budesonide-formoterol 160-4.5 mcg (SYMBICORT) 160-4.5 mcg/actuation HFAA INHALE 2 PUFFS INTO THE LUNGS EVERY 12 (TWELVE) HOURS. CONTROLLER. RINSE MOUTH AFTER USING. 10.2 g 11    busPIRone (BUSPAR) 30 MG Tab Take 1 tablet (30 mg total) by mouth 2 (two) times daily. 180 tablet 1    cyclobenzaprine (FLEXERIL) 10 MG tablet One p.o. b.i.d. may increase to q.8 hours if needed 60 tablet 5    diphenoxylate-atropine 2.5-0.025 mg (LOMOTIL) 2.5-0.025 mg per tablet TAKE 1 TAB BY MOUTH AFTER EACH LOOSE BOWEL MOVEMENT PER DAY 30 tablet 2    famotidine (PEPCID) 20 MG tablet Take 1 tablet (20 mg total) by mouth 2 (two) times daily. 180 tablet 3    fluoride, sodium, (PREVIDENT 5000) 1.1 % Pste Take by mouth.      furosemide (LASIX) 40 MG tablet Take 1 tablet (40 mg total) by mouth once daily. for 5 days (Patient taking differently: Take 40 mg by mouth as needed.) 90 tablet 2    gabapentin (NEURONTIN) 800 MG tablet TAKE 1 TABLET BY MOUTH THREE TIMES A DAY 90 tablet 5    HYDROcodone-acetaminophen (NORCO)  mg per tablet Take 1 tablet by mouth every 8 (eight) hours as needed.    0    hydrOXYzine pamoate (VISTARIL) 25 MG Cap One p.o. q.6 hours p.r.n. nausea vomiting cramps or itch 360 capsule 1    ibuprofen (ADVIL,MOTRIN) 800 MG tablet Take 1 tablet (800 mg total) by mouth every 6 (six) hours as needed for Pain. 20 tablet 0    ipratropium (ATROVENT) 21 mcg (0.03 %) nasal spray 2 sprays by Nasal route 2 (two) times daily. May be use more often if needed 30 mL 11    levocetirizine (XYZAL) 5 MG tablet TAKE 1 TABLET BY MOUTH EVERY DAY IN THE EVENING 90 tablet 3    losartan (COZAAR) 100 MG tablet Take 1 tablet (100 mg total) by mouth once daily. 90 tablet 1    meloxicam (MOBIC) 15 MG tablet START AFTER MEDROL DOSEPAK COMPLETE MOBIC 7.5 MG BY MOUTH TWICE A DAY AS NEEDED FOR SWELLING OR PAIN NO OTHER NSAIDS CAN TAKE WITH TYLENOL 30 tablet 5    montelukast (SINGULAIR) 10 mg tablet Take 1 tablet (10 mg total) by mouth once daily. 90 tablet 1    naproxen (NAPROSYN) 375 MG tablet TAKE 1 TABLET BY MOUTH 2 TIMES DAILY WITH MEALS. 180 tablet 1    pantoprazole (PROTONIX) 40 MG tablet TAKE 1 TABLET BY MOUTH EVERY DAY 90 tablet 3    rosuvastatin (CRESTOR) 5 MG tablet Take 1 tablet (5 mg total) by mouth once daily. 90 tablet 1    sertraline (ZOLOFT) 100 MG tablet Take 1 tablet (100 mg total) by mouth once daily. 90 tablet 1    tiZANidine (ZANAFLEX) 4 MG tablet Take 1 tablet (4 mg total) by mouth every 8 (eight) hours. 30 tablet 2    traZODone (DESYREL) 100 MG tablet Take 1 tablet (100 mg total) by mouth every evening. 90 tablet 1    ziprasidone (GEODON) 60 MG Cap Take 1 capsule (60 mg total) by mouth 2 (two) times daily. 30 capsule 5    ziprasidone (GEODON) 80 MG capsule Take 1 capsule (80 mg total) by mouth once daily. 30 capsule 5    zolpidem (AMBIEN) 5 MG Tab 1 po Q [facility] p.r.n. sleep 30 tablet 2    betamethasone valerate 0.1% (VALISONE) 0.1 % Crea APPLIED TO THE ABDOMEN TWICE A DAY AS NEEDED FOR SKIN RASH OR ITCH 180 g 0    clotrimazole-betamethasone 1-0.05% (LOTRISONE) cream Apply topically 2 (two) times  daily as needed. 15 g 1    diclofenac sodium (VOLTAREN) 1 % Gel Apply 2 g topically 4 (four) times daily. 100 g 5    mupirocin (BACTROBAN) 2 % ointment Apply to nose 3 times daily on a Q-tip for crusting or sores inside the nose 22 g 3    triamcinolone acetonide 0.1% (KENALOG) 0.1 % cream APPLY TO AFFECTED AREA TWICE A DAY 15 g 5     No current facility-administered medications for this visit.        History  Past Medical History:   Diagnosis Date    Allergic rhinitis     Anxiety     Bipolar 1 disorder     Chronic cough     Depression     Hypertension     Laryngopharyngeal reflux (LPR)     Lower back pain     Tinnitus of right ear      Past Surgical History:   Procedure Laterality Date    COLONOSCOPY N/A 1/12/2021    Procedure: COLONOSCOPY;  Surgeon: Vitaliy Hammer MD;  Location: [facility] ([address] [place] [address]);  Service: Endoscopy;  Laterality: N/A;    ESOPHAGOGASTRODUODENOSCOPY N/A 1/12/2021    Procedure: ESOPHAGOGASTRODUODENOSCOPY (EGD);  Surgeon: Vitaliy Hammer MD;  Location: [address]);  Service: Endoscopy;  Laterality: N/A;  prep ins. mailed - COVID screening on 1/9/21 [facility]    FUNCTIONAL ENDOSCOPIC SINUS SURGERY (FESS) USING COMPUTER-ASSISTED NAVIGATION Bilateral 2/3/2020    Procedure: FESS, USING COMPUTER-ASSISTED NAVIGATION;  Surgeon: DWAYNE Ricardo MD;  Location: [facility];  Service: ENT;  Laterality: Bilateral;    VAGINAL DELIVERY  1986     Social History     Socioeconomic History    Marital status: Single   Tobacco Use    Smoking status: Every Day     Packs/day: 0.25     Years: 10.00     Pack years: 2.50     Types: Cigarettes    Smokeless tobacco: Never   Substance and Sexual Activity    Alcohol use: Not Currently    Drug use: Yes     Types: Marijuana     Comment: occassionally    Sexual activity: Yes     Partners: Male     History reviewed. No pertinent family history.     Allergies  Review of patient's allergies indicates:  No Known Allergies    Review of Systems   Review of Systems    Constitutional: Negative for fever.   HENT:  Negative for nosebleeds.    Eyes:  Negative for visual disturbance.   Cardiovascular:  Negative for chest pain, claudication, dyspnea on exertion, palpitations and syncope.   Respiratory:  Negative for cough, hemoptysis and wheezing.    Endocrine: Negative for cold intolerance, heat intolerance, polyphagia and polyuria.   Hematologic/Lymphatic: Negative for bleeding problem.   Skin:  Negative for rash.   Musculoskeletal:  Negative for myalgias.   Gastrointestinal:  Negative for hematemesis, hematochezia, nausea and vomiting.   Genitourinary:  Negative for dysuria.   Neurological:  Negative for focal weakness and sensory change.   Psychiatric/Behavioral:  Negative for altered mental status.      Physical Exam  Vitals:    01/06/23 0956   BP: 130/86   Pulse: 83     Wt Readings from Last 1 Encounters:   01/06/23 114.2 kg (251 lb 12.3 oz)     Physical Exam  Constitutional:       General: She is not in acute distress.  HENT:      Head: Normocephalic and atraumatic.      Mouth/Throat:      Mouth: Mucous membranes are moist.   Eyes:      Extraocular Movements: Extraocular movements intact.      Pupils: Pupils are equal, round, and reactive to light.   Neck:      Vascular: Carotid bruit present. No JVD.   Cardiovascular:      Rate and Rhythm: Normal rate and regular rhythm.      Heart sounds: No murmur heard.    No friction rub. No gallop.   Pulmonary:      Effort: Pulmonary effort is normal.      Breath sounds: Normal breath sounds.   Abdominal:      Tenderness: There is no abdominal tenderness. There is no guarding or rebound.   Musculoskeletal:      Right lower leg: No edema.      Left lower leg: No edema.   Skin:     General: Skin is warm and dry.      Capillary Refill: Capillary refill takes less than 2 seconds.   Neurological:      General: No focal deficit present.      Mental Status: She is alert.   Psychiatric:         Mood and Affect: Mood normal.       Labs  Admission  on 12/02/2022, Discharged on 12/02/2022   Component Date Value Ref Range Status    POC Molecular Influenza A Ag 12/02/2022 Negative  Negative, Not Reported Final    POC Molecular Influenza B Ag 12/02/2022 Negative  Negative, Not Reported Final     Acceptable 12/02/2022 Yes   Final    POC Rapid COVID 12/02/2022 Negative  Negative Final     Acceptable 12/02/2022 Yes   Final    Specimen UA 12/02/2022 Urine, Unspecified   Final    Color, UA 12/02/2022 Yellow  Yellow, Straw, Rebeca Final    Appearance, UA 12/02/2022 Hazy (A)  Clear Final    pH, UA 12/02/2022 6.0  5.0 - 8.0 Final    Specific Gravity, UA 12/02/2022 1.020  1.005 - 1.030 Final    Protein, UA 12/02/2022 Negative  Negative Final    Comment: Recommend a 24 hour urine protein or a urine   protein/creatinine ratio if globulin induced proteinuria is  clinically suspected.      Glucose, UA 12/02/2022 Negative  Negative Final    Ketones, UA 12/02/2022 Negative  Negative Final    Bilirubin (UA) 12/02/2022 Negative  Negative Final    Occult Blood UA 12/02/2022 Negative  Negative Final    Nitrite, UA 12/02/2022 Negative  Negative Final    Urobilinogen, UA 12/02/2022 2.0-3.0 (A)  Negative EU/dL Final    Leukocytes, UA 12/02/2022 Negative  Negative Final    WBC 12/02/2022 13.35 (H)  3.90 - 12.70 K/uL Final    RBC 12/02/2022 4.34  4.00 - 5.40 M/uL Final    Hemoglobin 12/02/2022 12.2  12.0 - 16.0 g/dL Final    Hematocrit 12/02/2022 37.5  37.0 - 48.5 % Final    MCV 12/02/2022 86  82 - 98 fL Final    MCH 12/02/2022 28.1  27.0 - 31.0 pg Final    MCHC 12/02/2022 32.5  32.0 - 36.0 g/dL Final    RDW 12/02/2022 15.0 (H)  11.5 - 14.5 % Final    Platelets 12/02/2022 276  150 - 450 K/uL Final    MPV 12/02/2022 11.9  9.2 - 12.9 fL Final    Immature Granulocytes 12/02/2022 0.4  0.0 - 0.5 % Final    Gran # (ANC) 12/02/2022 9.9 (H)  1.8 - 7.7 K/uL Final    Immature Grans (Abs) 12/02/2022 0.05 (H)  0.00 - 0.04 K/uL Final    Comment: Mild elevation in immature  granulocytes is non specific and   can be seen in a variety of conditions including stress response,   acute inflammation, trauma and pregnancy. Correlation with other   laboratory and clinical findings is essential.      Lymph # 12/02/2022 2.3  1.0 - 4.8 K/uL Final    Mono # 12/02/2022 0.7  0.3 - 1.0 K/uL Final    Eos # 12/02/2022 0.4  0.0 - 0.5 K/uL Final    Baso # 12/02/2022 0.08  0.00 - 0.20 K/uL Final    nRBC 12/02/2022 0  0 /100 WBC Final    Gran % 12/02/2022 74.2 (H)  38.0 - 73.0 % Final    Lymph % 12/02/2022 16.9 (L)  18.0 - 48.0 % Final    Mono % 12/02/2022 5.1  4.0 - 15.0 % Final    Eosinophil % 12/02/2022 2.8  0.0 - 8.0 % Final    Basophil % 12/02/2022 0.6  0.0 - 1.9 % Final    Differential Method 12/02/2022 Automated   Final    Sodium 12/02/2022 144  136 - 145 mmol/L Final    Potassium 12/02/2022 3.2 (L)  3.5 - 5.1 mmol/L Final    Chloride 12/02/2022 107  95 - 110 mmol/L Final    CO2 12/02/2022 26  23 - 29 mmol/L Final    Glucose 12/02/2022 121 (H)  70 - 110 mg/dL Final    BUN 12/02/2022 21 (H)  6 - 20 mg/dL Final    Creatinine 12/02/2022 0.8  0.5 - 1.4 mg/dL Final    Calcium 12/02/2022 9.5  8.7 - 10.5 mg/dL Final    Total Protein 12/02/2022 6.7  6.0 - 8.4 g/dL Final    Albumin 12/02/2022 3.4 (L)  3.5 - 5.2 g/dL Final    Total Bilirubin 12/02/2022 0.2  0.1 - 1.0 mg/dL Final    Comment: For infants and newborns, interpretation of results should be based  on gestational age, weight and in agreement with clinical  observations.    Premature Infant recommended reference ranges:  Up to 24 hours.............<8.0 mg/dL  Up to 48 hours............<12.0 mg/dL  3-5 days..................<15.0 mg/dL  6-29 days.................<15.0 mg/dL      Alkaline Phosphatase 12/02/2022 104  55 - 135 U/L Final    AST 12/02/2022 12  10 - 40 U/L Final    ALT 12/02/2022 10  10 - 44 U/L Final    Anion Gap 12/02/2022 11  8 - 16 mmol/L Final    eGFR 12/02/2022 >60.0  >60 mL/min/1.73 m^2 Final    Troponin I 12/02/2022 <0.006  0.000 -  0.026 ng/mL Final    Comment: The reference interval for Troponin I represents the 99th percentile   cutoff   for our facility and is consistent with 3rd generation assay   performance.      POCT Glucose 12/02/2022 120 (H)  70 - 110 mg/dL Final   Admission on 11/11/2022, Discharged on 11/11/2022   Component Date Value Ref Range Status    WBC 11/11/2022 16.59 (H)  3.90 - 12.70 K/uL Final    RBC 11/11/2022 4.62  4.00 - 5.40 M/uL Final    Hemoglobin 11/11/2022 12.8  12.0 - 16.0 g/dL Final    Hematocrit 11/11/2022 40.0  37.0 - 48.5 % Final    MCV 11/11/2022 87  82 - 98 fL Final    MCH 11/11/2022 27.7  27.0 - 31.0 pg Final    MCHC 11/11/2022 32.0  32.0 - 36.0 g/dL Final    RDW 11/11/2022 15.3 (H)  11.5 - 14.5 % Final    Platelets 11/11/2022 316  150 - 450 K/uL Final    MPV 11/11/2022 11.1  9.2 - 12.9 fL Final    Immature Granulocytes 11/11/2022 0.6 (H)  0.0 - 0.5 % Final    Gran # (ANC) 11/11/2022 12.6 (H)  1.8 - 7.7 K/uL Final    Immature Grans (Abs) 11/11/2022 0.10 (H)  0.00 - 0.04 K/uL Final    Comment: Mild elevation in immature granulocytes is non specific and   can be seen in a variety of conditions including stress response,   acute inflammation, trauma and pregnancy. Correlation with other   laboratory and clinical findings is essential.      Lymph # 11/11/2022 2.8  1.0 - 4.8 K/uL Final    Mono # 11/11/2022 1.0  0.3 - 1.0 K/uL Final    Eos # 11/11/2022 0.0  0.0 - 0.5 K/uL Final    Baso # 11/11/2022 0.03  0.00 - 0.20 K/uL Final    nRBC 11/11/2022 0  0 /100 WBC Final    Gran % 11/11/2022 76.0 (H)  38.0 - 73.0 % Final    Lymph % 11/11/2022 16.9 (L)  18.0 - 48.0 % Final    Mono % 11/11/2022 6.2  4.0 - 15.0 % Final    Eosinophil % 11/11/2022 0.1  0.0 - 8.0 % Final    Basophil % 11/11/2022 0.2  0.0 - 1.9 % Final    Differential Method 11/11/2022 Automated   Final    Sodium 11/11/2022 141  136 - 145 mmol/L Final    Potassium 11/11/2022 3.6  3.5 - 5.1 mmol/L Final    Chloride 11/11/2022 108  95 - 110 mmol/L Final    CO2  11/11/2022 25  23 - 29 mmol/L Final    Glucose 11/11/2022 93  70 - 110 mg/dL Final    BUN 11/11/2022 27 (H)  6 - 20 mg/dL Final    Creatinine 11/11/2022 0.8  0.5 - 1.4 mg/dL Final    Calcium 11/11/2022 9.6  8.7 - 10.5 mg/dL Final    Total Protein 11/11/2022 7.1  6.0 - 8.4 g/dL Final    Albumin 11/11/2022 3.7  3.5 - 5.2 g/dL Final    Total Bilirubin 11/11/2022 0.3  0.1 - 1.0 mg/dL Final    Comment: For infants and newborns, interpretation of results should be based  on gestational age, weight and in agreement with clinical  observations.    Premature Infant recommended reference ranges:  Up to 24 hours.............<8.0 mg/dL  Up to 48 hours............<12.0 mg/dL  3-5 days..................<15.0 mg/dL  6-29 days.................<15.0 mg/dL      Alkaline Phosphatase 11/11/2022 105  55 - 135 U/L Final    AST 11/11/2022 11  10 - 40 U/L Final    ALT 11/11/2022 9 (L)  10 - 44 U/L Final    Anion Gap 11/11/2022 8  8 - 16 mmol/L Final    eGFR 11/11/2022 >60.0  >60 mL/min/1.73 m^2 Final    Troponin I 11/11/2022 <0.006  0.000 - 0.026 ng/mL Final    Comment: The reference interval for Troponin I represents the 99th percentile   cutoff   for our facility and is consistent with 3rd generation assay   performance.      Specimen UA 11/11/2022 Urine, Unspecified   Final    Color, UA 11/11/2022 Yellow  Yellow, Straw, Erbeca Final    Appearance, UA 11/11/2022 Hazy (A)  Clear Final    pH, UA 11/11/2022 6.0  5.0 - 8.0 Final    Specific Gravity, UA 11/11/2022 1.020  1.005 - 1.030 Final    Protein, UA 11/11/2022 Negative  Negative Final    Comment: Recommend a 24 hour urine protein or a urine   protein/creatinine ratio if globulin induced proteinuria is  clinically suspected.      Glucose, UA 11/11/2022 Negative  Negative Final    Ketones, UA 11/11/2022 Negative  Negative Final    Bilirubin (UA) 11/11/2022 Negative  Negative Final    Occult Blood UA 11/11/2022 Negative  Negative Final    Nitrite, UA 11/11/2022 Negative  Negative Final     Urobilinogen, UA 11/11/2022 Negative  Negative EU/dL Final    Leukocytes, UA 11/11/2022 Negative  Negative Final   Lab Visit on 09/30/2022   Component Date Value Ref Range Status    D-Dimer 09/30/2022 0.39  <0.50 mg/L FEU Final    Comment: The quantitative D-dimer assay should be used as an aid in   the diagnosis of deep vein thrombosis and pulmonary embolism  in patients with the appropriate presentation and clinical  history. The upper limit of the reference interval and the clinical   cut off   point are identical. Causes of a positive (>0.50 mg/L FEU) D-Dimer   test  include, but are not limited to: DVT, PE, DIC, thrombolytic   therapy, anticoagulant therapy, recent surgery, trauma, or   pregnancy, disseminated malignancy, aortic aneurysm, cirrhosis,  and severe infection. False negative results may occur in   patients with distal DVT.  JUAN C^612^^2^  LOT^610^[id number]         EKG  EKG 12/02/2020 to normal sinus rhythm normal rate inferior and anterolateral ST depression/T-wave inversion abnormal precordial R-wave progression not markedly changed from prior EKGs    Echo   No results found for this or any previous visit.    Imaging  No results found.    Prior coronary angiogram / intervention:  None    Assessment and Plan  1. Abnormal EKG  Asymptomatic  - Echo; Future    2. Essential hypertension  Continue amlodipine, losartan  - Echo; Future    3. Pure hypercholesterolemia  Continue statin    4. Carotid bruit, unspecified laterality  - Radiology US Carotid Bilateral; Future        Follow Up  Follow up in about 1 year (around 1/6/2024).      Chino Nguyen MD, FAC, VI  Interventional Cardiology

## 2023-01-11 ENCOUNTER — TELEPHONE (OUTPATIENT)
Dept: BARIATRICS | Facility: CLINIC | Age: 58
End: 2023-01-11
Payer: MEDICAID

## 2023-01-12 ENCOUNTER — OFFICE VISIT (OUTPATIENT)
Dept: OTOLARYNGOLOGY | Facility: CLINIC | Age: 58
End: 2023-01-12
Payer: MEDICARE

## 2023-01-12 ENCOUNTER — TELEPHONE (OUTPATIENT)
Dept: BARIATRICS | Facility: CLINIC | Age: 58
End: 2023-01-12
Payer: MEDICAID

## 2023-01-12 VITALS
BODY MASS INDEX: 40.76 KG/M2 | DIASTOLIC BLOOD PRESSURE: 69 MMHG | TEMPERATURE: 98 F | HEART RATE: 76 BPM | SYSTOLIC BLOOD PRESSURE: 137 MMHG | WEIGHT: 252.56 LBS

## 2023-01-12 DIAGNOSIS — K21.9 LARYNGOPHARYNGEAL REFLUX (LPR): Primary | ICD-10-CM

## 2023-01-12 DIAGNOSIS — R05.3 CHRONIC COUGH: ICD-10-CM

## 2023-01-12 DIAGNOSIS — J30.9 ALLERGIC RHINITIS, UNSPECIFIED SEASONALITY, UNSPECIFIED TRIGGER: ICD-10-CM

## 2023-01-12 DIAGNOSIS — R09.82 PND (POST-NASAL DRIP): ICD-10-CM

## 2023-01-12 DIAGNOSIS — R09.89 CHOKING IN ADULT: ICD-10-CM

## 2023-01-12 DIAGNOSIS — H93.A1 PULSATILE TINNITUS OF RIGHT EAR: ICD-10-CM

## 2023-01-12 DIAGNOSIS — J34.2 NASAL SEPTAL DEVIATION: ICD-10-CM

## 2023-01-12 PROCEDURE — 3078F PR MOST RECENT DIASTOLIC BLOOD PRESSURE < 80 MM HG: ICD-10-PCS | Mod: CPTII,S$GLB,, | Performed by: SPECIALIST

## 2023-01-12 PROCEDURE — 3008F BODY MASS INDEX DOCD: CPT | Mod: CPTII,S$GLB,, | Performed by: SPECIALIST

## 2023-01-12 PROCEDURE — 99999 PR PBB SHADOW E&M-EST. PATIENT-LVL V: ICD-10-PCS | Mod: PBBFAC,,, | Performed by: SPECIALIST

## 2023-01-12 PROCEDURE — 99999 PR PBB SHADOW E&M-EST. PATIENT-LVL V: CPT | Mod: PBBFAC,,, | Performed by: SPECIALIST

## 2023-01-12 PROCEDURE — 3075F PR MOST RECENT SYSTOLIC BLOOD PRESS GE 130-139MM HG: ICD-10-PCS | Mod: CPTII,S$GLB,, | Performed by: SPECIALIST

## 2023-01-12 PROCEDURE — 99214 OFFICE O/P EST MOD 30 MIN: CPT | Mod: S$GLB,,, | Performed by: SPECIALIST

## 2023-01-12 PROCEDURE — 3078F DIAST BP <80 MM HG: CPT | Mod: CPTII,S$GLB,, | Performed by: SPECIALIST

## 2023-01-12 PROCEDURE — 1160F PR REVIEW ALL MEDS BY PRESCRIBER/CLIN PHARMACIST DOCUMENTED: ICD-10-PCS | Mod: CPTII,S$GLB,, | Performed by: SPECIALIST

## 2023-01-12 PROCEDURE — 1159F PR MEDICATION LIST DOCUMENTED IN MEDICAL RECORD: ICD-10-PCS | Mod: CPTII,S$GLB,, | Performed by: SPECIALIST

## 2023-01-12 PROCEDURE — 3075F SYST BP GE 130 - 139MM HG: CPT | Mod: CPTII,S$GLB,, | Performed by: SPECIALIST

## 2023-01-12 PROCEDURE — 3008F PR BODY MASS INDEX (BMI) DOCUMENTED: ICD-10-PCS | Mod: CPTII,S$GLB,, | Performed by: SPECIALIST

## 2023-01-12 PROCEDURE — 1159F MED LIST DOCD IN RCRD: CPT | Mod: CPTII,S$GLB,, | Performed by: SPECIALIST

## 2023-01-12 PROCEDURE — 1160F RVW MEDS BY RX/DR IN RCRD: CPT | Mod: CPTII,S$GLB,, | Performed by: SPECIALIST

## 2023-01-12 PROCEDURE — 99214 PR OFFICE/OUTPT VISIT, EST, LEVL IV, 30-39 MIN: ICD-10-PCS | Mod: S$GLB,,, | Performed by: SPECIALIST

## 2023-01-12 RX ORDER — PROMETHAZINE HYDROCHLORIDE AND CODEINE PHOSPHATE 6.25; 1 MG/5ML; MG/5ML
5 SOLUTION ORAL EVERY 4 HOURS PRN
Qty: 210 ML | Refills: 0 | Status: SHIPPED | OUTPATIENT
Start: 2023-01-12 | End: 2023-01-22

## 2023-01-12 NOTE — PROGRESS NOTES
Subjective:       Patient ID: Trina Marie Collette is a 58 y.o. female.    Chief Complaint: Follow-up and Cough      Follow-up visit:  The patient is coming in for a follow-up visit.  There are multiple issues to discuss:  1.  Laryngopharyngeal reflux:   The patient is laryngeal reflux symptoms are minimal.  She does have a tickle in her throat that causes her cough chronically.  She is not having any choking episodes.  She is taking famotidine twice daily.     2.  Allergic rhinitis:  She does have recurring nasal congestion and rhinorrhea.  She has chronic postnasal drip.  Postnasal drip at night cause her to have chronic coughing.  Nasal secretions are clear.  She takes Singulair at night and use ipratropium bromide as needed for postnasal drip.    3.  Pulsatile tinnitus right ear:   The pulsatile tinnitus in her right ear has worsened significantly.  It will totally resolve with compression of the vascular sheath at the angle of the mandible and also when she occludes her external auditory canal by pressing the tragus over the external auditory meatus.  4.  Asthma/Chronic cough:  She continues to have coughing both day and night.  The Phenergan with codeine is the only thing that stops her nighttime cough.      Review of Systems   Constitutional:  Positive for fatigue. Negative for activity change, appetite change, chills, fever and unexpected weight change.   HENT:  Positive for congestion, ear pain, postnasal drip, rhinorrhea, sore throat, tinnitus and trouble swallowing. Negative for ear discharge, facial swelling, hearing loss, mouth sores, sinus pressure, sinus pain, sneezing and voice change.    Eyes:  Negative for photophobia, pain, discharge, redness, itching and visual disturbance.   Respiratory:  Positive for cough, shortness of breath and wheezing. Negative for apnea and choking.    Cardiovascular:  Negative for chest pain and palpitations.   Gastrointestinal:  Negative for abdominal distention,  abdominal pain, nausea and vomiting.   Musculoskeletal:  Negative for arthralgias, myalgias, neck pain and neck stiffness.   Skin: Negative.  Negative for color change, pallor and rash.   Allergic/Immunologic: Positive for environmental allergies. Negative for food allergies and immunocompromised state.   Neurological:  Positive for headaches. Negative for dizziness, facial asymmetry, speech difficulty, weakness, light-headedness and numbness.   Hematological:  Negative for adenopathy. Does not bruise/bleed easily.   Psychiatric/Behavioral:  Negative for confusion, decreased concentration and sleep disturbance.      Objective:      Physical Exam  Constitutional:       Appearance: She is well-developed.   HENT:      Head: Normocephalic.      Right Ear: Ear canal and external ear normal. Tympanic membrane is retracted.      Left Ear: Ear canal and external ear normal. Tympanic membrane is retracted.      Nose: Septal deviation (To the left), nasal tenderness (Scabbing and erythema in the piriform aperture area of the right nostril vestibular skin), mucosal edema (cyanotic, boggy inferior turbinates bilaterally) and rhinorrhea (clear mucus bilaterally) present. No nasal deformity. Rhinorrhea is clear.      Mouth/Throat:      Mouth: No oral lesions.      Pharynx: Uvula midline.   Eyes:      General: Lids are normal.         Right eye: No discharge.         Left eye: No discharge.      Conjunctiva/sclera:      Right eye: Right conjunctiva is injected. No exudate.     Left eye: Left conjunctiva is injected. No exudate.     Pupils: Pupils are equal, round, and reactive to light.   Neck:      Thyroid: No thyroid mass or thyromegaly.      Vascular: No carotid bruit.      Trachea: Trachea normal. No tracheal deviation.        Comments: Neck-compression of the vascular sheath at the angle the mandible on the right as noted above results and complete cessation of her pulsatile tinnitus.  Cardiovascular:      Rate and Rhythm:  Normal rate and regular rhythm.      Pulses: Normal pulses.      Heart sounds: Normal heart sounds.   Pulmonary:      Effort: Pulmonary effort is normal.      Breath sounds: No stridor. Examination of the right-lower field reveals wheezing. Examination of the left-lower field reveals wheezing. Wheezing ( mild expiratory wheezing in all lung fields) present. No decreased breath sounds, rhonchi or rales.   Abdominal:      General: Bowel sounds are normal.      Palpations: Abdomen is soft.      Tenderness: There is no abdominal tenderness.   Musculoskeletal:         General: Normal range of motion.      Cervical back: Normal range of motion. No muscular tenderness.   Lymphadenopathy:      Head:      Right side of head: No submental, submandibular, preauricular, posterior auricular or occipital adenopathy.      Left side of head: No submental, submandibular, preauricular, posterior auricular or occipital adenopathy.      Cervical: No cervical adenopathy.   Skin:     General: Skin is warm and dry.      Findings: No petechiae or rash.      Nails: There is no clubbing.   Neurological:      Mental Status: She is alert and oriented to person, place, and time.      Cranial Nerves: No cranial nerve deficit.      Sensory: No sensory deficit.      Gait: Gait normal.   Psychiatric:         Speech: Speech normal.         Behavior: Behavior normal. Behavior is cooperative.         Thought Content: Thought content normal.         Judgment: Judgment normal.          Assessment:       1. Laryngopharyngeal reflux (LPR)    2. Choking in adult    3. Chronic cough    4. Pulsatile tinnitus of right ear    5. Allergic rhinitis, unspecified seasonality, unspecified trigger    6. Nasal septal deviation    7. PND (post-nasal drip)        Plan:       I  will have the patient continue with her famotidine and current allergy drug regimen.  I will contact Dr. Schuyler Collins regarding the patient's pulsatile tinnitus.  I am referring her back to  Pulmonary Medicine for their assistance in diagnosing and managing the cause of the patient's chronic coughing.  I will recheck her in 3 months, or sooner on an as-needed basis.

## 2023-01-19 ENCOUNTER — TELEPHONE (OUTPATIENT)
Dept: BARIATRICS | Facility: CLINIC | Age: 58
End: 2023-01-19
Payer: MEDICARE

## 2023-01-26 ENCOUNTER — TELEPHONE (OUTPATIENT)
Dept: BARIATRICS | Facility: CLINIC | Age: 58
End: 2023-01-26
Payer: MEDICARE

## 2023-02-08 ENCOUNTER — PATIENT OUTREACH (OUTPATIENT)
Dept: ADMINISTRATIVE | Facility: HOSPITAL | Age: 58
End: 2023-02-08
Payer: MEDICAID

## 2023-02-08 NOTE — PROGRESS NOTES
Health Maintenance Due   Topic Date Due    Pneumococcal Vaccines (Age 0-64) (1 - PCV) Never done    TETANUS VACCINE  Never done    Cervical Cancer Screening  06/20/2014    Shingles Vaccine (1 of 2) Never done        Chart review done.   HM updated.   Immunizations reviewed & updated.   Care Everywhere updated.   [facility] reviewed

## 2023-02-13 ENCOUNTER — TELEPHONE (OUTPATIENT)
Dept: OTOLARYNGOLOGY | Facility: CLINIC | Age: 58
End: 2023-02-13
Payer: MEDICAID

## 2023-02-13 RX ORDER — PROMETHAZINE HYDROCHLORIDE AND CODEINE PHOSPHATE 6.25; 1 MG/5ML; MG/5ML
5 SOLUTION ORAL EVERY 4 HOURS PRN
Qty: 210 ML | Refills: 0 | Status: SHIPPED | OUTPATIENT
Start: 2023-02-13 | End: 2023-02-14 | Stop reason: SDUPTHER

## 2023-02-13 NOTE — TELEPHONE ENCOUNTER
----- Message from Rosanne Pace MA sent at 2/13/2023  2:10 PM CST -----  Contact: [phone]    Type:  RX Refill Request      Who Called: patient      RX Name and Strength:promethazine-codeine 6.25-10 mg/5 ml (PHENERGAN WITH CODEINE) 6.25-10 mg/5 mL syrup      Preferred Pharmacy with phone number: (No pharmacy provided)      Would the patient rather a call back or a response via [facility]? Callback      Best Call Back Number: [phone]      Additional Information: Pt states she would like a paper prescription that she can  to bring it somewhere.       Called and spoke with patient today letting her know Dr. Ricardo is in clinic now, when he's out of clinic he will see her request for refill.

## 2023-02-14 ENCOUNTER — TELEPHONE (OUTPATIENT)
Dept: OTOLARYNGOLOGY | Facility: CLINIC | Age: 58
End: 2023-02-14
Payer: MEDICAID

## 2023-02-14 RX ORDER — PROMETHAZINE HYDROCHLORIDE AND CODEINE PHOSPHATE 6.25; 1 MG/5ML; MG/5ML
5 SOLUTION ORAL EVERY 4 HOURS PRN
Qty: 210 ML | Refills: 0 | Status: SHIPPED | OUTPATIENT
Start: 2023-02-14 | End: 2023-02-24

## 2023-02-14 NOTE — TELEPHONE ENCOUNTER
----- Message from DWAYNE Ricardo MD sent at 2/14/2023 12:42 PM CST -----  Contact: [phone]  Please inform the patient this is last prescription for promethazine with codeine the I will provide to her.  For every prescription for promethazine with codeine that I write I have to fill out a prior authorization that includes 58 questions.  ----- Message -----  From: Ritchie Taylor MA  Sent: 2/14/2023  12:25 PM CST  To: DWAYNE Ricardo MD      ----- Message -----  From: Serafin Caal  Sent: 2/14/2023  12:00 PM CST  To: Davion Parr Staff    Pt is calling in ref to which pharm her script was sent to -- promethazine-codeine 6.25-10 mg/5 ml (PHENERGAN WITH CODEINE) 6.25-10 mg/5 mL syrup -- please advise [phone]        Patient called about her prescription and are here to  prescription per Dr. Ricardo.

## 2023-02-20 ENCOUNTER — OFFICE VISIT (OUTPATIENT)
Dept: ORTHOPEDICS | Facility: CLINIC | Age: 58
End: 2023-02-20
Payer: MEDICARE

## 2023-02-20 VITALS
WEIGHT: 254.63 LBS | SYSTOLIC BLOOD PRESSURE: 111 MMHG | HEIGHT: 66 IN | BODY MASS INDEX: 40.92 KG/M2 | HEART RATE: 88 BPM | DIASTOLIC BLOOD PRESSURE: 74 MMHG

## 2023-02-20 DIAGNOSIS — M25.862 CYST OF LEFT KNEE JOINT: Primary | ICD-10-CM

## 2023-02-20 DIAGNOSIS — M94.20 CHONDROMALACIA: ICD-10-CM

## 2023-02-20 PROCEDURE — 4010F ACE/ARB THERAPY RXD/TAKEN: CPT | Mod: CPTII,S$GLB,, | Performed by: ORTHOPAEDIC SURGERY

## 2023-02-20 PROCEDURE — 3008F PR BODY MASS INDEX (BMI) DOCUMENTED: ICD-10-PCS | Mod: CPTII,S$GLB,, | Performed by: ORTHOPAEDIC SURGERY

## 2023-02-20 PROCEDURE — 3074F SYST BP LT 130 MM HG: CPT | Mod: CPTII,S$GLB,, | Performed by: ORTHOPAEDIC SURGERY

## 2023-02-20 PROCEDURE — 99999 PR PBB SHADOW E&M-EST. PATIENT-LVL V: ICD-10-PCS | Mod: PBBFAC,,, | Performed by: ORTHOPAEDIC SURGERY

## 2023-02-20 PROCEDURE — 3074F PR MOST RECENT SYSTOLIC BLOOD PRESSURE < 130 MM HG: ICD-10-PCS | Mod: CPTII,S$GLB,, | Performed by: ORTHOPAEDIC SURGERY

## 2023-02-20 PROCEDURE — 1159F PR MEDICATION LIST DOCUMENTED IN MEDICAL RECORD: ICD-10-PCS | Mod: CPTII,S$GLB,, | Performed by: ORTHOPAEDIC SURGERY

## 2023-02-20 PROCEDURE — 99999 PR PBB SHADOW E&M-EST. PATIENT-LVL V: CPT | Mod: PBBFAC,,, | Performed by: ORTHOPAEDIC SURGERY

## 2023-02-20 PROCEDURE — 3078F DIAST BP <80 MM HG: CPT | Mod: CPTII,S$GLB,, | Performed by: ORTHOPAEDIC SURGERY

## 2023-02-20 PROCEDURE — 1159F MED LIST DOCD IN RCRD: CPT | Mod: CPTII,S$GLB,, | Performed by: ORTHOPAEDIC SURGERY

## 2023-02-20 PROCEDURE — 99214 PR OFFICE/OUTPT VISIT, EST, LEVL IV, 30-39 MIN: ICD-10-PCS | Mod: S$PBB,,, | Performed by: ORTHOPAEDIC SURGERY

## 2023-02-20 PROCEDURE — 3008F BODY MASS INDEX DOCD: CPT | Mod: CPTII,S$GLB,, | Performed by: ORTHOPAEDIC SURGERY

## 2023-02-20 PROCEDURE — 99214 OFFICE O/P EST MOD 30 MIN: CPT | Mod: S$PBB,,, | Performed by: ORTHOPAEDIC SURGERY

## 2023-02-20 PROCEDURE — 3078F PR MOST RECENT DIASTOLIC BLOOD PRESSURE < 80 MM HG: ICD-10-PCS | Mod: CPTII,S$GLB,, | Performed by: ORTHOPAEDIC SURGERY

## 2023-02-20 PROCEDURE — 4010F PR ACE/ARB THEARPY RXD/TAKEN: ICD-10-PCS | Mod: CPTII,S$GLB,, | Performed by: ORTHOPAEDIC SURGERY

## 2023-02-20 RX ORDER — TRAZODONE HYDROCHLORIDE 100 MG/1
100 TABLET ORAL
COMMUNITY
Start: 2023-02-13 | End: 2023-05-12

## 2023-02-20 NOTE — PROGRESS NOTES
"Patient ID: Trina Marie Collette is a 58 y.o. female    Pain of the Left Knee        History of Present Illness:    Trina Marie Collette   With significant past medical history including COPD and chronic back pain presents to clinic for Left knee pain. Patient denies known SHELLY.  She states the pain has been present since August when she got an ultrasound and felt like the tech was "squeezing" on her legs,  no evidence of DVT. The pain started 3 months ago and is becoming progressively worse.  Pain is located over (points to) posterior left knee. She reports that the pain is a 7 /10 sharp pain today. The pain is affecting ADLs and limiting desired level of activity. Denies numbness, tingling, radiation and inability to bear weight.   Her primary care  ordered a left knee MRI.  She has brought the disc today with no report.    She has tried muscular injection from primary care, Mobic,, and takes Norco 10 for lower back pain with some improvement.    Mechanical symptoms: -  Instability: -    Occupation: unemployed    Ambulating: unassisted  Diabetic: no  Smoking: current  Hx of DVT/PE: no     ____________________________________________________________________    Interval history 02/20/2023 : Patient returns today for  follow up of her left knee.  At her last visit she was injected with steroid.  She states this gave her some relief.  The pain is mostly in the proximal leg and radiates down the entirety of the leg.  She reports swelling of the both calves worse on the left.  She reports popping sensation of the left knee.  She did have her MRI done in an open MRI Center and brought that MRI disc with her at her last visit.  She brought the report with her today.    PAST MEDICAL HISTORY:   Past Medical History:   Diagnosis Date    Allergic rhinitis     Anxiety     Bipolar 1 disorder     Chronic cough     Depression     Hypertension     Laryngopharyngeal reflux (LPR)     Lower back pain     Tinnitus of right ear  "     PAST SURGICAL HISTORY:   Past Surgical History:   Procedure Laterality Date    COLONOSCOPY N/A 1/12/2021    Procedure: COLONOSCOPY;  Surgeon: Vitaliy Hammer MD;  [facility]  Service: Endoscopy;  Laterality: N/A;    ESOPHAGOGASTRODUODENOSCOPY N/A 1/12/2021    Procedure: ESOPHAGOGASTRODUODENOSCOPY (EGD);  Surgeon: Vitaliy Hammer MD;  [facility]  Service: Endoscopy;  Laterality: N/A;  prep ins. mailed - COVID screening on 1/9/21 [facility]    FUNCTIONAL ENDOSCOPIC SINUS SURGERY (FESS) USING COMPUTER-ASSISTED NAVIGATION Bilateral 2/3/2020    Procedure: FESS, USING COMPUTER-ASSISTED NAVIGATION;  Surgeon: DWAYNE Ricardo MD;  Location: [facility];  Service: ENT;  Laterality: Bilateral;    VAGINAL DELIVERY  1986     FAMILY HISTORY: No family history on file.  SOCIAL HISTORY:   Social History     Occupational History    Not on file   Tobacco Use    Smoking status: Every Day     Packs/day: 0.25     Years: 10.00     Pack years: 2.50     Types: Cigarettes    Smokeless tobacco: Never   Substance and Sexual Activity    Alcohol use: Not Currently    Drug use: Yes     Types: Marijuana     Comment: occassionally    Sexual activity: Yes     Partners: Male        MEDICATIONS:   Current Outpatient Medications:     albuterol (PROVENTIL) 2.5 mg /3 mL (0.083 %) nebulizer solution, Take 3 mLs (2.5 mg total) by nebulization every 4 (four) hours as needed for Wheezing or Shortness of Breath. Rescue, Disp: 1 each, Rfl: 1    amLODIPine (NORVASC) 2.5 MG tablet, TAKE 1 TABLET BY MOUTH AT BEDTIME FOR HIGH BLOOD PRESSURE. CONTINUE LOSARTAN 100 MG IN THE MORNING, Disp: 90 tablet, Rfl: 1    betamethasone valerate 0.1% (VALISONE) 0.1 % Crea, APPLIED TO THE ABDOMEN TWICE A DAY AS NEEDED FOR SKIN RASH OR ITCH, Disp: 180 g, Rfl: 0    budesonide-formoterol 160-4.5 mcg (SYMBICORT) 160-4.5 mcg/actuation HFAA, INHALE 2 PUFFS INTO THE LUNGS EVERY 12 (TWELVE) HOURS. CONTROLLER. RINSE MOUTH AFTER  USING., Disp: 10.2 g, Rfl: 11    busPIRone (BUSPAR) 30 MG Tab, Take 1 tablet (30 mg total) by mouth 2 (two) times daily., Disp: 180 tablet, Rfl: 1    clotrimazole-betamethasone 1-0.05% (LOTRISONE) cream, Apply topically 2 (two) times daily as needed., Disp: 15 g, Rfl: 1    cyclobenzaprine (FLEXERIL) 10 MG tablet, One p.o. b.i.d. may increase to q.8 hours if needed, Disp: 60 tablet, Rfl: 5    diclofenac sodium (VOLTAREN) 1 % Gel, Apply 2 g topically 4 (four) times daily., Disp: 100 g, Rfl: 5    diphenoxylate-atropine 2.5-0.025 mg (LOMOTIL) 2.5-0.025 mg per tablet, TAKE 1 TAB BY MOUTH AFTER EACH LOOSE BOWEL MOVEMENT PER DAY, Disp: 30 tablet, Rfl: 2    famotidine (PEPCID) 20 MG tablet, Take 1 tablet (20 mg total) by mouth 2 (two) times daily., Disp: 180 tablet, Rfl: 3    fluoride, sodium, (PREVIDENT 5000) 1.1 % Pste, Take by mouth., Disp: , Rfl:     gabapentin (NEURONTIN) 800 MG tablet, TAKE 1 TABLET BY MOUTH THREE TIMES A DAY, Disp: 90 tablet, Rfl: 5    HYDROcodone-acetaminophen (NORCO)  mg per tablet, Take 1 tablet by mouth every 8 (eight) hours as needed. , Disp: , Rfl: 0    hydrOXYzine pamoate (VISTARIL) 25 MG Cap, One p.o. q.6 hours p.r.n. nausea vomiting cramps or itch, Disp: 360 capsule, Rfl: 1    ibuprofen (ADVIL,MOTRIN) 800 MG tablet, Take 1 tablet (800 mg total) by mouth every 6 (six) hours as needed for Pain., Disp: 20 tablet, Rfl: 0    ipratropium (ATROVENT) 21 mcg (0.03 %) nasal spray, 2 sprays by Nasal route 2 (two) times daily. May be use more often if needed, Disp: 30 mL, Rfl: 11    levocetirizine (XYZAL) 5 MG tablet, TAKE 1 TABLET BY MOUTH EVERY DAY IN THE EVENING, Disp: 90 tablet, Rfl: 3    losartan (COZAAR) 100 MG tablet, Take 1 tablet (100 mg total) by mouth once daily., Disp: 90 tablet, Rfl: 1    meloxicam (MOBIC) 15 MG tablet, START AFTER MEDROL DOSEPAK COMPLETE MOBIC 7.5 MG BY MOUTH TWICE A DAY AS NEEDED FOR SWELLING OR PAIN NO OTHER NSAIDS CAN TAKE WITH TYLENOL, Disp: 30 tablet, Rfl: 5     montelukast (SINGULAIR) 10 mg tablet, Take 1 tablet (10 mg total) by mouth once daily., Disp: 90 tablet, Rfl: 1    mupirocin (BACTROBAN) 2 % ointment, Apply to nose 3 times daily on a Q-tip for crusting or sores inside the nose, Disp: 22 g, Rfl: 3    naproxen (NAPROSYN) 375 MG tablet, TAKE 1 TABLET BY MOUTH 2 TIMES DAILY WITH MEALS., Disp: 180 tablet, Rfl: 1    pantoprazole (PROTONIX) 40 MG tablet, TAKE 1 TABLET BY MOUTH EVERY DAY, Disp: 90 tablet, Rfl: 3    promethazine-codeine 6.25-10 mg/5 ml (PHENERGAN WITH CODEINE) 6.25-10 mg/5 mL syrup, Take 5 mLs by mouth every 4 (four) hours as needed for Cough., Disp: 210 mL, Rfl: 0    rosuvastatin (CRESTOR) 5 MG tablet, Take 1 tablet (5 mg total) by mouth once daily., Disp: 90 tablet, Rfl: 1    sertraline (ZOLOFT) 100 MG tablet, Take 1 tablet (100 mg total) by mouth once daily., Disp: 90 tablet, Rfl: 1    tiZANidine (ZANAFLEX) 4 MG tablet, Take 1 tablet (4 mg total) by mouth every 8 (eight) hours., Disp: 30 tablet, Rfl: 2    traZODone (DESYREL) 100 MG tablet, Take 100 mg by mouth., Disp: , Rfl:     triamcinolone acetonide 0.1% (KENALOG) 0.1 % cream, APPLY TO AFFECTED AREA TWICE A DAY, Disp: 15 g, Rfl: 5    ziprasidone (GEODON) 60 MG Cap, Take 1 capsule (60 mg total) by mouth 2 (two) times daily., Disp: 30 capsule, Rfl: 5    ziprasidone (GEODON) 80 MG capsule, TAKE 1 CAPSULE BY MOUTH ONCE DAILY., Disp: 30 capsule, Rfl: 5    zolpidem (AMBIEN) 5 MG Tab, 1 po Q OHS p.r.n. sleep, Disp: 30 tablet, Rfl: 2    furosemide (LASIX) 40 MG tablet, Take 1 tablet (40 mg total) by mouth once daily. for 5 days (Patient taking differently: Take 40 mg by mouth as needed.), Disp: 90 tablet, Rfl: 2  ALLERGIES: Review of patient's allergies indicates:  No Known Allergies      Physical Exam     Vitals:    02/20/23 1137   BP: 111/74   Pulse: 88     Alert and oriented to person, place and time. No acute distress. Well-groomed, not ill appearing. Pupils round and reactive, normal respiratory effort,  no audible wheezing.       On exam she has full passive range of motion of the left knee.  She has some pain with terminal extension.  She has mild medial joint line tenderness and tenderness over the pes anserinus.  She has 1+ pitting edema of the left leg.  She has negative Homans sign.  She has no pain with range of motion of the left hip.  She has palpable fluid-filled mass on the medial parapatellar region consistent with ultrasound findings from 09/22.    Imaging:     Left knee X-rays ordered/reviewed by me showing possible subchondral cyst is present in the distal femur within the patellofemoral compartment.  No fracture or dislocation.  Medial compartment joint space narrowing. No joint effusion or other soft tissue abnormality.    Outside MRI reviewed showing   4 cm Baker cyst with debris.  There is some focal chondromalacia in the medial trochlea.  There is suprapatellar plica.    Ultrasound, rule out DVT, shows what appears to be 5 cm cystic mass in the medial parapatellar region    Assessment & Plan    Cyst of left knee joint    Chondromalacia  -     Ambulatory referral/consult to Orthopedics        Treatment options were discussed in detail with her.  I went over her MRI findings as well as ultrasound and x-ray findings.  Her symptoms are mostly in the bilateral legs and calf, worse on the left.  She does have MRI findings consistent with Baker cyst of the left knee which is roughly 4 cm.  Clinically and on an ultrasound she also has a cyst on the parapatellar region medial side of her left knee.  Discussed options for her including a referral for possible aspiration of these under ultrasound guidance.  We also discussed left knee arthroscopy if this fails.  She has done physical therapy and left knee injection with mild relief at this point.

## 2023-02-22 ENCOUNTER — OFFICE VISIT (OUTPATIENT)
Dept: PRIMARY CARE CLINIC | Facility: CLINIC | Age: 58
End: 2023-02-22
Payer: MEDICARE

## 2023-02-22 VITALS
RESPIRATION RATE: 18 BRPM | BODY MASS INDEX: 40.44 KG/M2 | TEMPERATURE: 96 F | DIASTOLIC BLOOD PRESSURE: 88 MMHG | WEIGHT: 251.63 LBS | HEIGHT: 66 IN | HEART RATE: 79 BPM | SYSTOLIC BLOOD PRESSURE: 138 MMHG | OXYGEN SATURATION: 97 %

## 2023-02-22 DIAGNOSIS — H90.A31 MIXED CONDUCTIVE AND SENSORINEURAL HEARING LOSS OF RIGHT EAR WITH RESTRICTED HEARING OF LEFT EAR: ICD-10-CM

## 2023-02-22 DIAGNOSIS — M25.562 ACUTE PAIN OF BOTH KNEES: ICD-10-CM

## 2023-02-22 DIAGNOSIS — F11.20 OPIOID DEPENDENCE, UNCOMPLICATED: ICD-10-CM

## 2023-02-22 DIAGNOSIS — D37.05 NEOPLASM OF UNCERTAIN BEHAVIOR OF NASOPHARYNX: ICD-10-CM

## 2023-02-22 DIAGNOSIS — M54.50 LUMBAR SPINE PAIN: ICD-10-CM

## 2023-02-22 DIAGNOSIS — E66.01 MORBID (SEVERE) OBESITY DUE TO EXCESS CALORIES: ICD-10-CM

## 2023-02-22 DIAGNOSIS — J01.00 ACUTE NON-RECURRENT MAXILLARY SINUSITIS: ICD-10-CM

## 2023-02-22 DIAGNOSIS — R05.3 CHRONIC COUGH: ICD-10-CM

## 2023-02-22 DIAGNOSIS — I10 ESSENTIAL HYPERTENSION: ICD-10-CM

## 2023-02-22 DIAGNOSIS — E07.9 THYROID MASS: ICD-10-CM

## 2023-02-22 DIAGNOSIS — M25.561 ACUTE PAIN OF BOTH KNEES: ICD-10-CM

## 2023-02-22 DIAGNOSIS — J40 BRONCHITIS: ICD-10-CM

## 2023-02-22 DIAGNOSIS — Z72.0 TOBACCO ABUSE: ICD-10-CM

## 2023-02-22 DIAGNOSIS — E23.6 EMPTY SELLA SYNDROME: ICD-10-CM

## 2023-02-22 DIAGNOSIS — F20.0 PARANOID SCHIZOPHRENIA: ICD-10-CM

## 2023-02-22 DIAGNOSIS — S39.012D LUMBOSACRAL STRAIN, SUBSEQUENT ENCOUNTER: ICD-10-CM

## 2023-02-22 DIAGNOSIS — H93.19 TINNITUS, UNSPECIFIED LATERALITY: ICD-10-CM

## 2023-02-22 DIAGNOSIS — G89.4 CHRONIC PAIN SYNDROME: Primary | ICD-10-CM

## 2023-02-22 DIAGNOSIS — E66.9 OBESITY (BMI 35.0-39.9 WITHOUT COMORBIDITY): ICD-10-CM

## 2023-02-22 DIAGNOSIS — J45.21 MILD INTERMITTENT ASTHMA WITH ACUTE EXACERBATION: ICD-10-CM

## 2023-02-22 DIAGNOSIS — J98.01 BRONCHOSPASM: ICD-10-CM

## 2023-02-22 DIAGNOSIS — G47.00 INSOMNIA, UNSPECIFIED TYPE: ICD-10-CM

## 2023-02-22 DIAGNOSIS — J44.9 CHRONIC OBSTRUCTIVE PULMONARY DISEASE, UNSPECIFIED COPD TYPE: ICD-10-CM

## 2023-02-22 DIAGNOSIS — E78.5 HYPERLIPIDEMIA, UNSPECIFIED HYPERLIPIDEMIA TYPE: ICD-10-CM

## 2023-02-22 DIAGNOSIS — M46.96 UNSPECIFIED INFLAMMATORY SPONDYLOPATHY, LUMBAR REGION: ICD-10-CM

## 2023-02-22 PROCEDURE — 3008F BODY MASS INDEX DOCD: CPT | Mod: CPTII,S$GLB,, | Performed by: FAMILY MEDICINE

## 2023-02-22 PROCEDURE — 99999 PR PBB SHADOW E&M-EST. PATIENT-LVL V: ICD-10-PCS | Mod: PBBFAC,,, | Performed by: FAMILY MEDICINE

## 2023-02-22 PROCEDURE — 3079F DIAST BP 80-89 MM HG: CPT | Mod: CPTII,S$GLB,, | Performed by: FAMILY MEDICINE

## 2023-02-22 PROCEDURE — 1159F PR MEDICATION LIST DOCUMENTED IN MEDICAL RECORD: ICD-10-PCS | Mod: CPTII,S$GLB,, | Performed by: FAMILY MEDICINE

## 2023-02-22 PROCEDURE — 99499 RISK ADDL DX/OHS AUDIT: ICD-10-PCS | Mod: S$GLB,,, | Performed by: FAMILY MEDICINE

## 2023-02-22 PROCEDURE — 99214 PR OFFICE/OUTPT VISIT, EST, LEVL IV, 30-39 MIN: ICD-10-PCS | Mod: S$GLB,,, | Performed by: FAMILY MEDICINE

## 2023-02-22 PROCEDURE — 3075F PR MOST RECENT SYSTOLIC BLOOD PRESS GE 130-139MM HG: ICD-10-PCS | Mod: CPTII,S$GLB,, | Performed by: FAMILY MEDICINE

## 2023-02-22 PROCEDURE — 99999 PR PBB SHADOW E&M-EST. PATIENT-LVL V: CPT | Mod: PBBFAC,,, | Performed by: FAMILY MEDICINE

## 2023-02-22 PROCEDURE — 4010F PR ACE/ARB THEARPY RXD/TAKEN: ICD-10-PCS | Mod: CPTII,S$GLB,, | Performed by: FAMILY MEDICINE

## 2023-02-22 PROCEDURE — 3075F SYST BP GE 130 - 139MM HG: CPT | Mod: CPTII,S$GLB,, | Performed by: FAMILY MEDICINE

## 2023-02-22 PROCEDURE — 3008F PR BODY MASS INDEX (BMI) DOCUMENTED: ICD-10-PCS | Mod: CPTII,S$GLB,, | Performed by: FAMILY MEDICINE

## 2023-02-22 PROCEDURE — 99214 OFFICE O/P EST MOD 30 MIN: CPT | Mod: S$GLB,,, | Performed by: FAMILY MEDICINE

## 2023-02-22 PROCEDURE — 3079F PR MOST RECENT DIASTOLIC BLOOD PRESSURE 80-89 MM HG: ICD-10-PCS | Mod: CPTII,S$GLB,, | Performed by: FAMILY MEDICINE

## 2023-02-22 PROCEDURE — 1159F MED LIST DOCD IN RCRD: CPT | Mod: CPTII,S$GLB,, | Performed by: FAMILY MEDICINE

## 2023-02-22 PROCEDURE — 4010F ACE/ARB THERAPY RXD/TAKEN: CPT | Mod: CPTII,S$GLB,, | Performed by: FAMILY MEDICINE

## 2023-02-22 PROCEDURE — 99499 UNLISTED E&M SERVICE: CPT | Mod: S$GLB,,, | Performed by: FAMILY MEDICINE

## 2023-02-22 RX ORDER — MELOXICAM 15 MG/1
TABLET ORAL
Qty: 30 TABLET | Refills: 5 | Status: SHIPPED | OUTPATIENT
Start: 2023-02-22 | End: 2023-06-06 | Stop reason: SDUPTHER

## 2023-02-22 RX ORDER — BETAMETHASONE VALERATE 1.2 MG/G
CREAM TOPICAL
Qty: 180 G | Refills: 0 | Status: SHIPPED | OUTPATIENT
Start: 2023-02-22 | End: 2023-04-14

## 2023-02-22 RX ORDER — AMLODIPINE BESYLATE 5 MG/1
5 TABLET ORAL DAILY
Qty: 30 TABLET | Refills: 11 | Status: SHIPPED | OUTPATIENT
Start: 2023-02-22 | End: 2023-06-06 | Stop reason: SDUPTHER

## 2023-02-22 RX ORDER — AMLODIPINE BESYLATE 5 MG/1
5 TABLET ORAL DAILY
Qty: 30 TABLET | Refills: 11 | Status: SHIPPED | OUTPATIENT
Start: 2023-02-22 | End: 2023-02-22

## 2023-02-22 RX ORDER — ROSUVASTATIN CALCIUM 5 MG/1
5 TABLET, COATED ORAL DAILY
Qty: 90 TABLET | Refills: 1 | Status: SHIPPED | OUTPATIENT
Start: 2023-02-22 | End: 2023-06-06 | Stop reason: SDUPTHER

## 2023-02-22 RX ORDER — LOSARTAN POTASSIUM 100 MG/1
100 TABLET ORAL DAILY
Qty: 90 TABLET | Refills: 1 | Status: SHIPPED | OUTPATIENT
Start: 2023-02-22 | End: 2023-06-06 | Stop reason: SDUPTHER

## 2023-02-22 RX ORDER — ZOLPIDEM TARTRATE 5 MG/1
TABLET ORAL
Qty: 30 TABLET | Refills: 2 | Status: SHIPPED | OUTPATIENT
Start: 2023-02-22 | End: 2023-05-22 | Stop reason: SDUPTHER

## 2023-02-22 RX ORDER — DIPHENOXYLATE HYDROCHLORIDE AND ATROPINE SULFATE 2.5; .025 MG/1; MG/1
TABLET ORAL
Qty: 30 TABLET | Refills: 2 | Status: SHIPPED | OUTPATIENT
Start: 2023-02-22 | End: 2023-06-06 | Stop reason: SDUPTHER

## 2023-02-22 RX ORDER — CYCLOBENZAPRINE HCL 10 MG
TABLET ORAL
Qty: 60 TABLET | Refills: 5 | Status: SHIPPED | OUTPATIENT
Start: 2023-02-22 | End: 2023-06-06 | Stop reason: SDUPTHER

## 2023-02-22 RX ORDER — AMLODIPINE BESYLATE 2.5 MG/1
TABLET ORAL
Qty: 90 TABLET | Refills: 1 | Status: CANCELLED | OUTPATIENT
Start: 2023-02-22

## 2023-02-22 RX ORDER — MONTELUKAST SODIUM 10 MG/1
10 TABLET ORAL DAILY
Qty: 90 TABLET | Refills: 1 | Status: SHIPPED | OUTPATIENT
Start: 2023-02-22 | End: 2023-06-06 | Stop reason: SDUPTHER

## 2023-02-22 RX ORDER — ALBUTEROL SULFATE 0.83 MG/ML
2.5 SOLUTION RESPIRATORY (INHALATION) EVERY 4 HOURS PRN
Qty: 1 EACH | Refills: 1 | Status: SHIPPED | OUTPATIENT
Start: 2023-02-22 | End: 2023-05-12

## 2023-02-22 RX ORDER — TIZANIDINE 4 MG/1
4 TABLET ORAL EVERY 8 HOURS
Qty: 30 TABLET | Refills: 2 | Status: SHIPPED | OUTPATIENT
Start: 2023-02-22 | End: 2023-06-06 | Stop reason: SDUPTHER

## 2023-02-22 NOTE — PROGRESS NOTES
Subjective:       Patient ID: Trina Marie Collette is a 58 y.o. female.    Chief Complaint:   HPI:  57 yo BF in for 3 mo checkup   Hypertension blood pressure 138/98 patient on Cozaar 100 mg Lasix 40 mg amlodipine 2.5--will increase to 5 mg  Complaining of bilateral leg swelling and pain --on low Na diet--limiting fluid 1000cc  Alot pain --goes pain management --on 10 mg Norco --told I don't write 10 mg Narco in [facility]   Coughing up a lot of mucus--x1 month--no fever--+runny stuffy nose--+sore throat--+cough--+phlegm yellow  No pneumonia asthma TB--smoking 1/4ppd .  Occasional nausea vomiting diarrhea.  Occasional wheezing has albuterol and Symbicort  Left leg swollen now right leg also Just saw orthopedist Dr Bob   Patient not sure if seen neurosurgeon in the past will start with Neurology evaluate neuropathy legs               ROS--no significant change except for history of present illness  Skin: no psoriasis, eczema, skin cancer--  HEENT+ headache, no ocular pain, blurred vision, diplopia, epistaxis, hoarseness change in voice, thyroid trouble--history of tinnitus/chronic sinus problem/nasal septal deviation/hearing loss has hearing aid  Lung: No pneumonia, asthma, Tb, wheezing, SOB, smoking 1/4 ppd trying to quit  --history of bronchospasm in the past uses albuterol--had chest x-ray which is normal  Heart: No chest pain, ankle edema, +occas palpitations, MI, river murmur, +hypertension blood pressure   + hyperlipidemia-no stent bypass arrhythmia  Abdomen: No nausea, vomiting, diarrhea, constipation, ulcers, hepatitis, gallbladder disease, melena, hematochezia, hematemesis patient complaining of heartburn had EGD colonoscopy hx diarrhea immodium Hx Gerd on protonix comes and goes  : no UTI, renal disease, stones-  GYN last menstrual.  Years ago has mammogram yearly--refuses PAP smear --I don't fool with that    MS: no fractures, O/A, lupus, rheumatoid, gout--history of chronic pain the back  lumbar spine, right knee  right shoulder, history DDD lumbar history cervical spinal stenosis  Neuro: No dizziness, LOC, seizures   No diabetes, no anemia, no anxiety, no depression patient with history of bipolar schizophrenia--doing well with medication goes to Mental Health   Single, one child, disabled, lives alone     Objective:   Physical Exam:    General: Well nourished, well developed, no acute distress + morbid obesity  Skin: No lesions  HEENT--eyes PERRLA EOM intact nose clear , throat nonerythematous ears TMs clear  NECK: Supple, no bruits, No JVD, no nodes  Lungs: Clear, no rales, rhonchi, wheezing   Heart: Regular rate and rhythm, no murmurs, gallops, or rubs  Abdomen: flat, bowel sounds positive, no tenderness, or organomegaly  MS:  pain left leg griselda though bilaterally --no significant edeam now pt states was real swollen 2 says ago LS strain --bk=ilat knee pain   Neuro: Alert, CN intact, oriented X 3 still with some anxiety and depression of issues--Romberg's negative heel-toe intact  Extremities: No cyanosis, clubbing, or edema         Assessment:       1. Chronic pain syndrome    2. Bronchitis    3. Insomnia, unspecified type    4. Chronic cough    5. Hyperlipidemia, unspecified hyperlipidemia type    6. Lumbar spine pain    7. Tinnitus, unspecified laterality    8. Thyroid mass    9. Acute non-recurrent maxillary sinusitis    10. Bronchospasm    11. Chronic obstructive pulmonary disease, unspecified COPD type    12. Mild intermittent asthma with acute exacerbation    13. Mixed conductive and sensorineural hearing loss of right ear with restricted hearing of left ear    14. Essential hypertension    15. Neoplasm of uncertain behavior of nasopharynx    16. Obesity (BMI 35.0-39.9 without comorbidity)    17. Lumbosacral strain, subsequent encounter    18. Acute pain of both knees    19. Tobacco abuse    20. Opioid dependence, uncomplicated    21. Empty sella syndrome    22. Unspecified inflammatory  spondylopathy, lumbar region    23. Paranoid schizophrenia    24. Morbid (severe) obesity due to excess calories            Plan:       Chronic pain syndrome  -     Ambulatory referral/consult to Neurology; Future; Expected date: 03/01/2023    Bronchitis  -     albuterol (PROVENTIL) 2.5 mg /3 mL (0.083 %) nebulizer solution; Take 3 mLs (2.5 mg total) by nebulization every 4 (four) hours as needed for Wheezing or Shortness of Breath. Rescue  Dispense: 1 each; Refill: 1    Insomnia, unspecified type  -     zolpidem (AMBIEN) 5 MG Tab; 1 po Q OHS p.r.n. sleep  Dispense: 30 tablet; Refill: 2    Chronic cough  -     albuterol (PROVENTIL) 2.5 mg /3 mL (0.083 %) nebulizer solution; Take 3 mLs (2.5 mg total) by nebulization every 4 (four) hours as needed for Wheezing or Shortness of Breath. Rescue  Dispense: 1 each; Refill: 1    Hyperlipidemia, unspecified hyperlipidemia type  -     rosuvastatin (CRESTOR) 5 MG tablet; Take 1 tablet (5 mg total) by mouth once daily.  Dispense: 90 tablet; Refill: 1    Lumbar spine pain  -     rosuvastatin (CRESTOR) 5 MG tablet; Take 1 tablet (5 mg total) by mouth once daily.  Dispense: 90 tablet; Refill: 1    Tinnitus, unspecified laterality  -     rosuvastatin (CRESTOR) 5 MG tablet; Take 1 tablet (5 mg total) by mouth once daily.  Dispense: 90 tablet; Refill: 1    Thyroid mass  -     rosuvastatin (CRESTOR) 5 MG tablet; Take 1 tablet (5 mg total) by mouth once daily.  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 do not apply to face  Dispense: 180 g; Refill: 0  -     cyclobenzaprine (FLEXERIL) 10 MG tablet; One p.o. b.i.d. may increase to q.8 hours if needed  Dispense: 60 tablet; Refill: 5  -     diphenoxylate-atropine 2.5-0.025 mg (LOMOTIL) 2.5-0.025 mg per tablet; TAKE 1 TAB BY MOUTH AFTER EACH LOOSE BOWEL MOVEMENT PER DAY  Dispense: 30 tablet; Refill: 2  -     losartan (COZAAR) 100 MG tablet; Take 1 tablet (100 mg total) by mouth once daily.  Dispense: 90 tablet; Refill: 1  -     meloxicam (MOBIC) 15 MG tablet; START AFTER MEDROL DOSEPAK COMPLETE MOBIC 7.5 MG BY MOUTH TWICE A DAY AS NEEDED FOR SWELLING OR PAIN NO OTHER NSAIDS CAN TAKE WITH TYLENOL  Dispense: 30 tablet; Refill: 5  -     montelukast (SINGULAIR) 10 mg tablet; Take 1 tablet (10 mg total) by mouth once daily.  Dispense: 90 tablet; Refill: 1  -     tiZANidine (ZANAFLEX) 4 MG tablet; Take 1 tablet (4 mg total) by mouth every 8 (eight) hours.  Dispense: 30 tablet; Refill: 2  -     Discontinue: amLODIPine (NORVASC) 5 MG tablet; Take 1 tablet (5 mg total) by mouth once daily.  Dispense: 30 tablet; Refill: 11  -     amLODIPine (NORVASC) 5 MG tablet; Take 1 tablet (5 mg total) by mouth once daily.  Dispense: 30 tablet; Refill: 11            Main Reason here    Chronic pain syndrome--bilateral leg pain--left much greater than right---saw orthopedist--no treatment given--patient is on Norco 10 from pain clinic/Mobic/Flexeril/gabapentin 800 t.i.d.--pain mainly in back and left leg  Bronchitis/sinusitis/bronchospasm--Omnicef 300 b.i.d. times 10 daysPrednisone 20 mg 1 p.o. q.d. times 10 days hold Mobic while on this medicine restart Mobic when completing prednisone/Phenergan DM/albuterol  inhaler/Symbicort  Hypertension patient on Cozaar 100 Lasix 40 amlodipine 2.5 will increase to 5 mg  Peripheral edema told can increase Lasix from 40 mg to 40 in the morning 20 afternoon if needed  Sees Dr. Senior for pain medication--DDD cervical and lumbar spine  Hyperlipidemia on Crestor  Vitamin D deficiency on vitamin-D  COPD on Symbicort and albuterol  Anxiety depression bipolar schizophrenia on Ambien Ativan  Zoloft trazodone Geodon--goes to mental health  Lab done March needs lab q 6 month   Health maintenance Pap smear

## 2023-02-24 ENCOUNTER — TELEPHONE (OUTPATIENT)
Dept: PRIMARY CARE CLINIC | Facility: CLINIC | Age: 58
End: 2023-02-24

## 2023-02-24 NOTE — TELEPHONE ENCOUNTER
Returned call to patient in regards to message. Patient was calling in regards to medication. Can you please send medication to pharmacy for patient antibiotic and prednisone. Dr. RACHEL Brock and I looked at chart looks like everything pinned just need to be signed.

## 2023-02-24 NOTE — TELEPHONE ENCOUNTER
----- Message from Reji Laguerre sent at 2/24/2023 11:23 AM CST -----  Contact: self [phone]  Pt requesting an RX for predisone and an antibiotic.      [facility]/pharmacy #[id number] - [place] - [address]  [address] [place] [address]  Phone: [phone] Fax: [phone]    Please call and advise

## 2023-02-25 RX ORDER — AZITHROMYCIN 250 MG/1
TABLET, FILM COATED ORAL
Qty: 6 TABLET | Refills: 0 | Status: SHIPPED | OUTPATIENT
Start: 2023-02-25 | End: 2023-03-01

## 2023-02-25 RX ORDER — PREDNISONE 5 MG/1
TABLET ORAL
Qty: 20 TABLET | Refills: 0 | Status: SHIPPED | OUTPATIENT
Start: 2023-02-25 | End: 2023-12-06

## 2023-02-28 ENCOUNTER — HOSPITAL ENCOUNTER (OUTPATIENT)
Dept: RADIOLOGY | Facility: OTHER | Age: 58
Discharge: HOME | End: 2023-02-28
Attending: FAMILY MEDICINE
Payer: MEDICARE

## 2023-02-28 DIAGNOSIS — Z12.31 ENCOUNTER FOR SCREENING MAMMOGRAM FOR MALIGNANT NEOPLASM OF BREAST: ICD-10-CM

## 2023-02-28 PROCEDURE — 77067 SCR MAMMO BI INCL CAD: CPT | Mod: TC

## 2023-02-28 PROCEDURE — 77063 BREAST TOMOSYNTHESIS BI: CPT | Mod: 26,,, | Performed by: RADIOLOGY

## 2023-02-28 PROCEDURE — 77067 MAMMO DIGITAL SCREENING BILAT WITH TOMO: ICD-10-PCS | Mod: 26,,, | Performed by: RADIOLOGY

## 2023-02-28 PROCEDURE — 77063 MAMMO DIGITAL SCREENING BILAT WITH TOMO: ICD-10-PCS | Mod: 26,,, | Performed by: RADIOLOGY

## 2023-02-28 PROCEDURE — 77067 SCR MAMMO BI INCL CAD: CPT | Mod: 26,,, | Performed by: RADIOLOGY

## 2023-03-13 RX ORDER — CODEINE PHOSPHATE AND GUAIFENESIN 10; 100 MG/5ML; MG/5ML
5 SOLUTION ORAL EVERY 4 HOURS PRN
Qty: 210 ML | Refills: 0 | Status: SHIPPED | OUTPATIENT
Start: 2023-03-13 | End: 2023-03-23

## 2023-04-03 ENCOUNTER — PATIENT MESSAGE (OUTPATIENT)
Dept: ADMINISTRATIVE | Facility: HOSPITAL | Age: 58
End: 2023-04-03
Payer: MEDICARE

## 2023-04-28 DIAGNOSIS — I10 HYPERTENSION: ICD-10-CM

## 2023-04-28 RX ORDER — FUROSEMIDE 40 MG/1
TABLET ORAL
Qty: 90 TABLET | Refills: 2 | Status: SHIPPED | OUTPATIENT
Start: 2023-04-28 | End: 2023-06-06 | Stop reason: SDUPTHER

## 2023-04-28 NOTE — TELEPHONE ENCOUNTER
No care due was identified.  [facility] Embedded Care Due Messages. Reference number: [id number].   4/28/2023 9:56:06 AM CDT

## 2023-04-28 NOTE — TELEPHONE ENCOUNTER
Refill Routing Note   Medication(s) are not appropriate for processing by [facility] for the following reason(s):      Required labs abnormal    [facility] action(s):  Defer            Appointments  past 12m or future 3m with PCP    Date Provider   Last Visit   2/22/2023 Davon Brock MD   Next Visit   5/10/2023 Davon Brock MD   ED visits in past 90 days: 0        Note composed:9:59 AM 04/28/2023

## 2023-05-12 DIAGNOSIS — R05.3 CHRONIC COUGH: ICD-10-CM

## 2023-05-12 DIAGNOSIS — J40 BRONCHITIS: ICD-10-CM

## 2023-05-12 RX ORDER — ALBUTEROL SULFATE 0.83 MG/ML
2.5 SOLUTION RESPIRATORY (INHALATION) EVERY 4 HOURS PRN
Qty: 1080 ML | Refills: 3 | Status: SHIPPED | OUTPATIENT
Start: 2023-05-12 | End: 2023-06-06 | Stop reason: SDUPTHER

## 2023-05-12 RX ORDER — AMLODIPINE BESYLATE 2.5 MG/1
TABLET ORAL
Qty: 90 TABLET | Refills: 1 | OUTPATIENT
Start: 2023-05-12

## 2023-05-12 RX ORDER — BETAMETHASONE VALERATE 1.2 MG/G
CREAM TOPICAL
Qty: 180 G | Refills: 0 | Status: SHIPPED | OUTPATIENT
Start: 2023-05-12 | End: 2023-06-06 | Stop reason: SDUPTHER

## 2023-05-12 RX ORDER — TRAZODONE HYDROCHLORIDE 100 MG/1
TABLET ORAL
Qty: 90 TABLET | Refills: 1 | Status: SHIPPED | OUTPATIENT
Start: 2023-05-12 | End: 2023-06-06 | Stop reason: SDUPTHER

## 2023-05-12 NOTE — TELEPHONE ENCOUNTER
No care due was identified.  [facility] Embedded Care Due Messages. Reference number: [id number].   5/12/2023 9:30:12 AM CDT

## 2023-05-12 NOTE — TELEPHONE ENCOUNTER
Refill Routing Note   Medication(s) are not appropriate for processing by [facility] for the following reason(s):      No active prescription written by PCP    [facility] action(s):  Defer  Quick Discontinue  Approve None identified   Medication Therapy Plan: Amlodipine increased to 5 mg on 2/22/23      Appointments  past 12m or future 3m with PCP    Date Provider   Last Visit   2/22/2023 Davon Brock MD   Next Visit   Visit date not found Davon Brock MD   ED visits in past 90 days: 0        Note composed:2:33 PM 05/12/2023

## 2023-05-16 DIAGNOSIS — G47.00 INSOMNIA, UNSPECIFIED TYPE: ICD-10-CM

## 2023-05-16 RX ORDER — ZOLPIDEM TARTRATE 5 MG/1
TABLET ORAL
Qty: 30 TABLET | Refills: 2 | OUTPATIENT
Start: 2023-05-16

## 2023-05-16 NOTE — TELEPHONE ENCOUNTER
----- Message from Linda Pond sent at 5/16/2023 11:10 AM CDT -----  Contact: [phone]  Pt is needing a refill on her zolpidem (AMBIEN) 5 MG Tab 30 tablet. The first available appt that pt was able to schedule is 05/29(Audio). The next in person appt is 06/13. Pt is using   [facility]/pharmacy #[id number] - [place], [place] - [address]   [address] [place] [address]  Phone: [phone] Fax: [phone]. Pt would also like a call from the office in regards to this matter.           Thank you

## 2023-05-16 NOTE — TELEPHONE ENCOUNTER
No care due was identified.  [facility] Embedded Care Due Messages. Reference number: [id number].   5/16/2023 12:35:43 PM CDT

## 2023-05-17 RX ORDER — ZOLPIDEM TARTRATE 5 MG/1
TABLET ORAL
Qty: 30 TABLET | Refills: 0 | OUTPATIENT
Start: 2023-05-17

## 2023-05-17 NOTE — TELEPHONE ENCOUNTER
----- Message from Iliana Chinchilla sent at 5/17/2023  8:18 AM CDT -----  Contact: Pt [phone]  Requesting an RX refill or new RX.  Is this a refill or new RX: Refill  RX name and strength (copy/paste from chart):  zolpidem (AMBIEN) 5 MG Tab  Is this a 30 day or 90 day RX: 30  Pharmacy name and phone # (copy/paste from chart):    [facility]/pharmacy #[id number] - [place] - [address]  [address]  [place] [address]  Phone: [phone] Fax: [phone]    Comment:  Patient is out of medication and has an appointment on 5/29/23    Patient is requesting a call back.    Thank You

## 2023-05-22 ENCOUNTER — OFFICE VISIT (OUTPATIENT)
Dept: PRIMARY CARE CLINIC | Facility: CLINIC | Age: 58
End: 2023-05-22
Payer: MEDICARE

## 2023-05-22 DIAGNOSIS — J40 BRONCHITIS: Primary | ICD-10-CM

## 2023-05-22 DIAGNOSIS — J98.01 BRONCHOSPASM: ICD-10-CM

## 2023-05-22 DIAGNOSIS — E87.6 HYPOKALEMIA: ICD-10-CM

## 2023-05-22 DIAGNOSIS — G47.00 INSOMNIA, UNSPECIFIED TYPE: ICD-10-CM

## 2023-05-22 PROCEDURE — 1160F RVW MEDS BY RX/DR IN RCRD: CPT | Mod: CPTII,95,, | Performed by: INTERNAL MEDICINE

## 2023-05-22 PROCEDURE — 4010F PR ACE/ARB THEARPY RXD/TAKEN: ICD-10-PCS | Mod: CPTII,95,, | Performed by: INTERNAL MEDICINE

## 2023-05-22 PROCEDURE — 4010F ACE/ARB THERAPY RXD/TAKEN: CPT | Mod: CPTII,95,, | Performed by: INTERNAL MEDICINE

## 2023-05-22 PROCEDURE — 1159F MED LIST DOCD IN RCRD: CPT | Mod: CPTII,95,, | Performed by: INTERNAL MEDICINE

## 2023-05-22 PROCEDURE — 99213 PR OFFICE/OUTPT VISIT, EST, LEVL III, 20-29 MIN: ICD-10-PCS | Mod: 95,,, | Performed by: INTERNAL MEDICINE

## 2023-05-22 PROCEDURE — 99213 OFFICE O/P EST LOW 20 MIN: CPT | Mod: 95,,, | Performed by: INTERNAL MEDICINE

## 2023-05-22 PROCEDURE — 1160F PR REVIEW ALL MEDS BY PRESCRIBER/CLIN PHARMACIST DOCUMENTED: ICD-10-PCS | Mod: CPTII,95,, | Performed by: INTERNAL MEDICINE

## 2023-05-22 PROCEDURE — 1159F PR MEDICATION LIST DOCUMENTED IN MEDICAL RECORD: ICD-10-PCS | Mod: CPTII,95,, | Performed by: INTERNAL MEDICINE

## 2023-05-22 RX ORDER — POTASSIUM CHLORIDE 750 MG/1
10 CAPSULE, EXTENDED RELEASE ORAL DAILY
Qty: 90 CAPSULE | Refills: 0 | Status: SHIPPED | OUTPATIENT
Start: 2023-05-22 | End: 2023-06-07

## 2023-05-22 RX ORDER — BUDESONIDE AND FORMOTEROL FUMARATE DIHYDRATE 160; 4.5 UG/1; UG/1
2 AEROSOL RESPIRATORY (INHALATION) EVERY 12 HOURS
Qty: 10.2 G | Refills: 11 | Status: SHIPPED | OUTPATIENT
Start: 2023-05-22 | End: 2023-06-06 | Stop reason: SDUPTHER

## 2023-05-22 RX ORDER — ZOLPIDEM TARTRATE 5 MG/1
TABLET ORAL
Qty: 30 TABLET | Refills: 2 | Status: SHIPPED | OUTPATIENT
Start: 2023-05-22 | End: 2023-06-06 | Stop reason: SDUPTHER

## 2023-05-22 NOTE — PROGRESS NOTES
Subjective:    The patient location is: home  The chief complaint leading to consultation is: refill meds    Visit type: audiovisual    Face to Face time with patient: 15   minutes of total time spent on the encounter, which includes face to face time and non-face to face time preparing to see the patient (eg, review of tests), Obtaining and/or reviewing separately obtained history, Documenting clinical information in the electronic or other health record, Independently interpreting results (not separately reported) and communicating results to the patient/family/caregiver, or Care coordination (not separately reported).         Each patient to whom he or she provides medical services by telemedicine is:  (1) informed of the relationship between the physician and patient and the respective role of any other health care provider with respect to management of the patient; and (2) notified that he or she may decline to receive medical services by telemedicine and may withdraw from such care at any time.    Notes:     Patient ID: Trina Marie Collette is a 58 y.o. female.    Chief Complaint: No chief complaint on file.    HPI  pt visit today to refill medications  she ah sh/o insomnia low KCL chronic cough asthma HTN bipolar ds she is doing well physically on medications tolerate well still having insomnia request refill low dose ambien she denies sob cp MINOR no fever chill no wt gain or loss her asthma controlled with inhalers  Review of Systems    Objective:      Physical Exam  Vitals and nursing note reviewed.   Constitutional:       General: She is not in acute distress.     Appearance: She is well-developed.   HENT:      Head: Normocephalic and atraumatic.      Right Ear: External ear normal.      Left Ear: External ear normal.      Nose: Nose normal.      Mouth/Throat:      Pharynx: No oropharyngeal exudate.   Eyes:      Extraocular Movements: Extraocular movements intact.      Conjunctiva/sclera: Conjunctivae  normal.      Pupils: Pupils are equal, round, and reactive to light.   Neck:      Thyroid: No thyromegaly.   Cardiovascular:      Rate and Rhythm: Normal rate and regular rhythm.      Heart sounds: Normal heart sounds. No murmur heard.    No friction rub. No gallop.   Pulmonary:      Effort: Pulmonary effort is normal. No respiratory distress.      Breath sounds: Normal breath sounds. No wheezing.   Abdominal:      General: Bowel sounds are normal. There is no distension.      Palpations: Abdomen is soft.      Tenderness: There is no abdominal tenderness.   Musculoskeletal:         General: No tenderness or deformity. Normal range of motion.      Cervical back: Normal range of motion and neck supple.   Lymphadenopathy:      Cervical: No cervical adenopathy.   Skin:     General: Skin is warm and dry.      Findings: No erythema or rash.   Neurological:      Mental Status: She is alert and oriented to person, place, and time.   Psychiatric:         Mood and Affect: Mood normal.         Thought Content: Thought content normal.         Judgment: Judgment normal.       Assessment:       1. Bronchitis    2. Insomnia, unspecified type    3. Bronchospasm    4. Hypokalemia        Plan:       Bronchitis  -     budesonide-formoterol 160-4.5 mcg (SYMBICORT) 160-4.5 mcg/actuation HFAA; Inhale 2 puffs into the lungs every 12 (twelve) hours. Controller. Rinse mouth after using.  Dispense: 10.2 g; Refill: 11    Insomnia, unspecified type  -     zolpidem (AMBIEN) 5 MG Tab; 1 po Q [facility] p.r.n. sleep  Dispense: 30 tablet; Refill: 2    Bronchospasm  -     budesonide-formoterol 160-4.5 mcg (SYMBICORT) 160-4.5 mcg/actuation HFAA; Inhale 2 puffs into the lungs every 12 (twelve) hours. Controller. Rinse mouth after using.  Dispense: 10.2 g; Refill: 11    Hypokalemia  -     potassium chloride (MICRO-K) 10 MEQ CpSR; Take 1 capsule (10 mEq total) by mouth once daily.  Dispense: 90 capsule; Refill: 0        Medication List with Changes/Refills    New Medications    POTASSIUM CHLORIDE (MICRO-K) 10 MEQ CPSR    Take 1 capsule (10 mEq total) by mouth once daily.   Current Medications    ALBUTEROL (PROVENTIL) 2.5 MG /3 ML (0.083 %) NEBULIZER SOLUTION    TAKE 3 MLS (2.5 MG TOTAL) BY NEBULIZATION EVERY 4 (FOUR) HOURS AS NEEDED FOR WHEEZING OR SHORTNESS OF BREATH. RESCUE    AMLODIPINE (NORVASC) 5 MG TABLET    Take 1 tablet (5 mg total) by mouth once daily.    BETAMETHASONE VALERATE 0.1% (VALISONE) 0.1 % CREA    APPLY TO RASH TWICE A DAY . DO NOT APPLY TO FACE    BUSPIRONE (BUSPAR) 30 MG TAB    TAKE 1 TABLET BY MOUTH 2 TIMES DAILY.    CLOTRIMAZOLE-BETAMETHASONE 1-0.05% (LOTRISONE) CREAM    Apply topically 2 (two) times daily as needed.    CYCLOBENZAPRINE (FLEXERIL) 10 MG TABLET    One p.o. b.i.d. may increase to q.8 hours if needed    DIPHENOXYLATE-ATROPINE 2.5-0.025 MG (LOMOTIL) 2.5-0.025 MG PER TABLET    TAKE 1 TAB BY MOUTH AFTER EACH LOOSE BOWEL MOVEMENT PER DAY    FAMOTIDINE (PEPCID) 20 MG TABLET    Take 1 tablet (20 mg total) by mouth 2 (two) times daily.    FLUORIDE, SODIUM, (PREVIDENT 5000) 1.1 % PSTE    Take by mouth.    FUROSEMIDE (LASIX) 40 MG TABLET    TAKE 1 TABLET BY MOUTH ONCE DAILY FOR 5 DAYS    GABAPENTIN (NEURONTIN) 800 MG TABLET    TAKE 1 TABLET BY MOUTH THREE TIMES A DAY    HYDROCODONE-ACETAMINOPHEN (NORCO)  MG PER TABLET    Take 1 tablet by mouth every 8 (eight) hours as needed.     HYDROXYZINE PAMOATE (VISTARIL) 25 MG CAP    TAKE 1 CAPSULE BY MOUTH EVERY 6 HOURS AS NEEDED FOR NAUSEA/VOMITING/CRAMPS/ OR ITCH    IPRATROPIUM (ATROVENT) 21 MCG (0.03 %) NASAL SPRAY    2 sprays by Nasal route 2 (two) times daily. May be use more often if needed    LEVOCETIRIZINE (XYZAL) 5 MG TABLET    TAKE 1 TABLET BY MOUTH EVERY DAY IN THE EVENING    LOSARTAN (COZAAR) 100 MG TABLET    Take 1 tablet (100 mg total) by mouth once daily.    MELOXICAM (MOBIC) 15 MG TABLET    START AFTER MEDROL DOSEPAK COMPLETE MOBIC 7.5 MG BY MOUTH TWICE A DAY AS NEEDED FOR  SWELLING OR PAIN NO OTHER NSAIDS CAN TAKE WITH TYLENOL    MONTELUKAST (SINGULAIR) 10 MG TABLET    Take 1 tablet (10 mg total) by mouth once daily.    MUPIROCIN (BACTROBAN) 2 % OINTMENT    Apply to nose 3 times daily on a Q-tip for crusting or sores inside the nose    PANTOPRAZOLE (PROTONIX) 40 MG TABLET    TAKE 1 TABLET BY MOUTH EVERY DAY    PREDNISONE (DELTASONE) 5 MG TABLET    4 po qd x 2, 3 po qd x2, 2 po qd x2, 1 po qd x2    ROSUVASTATIN (CRESTOR) 5 MG TABLET    Take 1 tablet (5 mg total) by mouth once daily.    SERTRALINE (ZOLOFT) 100 MG TABLET    TAKE 1 TABLET BY MOUTH EVERY DAY    TIZANIDINE (ZANAFLEX) 4 MG TABLET    Take 1 tablet (4 mg total) by mouth every 8 (eight) hours.    TRAZODONE (DESYREL) 100 MG TABLET    TAKE 1 TABLET BY MOUTH NIGHTLY AS NEEDED FOR INSOMNIA.    TRIAMCINOLONE ACETONIDE 0.1% (KENALOG) 0.1 % CREAM    APPLY TO AFFECTED AREA TWICE A DAY    ZIPRASIDONE (GEODON) 60 MG CAP    Take 1 capsule (60 mg total) by mouth 2 (two) times daily.    ZIPRASIDONE (GEODON) 80 MG CAPSULE    TAKE 1 CAPSULE BY MOUTH ONCE DAILY.   Changed and/or Refilled Medications    Modified Medication Previous Medication    BUDESONIDE-FORMOTEROL 160-4.5 MCG (SYMBICORT) 160-4.5 MCG/ACTUATION HFAA budesonide-formoterol 160-4.5 mcg (SYMBICORT) 160-4.5 mcg/actuation HFAA       Inhale 2 puffs into the lungs every 12 (twelve) hours. Controller. Rinse mouth after using.    INHALE 2 PUFFS INTO THE LUNGS EVERY 12 (TWELVE) HOURS. CONTROLLER. RINSE MOUTH AFTER USING.    ZOLPIDEM (AMBIEN) 5 MG TAB zolpidem (AMBIEN) 5 MG Tab       1 po Q [facility] p.r.n. sleep    1 po Q [facility] p.r.n. sleep

## 2023-05-23 ENCOUNTER — PATIENT OUTREACH (OUTPATIENT)
Dept: ADMINISTRATIVE | Facility: HOSPITAL | Age: 58
End: 2023-05-23
Payer: MEDICARE

## 2023-05-23 NOTE — PROGRESS NOTES
Health Maintenance Due   Topic Date Due    Pneumococcal Vaccines (Age 0-64) (1 - PCV) Never done    TETANUS VACCINE  Never done    Cervical Cancer Screening  06/20/2014    Shingles Vaccine (1 of 2) Never done        Chart review done.    updated.   Immunizations reviewed & updated.   Care Everywhere updated.

## 2023-06-06 ENCOUNTER — OFFICE VISIT (OUTPATIENT)
Dept: PRIMARY CARE CLINIC | Facility: CLINIC | Age: 58
End: 2023-06-06
Payer: MEDICARE

## 2023-06-06 VITALS
WEIGHT: 237.88 LBS | HEIGHT: 66 IN | RESPIRATION RATE: 16 BRPM | OXYGEN SATURATION: 98 % | SYSTOLIC BLOOD PRESSURE: 122 MMHG | BODY MASS INDEX: 38.23 KG/M2 | DIASTOLIC BLOOD PRESSURE: 78 MMHG | HEART RATE: 73 BPM

## 2023-06-06 DIAGNOSIS — G89.29 CHRONIC PAIN OF LEFT KNEE: Primary | ICD-10-CM

## 2023-06-06 DIAGNOSIS — J98.01 BRONCHOSPASM: ICD-10-CM

## 2023-06-06 DIAGNOSIS — J45.21 MILD INTERMITTENT ASTHMA WITH ACUTE EXACERBATION: ICD-10-CM

## 2023-06-06 DIAGNOSIS — M54.50 LUMBAR SPINE PAIN: ICD-10-CM

## 2023-06-06 DIAGNOSIS — M25.562 CHRONIC PAIN OF LEFT KNEE: Primary | ICD-10-CM

## 2023-06-06 DIAGNOSIS — L30.9 ECZEMA, UNSPECIFIED TYPE: ICD-10-CM

## 2023-06-06 DIAGNOSIS — E55.9 VITAMIN D DEFICIENCY: ICD-10-CM

## 2023-06-06 DIAGNOSIS — R05.3 CHRONIC COUGH: ICD-10-CM

## 2023-06-06 DIAGNOSIS — R13.10 DYSPHAGIA, UNSPECIFIED TYPE: ICD-10-CM

## 2023-06-06 DIAGNOSIS — I10 ESSENTIAL HYPERTENSION: ICD-10-CM

## 2023-06-06 DIAGNOSIS — E07.9 THYROID MASS: ICD-10-CM

## 2023-06-06 DIAGNOSIS — Z72.0 TOBACCO ABUSE: ICD-10-CM

## 2023-06-06 DIAGNOSIS — J40 BRONCHITIS: ICD-10-CM

## 2023-06-06 DIAGNOSIS — S39.012D LUMBOSACRAL STRAIN, SUBSEQUENT ENCOUNTER: ICD-10-CM

## 2023-06-06 DIAGNOSIS — H93.19 TINNITUS, UNSPECIFIED LATERALITY: ICD-10-CM

## 2023-06-06 DIAGNOSIS — E87.6 HYPOKALEMIA: ICD-10-CM

## 2023-06-06 DIAGNOSIS — E78.5 HYPERLIPIDEMIA, UNSPECIFIED HYPERLIPIDEMIA TYPE: ICD-10-CM

## 2023-06-06 DIAGNOSIS — G89.4 CHRONIC PAIN SYNDROME: ICD-10-CM

## 2023-06-06 DIAGNOSIS — F31.9 BIPOLAR 1 DISORDER: ICD-10-CM

## 2023-06-06 DIAGNOSIS — G47.00 INSOMNIA, UNSPECIFIED TYPE: ICD-10-CM

## 2023-06-06 DIAGNOSIS — D37.05 NEOPLASM OF UNCERTAIN BEHAVIOR OF NASOPHARYNX: ICD-10-CM

## 2023-06-06 DIAGNOSIS — I10 HYPERTENSION: ICD-10-CM

## 2023-06-06 DIAGNOSIS — M71.22 SYNOVIAL CYST OF LEFT POPLITEAL SPACE: ICD-10-CM

## 2023-06-06 DIAGNOSIS — J44.9 CHRONIC OBSTRUCTIVE PULMONARY DISEASE, UNSPECIFIED COPD TYPE: ICD-10-CM

## 2023-06-06 DIAGNOSIS — E66.9 OBESITY (BMI 35.0-39.9 WITHOUT COMORBIDITY): ICD-10-CM

## 2023-06-06 PROCEDURE — 3078F DIAST BP <80 MM HG: CPT | Mod: CPTII,S$GLB,, | Performed by: FAMILY MEDICINE

## 2023-06-06 PROCEDURE — 3008F BODY MASS INDEX DOCD: CPT | Mod: CPTII,S$GLB,, | Performed by: FAMILY MEDICINE

## 2023-06-06 PROCEDURE — 99499 UNLISTED E&M SERVICE: CPT | Mod: HCNC,S$GLB,, | Performed by: FAMILY MEDICINE

## 2023-06-06 PROCEDURE — 99214 PR OFFICE/OUTPT VISIT, EST, LEVL IV, 30-39 MIN: ICD-10-PCS | Mod: S$GLB,,, | Performed by: FAMILY MEDICINE

## 2023-06-06 PROCEDURE — 3078F PR MOST RECENT DIASTOLIC BLOOD PRESSURE < 80 MM HG: ICD-10-PCS | Mod: CPTII,S$GLB,, | Performed by: FAMILY MEDICINE

## 2023-06-06 PROCEDURE — 4010F PR ACE/ARB THEARPY RXD/TAKEN: ICD-10-PCS | Mod: CPTII,S$GLB,, | Performed by: FAMILY MEDICINE

## 2023-06-06 PROCEDURE — 3008F PR BODY MASS INDEX (BMI) DOCUMENTED: ICD-10-PCS | Mod: CPTII,S$GLB,, | Performed by: FAMILY MEDICINE

## 2023-06-06 PROCEDURE — 3074F SYST BP LT 130 MM HG: CPT | Mod: CPTII,S$GLB,, | Performed by: FAMILY MEDICINE

## 2023-06-06 PROCEDURE — 1159F PR MEDICATION LIST DOCUMENTED IN MEDICAL RECORD: ICD-10-PCS | Mod: CPTII,S$GLB,, | Performed by: FAMILY MEDICINE

## 2023-06-06 PROCEDURE — 4010F ACE/ARB THERAPY RXD/TAKEN: CPT | Mod: CPTII,S$GLB,, | Performed by: FAMILY MEDICINE

## 2023-06-06 PROCEDURE — 3074F PR MOST RECENT SYSTOLIC BLOOD PRESSURE < 130 MM HG: ICD-10-PCS | Mod: CPTII,S$GLB,, | Performed by: FAMILY MEDICINE

## 2023-06-06 PROCEDURE — 99999 PR PBB SHADOW E&M-EST. PATIENT-LVL III: CPT | Mod: PBBFAC,,, | Performed by: FAMILY MEDICINE

## 2023-06-06 PROCEDURE — 99214 OFFICE O/P EST MOD 30 MIN: CPT | Mod: S$GLB,,, | Performed by: FAMILY MEDICINE

## 2023-06-06 PROCEDURE — 1159F MED LIST DOCD IN RCRD: CPT | Mod: CPTII,S$GLB,, | Performed by: FAMILY MEDICINE

## 2023-06-06 PROCEDURE — 99499 RISK ADDL DX/OHS AUDIT: ICD-10-PCS | Mod: HCNC,S$GLB,, | Performed by: FAMILY MEDICINE

## 2023-06-06 PROCEDURE — 99999 PR PBB SHADOW E&M-EST. PATIENT-LVL III: ICD-10-PCS | Mod: PBBFAC,,, | Performed by: FAMILY MEDICINE

## 2023-06-06 RX ORDER — NAPROXEN 375 MG/1
1 TABLET ORAL 2 TIMES DAILY
COMMUNITY
Start: 2023-05-05 | End: 2023-06-06 | Stop reason: SDUPTHER

## 2023-06-06 RX ORDER — TRIAMCINOLONE ACETONIDE 1 MG/G
CREAM TOPICAL 2 TIMES DAILY
Qty: 80 G | Refills: 1 | Status: SHIPPED | OUTPATIENT
Start: 2023-06-06 | End: 2023-08-30

## 2023-06-06 RX ORDER — BUDESONIDE AND FORMOTEROL FUMARATE DIHYDRATE 160; 4.5 UG/1; UG/1
2 AEROSOL RESPIRATORY (INHALATION) EVERY 12 HOURS
Qty: 10.2 G | Refills: 11 | Status: SHIPPED | OUTPATIENT
Start: 2023-06-06 | End: 2023-12-06 | Stop reason: SDUPTHER

## 2023-06-06 RX ORDER — DIPHENOXYLATE HYDROCHLORIDE AND ATROPINE SULFATE 2.5; .025 MG/1; MG/1
TABLET ORAL
Qty: 30 TABLET | Refills: 2 | Status: SHIPPED | OUTPATIENT
Start: 2023-06-06 | End: 2023-12-06 | Stop reason: SDUPTHER

## 2023-06-06 RX ORDER — LOSARTAN POTASSIUM 100 MG/1
100 TABLET ORAL DAILY
Qty: 90 TABLET | Refills: 3 | Status: SHIPPED | OUTPATIENT
Start: 2023-06-06 | End: 2023-12-06 | Stop reason: SDUPTHER

## 2023-06-06 RX ORDER — ALBUTEROL SULFATE 0.83 MG/ML
2.5 SOLUTION RESPIRATORY (INHALATION) EVERY 4 HOURS PRN
Qty: 1080 ML | Refills: 3 | Status: SHIPPED | OUTPATIENT
Start: 2023-06-06 | End: 2023-12-06 | Stop reason: SDUPTHER

## 2023-06-06 RX ORDER — AMLODIPINE BESYLATE 2.5 MG/1
TABLET ORAL
Qty: 90 TABLET | Refills: 1 | OUTPATIENT
Start: 2023-06-06

## 2023-06-06 RX ORDER — CYCLOBENZAPRINE HCL 10 MG
TABLET ORAL
Qty: 60 TABLET | Refills: 5 | Status: SHIPPED | OUTPATIENT
Start: 2023-06-06 | End: 2024-01-29

## 2023-06-06 RX ORDER — ZIPRASIDONE HYDROCHLORIDE 80 MG/1
80 CAPSULE ORAL DAILY
Qty: 30 CAPSULE | Refills: 5 | Status: SHIPPED | OUTPATIENT
Start: 2023-06-06

## 2023-06-06 RX ORDER — BETAMETHASONE VALERATE 1.2 MG/G
CREAM TOPICAL
Qty: 180 G | Refills: 0 | Status: SHIPPED | OUTPATIENT
Start: 2023-06-06 | End: 2023-09-18

## 2023-06-06 RX ORDER — MONTELUKAST SODIUM 10 MG/1
10 TABLET ORAL DAILY
Qty: 90 TABLET | Refills: 1 | Status: SHIPPED | OUTPATIENT
Start: 2023-06-06 | End: 2023-09-18 | Stop reason: SDUPTHER

## 2023-06-06 RX ORDER — LEVOCETIRIZINE DIHYDROCHLORIDE 5 MG/1
TABLET, FILM COATED ORAL
Qty: 90 TABLET | Refills: 3 | Status: SHIPPED | OUTPATIENT
Start: 2023-06-06 | End: 2023-12-06 | Stop reason: SDUPTHER

## 2023-06-06 RX ORDER — SERTRALINE HYDROCHLORIDE 100 MG/1
100 TABLET, FILM COATED ORAL DAILY
Qty: 90 TABLET | Refills: 3 | Status: SHIPPED | OUTPATIENT
Start: 2023-06-06 | End: 2023-10-22 | Stop reason: SDUPTHER

## 2023-06-06 RX ORDER — AMLODIPINE BESYLATE 5 MG/1
5 TABLET ORAL DAILY
Qty: 30 TABLET | Refills: 11 | Status: SHIPPED | OUTPATIENT
Start: 2023-06-06 | End: 2023-10-06 | Stop reason: SDUPTHER

## 2023-06-06 RX ORDER — MELOXICAM 15 MG/1
TABLET ORAL
Qty: 30 TABLET | Refills: 5 | Status: SHIPPED | OUTPATIENT
Start: 2023-06-06 | End: 2023-12-06 | Stop reason: SDUPTHER

## 2023-06-06 RX ORDER — IPRATROPIUM BROMIDE 21 UG/1
2 SPRAY, METERED NASAL 2 TIMES DAILY
Qty: 30 ML | Refills: 5 | Status: SHIPPED | OUTPATIENT
Start: 2023-06-06 | End: 2023-09-10

## 2023-06-06 RX ORDER — FAMOTIDINE 20 MG/1
20 TABLET, FILM COATED ORAL 2 TIMES DAILY
Qty: 180 TABLET | Refills: 3 | Status: SHIPPED | OUTPATIENT
Start: 2023-06-06 | End: 2023-12-06 | Stop reason: SDUPTHER

## 2023-06-06 RX ORDER — TIZANIDINE 4 MG/1
4 TABLET ORAL EVERY 8 HOURS
Qty: 30 TABLET | Refills: 2 | Status: SHIPPED | OUTPATIENT
Start: 2023-06-06 | End: 2024-01-29 | Stop reason: SDUPTHER

## 2023-06-06 RX ORDER — ZIPRASIDONE HYDROCHLORIDE 60 MG/1
60 CAPSULE ORAL 2 TIMES DAILY
Qty: 30 CAPSULE | Refills: 5 | Status: SHIPPED | OUTPATIENT
Start: 2023-06-06 | End: 2023-12-06 | Stop reason: SDUPTHER

## 2023-06-06 RX ORDER — TRAZODONE HYDROCHLORIDE 100 MG/1
100 TABLET ORAL NIGHTLY
Qty: 90 TABLET | Refills: 3 | Status: SHIPPED | OUTPATIENT
Start: 2023-06-06 | End: 2023-12-06 | Stop reason: SDUPTHER

## 2023-06-06 RX ORDER — ROSUVASTATIN CALCIUM 5 MG/1
5 TABLET, COATED ORAL DAILY
Qty: 90 TABLET | Refills: 1 | Status: SHIPPED | OUTPATIENT
Start: 2023-06-06 | End: 2023-12-06 | Stop reason: SDUPTHER

## 2023-06-06 RX ORDER — PANTOPRAZOLE SODIUM 40 MG/1
TABLET, DELAYED RELEASE ORAL
Qty: 90 TABLET | Refills: 3 | Status: SHIPPED | OUTPATIENT
Start: 2023-06-06 | End: 2023-10-23 | Stop reason: SDUPTHER

## 2023-06-06 RX ORDER — ZOLPIDEM TARTRATE 5 MG/1
TABLET ORAL
Qty: 30 TABLET | Refills: 2 | Status: SHIPPED | OUTPATIENT
Start: 2023-06-06 | End: 2023-12-06 | Stop reason: SDUPTHER

## 2023-06-06 RX ORDER — MUPIROCIN 20 MG/G
OINTMENT TOPICAL
Qty: 22 G | Refills: 3 | Status: SHIPPED | OUTPATIENT
Start: 2023-06-06 | End: 2023-09-18 | Stop reason: SDUPTHER

## 2023-06-06 RX ORDER — FUROSEMIDE 40 MG/1
40 TABLET ORAL DAILY
Qty: 90 TABLET | Refills: 2 | Status: SHIPPED | OUTPATIENT
Start: 2023-06-06 | End: 2023-12-06 | Stop reason: SDUPTHER

## 2023-06-06 RX ORDER — NAPROXEN 375 MG/1
375 TABLET ORAL 2 TIMES DAILY WITH MEALS
Qty: 100 TABLET | Refills: 5 | Status: SHIPPED | OUTPATIENT
Start: 2023-06-06

## 2023-06-06 RX ORDER — BUSPIRONE HYDROCHLORIDE 30 MG/1
30 TABLET ORAL 2 TIMES DAILY
Qty: 180 TABLET | Refills: 1 | Status: SHIPPED | OUTPATIENT
Start: 2023-06-06 | End: 2023-12-06 | Stop reason: SDUPTHER

## 2023-06-06 RX ORDER — CLOTRIMAZOLE AND BETAMETHASONE DIPROPIONATE 10; .64 MG/G; MG/G
CREAM TOPICAL 2 TIMES DAILY PRN
Qty: 15 G | Refills: 1 | Status: SHIPPED | OUTPATIENT
Start: 2023-06-06

## 2023-06-06 NOTE — TELEPHONE ENCOUNTER
No care due was identified.  [facility] Embedded Care Due Messages. Reference number: [id number].   6/06/2023 11:12:04 AM CDT

## 2023-06-06 NOTE — TELEPHONE ENCOUNTER
Refill Routing Note   Medication(s) are not appropriate for processing by [facility] for the following reason(s):      New or recently adjusted medication  No active prescription written by PCP    [facility] action(s):  Defer None identified            Appointments  past 12m or future 3m with PCP    Date Provider   Last Visit   2/22/2023 Davon Brock MD   Next Visit   6/6/2023 Davon Brock MD   ED visits in past 90 days: 0        Note composed:4:11 PM 06/06/2023

## 2023-06-06 NOTE — PROGRESS NOTES
Subjective:       Patient ID: Trina Marie Collette is a 58 y.o. female.    Chief Complaint:   HPI:  58-year-old black female in for low back pain left knee pain and medication refills  Pt still suffering with left leg--walks with a cane at this point occasionally not able walk at all--has appt with DR [facility] --orthopedist. Appt June 13th .  Had MRI knee no one able relieve symptoms. Knee cracking ?? Bone on bone.  History Baker cyst in the popliteal area-- Lower back pain x years   Here mainly for refills. On Norco 10, Naprosyn, gabapentin for back and knee pain--still on excruciating pain.  Patient thinks knee was injected once in the past  HTN amlodipine losartan  History hyperlipidemia on Crestor  History GERD on Protonix and Pepcid--has problems swallowing   MRI of the left knee shows chondromalacia with subchondral cyst versus low-grade osteochondral defect in the medial trochle periods 4 cm popliteal cyst containing debris.  Small joint effusion with suprapatellar plica MRI done 11/04/2022                 ROS-  Skin: no psoriasis, eczema, skin cancer--rash abd due belt buckel  HEENT+ headache, no ocular pain, blurred vision, diplopia, epistaxis, hoarseness change in voice, thyroid trouble--history of tinnitus/chronic sinus problem/nasal septal deviation/hearing loss has hearing aid  Lung: No pneumonia, asthma, Tb, wheezing, SOB, smoking 1/4 ppd trying to quit  --history of bronchospasm in the past uses albuterol--had chest x-ray which is normal  Heart: No chest pain, ankle edema, +occas palpitations, MI, river murmur, +hypertension blood pressure   + hyperlipidemia-no stent bypass arrhythmia  Abdomen: No nausea, vomiting, diarrhea, constipation, ulcers, hepatitis, gallbladder disease, melena, hematochezia, hematemesis patient complaining of heartburn had EGD colonoscopy hx diarrhea immodium Hx Gerd on protonix comes and goes  : no UTI, renal disease, stones-  GYN last menstrual.  Years  ago has mammogram yearly--refuses PAP smear --I don't fool with that    MS: no fractures, O/A, lupus, rheumatoid, gout--history of chronic pain the back lumbar spine, right knee  right shoulder, history DDD lumbar history cervical spinal stenosis  Neuro: No dizziness, LOC, seizures   No diabetes, no anemia, no anxiety, no depression patient with history of bipolar schizophrenia--doing well with medication goes to Mental Health   Single, one child, disabled, lives alone     Objective:   Physical Exam:    General: Well nourished, well developed, no acute distress + morbid obesity  Skin: No lesions  HEENT--eyes PERRLA EOM intact nose clear , throat nonerythematous ears TMs clear  NECK: Supple, no bruits, No JVD, no nodes  Lungs: Clear, no rales, rhonchi, wheezing   Heart: Regular rate and rhythm, no murmurs, gallops, or rubs  Abdomen: flat, bowel sounds positive, no tenderness, or organomegaly  MS:  pain left leg griselda though bilaterally --gets swelling knee medial and lateral aspect --pain with ambulation squatting and arising getting up on exam table  Neuro: Alert, CN intact, oriented X 3 still with some anxiety and depression of issues--Romberg's negative heel-toe intact  Extremities: No cyanosis, clubbing, or edema         Assessment:       1. Chronic pain of left knee    2. Chronic cough    3. Bronchitis    4. Bronchospasm    5. Hypertension    6. Hyperlipidemia, unspecified hyperlipidemia type    7. Lumbar spine pain    8. Tinnitus, unspecified laterality    9. Thyroid mass    10. Insomnia, unspecified type    11. Lumbosacral strain, subsequent encounter    12. Synovial cyst of left popliteal space    13. Dysphagia, unspecified type    14. Vitamin D deficiency    15. 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 Essential hypertension    18. Mild intermittent asthma with acute exacerbation    19. Chronic obstructive pulmonary disease, unspecified COPD type     20. Bipolar 1 disorder    21. Chronic pain syndrome    22. Eczema, unspecified type    23. Tobacco abuse              Plan:       Chronic pain of left knee    Chronic cough  -     albuterol (PROVENTIL) 2.5 mg /3 mL (0.083 %) nebulizer solution; Take 3 mLs (2.5 mg total) by nebulization every 4 (four) hours as needed for Wheezing or Shortness of Breath. Rescue  Dispense: 1080 mL; Refill: 3    Bronchitis  -     albuterol (PROVENTIL) 2.5 mg /3 mL (0.083 %) nebulizer solution; Take 3 mLs (2.5 mg total) by nebulization every 4 (four) hours as needed for Wheezing or Shortness of Breath. Rescue  Dispense: 1080 mL; Refill: 3  -     budesonide-formoterol 160-4.5 mcg (SYMBICORT) 160-4.5 mcg/actuation HFAA; Inhale 2 puffs into the lungs every 12 (twelve) hours. Controller. Rinse mouth after using.  Dispense: 10.2 g; Refill: 11    Bronchospasm  -     budesonide-formoterol 160-4.5 mcg (SYMBICORT) 160-4.5 mcg/actuation HFAA; Inhale 2 puffs into the lungs every 12 (twelve) hours. Controller. Rinse mouth after using.  Dispense: 10.2 g; Refill: 11    Hypertension  -     furosemide (LASIX) 40 MG tablet; Take 1 tablet (40 mg total) by mouth once daily. for five days  Dispense: 90 tablet; Refill: 2  -     CBC Auto Differential; Future; Expected date: 06/06/2023  -     Comprehensive Metabolic Panel; Future; Expected date: 06/06/2023  -     TSH; Future; Expected date: 06/06/2023    Hyperlipidemia, unspecified hyperlipidemia type  -     rosuvastatin (CRESTOR) 5 MG tablet; Take 1 tablet (5 mg total) by mouth once daily.  Dispense: 90 tablet; Refill: 1  -     Lipid Panel; Future; Expected date: 06/06/2023    Lumbar spine pain  -     rosuvastatin (CRESTOR) 5 MG tablet; Take 1 tablet (5 mg total) by mouth once daily.  Dispense: 90 tablet; Refill: 1    Tinnitus, unspecified laterality  -     rosuvastatin (CRESTOR) 5 MG tablet; Take 1 tablet (5 mg total) by mouth once daily.  Dispense: 90 tablet; Refill: 1    Thyroid mass  -     rosuvastatin  (CRESTOR) 5 MG tablet; Take 1 tablet (5 mg total) by mouth once daily.  Dispense: 90 tablet; Refill: 1    Insomnia, unspecified type  -     zolpidem (AMBIEN) 5 MG Tab; 1 po Q [facility] p.r.n. sleep  Dispense: 30 tablet; Refill: 2    Lumbosacral strain, subsequent encounter    Synovial cyst of left popliteal space    Dysphagia, unspecified type  -     Ambulatory referral/consult to Gastroenterology; Future; Expected date: 06/13/2023    Vitamin D deficiency  -     Vitamin D; Future; Expected date: 06/06/2023    Obesity (BMI 35.0-39.9 without comorbidity)    Neoplasm of uncertain behavior of nasopharynx    Essential hypertension    Mild intermittent asthma with acute exacerbation    Chronic obstructive pulmonary disease, unspecified COPD type    Bipolar 1 disorder    Chronic pain syndrome    Eczema, unspecified type    Tobacco abuse    Other orders  -     amLODIPine (NORVASC) 5 MG tablet; Take 1 tablet (5 mg total) by mouth once daily.  Dispense: 30 tablet; Refill: 11  -     betamethasone valerate 0.1% (VALISONE) 0.1 % Crea; Apply area bid do not apply to face  Dispense: 180 g; Refill: 0  -     busPIRone (BUSPAR) 30 MG Tab; Take 1 tablet (30 mg total) by mouth 2 (two) times daily.  Dispense: 180 tablet; Refill: 1  -     clotrimazole-betamethasone 1-0.05% (LOTRISONE) cream; Apply topically 2 (two) times daily as needed.  Dispense: 15 g; Refill: 1  -     cyclobenzaprine (FLEXERIL) 10 MG tablet; One p.o. b.i.d. may increase to q.8 hours if needed  Dispense: 60 tablet; Refill: 5  -     diphenoxylate-atropine 2.5-0.025 mg (LOMOTIL) 2.5-0.025 mg per tablet; TAKE 1 TAB BY MOUTH AFTER EACH LOOSE BOWEL MOVEMENT PER DAY  Dispense: 30 tablet; Refill: 2  -     famotidine (PEPCID) 20 MG tablet; Take 1 tablet (20 mg total) by mouth 2 (two) times daily.  Dispense: 180 tablet; Refill: 3  -     losartan (COZAAR) 100 MG tablet; Take 1 tablet (100 mg total) by mouth once daily.  Dispense: 90 tablet; Refill: 3  -     ipratropium (ATROVENT) 21  mcg (0.03 %) nasal spray; 2 sprays by Each Nostril route 2 (two) times daily. May be use more often if needed  Dispense: 30 mL; Refill: 5  -     meloxicam (MOBIC) 15 MG tablet; START AFTER MEDROL DOSEPAK COMPLETE MOBIC 7.5 MG BY MOUTH TWICE A DAY AS NEEDED FOR SWELLING OR PAIN NO OTHER NSAIDS CAN TAKE WITH TYLENOL  Dispense: 30 tablet; Refill: 5  -     montelukast (SINGULAIR) 10 mg tablet; Take 1 tablet (10 mg total) by mouth once daily.  Dispense: 90 tablet; Refill: 1  -     mupirocin (BACTROBAN) 2 % ointment; Apply to nose 3 times daily on a Q-tip for crusting or sores inside the nose  Dispense: 22 g; Refill: 3  -     naproxen (NAPROSYN) 375 MG tablet; Take 1 tablet (375 mg total) by mouth 2 (two) times daily with meals.  Dispense: 100 tablet; Refill: 5  -     sertraline (ZOLOFT) 100 MG tablet; Take 1 tablet (100 mg total) by mouth once daily.  Dispense: 90 tablet; Refill: 3  -     tiZANidine (ZANAFLEX) 4 MG tablet; Take 1 tablet (4 mg total) by mouth every 8 (eight) hours.  Dispense: 30 tablet; Refill: 2  -     traZODone (DESYREL) 100 MG tablet; Take 1 tablet (100 mg total) by mouth every evening.  Dispense: 90 tablet; Refill: 3  -     triamcinolone acetonide 0.1% (KENALOG) 0.1 % cream; Apply topically 2 (two) times daily. Apply to affected area  Dispense: 80 g; Refill: 1  -     ziprasidone (GEODON) 60 MG Cap; Take 1 capsule (60 mg total) by mouth 2 (two) times daily.  Dispense: 30 capsule; Refill: 5  -     ziprasidone (GEODON) 80 MG capsule; Take 1 capsule (80 mg total) by mouth once daily.  Dispense: 30 capsule; Refill: 5              Main Reason here    Chronic pain syndrome--bilateral leg pain--left much greater than right---saw orthopedist--no treatment given--patient is on Norco 10 from pain clinic/Mobic/Flexeril/gabapentin 800 t.i.d.--pain mainly in back and left leg--has appt Dr Narayan June 13th --MRI results chondromalacia with subchondral cysts verse low-grade osteochondral defect in the medial trochlea.   4 cm popliteal cyst with debris.  Small joint effusion with suprapatellar plica  Back pain x years unchanged   Hypertension patient on Cozaar 100 Lasix 40 amlodipine 5 mg blood pressure 122/78  Peripheral edema told can increase Lasix from 40 mg to 40 in the morning 20 afternoon if needed  Sees Dr. Senior for pain medication--DDD cervical and lumbar spine  Hyperlipidemia on Crestor  Vitamin D deficiency on vitamin-D  COPD on Symbicort and albuterol  Anxiety depression bipolar schizophrenia on Ambien Ativan  Zoloft trazodone Geodon--goes to mental health  Lab cbc cmp lipid TSH sed rate   Health maintenance Pap smear

## 2023-06-06 NOTE — TELEPHONE ENCOUNTER
Refill Decision Note   Trina Collette  is requesting a refill authorization.  Brief Assessment and Rationale for Refill:  Approve  Quick Discontinue     Medication Therapy Plan:  Amlodipine 2.5 mg  discontinued on 2/22/2023 by Davon Brock MD- dose adjustment      Alert overridden per protocol: Yes   Pharmacist review requested: Yes   Comments:     No Care Gaps recommended.     Note composed:5:01 PM 06/06/2023

## 2023-06-06 NOTE — TELEPHONE ENCOUNTER
No care due was identified.  [facility] Embedded Care Due Messages. Reference number: [id number].   6/06/2023 11:12:46 AM CDT

## 2023-06-06 NOTE — TELEPHONE ENCOUNTER
Refill Routing Note   Medication(s) are not appropriate for processing by [facility] for the following reason(s):       Drug-drug interaction : hydrOXYzine pamoate, levocetirizine    [facility] action(s):  Approve  Quick Discontinue  Defer      Medication Therapy Plan: Amlodipine 2.5 mg  discontinued on 2/22/2023 by Davon Brock MD- dose adjustment    Appointments  past 12m or future 3m with PCP    Date Provider   Last Visit   2/22/2023 Davon Brock MD   Next Visit   6/6/2023 Davon Brock MD   ED visits in past 90 days: 0        Note composed:4:15 PM 06/06/2023

## 2023-06-07 RX ORDER — POTASSIUM CHLORIDE 750 MG/1
CAPSULE, EXTENDED RELEASE ORAL
Qty: 90 CAPSULE | Refills: 3 | Status: SHIPPED | OUTPATIENT
Start: 2023-06-07 | End: 2023-10-22 | Stop reason: SDUPTHER

## 2023-06-09 ENCOUNTER — TELEPHONE (OUTPATIENT)
Dept: SPORTS MEDICINE | Facility: CLINIC | Age: 58
End: 2023-06-09
Payer: MEDICARE

## 2023-07-04 RX ORDER — AMLODIPINE BESYLATE 2.5 MG/1
TABLET ORAL
Qty: 90 TABLET | Refills: 1 | OUTPATIENT
Start: 2023-07-04

## 2023-07-04 NOTE — TELEPHONE ENCOUNTER
Provider Staff:  Action required for this patient     Please see care gap opportunities below in Care Due Message.    Thanks!  [facility]     Appointments      Date Provider   Last Visit   6/6/2023 Davon Brock MD   Next Visit   12/6/2023 Davon Brock MD     Refill Decision Note   Trina Collette  is requesting a refill authorization.  Brief Assessment and Rationale for Refill:  Quick Discontinue     Medication Therapy Plan:  E-Prescribing Status: Receipt confirmed by pharmacy (6/6/2023  2:39 PM CDT); 5 MG      Comments:     Note composed:12:26 PM 07/04/2023             Appointments     Last Visit   6/6/2023 Davon Brock MD   Next Visit   12/6/2023 Davon Brock MD           Appointments     Last Visit   6/6/2023 Davon Brock MD   Next Visit   12/6/2023 Davon Brock MD

## 2023-07-04 NOTE — TELEPHONE ENCOUNTER
Care Due:                  Date            Visit Type   Department     Provider  --------------------------------------------------------------------------------                                EP -                              [facility]      [facility]  Last Visit: 06-      CARE ([facility])   PRIMARY CARE   Davon Brock                              [facility]                              PRIMARY      [facility]  Next Visit: 12-      CARE ([facility])   PRIMARY CARE   Davon Brock                                                            Last  Test          Frequency    Reason                     Performed    Due Date  --------------------------------------------------------------------------------    Lipid Panel.  12 months..  rosuvastatin.............  04- 04-    [facility] Embedded Care Due Messages. Reference number: [id number].   7/04/2023 12:25:29 PM CDT

## 2023-07-25 RX ORDER — HYDROXYZINE PAMOATE 25 MG/1
CAPSULE ORAL
Qty: 360 CAPSULE | Refills: 0 | Status: SHIPPED | OUTPATIENT
Start: 2023-07-25 | End: 2023-12-06 | Stop reason: SDUPTHER

## 2023-08-02 ENCOUNTER — TELEPHONE (OUTPATIENT)
Dept: PRIMARY CARE CLINIC | Facility: CLINIC | Age: 58
End: 2023-08-02
Payer: MEDICARE

## 2023-08-02 NOTE — TELEPHONE ENCOUNTER
Called patient in regards to message. Patient said that she wanted to schedule an appointment with [facility] instead of [facility]. I informed the patient I was unaware as to how [facility] received the information for scheduling. Though, I will have someone call her for scheduling.

## 2023-08-02 NOTE — TELEPHONE ENCOUNTER
----- Message from Iona Vasquez MA sent at 7/31/2023  5:05 PM CDT -----  Contact: [phone]    ----- Message -----  From: Annie Laguerre  Sent: 7/31/2023  12:07 PM CDT  To: [facility] Staff    [email or web address]     Patient is calling for Medical Advice regarding: pt is needing to be scheduled for gastroenterology. No available appts at any location with any providers for pt. Informed pt I will send message out to pcp to assist on how to move forward. Pt states that she received a letter from [facility] On 6/8 about seeing Dr. Mateo Dowling on tomorrow. Pt states she doesn't want to go [facility] and she is not sure why she received the letter.     How long has patient had these symptoms:     Pharmacy name and phone#:    Would like response via [facility]:  no     Comments:

## 2023-08-14 ENCOUNTER — TELEPHONE (OUTPATIENT)
Dept: PRIMARY CARE CLINIC | Facility: CLINIC | Age: 58
End: 2023-08-14
Payer: MEDICARE

## 2023-08-14 DIAGNOSIS — M79.606 PAIN OF LOWER EXTREMITY, UNSPECIFIED LATERALITY: Primary | ICD-10-CM

## 2023-08-14 NOTE — TELEPHONE ENCOUNTER
----- Message from Iliana Chinchilla sent at 8/14/2023 10:02 AM CDT -----  Contact: Pt [phone]  Patient would like to get a referral.  Referral to what specialty:  [facility] Toy surgeon  Does the patient want the referral with a specific physician:  any  [facility] the [facility]  Reason (be specific):  bad veins in legs/pain  Does the patient already have the specialty clinic appointment scheduled:  no  If yes, what date is the appointment scheduled:     Is the insurance listed in Epic correct? (this is important for a referral):  yes  Advised patient that once provider approves this either a nurse or  will return their call?:   Would the patient like a call back, or a response through their [facility] portal?:   Call back  Comments:    Patient is requesting [facility] [place] or [facility]    Please call and advise.    Thank You

## 2023-08-16 NOTE — TELEPHONE ENCOUNTER
Called patient regarding providers message. Patient was informed that consult is in. Patient verbalized understand.

## 2023-08-18 ENCOUNTER — TELEPHONE (OUTPATIENT)
Dept: PRIMARY CARE CLINIC | Facility: CLINIC | Age: 58
End: 2023-08-18
Payer: MEDICARE

## 2023-08-18 DIAGNOSIS — I83.90 VARICOSE VEINS OF LOWER EXTREMITY, UNSPECIFIED LATERALITY, UNSPECIFIED WHETHER COMPLICATED: Primary | ICD-10-CM

## 2023-08-18 NOTE — TELEPHONE ENCOUNTER
----- Message from Ana Luisa Montana sent at 8/18/2023 11:30 AM CDT -----  Contact: Patient, [phone]  Calling because she needs a referral for Vascular Surgery not Neurosurgery. Please correct . Thanks.

## 2023-08-25 ENCOUNTER — PATIENT OUTREACH (OUTPATIENT)
Dept: ADMINISTRATIVE | Facility: HOSPITAL | Age: 58
End: 2023-08-25
Payer: MEDICARE

## 2023-08-25 ENCOUNTER — PATIENT MESSAGE (OUTPATIENT)
Dept: ADMINISTRATIVE | Facility: HOSPITAL | Age: 58
End: 2023-08-25
Payer: MEDICARE

## 2023-08-25 NOTE — PROGRESS NOTES
Health Maintenance Due   Topic Date Due    Pneumococcal Vaccines (Age 0-64) (1 - PCV) Never done    TETANUS VACCINE  Never done    Cervical Cancer Screening  06/20/2014    Shingles Vaccine (1 of 2) Never done     Immunizations - reviewed and updated   Care Everywhere - triggered   Care Teams - updated   Outreach - Portal message sent in regards to overdue cervical cancer screening. Last pap done in 2014.

## 2023-08-29 NOTE — TELEPHONE ENCOUNTER
No care due was identified.  [facility] Embedded Care Due Messages. Reference number: [id number].   8/29/2023 6:26:17 PM CDT

## 2023-08-30 RX ORDER — TRIAMCINOLONE ACETONIDE 1 MG/G
CREAM TOPICAL
Qty: 80 G | Refills: 1 | Status: SHIPPED | OUTPATIENT
Start: 2023-08-30 | End: 2023-11-02

## 2023-09-08 NOTE — TELEPHONE ENCOUNTER
Care Due:                  Date            Visit Type   Department     Provider  --------------------------------------------------------------------------------                                EP -                              [facility]      [facility]  Last Visit: 06-      CARE ([facility])   PRIMARY CARE   Davon Brock                              EP - PRIMARY [facility]  Next Visit: 12-      CARE ([facility])   PRIMARY CARE   Davon Brock                                                            Last  Test          Frequency    Reason                     Performed    Due Date  --------------------------------------------------------------------------------    CMP.........  12 months..  famotidine, furosemide,    12- 11-                             losartan, rosuvastatin...    Lipid Panel.  12 months..  rosuvastatin.............  04- 04-    [facility] Embedded Care Due Messages. Reference number: [id number].   9/08/2023 10:48:59 AM CDT

## 2023-09-10 RX ORDER — IPRATROPIUM BROMIDE 21 UG/1
SPRAY, METERED NASAL
Qty: 30 ML | Refills: 3 | Status: SHIPPED | OUTPATIENT
Start: 2023-09-10 | End: 2024-03-08

## 2023-09-11 DIAGNOSIS — G47.00 INSOMNIA, UNSPECIFIED TYPE: ICD-10-CM

## 2023-09-11 NOTE — TELEPHONE ENCOUNTER
----- Message from Ana Luisa Montana sent at 9/11/2023 12:28 PM CDT -----  Contact: Patent, [phone]  Requesting an RX refill or new RX.  Is this a refill or new RX: Refill  RX name and strength (copy/paste from chart):  zolpidem (AMBIEN) 5 MG Tab  Is this a 30 day or 90 day RX: 30  Pharmacy name and phone # (copy/paste from chart):    [facility]/pharmacy #[id number] - [place] - [address]  [address]  [place] [address]  Phone: [phone] Fax: [phone]  The doctors have asked that we provide their patients with the following 2 reminders -- prescription refills can take up to 72 hours, and a friendly reminder that in the future you can use your [facility] account to request refills: Yes

## 2023-09-11 NOTE — TELEPHONE ENCOUNTER
Pending referral with diagnosis from older referral placed which was for Varicose veins of the lower extremity.

## 2023-09-11 NOTE — TELEPHONE ENCOUNTER
No care due was identified.  [facility] Embedded Care Due Messages. Reference number: [id number].   9/11/2023 2:31:08 PM CDT

## 2023-09-12 ENCOUNTER — TELEPHONE (OUTPATIENT)
Dept: NEUROSURGERY | Facility: CLINIC | Age: 58
End: 2023-09-12
Payer: MEDICARE

## 2023-09-12 RX ORDER — ZOLPIDEM TARTRATE 5 MG/1
TABLET ORAL
Qty: 30 TABLET | Refills: 2 | OUTPATIENT
Start: 2023-09-12

## 2023-09-12 NOTE — TELEPHONE ENCOUNTER
Called and spoke with pt concerning if she has recent imaging within 6-12 months. Pt stated she has a CD. Instructed need to have the disc prior to appt. Pt stated she will see about getting it sent to clinic.

## 2023-09-14 ENCOUNTER — TELEPHONE (OUTPATIENT)
Dept: PRIMARY CARE CLINIC | Facility: CLINIC | Age: 58
End: 2023-09-14
Payer: MEDICARE

## 2023-09-14 DIAGNOSIS — I80.03 PHLEBITIS AND THROMBOPHLEBITIS OF SUPERFICIAL VESSELS OF LOWER EXTREMITIES, BILATERAL: ICD-10-CM

## 2023-09-14 DIAGNOSIS — M79.89 PAIN AND SWELLING OF LOWER EXTREMITY, UNSPECIFIED LATERALITY: Primary | ICD-10-CM

## 2023-09-14 DIAGNOSIS — M79.606 PAIN AND SWELLING OF LOWER EXTREMITY, UNSPECIFIED LATERALITY: Primary | ICD-10-CM

## 2023-09-14 NOTE — TELEPHONE ENCOUNTER
----- Message from Francisco Urias sent at 9/14/2023  1:45 PM CDT -----  Contact: [phone] @ patient  Requesting an RX refill or new RX.zolpidem (AMBIEN) 5 MG Tab  Is this a refill or new RX:refill   RX name and strength (copy/paste from chart):    Is this a 30 day or 90 day RX:   Pharmacy name and phone #   The doctors have asked that we provide their patients with the following 2 reminders -- prescription refills can take up to 72 hours, and a friendly reminder that in the future you can use your [facility] account to request refills:

## 2023-09-15 ENCOUNTER — TELEPHONE (OUTPATIENT)
Dept: PRIMARY CARE CLINIC | Facility: CLINIC | Age: 58
End: 2023-09-15
Payer: MEDICARE

## 2023-09-15 NOTE — TELEPHONE ENCOUNTER
Called patient regarding message. Patient said that she needed medication refill. I schedule patient for Monday/ patient verbalized understand.

## 2023-09-15 NOTE — TELEPHONE ENCOUNTER
----- Message from Ady Sands MA sent at 9/14/2023  4:56 PM CDT -----  Contact: [phone]    ----- Message -----  From: Susan Gonzáles  Sent: 9/14/2023   4:48 PM CDT  To: Delmy Mays Staff    [email or web address]     Patient is calling for Medical Advice regarding: unwell hoarse     How long has patient had these symptoms: a week     Pharmacy name and phone#:  [id number] - [place] - [address]  [address] [place] [address]  Phone: [phone] Fax: [phone]    Would like response via [facility]:   call back     Comments:    Pt requesting a virtual audio appointment

## 2023-09-18 ENCOUNTER — PATIENT MESSAGE (OUTPATIENT)
Dept: PRIMARY CARE CLINIC | Facility: CLINIC | Age: 58
End: 2023-09-18

## 2023-09-18 ENCOUNTER — OFFICE VISIT (OUTPATIENT)
Dept: PRIMARY CARE CLINIC | Facility: CLINIC | Age: 58
End: 2023-09-18
Payer: MEDICARE

## 2023-09-18 DIAGNOSIS — I10 ESSENTIAL HYPERTENSION: ICD-10-CM

## 2023-09-18 DIAGNOSIS — F20.3 UNDIFFERENTIATED SCHIZOPHRENIA: ICD-10-CM

## 2023-09-18 DIAGNOSIS — R05.3 CHRONIC COUGH: Primary | ICD-10-CM

## 2023-09-18 DIAGNOSIS — J30.1 ALLERGIC RHINITIS DUE TO POLLEN, UNSPECIFIED SEASONALITY: ICD-10-CM

## 2023-09-18 DIAGNOSIS — F31.9 BIPOLAR 1 DISORDER: ICD-10-CM

## 2023-09-18 DIAGNOSIS — E78.5 HYPERLIPIDEMIA, UNSPECIFIED HYPERLIPIDEMIA TYPE: ICD-10-CM

## 2023-09-18 DIAGNOSIS — E66.9 OBESITY (BMI 35.0-39.9 WITHOUT COMORBIDITY): ICD-10-CM

## 2023-09-18 DIAGNOSIS — Z72.0 TOBACCO ABUSE: ICD-10-CM

## 2023-09-18 DIAGNOSIS — G47.00 INSOMNIA, UNSPECIFIED TYPE: ICD-10-CM

## 2023-09-18 PROCEDURE — 4010F ACE/ARB THERAPY RXD/TAKEN: CPT | Mod: HCNC,CPTII,95, | Performed by: FAMILY MEDICINE

## 2023-09-18 PROCEDURE — 4010F PR ACE/ARB THEARPY RXD/TAKEN: ICD-10-PCS | Mod: HCNC,CPTII,95, | Performed by: FAMILY MEDICINE

## 2023-09-18 PROCEDURE — 99213 OFFICE O/P EST LOW 20 MIN: CPT | Mod: HCNC,95,, | Performed by: FAMILY MEDICINE

## 2023-09-18 PROCEDURE — 99213 PR OFFICE/OUTPT VISIT, EST, LEVL III, 20-29 MIN: ICD-10-PCS | Mod: HCNC,95,, | Performed by: FAMILY MEDICINE

## 2023-09-18 RX ORDER — BETAMETHASONE VALERATE 1.2 MG/G
CREAM TOPICAL 2 TIMES DAILY
Qty: 60 G | Refills: 2 | Status: SHIPPED | OUTPATIENT
Start: 2023-09-18 | End: 2023-12-06 | Stop reason: SDUPTHER

## 2023-09-18 RX ORDER — PROMETHAZINE HYDROCHLORIDE AND DEXTROMETHORPHAN HYDROBROMIDE 6.25; 15 MG/5ML; MG/5ML
5 SYRUP ORAL EVERY 6 HOURS PRN
Qty: 180 ML | Refills: 1 | Status: SHIPPED | OUTPATIENT
Start: 2023-09-18 | End: 2023-12-06 | Stop reason: SDUPTHER

## 2023-09-18 RX ORDER — MONTELUKAST SODIUM 10 MG/1
10 TABLET ORAL DAILY
Qty: 90 TABLET | Refills: 3 | Status: SHIPPED | OUTPATIENT
Start: 2023-09-18 | End: 2023-12-06 | Stop reason: SDUPTHER

## 2023-09-18 RX ORDER — MUPIROCIN 20 MG/G
OINTMENT TOPICAL
Qty: 22 G | Refills: 3 | Status: SHIPPED | OUTPATIENT
Start: 2023-09-18 | End: 2023-12-06 | Stop reason: SDUPTHER

## 2023-09-18 RX ORDER — ZOLPIDEM TARTRATE 5 MG/1
5 TABLET ORAL NIGHTLY PRN
Qty: 30 TABLET | Refills: 2 | Status: SHIPPED | OUTPATIENT
Start: 2023-09-18 | End: 2024-03-18

## 2023-09-18 NOTE — PROGRESS NOTES
Subjective:       Patient ID: Trina Marie Collette is a 58 y.o. female.    Chief Complaint:   HPI:  Virtual Video Telehealth Visit     The patient location is:  [place]  The chief complaint leading to consultation is:  Insomnia cough sinus congestion skin rash  Visit type: Virtual visit  Total time spent with patient:  20 minutes     Each patient to whom I provide medical services by telemedicine is:  (1) informed of the relationship between the physician and patient and the respective role of any other health care provider with respect to management of the patient; and (2) notified that they may decline to receive medical services by telemedicine and may withdraw from such care at any time. Patient verbally consented to receive this service via voice-only telephone call.       HPI:  See history of present illness above     Assessment and plan:  See plan above 58-year-old black female calling for medication refills--needs Ambien having difficulty sleeping also having coughing episodes needs cough medication  Gait no fever-slight runny nose/no sore throat/positive + cough +phlegm            Office visit 06/06/2023 58-year-old black female in for low back pain left knee pain and medication refills  Pt still suffering with left leg--walks with a cane at this point occasionally not able walk at all--has appt with DR [facility] --orthopedist. Appt June 13th .  Had MRI knee no one able relieve symptoms. Knee cracking ?? Bone on bone.  History Baker cyst in the popliteal area-- Lower back pain x years   Here mainly for refills. On Norco 10, Naprosyn, gabapentin for back and knee pain--still on excruciating pain.  Patient thinks knee was injected once in the past  HTN amlodipine losartan  History hyperlipidemia on Crestor  History GERD on Protonix and Pepcid--has problems swallowing   MRI of the left knee shows chondromalacia with subchondral cyst versus low-grade osteochondral defect in the medial  trochle periods 4 cm popliteal cyst containing debris.  Small joint effusion with suprapatellar plica MRI done 11/04/2022                 ROS-no significant change except for history of present illness  Skin: no psoriasis, eczema, skin cancer--rash abd due belt buckel  HEENT+ headache, no ocular pain, blurred vision, diplopia, epistaxis, hoarseness change in voice, thyroid trouble--history of tinnitus/chronic sinus problem/nasal septal deviation/hearing loss has hearing aid  Lung: No pneumonia, asthma, Tb, wheezing, SOB, smoking 1/4 ppd trying to quit  --history of bronchospasm in the past uses albuterol--had chest x-ray which is normal  Heart: No chest pain, ankle edema, +occas palpitations, MI, river murmur, +hypertension blood pressure   + hyperlipidemia-no stent bypass arrhythmia  Abdomen: No nausea, vomiting, diarrhea, constipation, ulcers, hepatitis, gallbladder disease, melena, hematochezia, hematemesis patient complaining of heartburn had EGD colonoscopy hx diarrhea immodium Hx Gerd on protonix comes and goes  : no UTI, renal disease, stones-  GYN last menstrual.  Years ago has mammogram yearly--refuses PAP smear --I don't fool with that    MS: no fractures, O/A, lupus, rheumatoid, gout--history of chronic pain the back lumbar spine, right knee  right shoulder, history DDD lumbar history cervical spinal stenosis  Neuro: No dizziness, LOC, seizures   No diabetes, no anemia, no anxiety, no depression patient with history of bipolar schizophrenia--doing well with medication goes to Mental Health   Single, one child, disabled, lives alone     Objective:   Physical Exam:  No physical exam was done this was a virtual visit the visit below is from a prior office visit  General: Well nourished, well developed, no acute distress + morbid obesity  Skin: No lesions  HEENT--eyes PERRLA EOM intact nose clear , throat nonerythematous ears TMs clear  NECK: Supple, no bruits, No JVD, no nodes  Lungs: Clear, no rales,  rhonchi, wheezing   Heart: Regular rate and rhythm, no murmurs, gallops, or rubs  Abdomen: flat, bowel sounds positive, no tenderness, or organomegaly  MS:  pain left leg griselda though bilaterally --gets swelling knee medial and lateral aspect --pain with ambulation squatting and arising getting up on exam table  Neuro: Alert, CN intact, oriented X 3 still with some anxiety and depression of issues--Romberg's negative heel-toe intact  Extremities: No cyanosis, clubbing, or edema         Assessment:       1. Chronic cough    2. Allergic rhinitis due to pollen, unspecified seasonality    3. Insomnia, unspecified type    4. Tobacco abuse    5. Obesity (BMI 35.0-39.9 without comorbidity)    6. Essential hypertension    7. Hyperlipidemia, unspecified hyperlipidemia type    8. Undifferentiated schizophrenia    9. Bipolar 1 disorder                Plan:       Chronic cough    Allergic rhinitis due to pollen, unspecified seasonality    Insomnia, unspecified type    Tobacco abuse    Obesity (BMI 35.0-39.9 without comorbidity)    Essential hypertension    Hyperlipidemia, unspecified hyperlipidemia type    Undifferentiated schizophrenia    Bipolar 1 disorder    Other orders  -     betamethasone valerate 0.1% (VALISONE) 0.1 % Crea; Apply topically 2 (two) times daily.  Dispense: 60 g; Refill: 2  -     mupirocin (BACTROBAN) 2 % ointment; Apply to nose 3 times daily on a Q-tip for crusting or sores inside the nose  Dispense: 22 g; Refill: 3  -     promethazine-dextromethorphan (PROMETHAZINE-DM) 6.25-15 mg/5 mL Syrp; Take 5 mLs by mouth every 6 (six) hours as needed (cough).  Dispense: 180 mL; Refill: 1  -     zolpidem (AMBIEN) 5 MG Tab; Take 1 tablet (5 mg total) by mouth nightly as needed.  Dispense: 30 tablet; Refill: 2  -     montelukast (SINGULAIR) 10 mg tablet; Take 1 tablet (10 mg total) by mouth once daily.  Dispense: 90 tablet; Refill: 3                Main Reason here    Virtual visit  Insomnia needs refill of  Ambien  Chronic cough needs Phenergan DM Singulair  Skin rash needs Bactroban and Valisone cream  Other medical issues not all addressed this visit  Chronic pain syndrome--bilateral leg pain--left much greater than right---saw orthopedist--no treatment given--patient is on Norco 10 from pain clinic/Mobic/Flexeril/gabapentin 800 t.i.d.--pain mainly in back and left leg--has appt Dr Narayan June 13th --MRI results chondromalacia with subchondral cysts verse low-grade osteochondral defect in the medial trochlea.  4 cm popliteal cyst with debris.  Small joint effusion with suprapatellar plica  Back pain x years unchanged   Hypertension patient on Cozaar 100 Lasix 40 amlodipine 5 mg blood pressure   Peripheral edema told can increase Lasix from 40 mg to 40 in the morning 20 afternoon if needed  Sees Dr. Senior for pain medication--DDD cervical and lumbar spine  Hyperlipidemia on Crestor  Vitamin D deficiency on vitamin-D  COPD on Symbicort and albuterol  Anxiety depression bipolar schizophrenia on Ambien Ativan  Zoloft trazodone Geodon--goes to mental health  Lab cbc cmp lipid TSH sed rate   Health maintenance Pap smear

## 2023-09-28 ENCOUNTER — TELEPHONE (OUTPATIENT)
Dept: NEUROSURGERY | Facility: CLINIC | Age: 58
End: 2023-09-28
Payer: MEDICARE

## 2023-09-28 NOTE — TELEPHONE ENCOUNTER
Called pt again about disc. She stated she needs to reschedule. Pt rescheduled and confirmed appt.

## 2023-10-05 NOTE — TELEPHONE ENCOUNTER
No care due was identified.  [facility] Embedded Care Due Messages. Reference number: [id number].   10/05/2023 5:40:05 PM CDT

## 2023-10-06 RX ORDER — AMLODIPINE BESYLATE 2.5 MG/1
TABLET ORAL
Qty: 90 TABLET | Refills: 1 | OUTPATIENT
Start: 2023-10-06

## 2023-10-06 NOTE — TELEPHONE ENCOUNTER
Refill Routing Note   Medication(s) are not appropriate for processing by [facility] for the following reason(s):      Patient seen in ED/Hospital since LOV with provider  Required vitals abnormal    [facility] action(s):  Defer Care Due:  None identified            Appointments  past 12m or future 3m with PCP    Date Provider   Last Visit   9/18/2023 Davon Brock MD   Next Visit   12/6/2023 Davon Brock MD   ED visits in past 90 days: 2        Note composed:9:20 AM 10/06/2023

## 2023-10-08 RX ORDER — AMLODIPINE BESYLATE 5 MG/1
5 TABLET ORAL DAILY
Qty: 90 TABLET | Refills: 3 | Status: SHIPPED | OUTPATIENT
Start: 2023-10-08 | End: 2023-12-06 | Stop reason: SDUPTHER

## 2023-10-12 ENCOUNTER — TELEPHONE (OUTPATIENT)
Dept: NEUROSURGERY | Facility: CLINIC | Age: 58
End: 2023-10-12
Payer: MEDICARE

## 2023-10-12 NOTE — TELEPHONE ENCOUNTER
----- Message from Marlen Milton sent at 10/11/2023 10:13 AM CDT -----  Regarding: appt access  Name of Caller: pt      When is the first available appointment? unable to schedule      Symptoms: back, legs,       Best Call Back Number: [phone]        Additional Information: pt would like a call back to get an appt scheduled, per  advice because he is retiring, please advise.

## 2023-10-12 NOTE — TELEPHONE ENCOUNTER
Pt was trying to schedule an appointment for pt was advised that the clinic doesn't do injections she needs to see pain management pt verbalized understanding.

## 2023-10-12 NOTE — TELEPHONE ENCOUNTER
P/c to pt. States she doesn;t need neurosurgeon, she is looking for injections, explained we dont do that and that dr. Adkins can refer her to pain management for injections. Pt. Verbalized understanding.

## 2023-10-16 DIAGNOSIS — M47.816 FACET ARTHROPATHY, LUMBAR: Primary | ICD-10-CM

## 2023-10-18 ENCOUNTER — PATIENT MESSAGE (OUTPATIENT)
Dept: CARDIOLOGY | Facility: CLINIC | Age: 58
End: 2023-10-18
Payer: MEDICARE

## 2023-10-22 DIAGNOSIS — E87.6 HYPOKALEMIA: ICD-10-CM

## 2023-10-22 NOTE — TELEPHONE ENCOUNTER
No care due was identified.  [facility] Embedded Care Due Messages. Reference number: [id number].   10/22/2023 2:14:39 PM CDT

## 2023-10-22 NOTE — TELEPHONE ENCOUNTER
No care due was identified.  [facility] Embedded Care Due Messages. Reference number: [id number].   10/22/2023 2:15:42 PM CDT

## 2023-10-23 RX ORDER — POTASSIUM CHLORIDE 750 MG/1
10 CAPSULE, EXTENDED RELEASE ORAL DAILY
Qty: 90 CAPSULE | Refills: 3 | Status: SHIPPED | OUTPATIENT
Start: 2023-10-23 | End: 2023-12-06 | Stop reason: SDUPTHER

## 2023-10-23 RX ORDER — SERTRALINE HYDROCHLORIDE 100 MG/1
100 TABLET, FILM COATED ORAL DAILY
Qty: 90 TABLET | Refills: 3 | Status: SHIPPED | OUTPATIENT
Start: 2023-10-23 | End: 2023-12-06 | Stop reason: SDUPTHER

## 2023-10-23 NOTE — TELEPHONE ENCOUNTER
No care due was identified.  [facility] Embedded Care Due Messages. Reference number: [id number].   10/23/2023 3:32:05 PM CDT

## 2023-10-24 RX ORDER — PANTOPRAZOLE SODIUM 40 MG/1
40 TABLET, DELAYED RELEASE ORAL DAILY
Qty: 90 TABLET | Refills: 0 | Status: SHIPPED | OUTPATIENT
Start: 2023-10-24 | End: 2023-12-06 | Stop reason: SDUPTHER

## 2023-10-24 NOTE — TELEPHONE ENCOUNTER
Refill Decision Note   Trina Collette  is requesting a refill authorization.  Brief Assessment and Rationale for Refill:  Approve     Medication Therapy Plan:  Reviewed ED visit notes; approved 90+0 to bridge to FOV 12/16/23      Extended chart review required: Yes   Comments:     Note composed:1:25 PM 10/24/2023             Appointments     Last Visit   9/18/2023 Davon Brock MD   Next Visit   12/6/2023 Davon Brock MD

## 2023-11-06 ENCOUNTER — TELEPHONE (OUTPATIENT)
Dept: PAIN MEDICINE | Facility: CLINIC | Age: 58
End: 2023-11-06
Payer: MEDICARE

## 2023-11-24 ENCOUNTER — TELEPHONE (OUTPATIENT)
Dept: PAIN MEDICINE | Facility: CLINIC | Age: 58
End: 2023-11-24
Payer: MEDICARE

## 2023-11-24 NOTE — TELEPHONE ENCOUNTER
Staff contacted patient to confirm appointment on 11/27/23 for 11:00am. There was no answer, left voicemail.

## 2023-11-27 ENCOUNTER — TELEPHONE (OUTPATIENT)
Dept: ORTHOPEDICS | Facility: CLINIC | Age: 58
End: 2023-11-27
Payer: MEDICARE

## 2023-11-27 ENCOUNTER — OFFICE VISIT (OUTPATIENT)
Dept: PAIN MEDICINE | Facility: CLINIC | Age: 58
End: 2023-11-27
Payer: MEDICARE

## 2023-11-27 VITALS
OXYGEN SATURATION: 100 % | HEART RATE: 61 BPM | HEIGHT: 66 IN | WEIGHT: 236.13 LBS | RESPIRATION RATE: 18 BRPM | BODY MASS INDEX: 37.95 KG/M2 | SYSTOLIC BLOOD PRESSURE: 135 MMHG | DIASTOLIC BLOOD PRESSURE: 87 MMHG

## 2023-11-27 DIAGNOSIS — M51.36 LUMBAR DEGENERATIVE DISC DISEASE: ICD-10-CM

## 2023-11-27 DIAGNOSIS — M47.816 FACET ARTHROPATHY, LUMBAR: ICD-10-CM

## 2023-11-27 DIAGNOSIS — M17.12 PRIMARY OSTEOARTHRITIS OF LEFT KNEE: Primary | ICD-10-CM

## 2023-11-27 DIAGNOSIS — M54.16 LUMBAR RADICULOPATHY: ICD-10-CM

## 2023-11-27 PROCEDURE — 3075F PR MOST RECENT SYSTOLIC BLOOD PRESS GE 130-139MM HG: ICD-10-PCS | Mod: HCNC,CPTII,S$GLB, | Performed by: STUDENT IN AN ORGANIZED HEALTH CARE EDUCATION/TRAINING PROGRAM

## 2023-11-27 PROCEDURE — 3079F PR MOST RECENT DIASTOLIC BLOOD PRESSURE 80-89 MM HG: ICD-10-PCS | Mod: HCNC,CPTII,S$GLB, | Performed by: STUDENT IN AN ORGANIZED HEALTH CARE EDUCATION/TRAINING PROGRAM

## 2023-11-27 PROCEDURE — 99999 PR PBB SHADOW E&M-EST. PATIENT-LVL V: CPT | Mod: PBBFAC,HCNC,, | Performed by: STUDENT IN AN ORGANIZED HEALTH CARE EDUCATION/TRAINING PROGRAM

## 2023-11-27 PROCEDURE — 1159F PR MEDICATION LIST DOCUMENTED IN MEDICAL RECORD: ICD-10-PCS | Mod: HCNC,CPTII,S$GLB, | Performed by: STUDENT IN AN ORGANIZED HEALTH CARE EDUCATION/TRAINING PROGRAM

## 2023-11-27 PROCEDURE — 4010F ACE/ARB THERAPY RXD/TAKEN: CPT | Mod: HCNC,CPTII,S$GLB, | Performed by: STUDENT IN AN ORGANIZED HEALTH CARE EDUCATION/TRAINING PROGRAM

## 2023-11-27 PROCEDURE — 4010F PR ACE/ARB THEARPY RXD/TAKEN: ICD-10-PCS | Mod: HCNC,CPTII,S$GLB, | Performed by: STUDENT IN AN ORGANIZED HEALTH CARE EDUCATION/TRAINING PROGRAM

## 2023-11-27 PROCEDURE — 1160F PR REVIEW ALL MEDS BY PRESCRIBER/CLIN PHARMACIST DOCUMENTED: ICD-10-PCS | Mod: HCNC,CPTII,S$GLB, | Performed by: STUDENT IN AN ORGANIZED HEALTH CARE EDUCATION/TRAINING PROGRAM

## 2023-11-27 PROCEDURE — 1160F RVW MEDS BY RX/DR IN RCRD: CPT | Mod: HCNC,CPTII,S$GLB, | Performed by: STUDENT IN AN ORGANIZED HEALTH CARE EDUCATION/TRAINING PROGRAM

## 2023-11-27 PROCEDURE — 3079F DIAST BP 80-89 MM HG: CPT | Mod: HCNC,CPTII,S$GLB, | Performed by: STUDENT IN AN ORGANIZED HEALTH CARE EDUCATION/TRAINING PROGRAM

## 2023-11-27 PROCEDURE — 99204 OFFICE O/P NEW MOD 45 MIN: CPT | Mod: HCNC,S$GLB,, | Performed by: STUDENT IN AN ORGANIZED HEALTH CARE EDUCATION/TRAINING PROGRAM

## 2023-11-27 PROCEDURE — 99999 PR PBB SHADOW E&M-EST. PATIENT-LVL V: ICD-10-PCS | Mod: PBBFAC,HCNC,, | Performed by: STUDENT IN AN ORGANIZED HEALTH CARE EDUCATION/TRAINING PROGRAM

## 2023-11-27 PROCEDURE — 3075F SYST BP GE 130 - 139MM HG: CPT | Mod: HCNC,CPTII,S$GLB, | Performed by: STUDENT IN AN ORGANIZED HEALTH CARE EDUCATION/TRAINING PROGRAM

## 2023-11-27 PROCEDURE — 3008F PR BODY MASS INDEX (BMI) DOCUMENTED: ICD-10-PCS | Mod: HCNC,CPTII,S$GLB, | Performed by: STUDENT IN AN ORGANIZED HEALTH CARE EDUCATION/TRAINING PROGRAM

## 2023-11-27 PROCEDURE — 1159F MED LIST DOCD IN RCRD: CPT | Mod: HCNC,CPTII,S$GLB, | Performed by: STUDENT IN AN ORGANIZED HEALTH CARE EDUCATION/TRAINING PROGRAM

## 2023-11-27 PROCEDURE — 99204 PR OFFICE/OUTPT VISIT, NEW, LEVL IV, 45-59 MIN: ICD-10-PCS | Mod: HCNC,S$GLB,, | Performed by: STUDENT IN AN ORGANIZED HEALTH CARE EDUCATION/TRAINING PROGRAM

## 2023-11-27 PROCEDURE — 3008F BODY MASS INDEX DOCD: CPT | Mod: HCNC,CPTII,S$GLB, | Performed by: STUDENT IN AN ORGANIZED HEALTH CARE EDUCATION/TRAINING PROGRAM

## 2023-11-27 NOTE — PROGRESS NOTES
Chronic Pain - New Consult    Referring Physician: Cheyanne Presley NP    Chief Complaint:   Chief Complaint   Patient presents with    Low-back Pain        SUBJECTIVE:    Trina Marie Collette presents to the clinic for the evaluation of lower back and left leg pain. The pain started years ago ago following no inciting event and symptoms have been worsening.The pain is located in the lower back area and radiates to the left leg.  The pain is described as sharp and burning and is rated as 9/10. The pain is rated with a score of  7/10 on the BEST day and a score of 10/10 on the WORST day.  Symptoms interfere with daily activity and sleeping.. The pain is exacerbated by Standing and Walking and cold weather.  The pain is mitigated by medications. The patient reports 6 hours of uninterrupted sleep per night.    Patient admits to bowel incontinence for a couple of years (history of GI problems). She also admits to neck pain    Physical Therapy/Home Exercise: yes, did physical therapy years and currently doing stretches at home.      Pain Disability Index Review:      11/27/2023    11:21 AM 7/25/2019    11:41 AM 9/13/2018    10:12 AM   Last 3 PDI Scores   Pain Disability Index (PDI) 43 70 60       Pain Medications:   - Norco 10-325mg (Lion Adkins MD)   - gabapentin 800mg three times a day   - naproxen 500mg     report:  Reviewed and consistent with medication use as prescribed.  - Zolpidem 5mg    Pain Procedures:   Thinks she has in the past, but not sure when    Imaging:   XR KNEE 3 VIEW LEFT     CLINICAL HISTORY:  Pain in left knee     TECHNIQUE:  AP, lateral, and Merchant views of the left knee were performed.     COMPARISON:  09/12/2016     FINDINGS:  A probable subchondral cyst is present in the distal femur within the patellofemoral compartment.  This appears new when compared to 2016.  No fracture or dislocation.  Medial compartment joint space narrowing is present that is new when compared to 2016.  No  joint effusion or other soft tissue abnormality.     Impression:     Mild medial and patellofemoral compartment degenerative joint disease, new when compared to 2016.        Electronically signed by: Ayesha Ruiz  Date:                                            09/30/2022  Time:                                           11:32    XR LUMBAR SPINE COMPLETE 5 VIEW     CLINICAL HISTORY:  Strain of muscle, fascia and tendon of lower back, initial encounter     TECHNIQUE:  AP, lateral, spot and bilateral oblique views of the lumbar spine were performed.     COMPARISON:  09/13/2018     FINDINGS:  Lumbar spine vertebral body heights appear maintained with mild discogenic change.  No advanced disc space narrowing.  No evidence for lytic or blastic lesion.     Impression:     As above.        Electronically signed by: Les Ruiz  Date:                                            03/16/2022  Time:                                           15:38    Past Medical History:   Diagnosis Date    Allergic rhinitis     Anxiety     Bipolar 1 disorder     Chronic cough     Depression     Hypertension     Laryngopharyngeal reflux (LPR)     Lower back pain     Tinnitus of right ear      Past Surgical History:   Procedure Laterality Date    COLONOSCOPY N/A 1/12/2021    Procedure: COLONOSCOPY;  Surgeon: Vitaliy Hammer MD;  Location: [address]);  Service: Endoscopy;  Laterality: N/A;    ESOPHAGOGASTRODUODENOSCOPY N/A 1/12/2021    Procedure: ESOPHAGOGASTRODUODENOSCOPY (EGD);  Surgeon: Vitaliy Hammer MD;  Location: [address]);  Service: Endoscopy;  Laterality: N/A;  prep ins. mailed - COVID screening on 1/9/21 [facility]    FUNCTIONAL ENDOSCOPIC SINUS SURGERY (FESS) USING COMPUTER-ASSISTED NAVIGATION Bilateral 2/3/2020    Procedure: FESS, USING COMPUTER-ASSISTED NAVIGATION;  Surgeon: DWAYNE iRcardo MD;  Location: [facility];  Service: ENT;  Laterality: Bilateral;    VAGINAL DELIVERY  1986     Social  History     Socioeconomic History    Marital status: Single   Tobacco Use    Smoking status: Every Day     Current packs/day: 0.25     Average packs/day: 0.3 packs/day for 10.0 years (2.5 ttl pk-yrs)     Types: Cigarettes    Smokeless tobacco: Never   Substance and Sexual Activity    Alcohol use: Not Currently    Drug use: Yes     Types: Marijuana     Comment: occassionally    Sexual activity: Yes     Partners: Male     History reviewed. No pertinent family history.    Review of patient's allergies indicates:  No Known Allergies    Current Outpatient Medications   Medication Sig    albuterol (PROVENTIL) 2.5 mg /3 mL (0.083 %) nebulizer solution Take 3 mLs (2.5 mg total) by nebulization every 4 (four) hours as needed for Wheezing or Shortness of Breath. Rescue    amLODIPine (NORVASC) 5 MG tablet Take 1 tablet (5 mg total) by mouth once daily.    betamethasone valerate 0.1% (VALISONE) 0.1 % Crea Apply topically 2 (two) times daily.    budesonide-formoterol 160-4.5 mcg (SYMBICORT) 160-4.5 mcg/actuation HFAA Inhale 2 puffs into the lungs every 12 (twelve) hours. Controller. Rinse mouth after using.    busPIRone (BUSPAR) 30 MG Tab Take 1 tablet (30 mg total) by mouth 2 (two) times daily.    clotrimazole-betamethasone 1-0.05% (LOTRISONE) cream Apply topically 2 (two) times daily as needed.    diphenoxylate-atropine 2.5-0.025 mg (LOMOTIL) 2.5-0.025 mg per tablet TAKE 1 TAB BY MOUTH AFTER EACH LOOSE BOWEL MOVEMENT PER DAY    famotidine (PEPCID) 20 MG tablet Take 1 tablet (20 mg total) by mouth 2 (two) times daily.    fluoride, sodium, (PREVIDENT 5000) 1.1 % Pste Take by mouth.    furosemide (LASIX) 40 MG tablet Take 1 tablet (40 mg total) by mouth once daily. for five days    gabapentin (NEURONTIN) 800 MG tablet TAKE 1 TABLET BY MOUTH THREE TIMES A DAY    HYDROcodone-acetaminophen (NORCO)  mg per tablet Take 1 tablet by mouth every 8 (eight) hours as needed.     HYDROcodone-acetaminophen (NORCO)  mg per tablet  Take one tablet by mouth every 8 to 12 hours as needed for severe pain    hydrOXYzine pamoate (VISTARIL) 25 MG Cap TAKE 1 CAPSULE BY MOUTH EVERY 6 HOURS AS NEEDED FOR NAUSEA/VOMITING/CRAMPS/ OR ITCH    levocetirizine (XYZAL) 5 MG tablet TAKE 1 TABLET BY MOUTH EVERY DAY IN THE EVENING    losartan (COZAAR) 100 MG tablet Take 1 tablet (100 mg total) by mouth once daily.    meloxicam (MOBIC) 15 MG tablet START AFTER MEDROL DOSEPAK COMPLETE MOBIC 7.5 MG BY MOUTH TWICE A DAY AS NEEDED FOR SWELLING OR PAIN NO OTHER NSAIDS CAN TAKE WITH TYLENOL    montelukast (SINGULAIR) 10 mg tablet Take 1 tablet (10 mg total) by mouth once daily.    mupirocin (BACTROBAN) 2 % ointment Apply to nose 3 times daily on a Q-tip for crusting or sores inside the nose    naproxen (NAPROSYN) 375 MG tablet Take 1 tablet (375 mg total) by mouth 2 (two) times daily with meals. (Patient taking differently: Take 500 mg by mouth 2 (two) times daily with meals.)    pantoprazole (PROTONIX) 40 MG tablet Take 1 tablet (40 mg total) by mouth once daily.    potassium chloride (MICRO-K) 10 MEQ CpSR Take 1 capsule (10 mEq total) by mouth once daily.    promethazine-dextromethorphan (PROMETHAZINE-DM) 6.25-15 mg/5 mL Syrp Take 5 mLs by mouth every 6 (six) hours as needed (cough).    rosuvastatin (CRESTOR) 5 MG tablet Take 1 tablet (5 mg total) by mouth once daily.    sertraline (ZOLOFT) 100 MG tablet Take 1 tablet (100 mg total) by mouth once daily.    tiZANidine (ZANAFLEX) 4 MG tablet Take 1 tablet (4 mg total) by mouth every 8 (eight) hours.    traZODone (DESYREL) 100 MG tablet Take 1 tablet (100 mg total) by mouth every evening.    triamcinolone acetonide 0.1% (KENALOG) 0.1 % cream APPLY TO AFFECTED AREA TOPICALLY TWICE A DAY    ziprasidone (GEODON) 60 MG Cap Take 1 capsule (60 mg total) by mouth 2 (two) times daily.    ziprasidone (GEODON) 80 MG capsule Take 1 capsule (80 mg total) by mouth once daily.    zolpidem (AMBIEN) 5 MG Tab 1 po Q [facility] p.r.n. sleep     "zolpidem (AMBIEN) 5 MG Tab Take 1 tablet (5 mg total) by mouth nightly as needed.    cyclobenzaprine (FLEXERIL) 10 MG tablet One p.o. b.i.d. may increase to q.8 hours if needed (Patient not taking: Reported on 11/27/2023)    ipratropium (ATROVENT) 21 mcg (0.03 %) nasal spray INSTILL 2 SPRAYS INTO EACH NOSTRIL TWICE A DAY AS NEEDED MAY BE USED MORE OFTEN IF NEEDED (Patient not taking: Reported on 11/27/2023)    predniSONE (DELTASONE) 5 MG tablet 4 po qd x 2, 3 po qd x2, 2 po qd x2, 1 po qd x2 (Patient not taking: Reported on 11/27/2023)     No current facility-administered medications for this visit.       REVIEW OF SYSTEMS:    GENERAL:  No weight loss, malaise or fevers.  HEENT:  Negative for frequent or significant headaches.  NECK:  Negative for lumps, goiter, and significant neck swelling.  RESPIRATORY:  Negative for cough, wheezing or shortness of breath.  CARDIOVASCULAR:  Negative for chest pain, leg swelling or palpitations.  GI:  Negative for abdominal discomfort, blood in stools or black stools or change in bowel habits.  MUSCULOSKELETAL:  See HPI.  SKIN:  Negative for lesions, rash, and itching.  PSYCH:  Negative for sleep disturbance, mood disorder and recent psychosocial stressors.  HEMATOLOGY/LYMPHOLOGY:  Negative for prolonged bleeding, bruising easily or swollen nodes.  NEURO:   No history of headaches, syncope, paralysis, seizures or tremors.  All other reviewed and negative other than HPI.    OBJECTIVE:    /87 (BP Location: Left arm, Patient Position: Sitting, BP Method: Small (Automatic))   Pulse 61   Resp 18   Ht 5' 6" (1.676 m)   Wt 107.1 kg (236 lb 1.8 oz)   LMP 09/25/2017 (Exact Date)   SpO2 100%   BMI 38.11 kg/m²     PHYSICAL EXAMINATION:  General appearance: Well appearing, in no acute distress, alert and appropriately communicative.  Psych:  Mood and affect appropriate.  Skin: Skin color, texture, turgor normal, no rashes or lesions, in both upper and lower body.  Head/face:  " Atraumatic, normocephalic.  Neck: pain to palpation over the cervical paraspinous muscles. Spurling Negative. pain with neck flexion, extension, or lateral flexion. .  Cor: regular rate  Pulm: non-labored breathing  GI: Abdomen non-distended and non-tender.  Back: Straight leg raising in the sitting and supine positions is positive to radicular pain on the left.  pain to palpation over the spine and/or paraspinal muscles. Pain with lumbar extension and facet loading.  Extremities: Peripheral joint ROM is full and pain free without obvious instability or laxity in all four extremities. No deformities, edema, or skin discoloration. Good capillary refill.  Musculoskeletal:  Shoulder, hip, sacroiliac and knee provocative maneuvers are negative with the exception of left lateral and medial joint line tenderness, pain with extension of the knee, no significant pain with flexion past 90 degrees. Bilateral upper and lower extremity strength is normal and symmetric.  No atrophy or tone abnormalities are noted.  Neuro: Bilateral upper and lower extremity coordination and muscle stretch reflexes are physiologic and symmetric.  Negative Clonus. No loss of sensation is noted.  Gait: Normal.      ASSESSMENT: 58 y.o. year old female with lower back pain, consistent with:     1. Primary osteoarthritis of left knee  Ambulatory referral/consult to Orthopedics    Procedure Order to Pain Management    Ambulatory referral/consult to Physical/Occupational Therapy      2. Facet arthropathy, lumbar  Ambulatory referral/consult to Pain Clinic      3. Lumbar radiculopathy  Procedure Order to Pain Management      4. Lumbar degenerative disc disease  Procedure Order to Pain Management        IMPRESSION: Ms. Collette presents for lower back and left leg pain.  She has had this pain for many years, and has been on narcotics to help her control her pain.  She states that her current pain provider is retiring, so she is seeking a provider take  over opiate management.  She is also interested in interventions to help with her pain control.  History and physical exam are consistent with axial lower back pain, lumbar radiculopathy, and chronic left knee pain.  Imaging is consistent with lumbar degenerative disc disease and lumbar spondylosis.  She has tried medication management and does some stretching at home, but does not find significant relief in her pain.  We can consider interventions to help with her pain.    PLAN:   - I have stressed the importance of physical activity and a home exercise plan to help with pain and improve health.  - Patient can continue with medications for now since they are providing benefits, using them appropriately, and without side effects.  - referral to orthopedic surgery  - referral to physical therapy  - schedule for left L3/4 and L4/5 transforaminal steroid injection  - We discussed tapering and coming off her opioids, but she is not interested in this at this time. She will seek elsewhere to continue her opioid prescribing  - RTC 6-8 weeks   - Counseled patient regarding the importance of activity modification and physical therapy.    The above plan and management options were discussed at length with patient. Patient is in agreement with the above and verbalized understanding. It will be communicated with the referring physician via electronic record, fax, or mail.    Quique White  11/27/2023

## 2023-11-28 DIAGNOSIS — M79.605 LEFT LEG PAIN: Primary | ICD-10-CM

## 2023-11-28 DIAGNOSIS — M54.16 LUMBAR RADICULOPATHY: Primary | ICD-10-CM

## 2023-11-28 DIAGNOSIS — M51.36 LUMBAR DEGENERATIVE DISC DISEASE: ICD-10-CM

## 2023-11-29 ENCOUNTER — PATIENT MESSAGE (OUTPATIENT)
Dept: ADMINISTRATIVE | Facility: HOSPITAL | Age: 58
End: 2023-11-29
Payer: MEDICARE

## 2023-12-06 ENCOUNTER — OFFICE VISIT (OUTPATIENT)
Dept: PRIMARY CARE CLINIC | Facility: CLINIC | Age: 58
End: 2023-12-06
Payer: MEDICARE

## 2023-12-06 VITALS
OXYGEN SATURATION: 99 % | SYSTOLIC BLOOD PRESSURE: 132 MMHG | HEART RATE: 65 BPM | WEIGHT: 231.81 LBS | HEIGHT: 66 IN | BODY MASS INDEX: 37.26 KG/M2 | RESPIRATION RATE: 18 BRPM | DIASTOLIC BLOOD PRESSURE: 86 MMHG

## 2023-12-06 DIAGNOSIS — M79.605 LEFT LEG PAIN: ICD-10-CM

## 2023-12-06 DIAGNOSIS — E78.5 HYPERLIPIDEMIA, UNSPECIFIED HYPERLIPIDEMIA TYPE: ICD-10-CM

## 2023-12-06 DIAGNOSIS — E07.9 THYROID MASS: ICD-10-CM

## 2023-12-06 DIAGNOSIS — J98.01 BRONCHOSPASM: ICD-10-CM

## 2023-12-06 DIAGNOSIS — F20.9 SCHIZOPHRENIA, UNSPECIFIED TYPE: ICD-10-CM

## 2023-12-06 DIAGNOSIS — I10 ESSENTIAL HYPERTENSION: ICD-10-CM

## 2023-12-06 DIAGNOSIS — M54.50 LUMBAR SPINE PAIN: ICD-10-CM

## 2023-12-06 DIAGNOSIS — E87.6 HYPOKALEMIA: ICD-10-CM

## 2023-12-06 DIAGNOSIS — J44.9 CHRONIC OBSTRUCTIVE PULMONARY DISEASE, UNSPECIFIED COPD TYPE: ICD-10-CM

## 2023-12-06 DIAGNOSIS — E55.9 VITAMIN D DEFICIENCY: ICD-10-CM

## 2023-12-06 DIAGNOSIS — F31.9 BIPOLAR 1 DISORDER: ICD-10-CM

## 2023-12-06 DIAGNOSIS — H93.19 TINNITUS, UNSPECIFIED LATERALITY: ICD-10-CM

## 2023-12-06 DIAGNOSIS — I10 HYPERTENSION: ICD-10-CM

## 2023-12-06 DIAGNOSIS — J40 BRONCHITIS: ICD-10-CM

## 2023-12-06 DIAGNOSIS — G89.4 CHRONIC PAIN SYNDROME: ICD-10-CM

## 2023-12-06 DIAGNOSIS — R05.3 CHRONIC COUGH: Primary | ICD-10-CM

## 2023-12-06 DIAGNOSIS — J01.01 ACUTE RECURRENT MAXILLARY SINUSITIS: ICD-10-CM

## 2023-12-06 DIAGNOSIS — G47.00 INSOMNIA, UNSPECIFIED TYPE: ICD-10-CM

## 2023-12-06 PROCEDURE — 4010F ACE/ARB THERAPY RXD/TAKEN: CPT | Mod: HCNC,CPTII,S$GLB, | Performed by: FAMILY MEDICINE

## 2023-12-06 PROCEDURE — 4010F PR ACE/ARB THEARPY RXD/TAKEN: ICD-10-PCS | Mod: HCNC,CPTII,S$GLB, | Performed by: FAMILY MEDICINE

## 2023-12-06 PROCEDURE — 96372 THER/PROPH/DIAG INJ SC/IM: CPT | Mod: HCNC,S$GLB,, | Performed by: FAMILY MEDICINE

## 2023-12-06 PROCEDURE — 96372 PR INJECTION,THERAP/PROPH/DIAG2ST, IM OR SUBCUT: ICD-10-PCS | Mod: HCNC,S$GLB,, | Performed by: FAMILY MEDICINE

## 2023-12-06 PROCEDURE — 3008F PR BODY MASS INDEX (BMI) DOCUMENTED: ICD-10-PCS | Mod: HCNC,CPTII,S$GLB, | Performed by: FAMILY MEDICINE

## 2023-12-06 PROCEDURE — 3075F SYST BP GE 130 - 139MM HG: CPT | Mod: HCNC,CPTII,S$GLB, | Performed by: FAMILY MEDICINE

## 2023-12-06 PROCEDURE — 99214 OFFICE O/P EST MOD 30 MIN: CPT | Mod: HCNC,25,S$GLB, | Performed by: FAMILY MEDICINE

## 2023-12-06 PROCEDURE — 3008F BODY MASS INDEX DOCD: CPT | Mod: HCNC,CPTII,S$GLB, | Performed by: FAMILY MEDICINE

## 2023-12-06 PROCEDURE — 3079F DIAST BP 80-89 MM HG: CPT | Mod: HCNC,CPTII,S$GLB, | Performed by: FAMILY MEDICINE

## 2023-12-06 PROCEDURE — 99214 PR OFFICE/OUTPT VISIT, EST, LEVL IV, 30-39 MIN: ICD-10-PCS | Mod: HCNC,25,S$GLB, | Performed by: FAMILY MEDICINE

## 2023-12-06 PROCEDURE — 99999 PR PBB SHADOW E&M-EST. PATIENT-LVL IV: CPT | Mod: PBBFAC,HCNC,, | Performed by: FAMILY MEDICINE

## 2023-12-06 PROCEDURE — 3075F PR MOST RECENT SYSTOLIC BLOOD PRESS GE 130-139MM HG: ICD-10-PCS | Mod: HCNC,CPTII,S$GLB, | Performed by: FAMILY MEDICINE

## 2023-12-06 PROCEDURE — 99999 PR PBB SHADOW E&M-EST. PATIENT-LVL IV: ICD-10-PCS | Mod: PBBFAC,HCNC,, | Performed by: FAMILY MEDICINE

## 2023-12-06 PROCEDURE — 1159F PR MEDICATION LIST DOCUMENTED IN MEDICAL RECORD: ICD-10-PCS | Mod: HCNC,CPTII,S$GLB, | Performed by: FAMILY MEDICINE

## 2023-12-06 PROCEDURE — 1159F MED LIST DOCD IN RCRD: CPT | Mod: HCNC,CPTII,S$GLB, | Performed by: FAMILY MEDICINE

## 2023-12-06 PROCEDURE — 3079F PR MOST RECENT DIASTOLIC BLOOD PRESSURE 80-89 MM HG: ICD-10-PCS | Mod: HCNC,CPTII,S$GLB, | Performed by: FAMILY MEDICINE

## 2023-12-06 RX ORDER — POTASSIUM CHLORIDE 750 MG/1
10 CAPSULE, EXTENDED RELEASE ORAL DAILY
Qty: 90 CAPSULE | Refills: 3 | Status: SHIPPED | OUTPATIENT
Start: 2023-12-06

## 2023-12-06 RX ORDER — BUDESONIDE AND FORMOTEROL FUMARATE DIHYDRATE 160; 4.5 UG/1; UG/1
2 AEROSOL RESPIRATORY (INHALATION) EVERY 12 HOURS
Qty: 10.2 G | Refills: 11 | Status: SHIPPED | OUTPATIENT
Start: 2023-12-06 | End: 2024-12-05

## 2023-12-06 RX ORDER — MUPIROCIN 20 MG/G
OINTMENT TOPICAL
Qty: 22 G | Refills: 3 | Status: SHIPPED | OUTPATIENT
Start: 2023-12-06

## 2023-12-06 RX ORDER — TRIAMCINOLONE ACETONIDE 1 MG/G
CREAM TOPICAL
Qty: 80 G | Refills: 1 | Status: CANCELLED | OUTPATIENT
Start: 2023-12-06

## 2023-12-06 RX ORDER — ALBUTEROL SULFATE 0.83 MG/ML
2.5 SOLUTION RESPIRATORY (INHALATION) EVERY 4 HOURS PRN
Qty: 1080 ML | Refills: 3 | Status: SHIPPED | OUTPATIENT
Start: 2023-12-06

## 2023-12-06 RX ORDER — AMLODIPINE BESYLATE 5 MG/1
5 TABLET ORAL DAILY
Qty: 90 TABLET | Refills: 3 | Status: SHIPPED | OUTPATIENT
Start: 2023-12-06

## 2023-12-06 RX ORDER — PANTOPRAZOLE SODIUM 40 MG/1
40 TABLET, DELAYED RELEASE ORAL DAILY
Qty: 90 TABLET | Refills: 3 | Status: SHIPPED | OUTPATIENT
Start: 2023-12-06

## 2023-12-06 RX ORDER — PREDNISONE 20 MG/1
20 TABLET ORAL DAILY
Qty: 10 TABLET | Refills: 0 | OUTPATIENT
Start: 2023-12-06 | End: 2023-12-26

## 2023-12-06 RX ORDER — MELOXICAM 15 MG/1
TABLET ORAL
Qty: 30 TABLET | Refills: 5 | Status: SHIPPED | OUTPATIENT
Start: 2023-12-06 | End: 2023-12-08

## 2023-12-06 RX ORDER — CLOTRIMAZOLE AND BETAMETHASONE DIPROPIONATE 10; .64 MG/G; MG/G
CREAM TOPICAL 2 TIMES DAILY PRN
Qty: 15 G | Refills: 1 | Status: CANCELLED | OUTPATIENT
Start: 2023-12-06

## 2023-12-06 RX ORDER — DIPHENOXYLATE HYDROCHLORIDE AND ATROPINE SULFATE 2.5; .025 MG/1; MG/1
TABLET ORAL
Qty: 30 TABLET | Refills: 2 | Status: SHIPPED | OUTPATIENT
Start: 2023-12-06

## 2023-12-06 RX ORDER — ZIPRASIDONE HYDROCHLORIDE 60 MG/1
60 CAPSULE ORAL 2 TIMES DAILY
Qty: 30 CAPSULE | Refills: 5 | Status: SHIPPED | OUTPATIENT
Start: 2023-12-06 | End: 2024-03-04

## 2023-12-06 RX ORDER — TRAZODONE HYDROCHLORIDE 100 MG/1
100 TABLET ORAL NIGHTLY
Qty: 90 TABLET | Refills: 1 | Status: SHIPPED | OUTPATIENT
Start: 2023-12-06

## 2023-12-06 RX ORDER — BUSPIRONE HYDROCHLORIDE 30 MG/1
30 TABLET ORAL 2 TIMES DAILY
Qty: 180 TABLET | Refills: 3 | Status: SHIPPED | OUTPATIENT
Start: 2023-12-06

## 2023-12-06 RX ORDER — ZOLPIDEM TARTRATE 5 MG/1
TABLET ORAL
Qty: 30 TABLET | Refills: 2 | Status: SHIPPED | OUTPATIENT
Start: 2023-12-06

## 2023-12-06 RX ORDER — FAMOTIDINE 20 MG/1
20 TABLET, FILM COATED ORAL 2 TIMES DAILY
Qty: 180 TABLET | Refills: 3 | Status: SHIPPED | OUTPATIENT
Start: 2023-12-06

## 2023-12-06 RX ORDER — GABAPENTIN 800 MG/1
800 TABLET ORAL 3 TIMES DAILY
Qty: 270 TABLET | Refills: 1 | Status: SHIPPED | OUTPATIENT
Start: 2023-12-06

## 2023-12-06 RX ORDER — LEVOCETIRIZINE DIHYDROCHLORIDE 5 MG/1
5 TABLET, FILM COATED ORAL NIGHTLY
Qty: 90 TABLET | Refills: 1 | Status: SHIPPED | OUTPATIENT
Start: 2023-12-06

## 2023-12-06 RX ORDER — BETAMETHASONE VALERATE 1.2 MG/G
CREAM TOPICAL 2 TIMES DAILY
Qty: 60 G | Refills: 2 | Status: SHIPPED | OUTPATIENT
Start: 2023-12-06

## 2023-12-06 RX ORDER — HYDROXYZINE PAMOATE 25 MG/1
CAPSULE ORAL
Qty: 360 CAPSULE | Refills: 1 | Status: SHIPPED | OUTPATIENT
Start: 2023-12-06

## 2023-12-06 RX ORDER — LEVOFLOXACIN 500 MG/1
500 TABLET, FILM COATED ORAL DAILY
Qty: 10 TABLET | Refills: 0 | Status: SHIPPED | OUTPATIENT
Start: 2023-12-06 | End: 2023-12-16

## 2023-12-06 RX ORDER — PROMETHAZINE HYDROCHLORIDE AND DEXTROMETHORPHAN HYDROBROMIDE 6.25; 15 MG/5ML; MG/5ML
5 SYRUP ORAL EVERY 6 HOURS PRN
Qty: 180 ML | Refills: 1 | Status: SHIPPED | OUTPATIENT
Start: 2023-12-06

## 2023-12-06 RX ORDER — ROSUVASTATIN CALCIUM 5 MG/1
5 TABLET, COATED ORAL DAILY
Qty: 90 TABLET | Refills: 3 | Status: SHIPPED | OUTPATIENT
Start: 2023-12-06

## 2023-12-06 RX ORDER — FUROSEMIDE 40 MG/1
40 TABLET ORAL DAILY
Qty: 90 TABLET | Refills: 3 | Status: SHIPPED | OUTPATIENT
Start: 2023-12-06

## 2023-12-06 RX ORDER — MONTELUKAST SODIUM 10 MG/1
10 TABLET ORAL DAILY
Qty: 90 TABLET | Refills: 3 | Status: SHIPPED | OUTPATIENT
Start: 2023-12-06

## 2023-12-06 RX ORDER — SERTRALINE HYDROCHLORIDE 100 MG/1
100 TABLET, FILM COATED ORAL DAILY
Qty: 90 TABLET | Refills: 3 | Status: SHIPPED | OUTPATIENT
Start: 2023-12-06

## 2023-12-06 RX ORDER — TIZANIDINE 4 MG/1
4 TABLET ORAL EVERY 8 HOURS
Qty: 30 TABLET | Refills: 2 | Status: CANCELLED | OUTPATIENT
Start: 2023-12-06

## 2023-12-06 RX ORDER — LOSARTAN POTASSIUM 100 MG/1
100 TABLET ORAL DAILY
Qty: 90 TABLET | Refills: 3 | Status: SHIPPED | OUTPATIENT
Start: 2023-12-06

## 2023-12-06 RX ORDER — TRIAMCINOLONE ACETONIDE 40 MG/ML
40 INJECTION, SUSPENSION INTRA-ARTICULAR; INTRAMUSCULAR ONCE
Status: COMPLETED | OUTPATIENT
Start: 2023-12-06 | End: 2023-12-06

## 2023-12-06 RX ADMIN — TRIAMCINOLONE ACETONIDE 40 MG: 40 INJECTION, SUSPENSION INTRA-ARTICULAR; INTRAMUSCULAR at 12:12

## 2023-12-06 NOTE — PROGRESS NOTES
Verified pt ID using name and . NKDA. Administered Kenalog 40 in left VG per physician order using aseptic technique. Aspirated and no blood return noted. Pt tolerated well with no adverse reactions noted.

## 2023-12-06 NOTE — TELEPHONE ENCOUNTER
Care Due:                  Date            Visit Type   Department     Provider  --------------------------------------------------------------------------------                                EP -                              PRIMARY      [facility]  Last Visit: 12-      CARE ([facility])   PRIMARY CARE   Davon Brock  Next Visit: None Scheduled  None         None Found                                                            Last  Test          Frequency    Reason                     Performed    Due Date  --------------------------------------------------------------------------------    Lipid Panel.  12 months..  rosuvastatin.............  04- 04-    [facility] Embedded Care Due Messages. Reference number: [id number].   12/06/2023 12:38:06 PM CST

## 2023-12-06 NOTE — PROGRESS NOTES
Subjective:       Patient ID: Trina Marie Collette is a 58 y.o. female.    Chief Complaint:   HPI: 59 yo BF in for 6 mo checkup --cold symptoms--refills  Eating well---+BM --acting up occas loose BM better with medication--ambulation--rough still with problems with the left knee  HTN amlodipine losartan blood pressure 132/86  History hyperlipidemia on Crestor  History GERD on Protonix and Pepcid--has problems swallowing   MRI of the left knee shows chondromalacia with subchondral cyst versus low-grade osteochondral defect in the medial trochle periods 4 cm popliteal cyst containing debris.  Small joint effusion with suprapatellar plica MRI done 11/04/2022 Saw Dr Adkins for knee   Bad cold--no fever--+runny stuffy nose--slight sore throat--+ +phlegm clear but thick +cough--no pneumonia asthma Tb does have some bronchospasm patient on albuterol and Symbicort +wheezing--no nausea vomiting some loose bowel movement          --ROS-  Skin: no psoriasis, eczema, skin cancer--rash abd due belt buckel--doing okay with cream  HEENT no  headache, no ocular pain, blurred vision, diplopia, epistaxis, hoarseness change in voice, thyroid trouble--history of tinnitus/chronic sinus problem/nasal septal deviation/hearing loss has hearing aid  Lung: No pneumonia, asthma, Tb, wheezing, SOB, smoking 1/4 ppd trying to quit  --history of bronchospasm in the past uses albuterol--had chest x-ray which is normal  Heart: No chest pain, ankle edema, +occas palpitations, MI, river murmur, +hypertension blood pressure   + hyperlipidemia-no stent bypass arrhythmia  Abdomen: No nausea, vomiting, diarrhea, constipation, ulcers, hepatitis, gallbladder disease, melena, hematochezia, hematemesis patient complaining of heartburn had EGD colonoscopy hx diarrhea immodium Hx Gerd on protonix comes and goes  : no UTI, renal disease, stones-  GYN last menstrual.  Years ago has mammogram yearly--refuses PAP smear --I don't fool with that    MS: no  fractures, O/A, lupus, rheumatoid, gout--history of chronic pain the back lumbar spine, right knee  right shoulder, history DDD lumbar history cervical spinal stenosis  Neuro: No dizziness, LOC, seizures   No diabetes, no anemia, no anxiety, no depression patient with history of bipolar schizophrenia--doing well with medication goes to [facility]   Single, one child, disabled, lives alone     Objective:   Physical Exam:  No physical exam was done this was a virtual visit the visit below is from a prior office visit  General: Well nourished, well developed, no acute distress + morbid obesity  Skin: No lesions  HEENT--eyes PERRLA EOM intact nose clear , throat nonerythematous ears TMs clear  NECK: Supple, no bruits, No JVD, no nodes  Lungs: Clear, no rales, rhonchi, wheezing   Heart: Regular rate and rhythm, no murmurs, gallops, or rubs  Abdomen: flat, bowel sounds positive, no tenderness, or organomegaly  MS:  pain left leg griselda though bilaterally --gets swelling knee medial and lateral aspect --pain with ambulation squatting and arising getting up on exam table  Neuro: Alert, CN intact, oriented X 3 still with some anxiety and depression of issues--Romberg's negative heel-toe intact  Extremities: No cyanosis, clubbing, or edema         Assessment:       1. Chronic cough    2. Bronchitis    3. Bronchospasm    4. Hypertension    5. Hypokalemia    6. Hyperlipidemia, unspecified hyperlipidemia type    7. Lumbar spine pain    8. Tinnitus, unspecified laterality    9. Thyroid mass    10. Insomnia, unspecified type    11. Acute recurrent maxillary sinusitis    12. Essential hypertension    13. Chronic obstructive pulmonary disease, unspecified COPD type    14. Schizophrenia, unspecified type    15. Bipolar 1 disorder    16. Chronic pain syndrome    17. Vitamin D deficiency    18. Left leg pain                Plan:       Chronic cough  -     albuterol (PROVENTIL) 2.5 mg /3 mL (0.083 %) nebulizer solution; Take 3 mLs (2.5  mg total) by nebulization every 4 (four) hours as needed for Wheezing or Shortness of Breath. Rescue  Dispense: 1080 mL; Refill: 3  -     X-Ray Chest PA And Lateral; Future; Expected date: 12/06/2023    Bronchitis  -     albuterol (PROVENTIL) 2.5 mg /3 mL (0.083 %) nebulizer solution; Take 3 mLs (2.5 mg total) by nebulization every 4 (four) hours as needed for Wheezing or Shortness of Breath. Rescue  Dispense: 1080 mL; Refill: 3  -     budesonide-formoterol 160-4.5 mcg (SYMBICORT) 160-4.5 mcg/actuation HFAA; Inhale 2 puffs into the lungs every 12 (twelve) hours. Controller. Rinse mouth after using.  Dispense: 10.2 g; Refill: 11    Bronchospasm  -     budesonide-formoterol 160-4.5 mcg (SYMBICORT) 160-4.5 mcg/actuation HFAA; Inhale 2 puffs into the lungs every 12 (twelve) hours. Controller. Rinse mouth after using.  Dispense: 10.2 g; Refill: 11    Hypertension  -     furosemide (LASIX) 40 MG tablet; Take 1 tablet (40 mg total) by mouth once daily.  Dispense: 90 tablet; Refill: 3    Hypokalemia  -     potassium chloride (MICRO-K) 10 MEQ CpSR; Take 1 capsule (10 mEq total) by mouth once daily.  Dispense: 90 capsule; Refill: 3    Hyperlipidemia, unspecified hyperlipidemia type  -     rosuvastatin (CRESTOR) 5 MG tablet; Take 1 tablet (5 mg total) by mouth once daily.  Dispense: 90 tablet; Refill: 3    Lumbar spine pain  -     rosuvastatin (CRESTOR) 5 MG tablet; Take 1 tablet (5 mg total) by mouth once daily.  Dispense: 90 tablet; Refill: 3    Tinnitus, unspecified laterality  -     rosuvastatin (CRESTOR) 5 MG tablet; Take 1 tablet (5 mg total) by mouth once daily.  Dispense: 90 tablet; Refill: 3    Thyroid mass  -     rosuvastatin (CRESTOR) 5 MG tablet; Take 1 tablet (5 mg total) by mouth once daily.  Dispense: 90 tablet; Refill: 3    Insomnia, unspecified type  -     zolpidem (AMBIEN) 5 MG Tab; 1 po Q [facility] p.r.n. sleep  Dispense: 30 tablet; Refill: 2    Acute recurrent maxillary sinusitis    Essential  hypertension    Chronic obstructive pulmonary disease, unspecified COPD type    Schizophrenia, unspecified type    Bipolar 1 disorder    Chronic pain syndrome    Vitamin D deficiency    Left leg pain    Other orders  -     amLODIPine (NORVASC) 5 MG tablet; Take 1 tablet (5 mg total) by mouth once daily.  Dispense: 90 tablet; Refill: 3  -     betamethasone valerate 0.1% (VALISONE) 0.1 % Crea; Apply topically 2 (two) times daily.  Dispense: 60 g; Refill: 2  -     busPIRone (BUSPAR) 30 MG Tab; Take 1 tablet (30 mg total) by mouth 2 (two) times daily.  Dispense: 180 tablet; Refill: 3  -     diphenoxylate-atropine 2.5-0.025 mg (LOMOTIL) 2.5-0.025 mg per tablet; TAKE 1 TAB BY MOUTH AFTER EACH LOOSE BOWEL MOVEMENT PER DAY  Dispense: 30 tablet; Refill: 2  -     famotidine (PEPCID) 20 MG tablet; Take 1 tablet (20 mg total) by mouth 2 (two) times daily.  Dispense: 180 tablet; Refill: 3  -     gabapentin (NEURONTIN) 800 MG tablet; Take 1 tablet (800 mg total) by mouth 3 (three) times daily.  Dispense: 270 tablet; Refill: 1  -     hydrOXYzine pamoate (VISTARIL) 25 MG Cap; TAKE 1 CAPSULE BY MOUTH EVERY 6 HOURS AS NEEDED FOR NAUSEA/VOMITING/CRAMPS/ OR ITCH  Dispense: 360 capsule; Refill: 1  -     levocetirizine (XYZAL) 5 MG tablet; Take 1 tablet (5 mg total) by mouth every evening.  Dispense: 90 tablet; Refill: 1  -     losartan (COZAAR) 100 MG tablet; Take 1 tablet (100 mg total) by mouth once daily.  Dispense: 90 tablet; Refill: 3  -     meloxicam (MOBIC) 15 MG tablet; START AFTER MEDROL DOSEPAK COMPLETE MOBIC 7.5 MG BY MOUTH TWICE A DAY AS NEEDED FOR SWELLING OR PAIN NO OTHER NSAIDS CAN TAKE WITH TYLENOL  Dispense: 30 tablet; Refill: 5  -     montelukast (SINGULAIR) 10 mg tablet; Take 1 tablet (10 mg total) by mouth once daily.  Dispense: 90 tablet; Refill: 3  -     mupirocin (BACTROBAN) 2 % ointment; Apply to nose 3 times daily on a Q-tip for crusting or sores inside the nose  Dispense: 22 g; Refill: 3  -     pantoprazole  (PROTONIX) 40 MG tablet; Take 1 tablet (40 mg total) by mouth once daily.  Dispense: 90 tablet; Refill: 3  -     sertraline (ZOLOFT) 100 MG tablet; Take 1 tablet (100 mg total) by mouth once daily.  Dispense: 90 tablet; Refill: 3  -     traZODone (DESYREL) 100 MG tablet; Take 1 tablet (100 mg total) by mouth every evening.  Dispense: 90 tablet; Refill: 1  -     ziprasidone (GEODON) 60 MG Cap; Take 1 capsule (60 mg total) by mouth 2 (two) times daily.  Dispense: 30 capsule; Refill: 5  -     levoFLOXacin (LEVAQUIN) 500 MG tablet; Take 1 tablet (500 mg total) by mouth once daily. for 10 days  Dispense: 10 tablet; Refill: 0  -     triamcinolone acetonide injection 40 mg  -     predniSONE (DELTASONE) 20 MG tablet; Take 1 tablet (20 mg total) by mouth once daily.  Dispense: 10 tablet; Refill: 0  -     promethazine-dextromethorphan (PROMETHAZINE-DM) 6.25-15 mg/5 mL Syrp; Take 5 mLs by mouth every 6 (six) hours as needed (cough).  Dispense: 180 mL; Refill: 1                Main Reason here    Virtual visit  Insomnia needs refill of Ambien  Chronic cough needs Phenergan DM Singulair  Skin rash needs Bactroban and Valisone cream  Other medical issues not all addressed this visit  Chronic pain syndrome--bilateral leg pain--left much greater than right---saw orthopedist--no treatment given--patient is on Norco 10 from pain clinic/Mobic/Flexeril/gabapentin 800 t.i.d.--pain mainly in back and left leg--has appt Dr Narayan June 13th --MRI results chondromalacia with subchondral cysts verse low-grade osteochondral defect in the medial trochlea.  4 cm popliteal cyst with debris.  Small joint effusion with suprapatellar plica  Back pain x years unchanged   Hypertension patient on Cozaar 100 Lasix 40 amlodipine 5 mg blood pressure   Peripheral edema told can increase Lasix from 40 mg to 40 in the morning 20 afternoon if needed  Sees Dr. Senior for pain medication--DDD cervical and lumbar spine  Hyperlipidemia on Crestor  Vitamin D  deficiency on vitamin-D  COPD on Symbicort and albuterol  Anxiety depression bipolar schizophrenia on Ambien Ativan  Zoloft trazodone Geodon--goes to mental health  Lab cbc cmp lipid TSH sed rate   Health maintenance Pap smear

## 2023-12-08 RX ORDER — DIPHENOXYLATE HYDROCHLORIDE AND ATROPINE SULFATE 2.5; .025 MG/1; MG/1
TABLET ORAL
Qty: 30 TABLET | Refills: 5 | Status: SHIPPED | OUTPATIENT
Start: 2023-12-08

## 2023-12-08 RX ORDER — MELOXICAM 15 MG/1
TABLET ORAL
Qty: 30 TABLET | Refills: 5 | Status: SHIPPED | OUTPATIENT
Start: 2023-12-08

## 2023-12-27 ENCOUNTER — TELEPHONE (OUTPATIENT)
Dept: PRIMARY CARE CLINIC | Facility: CLINIC | Age: 58
End: 2023-12-27
Payer: MEDICARE

## 2023-12-27 DIAGNOSIS — J98.01 BRONCHOSPASM: ICD-10-CM

## 2023-12-27 DIAGNOSIS — J40 BRONCHITIS: Primary | ICD-10-CM

## 2023-12-27 DIAGNOSIS — J45.21 MILD INTERMITTENT ASTHMA WITH ACUTE EXACERBATION: ICD-10-CM

## 2023-12-27 DIAGNOSIS — R05.3 CHRONIC COUGH: ICD-10-CM

## 2023-12-27 NOTE — TELEPHONE ENCOUNTER
"----- Message from Iliana Chinchilla sent at 12/27/2023 12:57 PM CST -----  Contact: Pt [phone]  Patient is requesting a "Nebulizer" for home use, her's has broken.    Please call and advise.    Thank You    ##Please do NOT reply to sender as this is from the call center and they answer incoming calls only.      "

## 2024-01-06 NOTE — TELEPHONE ENCOUNTER
No care due was identified.  Health [facility] Embedded Care Due Messages. Reference number: [id number].   1/06/2024 6:59:04 AM CST

## 2024-01-08 RX ORDER — TRIAMCINOLONE ACETONIDE 1 MG/G
CREAM TOPICAL
Qty: 80 G | Refills: 1 | Status: SHIPPED | OUTPATIENT
Start: 2024-01-08

## 2024-01-17 ENCOUNTER — OUTPATIENT CASE MANAGEMENT (OUTPATIENT)
Dept: ADMINISTRATIVE | Facility: OTHER | Age: 59
End: 2024-01-17
Payer: MEDICARE

## 2024-01-24 ENCOUNTER — OUTPATIENT CASE MANAGEMENT (OUTPATIENT)
Dept: ADMINISTRATIVE | Facility: OTHER | Age: 59
End: 2024-01-24
Payer: MEDICARE

## 2024-01-29 RX ORDER — TIZANIDINE 4 MG/1
4 TABLET ORAL EVERY 8 HOURS
Qty: 30 TABLET | Refills: 5 | Status: SHIPPED | OUTPATIENT
Start: 2024-01-29 | End: 2024-04-04 | Stop reason: SDUPTHER

## 2024-01-29 NOTE — TELEPHONE ENCOUNTER
No care due was identified.  [facility] Embedded Care Due Messages. Reference number: [id number].   1/29/2024 9:30:10 AM CST

## 2024-03-02 NOTE — TELEPHONE ENCOUNTER
Care Due:                  Date            Visit Type   Department     Provider  --------------------------------------------------------------------------------                                EP -                              PRIMARY      [facility]  Last Visit: 12-      CARE ([facility])   PRIMARY CARE   Davon Brock                              [facility]                              PRIMARY      [facility]  Next Visit: 06-      CARE ([facility])   PRIMARY CARE   Davon Brock                                                            Last  Test          Frequency    Reason                     Performed    Due Date  --------------------------------------------------------------------------------    Lipid Panel.  12 months..  rosuvastatin.............  04- 04-    Health [facility] Embedded Care Due Messages. Reference number: [id number].   3/02/2024 9:32:44 AM CST

## 2024-03-04 RX ORDER — ZIPRASIDONE HYDROCHLORIDE 60 MG/1
60 CAPSULE ORAL 2 TIMES DAILY
Qty: 180 CAPSULE | Refills: 0 | Status: SHIPPED | OUTPATIENT
Start: 2024-03-04 | End: 2024-04-11 | Stop reason: SDUPTHER

## 2024-03-07 NOTE — TELEPHONE ENCOUNTER
No care due was identified.  Health [facility] Embedded Care Due Messages. Reference number: [id number].   3/07/2024 4:05:53 PM CST

## 2024-03-07 NOTE — TELEPHONE ENCOUNTER
No care due was identified.  [facility] Embedded Care Due Messages. Reference number: [id number].   3/07/2024 12:19:49 AM CST

## 2024-03-08 RX ORDER — IPRATROPIUM BROMIDE 21 UG/1
SPRAY, METERED NASAL
Qty: 30 ML | Refills: 1 | Status: SHIPPED | OUTPATIENT
Start: 2024-03-08 | End: 2024-04-08

## 2024-03-09 NOTE — TELEPHONE ENCOUNTER
No care due was identified.  Health [facility] Embedded Care Due Messages. Reference number: [id number].   3/09/2024 7:18:52 AM CST

## 2024-03-11 RX ORDER — TRIAMCINOLONE ACETONIDE 1 MG/G
CREAM TOPICAL
Qty: 80 G | Refills: 1 | Status: SHIPPED | OUTPATIENT
Start: 2024-03-11 | End: 2024-05-12

## 2024-04-04 DIAGNOSIS — G47.00 INSOMNIA, UNSPECIFIED TYPE: ICD-10-CM

## 2024-04-04 NOTE — TELEPHONE ENCOUNTER
No care due was identified.  [facility] Embedded Care Due Messages. Reference number: [id number].   4/04/2024 11:24:46 AM CDT

## 2024-04-05 RX ORDER — TIZANIDINE 4 MG/1
4 TABLET ORAL EVERY 8 HOURS
Qty: 30 TABLET | Refills: 5 | Status: SHIPPED | OUTPATIENT
Start: 2024-04-05 | End: 2024-06-10 | Stop reason: SDUPTHER

## 2024-04-05 RX ORDER — ZOLPIDEM TARTRATE 5 MG/1
TABLET ORAL
Qty: 30 TABLET | Refills: 2 | OUTPATIENT
Start: 2024-04-05

## 2024-04-05 RX ORDER — SERTRALINE HYDROCHLORIDE 100 MG/1
100 TABLET, FILM COATED ORAL DAILY
Qty: 90 TABLET | Refills: 3 | Status: SHIPPED | OUTPATIENT
Start: 2024-04-05 | End: 2024-06-10 | Stop reason: SDUPTHER

## 2024-04-06 NOTE — TELEPHONE ENCOUNTER
No care due was identified.  [facility] Embedded Care Due Messages. Reference number: [id number].   4/06/2024 10:33:07 AM CDT

## 2024-04-08 RX ORDER — IPRATROPIUM BROMIDE 21 UG/1
SPRAY, METERED NASAL
Qty: 30 ML | Refills: 1 | Status: SHIPPED | OUTPATIENT
Start: 2024-04-08 | End: 2024-05-01

## 2024-04-09 DIAGNOSIS — G47.00 INSOMNIA, UNSPECIFIED TYPE: ICD-10-CM

## 2024-04-09 NOTE — TELEPHONE ENCOUNTER
No care due was identified.  Health [facility] Embedded Care Due Messages. Reference number: [id number].   4/09/2024 3:43:27 PM CDT

## 2024-04-11 DIAGNOSIS — Z12.31 ENCOUNTER FOR SCREENING MAMMOGRAM FOR MALIGNANT NEOPLASM OF BREAST: Primary | ICD-10-CM

## 2024-04-11 DIAGNOSIS — G47.00 INSOMNIA, UNSPECIFIED TYPE: ICD-10-CM

## 2024-04-11 RX ORDER — ZOLPIDEM TARTRATE 5 MG/1
TABLET ORAL
Qty: 30 TABLET | Refills: 2 | Status: CANCELLED | OUTPATIENT
Start: 2024-04-11

## 2024-04-11 NOTE — TELEPHONE ENCOUNTER
No care due was identified.  Health [facility] Embedded Care Due Messages. Reference number: [id number].   4/11/2024 10:10:10 AM CDT

## 2024-04-11 NOTE — TELEPHONE ENCOUNTER
----- Message from Nettie Laguerre sent at 8/10/2020 10:40 AM CDT -----  Regarding: refill  Can the clinic reply in [facility]: no      Please refill the medication(s) listed below. Please call the patient when the prescription(s) is ready for  at this phone number   [phone]      Medication #1 Promethazine-codeine 6.25 mg/5 ml (phenergan with CODEINE) 6.25- 10 mg/5 ml syrup     Medication #2       Preferred Pharmacy: [facility]                                     breathing

## 2024-04-11 NOTE — TELEPHONE ENCOUNTER
----- Message from Socorro Roberts sent at 4/11/2024 10:19 AM CDT -----  Contact: [phone]  What orders is pt asking for?: annual mammogram.     Is there a future appointment with the provider?: no    When?:    Comments?:

## 2024-04-14 RX ORDER — ZOLPIDEM TARTRATE 5 MG/1
TABLET ORAL
Qty: 30 TABLET | Refills: 2 | Status: SHIPPED | OUTPATIENT
Start: 2024-04-14 | End: 2024-06-10 | Stop reason: SDUPTHER

## 2024-04-14 RX ORDER — ZIPRASIDONE HYDROCHLORIDE 60 MG/1
60 CAPSULE ORAL 2 TIMES DAILY
Qty: 180 CAPSULE | Refills: 0 | Status: SHIPPED | OUTPATIENT
Start: 2024-04-14 | End: 2024-06-10 | Stop reason: SDUPTHER

## 2024-05-01 RX ORDER — IPRATROPIUM BROMIDE 21 UG/1
SPRAY, METERED NASAL
Qty: 30 ML | Refills: 2 | Status: SHIPPED | OUTPATIENT
Start: 2024-05-01 | End: 2024-05-15

## 2024-05-01 NOTE — TELEPHONE ENCOUNTER
Care Due:                  Date            Visit Type   Department     Provider  --------------------------------------------------------------------------------                                EP -                              PRIMARY      [facility]  Last Visit: 12-      CARE ([facility])   PRIMARY CARE   Davon Brock                              [facility]                              PRIMARY      [place] [facility]  Next Visit: 06-      CARE ([facility])   PRIMARY CARE   Davon Brock                                                            Last  Test          Frequency    Reason                     Performed    Due Date  --------------------------------------------------------------------------------    Lipid Panel.  12 months..  rosuvastatin.............  04- 04-    Health [facility] Embedded Care Due Messages. Reference number: [id number].   5/01/2024 10:34:46 AM CDT

## 2024-05-09 NOTE — TELEPHONE ENCOUNTER
No care due was identified.  Health [facility] Embedded Care Due Messages. Reference number: [id number].   5/09/2024 12:31:01 AM CDT

## 2024-05-12 RX ORDER — TRIAMCINOLONE ACETONIDE 1 MG/G
CREAM TOPICAL
Qty: 80 G | Refills: 1 | Status: SHIPPED | OUTPATIENT
Start: 2024-05-12 | End: 2024-06-10 | Stop reason: SDUPTHER

## 2024-05-15 RX ORDER — LEVOCETIRIZINE DIHYDROCHLORIDE 5 MG/1
5 TABLET, FILM COATED ORAL NIGHTLY
Qty: 90 TABLET | Refills: 1 | Status: SHIPPED | OUTPATIENT
Start: 2024-05-15 | End: 2024-06-10 | Stop reason: SDUPTHER

## 2024-05-15 RX ORDER — HYDROXYZINE PAMOATE 25 MG/1
CAPSULE ORAL
Qty: 360 CAPSULE | Refills: 1 | Status: SHIPPED | OUTPATIENT
Start: 2024-05-15 | End: 2024-06-10 | Stop reason: SDUPTHER

## 2024-05-15 RX ORDER — IPRATROPIUM BROMIDE 21 UG/1
SPRAY, METERED NASAL
Qty: 30 ML | Refills: 5 | Status: SHIPPED | OUTPATIENT
Start: 2024-05-15 | End: 2024-06-10 | Stop reason: SDUPTHER

## 2024-05-15 NOTE — TELEPHONE ENCOUNTER
No care due was identified.  Health [facility] Embedded Care Due Messages. Reference number: [id number].   5/15/2024 9:24:05 AM CDT

## 2024-06-10 ENCOUNTER — OFFICE VISIT (OUTPATIENT)
Dept: PRIMARY CARE CLINIC | Facility: CLINIC | Age: 59
End: 2024-06-10
Payer: MEDICARE

## 2024-06-10 VITALS
WEIGHT: 243.63 LBS | BODY MASS INDEX: 39.15 KG/M2 | RESPIRATION RATE: 18 BRPM | DIASTOLIC BLOOD PRESSURE: 86 MMHG | SYSTOLIC BLOOD PRESSURE: 118 MMHG | OXYGEN SATURATION: 97 % | HEART RATE: 63 BPM | HEIGHT: 66 IN

## 2024-06-10 DIAGNOSIS — E23.6 EMPTY SELLA SYNDROME: ICD-10-CM

## 2024-06-10 DIAGNOSIS — G89.4 CHRONIC PAIN SYNDROME: Primary | ICD-10-CM

## 2024-06-10 DIAGNOSIS — F20.9 SCHIZOPHRENIA, UNSPECIFIED TYPE: ICD-10-CM

## 2024-06-10 DIAGNOSIS — G44.209 TENSION-TYPE HEADACHE, NOT INTRACTABLE, UNSPECIFIED CHRONICITY PATTERN: ICD-10-CM

## 2024-06-10 DIAGNOSIS — M50.30 DDD (DEGENERATIVE DISC DISEASE), CERVICAL: ICD-10-CM

## 2024-06-10 DIAGNOSIS — Z72.0 TOBACCO ABUSE: ICD-10-CM

## 2024-06-10 DIAGNOSIS — G47.00 INSOMNIA, UNSPECIFIED TYPE: ICD-10-CM

## 2024-06-10 DIAGNOSIS — E66.9 OBESITY (BMI 35.0-39.9 WITHOUT COMORBIDITY): ICD-10-CM

## 2024-06-10 DIAGNOSIS — S39.012D LUMBOSACRAL STRAIN, SUBSEQUENT ENCOUNTER: ICD-10-CM

## 2024-06-10 DIAGNOSIS — F31.9 BIPOLAR 1 DISORDER: ICD-10-CM

## 2024-06-10 DIAGNOSIS — E87.6 HYPOKALEMIA: ICD-10-CM

## 2024-06-10 DIAGNOSIS — F11.20 OPIOID DEPENDENCE, UNCOMPLICATED: ICD-10-CM

## 2024-06-10 DIAGNOSIS — E55.9 VITAMIN D DEFICIENCY: ICD-10-CM

## 2024-06-10 DIAGNOSIS — M54.50 LUMBAR SPINE PAIN: ICD-10-CM

## 2024-06-10 DIAGNOSIS — J44.9 CHRONIC OBSTRUCTIVE PULMONARY DISEASE, UNSPECIFIED COPD TYPE: ICD-10-CM

## 2024-06-10 DIAGNOSIS — Z79.899 ENCOUNTER FOR LONG-TERM CURRENT USE OF MEDICATION: ICD-10-CM

## 2024-06-10 DIAGNOSIS — L30.9 ECZEMA, UNSPECIFIED TYPE: ICD-10-CM

## 2024-06-10 DIAGNOSIS — J98.01 BRONCHOSPASM: ICD-10-CM

## 2024-06-10 DIAGNOSIS — M46.96 UNSPECIFIED INFLAMMATORY SPONDYLOPATHY, LUMBAR REGION: ICD-10-CM

## 2024-06-10 DIAGNOSIS — E78.5 HYPERLIPIDEMIA, UNSPECIFIED HYPERLIPIDEMIA TYPE: ICD-10-CM

## 2024-06-10 DIAGNOSIS — Z13.1 SCREENING FOR DIABETES MELLITUS: ICD-10-CM

## 2024-06-10 DIAGNOSIS — E66.01 MORBID (SEVERE) OBESITY DUE TO EXCESS CALORIES: ICD-10-CM

## 2024-06-10 PROCEDURE — 3079F DIAST BP 80-89 MM HG: CPT | Mod: HCNC,CPTII,S$GLB, | Performed by: FAMILY MEDICINE

## 2024-06-10 PROCEDURE — 99999 PR PBB SHADOW E&M-EST. PATIENT-LVL III: CPT | Mod: PBBFAC,HCNC,, | Performed by: FAMILY MEDICINE

## 2024-06-10 PROCEDURE — 3074F SYST BP LT 130 MM HG: CPT | Mod: HCNC,CPTII,S$GLB, | Performed by: FAMILY MEDICINE

## 2024-06-10 PROCEDURE — 99214 OFFICE O/P EST MOD 30 MIN: CPT | Mod: HCNC,S$GLB,, | Performed by: FAMILY MEDICINE

## 2024-06-10 PROCEDURE — 1159F MED LIST DOCD IN RCRD: CPT | Mod: HCNC,CPTII,S$GLB, | Performed by: FAMILY MEDICINE

## 2024-06-10 PROCEDURE — 4010F ACE/ARB THERAPY RXD/TAKEN: CPT | Mod: HCNC,CPTII,S$GLB, | Performed by: FAMILY MEDICINE

## 2024-06-10 PROCEDURE — 3008F BODY MASS INDEX DOCD: CPT | Mod: HCNC,CPTII,S$GLB, | Performed by: FAMILY MEDICINE

## 2024-06-10 RX ORDER — ZIPRASIDONE HYDROCHLORIDE 80 MG/1
80 CAPSULE ORAL DAILY
Qty: 30 CAPSULE | Refills: 5 | Status: SHIPPED | OUTPATIENT
Start: 2024-06-10

## 2024-06-10 RX ORDER — FAMOTIDINE 20 MG/1
20 TABLET, FILM COATED ORAL 2 TIMES DAILY
Qty: 180 TABLET | Refills: 1 | Status: CANCELLED | OUTPATIENT
Start: 2024-06-10

## 2024-06-10 RX ORDER — ZOLPIDEM TARTRATE 5 MG/1
TABLET ORAL
Qty: 30 TABLET | Refills: 2 | Status: SHIPPED | OUTPATIENT
Start: 2024-06-10

## 2024-06-10 RX ORDER — TRAZODONE HYDROCHLORIDE 100 MG/1
100 TABLET ORAL NIGHTLY
Qty: 90 TABLET | Refills: 1 | Status: CANCELLED | OUTPATIENT
Start: 2024-06-10

## 2024-06-10 RX ORDER — CLOTRIMAZOLE AND BETAMETHASONE DIPROPIONATE 10; .64 MG/G; MG/G
CREAM TOPICAL
Qty: 15 G | Refills: 1 | Status: SHIPPED | OUTPATIENT
Start: 2024-06-10

## 2024-06-10 RX ORDER — SEMAGLUTIDE 0.68 MG/ML
INJECTION, SOLUTION SUBCUTANEOUS
Qty: 3 EACH | Refills: 5 | Status: SHIPPED | OUTPATIENT
Start: 2024-06-10

## 2024-06-10 RX ORDER — TIZANIDINE 4 MG/1
4 TABLET ORAL EVERY 8 HOURS
Qty: 30 TABLET | Refills: 5 | Status: SHIPPED | OUTPATIENT
Start: 2024-06-10 | End: 2024-06-15

## 2024-06-10 RX ORDER — MONTELUKAST SODIUM 10 MG/1
10 TABLET ORAL DAILY
Qty: 90 TABLET | Refills: 3 | Status: SHIPPED | OUTPATIENT
Start: 2024-06-10

## 2024-06-10 RX ORDER — LOSARTAN POTASSIUM 100 MG/1
100 TABLET ORAL DAILY
Qty: 90 TABLET | Refills: 3 | Status: SHIPPED | OUTPATIENT
Start: 2024-06-10

## 2024-06-10 RX ORDER — BUSPIRONE HYDROCHLORIDE 30 MG/1
30 TABLET ORAL 2 TIMES DAILY
Qty: 180 TABLET | Refills: 3 | Status: SHIPPED | OUTPATIENT
Start: 2024-06-10

## 2024-06-10 RX ORDER — ROSUVASTATIN CALCIUM 5 MG/1
5 TABLET, COATED ORAL DAILY
Qty: 90 TABLET | Refills: 3 | Status: SHIPPED | OUTPATIENT
Start: 2024-06-10

## 2024-06-10 RX ORDER — MELOXICAM 15 MG/1
TABLET ORAL
Qty: 30 TABLET | Refills: 5 | Status: SHIPPED | OUTPATIENT
Start: 2024-06-10

## 2024-06-10 RX ORDER — ZIPRASIDONE HYDROCHLORIDE 60 MG/1
60 CAPSULE ORAL 2 TIMES DAILY
Qty: 180 CAPSULE | Refills: 0 | Status: SHIPPED | OUTPATIENT
Start: 2024-06-10

## 2024-06-10 RX ORDER — HYDROXYZINE PAMOATE 25 MG/1
CAPSULE ORAL
Qty: 360 CAPSULE | Refills: 1 | Status: SHIPPED | OUTPATIENT
Start: 2024-06-10

## 2024-06-10 RX ORDER — SEMAGLUTIDE 0.68 MG/ML
INJECTION, SOLUTION SUBCUTANEOUS
Qty: 3 ML | Refills: 5 | Status: SHIPPED | OUTPATIENT
Start: 2024-06-10 | End: 2024-06-10

## 2024-06-10 RX ORDER — NAPROXEN 500 MG/1
500 TABLET ORAL 2 TIMES DAILY WITH MEALS
Qty: 180 TABLET | Refills: 3 | Status: SHIPPED | OUTPATIENT
Start: 2024-06-10

## 2024-06-10 RX ORDER — PANTOPRAZOLE SODIUM 40 MG/1
40 TABLET, DELAYED RELEASE ORAL DAILY
Qty: 90 TABLET | Refills: 3 | Status: SHIPPED | OUTPATIENT
Start: 2024-06-10

## 2024-06-10 RX ORDER — CLOTRIMAZOLE AND BETAMETHASONE DIPROPIONATE 10; .64 MG/G; MG/G
CREAM TOPICAL 2 TIMES DAILY PRN
Qty: 15 G | Refills: 1 | Status: SHIPPED | OUTPATIENT
Start: 2024-06-10 | End: 2024-06-10

## 2024-06-10 RX ORDER — AMLODIPINE BESYLATE 5 MG/1
5 TABLET ORAL DAILY
Qty: 90 TABLET | Refills: 3 | Status: SHIPPED | OUTPATIENT
Start: 2024-06-10

## 2024-06-10 RX ORDER — MUPIROCIN 20 MG/G
OINTMENT TOPICAL
Qty: 22 G | Refills: 3 | Status: SHIPPED | OUTPATIENT
Start: 2024-06-10

## 2024-06-10 RX ORDER — FUROSEMIDE 40 MG/1
40 TABLET ORAL DAILY
Qty: 90 TABLET | Refills: 1 | Status: SHIPPED | OUTPATIENT
Start: 2024-06-10

## 2024-06-10 RX ORDER — LEVOCETIRIZINE DIHYDROCHLORIDE 5 MG/1
5 TABLET, FILM COATED ORAL NIGHTLY
Qty: 90 TABLET | Refills: 1 | Status: SHIPPED | OUTPATIENT
Start: 2024-06-10

## 2024-06-10 RX ORDER — POTASSIUM CHLORIDE 750 MG/1
10 CAPSULE, EXTENDED RELEASE ORAL DAILY
Qty: 90 CAPSULE | Refills: 3 | Status: SHIPPED | OUTPATIENT
Start: 2024-06-10

## 2024-06-10 RX ORDER — SERTRALINE HYDROCHLORIDE 100 MG/1
100 TABLET, FILM COATED ORAL DAILY
Qty: 90 TABLET | Refills: 3 | Status: SHIPPED | OUTPATIENT
Start: 2024-06-10

## 2024-06-10 RX ORDER — TRIAMCINOLONE ACETONIDE 1 MG/G
CREAM TOPICAL
Qty: 80 G | Refills: 2 | Status: SHIPPED | OUTPATIENT
Start: 2024-06-10

## 2024-06-10 RX ORDER — IPRATROPIUM BROMIDE 21 UG/1
SPRAY, METERED NASAL
Qty: 30 ML | Refills: 5 | Status: SHIPPED | OUTPATIENT
Start: 2024-06-10

## 2024-06-10 NOTE — PROGRESS NOTES
Subjective:       Patient ID: Trina Marie Collette is a 59 y.o. female.    Chief Complaint:   HPI:60 yo BF  in for six-month check-- eating well-- +BM-- ambulating well   HTN -- blood pressure 118/86 on amlodipine and losartan   Peripheral edema takes Lasix p.r.n.   History GERD doing well-- on Pepcid or famotidine p.r.n.   Hyperlipidemia Crestor   Pain in lumbar spine on hydrocodone gabapentin and Naprosyn 5   Depression on Zoloft   History bronchospasm uses albuterol and Symbicort p.r.n.  Wants try ozempic for weight loss              --ROS-  Skin: no psoriasis, eczema, skin cancer--rash abd due belt buckel--doing okay with cream  HEENT no  headache, no ocular pain, blurred vision, diplopia, epistaxis, hoarseness change in voice, thyroid trouble--history of tinnitus/chronic sinus problem/nasal septal deviation/hearing loss has hearing aid  Lung: No pneumonia, asthma, Tb, wheezing, SOB, smoking 1/4 ppd trying to quit  --history of bronchospasm in the past uses albuterol--had chest x-ray which is normal  Heart: No chest pain, ankle edema, +occas palpitations, MI, river murmur, +hypertension blood pressure   + hyperlipidemia-no stent bypass arrhythmia  Abdomen: No nausea, vomiting, diarrhea, constipation, ulcers, hepatitis, gallbladder disease, melena, hematochezia, hematemesis patient complaining of heartburn had EGD colonoscopy hx diarrhea immodium Hx Gerd on protonix comes and goes  : no UTI, renal disease, stones-  GYN last menstrual.  Years ago has mammogram yearly--refuses PAP smear --I don't fool with that    MS: no fractures, O/A, lupus, rheumatoid, gout--history of chronic pain the back lumbar spine, right knee  right shoulder, history DDD lumbar history cervical spinal stenosis  Neuro: No dizziness, LOC, seizures   No diabetes, no anemia, no anxiety, no depression patient with history of bipolar schizophrenia--doing well with medication goes to [facility]   Single, one child, disabled, lives alone      Objective:   Physical Exam:  No physical exam was done this was a virtual visit the visit below is from a prior office visit  General: Well nourished, well developed, no acute distress + morbid obesity  Skin: No lesions  HEENT--eyes PERRLA EOM intact nose clear , throat nonerythematous ears TMs clear  NECK: Supple, no bruits, No JVD, no nodes  Lungs: Clear, no rales, rhonchi, wheezing   Heart: Regular rate and rhythm, no murmurs, gallops, or rubs  Abdomen: flat, bowel sounds positive, no tenderness, or organomegaly  MS:  pain left leg griselda though bilaterally --gets swelling knee medial and lateral aspect --pain with ambulation squatting and arising getting up on exam table  Neuro: Alert, CN intact, oriented X 3 still with some anxiety and depression of issues--Romberg's negative heel-toe intact  Extremities: No cyanosis, clubbing, or edema         Assessment:       1. Chronic pain syndrome    2. Screening for diabetes mellitus    3. Hypertension    4. Hypokalemia    5. Hyperlipidemia, unspecified hyperlipidemia type    6. Lumbar spine pain    7. Insomnia, unspecified type    8. Chronic obstructive pulmonary disease, unspecified COPD type    9. DDD (degenerative disc disease), cervical    10. Eczema, unspecified type    11. Lumbosacral strain, subsequent encounter    12. Obesity (BMI 35.0-39.9 without comorbidity)    13. Schizophrenia, unspecified type    14. Morbid (severe) obesity due to excess calories    15. Opioid dependence, uncomplicated    16. Empty sella syndrome    17. Unspecified inflammatory spondylopathy, lumbar region    18. Encounter for long-term current use of medication    19. Tobacco abuse    20. Tension-type headache, not intractable, unspecified chronicity pattern    21. Bronchospasm    22. Bipolar 1 disorder                Plan:       Chronic pain syndrome    Screening for diabetes mellitus    Hypertension    Hypokalemia    Hyperlipidemia, unspecified hyperlipidemia type    Lumbar spine  pain    Insomnia, unspecified type    Chronic obstructive pulmonary disease, unspecified COPD type    DDD (degenerative disc disease), cervical    Eczema, unspecified type    Lumbosacral strain, subsequent encounter    Obesity (BMI 35.0-39.9 without comorbidity)    Schizophrenia, unspecified type    Morbid (severe) obesity due to excess calories    Opioid dependence, uncomplicated    Empty sella syndrome    Unspecified inflammatory spondylopathy, lumbar region    Encounter for long-term current use of medication    Tobacco abuse    Tension-type headache, not intractable, unspecified chronicity pattern    Bronchospasm    Bipolar 1 disorder                Main Reason here    Virtual visit  Insomnia needs refill of Ambien  Chronic cough needs  Singulair  Skin rash needs Bactroban and Valisone cream  Chronic pain syndrome--bilateral leg pain--left much greater than right---saw orthopedist--no treatment given--patient is on Norco 10 from pain clinic/Mobic/Flexeril/gabapentin 800 t.i.d.--pain mainly in back and left leg--has appt Dr Narayan June 13th --MRI results chondromalacia with subchondral cysts verse low-grade osteochondral defect in the medial trochlea.  4 cm popliteal cyst with debris.  Small joint effusion with suprapatellar plica  Back pain x years unchanged   Hypertension patient on Cozaar 100 Lasix 40 amlodipine 5 mg blood pressure   Peripheral edema told can increase Lasix from 40 mg to 40 in the morning 20 afternoon if needed  Sees Dr. Senior for pain medication--DDD cervical and lumbar spine  Hyperlipidemia on Crestor  Vitamin D deficiency on vitamin-D  COPD on Symbicort and albuterol  Anxiety depression bipolar schizophrenia on Ambien Ativan  Zoloft trazodone Geodon--goes to mental health  Lab cbc cmp lipid TSH Vit D  Health maintenance Pap smear

## 2024-06-10 NOTE — TELEPHONE ENCOUNTER
Care Due:                  Date            Visit Type   Department     Provider  --------------------------------------------------------------------------------                                EP -                              PRIMARY [facility]  Last Visit: 06-      CARE ([facility])   [facility]   [facility]  Next Visit: 12-      CARE ([facility])   PRIMARY CARE   Davon Brock                                                            Last  Test          Frequency    Reason                     Performed    Due Date  --------------------------------------------------------------------------------    HBA1C.......  6 months...  semaglutide..............  04-   10-    [facility] Embedded Care Due Messages. Reference number: [id number].   6/10/2024 12:10:59 PM CDT

## 2024-06-10 NOTE — TELEPHONE ENCOUNTER
Refill Routing Note   Medication(s) are not appropriate for processing by [facility] for the following reason(s):        Other  Outside of protocol    [facility] action(s):  Defer  Route     Requires labs : Yes      Medication Therapy Plan: ICD-10 code needed; DEFER. Lotrisone- outside of [facility] protocol; ROUTE      Appointments  past 12m or future 3m with PCP    Date Provider   Last Visit   6/10/2024 Davon Brock MD   Next Visit   Visit date not found Davon Brock MD   ED visits in past 90 days: 0        Note composed:3:01 PM 06/10/2024

## 2024-06-14 RX ORDER — TRAZODONE HYDROCHLORIDE 100 MG/1
100 TABLET ORAL NIGHTLY
Qty: 90 TABLET | Refills: 1 | OUTPATIENT
Start: 2024-06-14

## 2024-06-14 NOTE — TELEPHONE ENCOUNTER
Refill Routing Note   Medication(s) are not appropriate for processing by [facility] for the following reason(s):        Outside of protocol    [facility] action(s):  Route  Quick Discontinue        Medication Therapy Plan: CYCLOBENZAPRINE-OP; The original prescription was discontinued on 6/10/2024 by Davon Brock MD      Appointments  past 12m or future 3m with PCP    Date Provider   Last Visit   6/10/2024 Davon Brock MD   Next Visit   12/10/2024 Davon Brock MD   ED visits in past 90 days: 0        Note composed:10:07 AM 06/14/2024

## 2024-06-14 NOTE — TELEPHONE ENCOUNTER
No care due was identified.  Health [facility] Embedded Care Due Messages. Reference number: [id number].   6/14/2024 9:28:41 AM CDT

## 2024-06-15 RX ORDER — CYCLOBENZAPRINE HCL 10 MG
TABLET ORAL
Qty: 60 TABLET | Refills: 5 | Status: SHIPPED | OUTPATIENT
Start: 2024-06-15

## 2024-07-25 ENCOUNTER — TELEPHONE (OUTPATIENT)
Dept: PRIMARY CARE CLINIC | Facility: CLINIC | Age: 59
End: 2024-07-25
Payer: MEDICARE

## 2024-07-25 NOTE — TELEPHONE ENCOUNTER
----- Message from Karen Rodríguez sent at 7/25/2024  3:04 PM CDT -----  Contact: [phone]  [email or web address]     Patient is calling for Medical Advice regarding:Vascular referral     How long has patient had these symptoms:    Pharmacy name and phone#:    Patient wants a call back or thru [facility]:    Comments:  Pt is needing the referral to go here     [facility]  Dr Christopher kirkpatrick   Fax [phone]  Please advise patient replies from provider may take up to 48 hours.

## 2024-07-25 NOTE — TELEPHONE ENCOUNTER
Dr. Brock , patient is requesting a referral for a particular vascular doctor.      [facility] work   Dr Christopher kirkpatrick   Fax [phone]

## 2024-08-13 RX ORDER — SEMAGLUTIDE 0.68 MG/ML
INJECTION, SOLUTION SUBCUTANEOUS
Qty: 3 EACH | Refills: 5 | Status: SHIPPED | OUTPATIENT
Start: 2024-08-13

## 2024-08-13 NOTE — TELEPHONE ENCOUNTER
Care Due:                  Date            Visit Type   Department     Provider  --------------------------------------------------------------------------------                                EP -                              PRIMARY [facility]  Last Visit: 06-      CARE ([facility])   PRIMARY CARE   Davon Brock                              EP - PRIMARY [facility]  Next Visit: 12-      CARE ([facility])   PRIMARY CARE   Davon Brock                                                            Last  Test          Frequency    Reason                     Performed    Due Date  --------------------------------------------------------------------------------    HBA1C.......  6 months...  semaglutide..............  04-   10-    Lipid Panel.  12 months..  rosuvastatin.............  04- 04-    Health [facility] Embedded Care Due Messages. Reference number: [id number].   8/13/2024 12:03:56 PM CDT

## 2024-08-13 NOTE — TELEPHONE ENCOUNTER
Refill Routing Note   Medication(s) are not appropriate for processing by [facility] for the following reason(s):        Required labs outdated  Med affordability    [facility] action(s):  Defer     Requires labs : Yes             Appointments  past 12m or future 3m with PCP    Date Provider   Last Visit   6/10/2024 Davon Brock MD   Next Visit   12/10/2024 Davon Brock MD   ED visits in past 90 days: 0        Note composed:4:16 PM 08/13/2024

## 2024-08-23 NOTE — TELEPHONE ENCOUNTER
No care due was identified.  [facility] Embedded Care Due Messages. Reference number: [id number].   8/23/2024 11:15:52 AM CDT

## 2024-08-23 NOTE — TELEPHONE ENCOUNTER
Refill Routing Note   Medication(s) are not appropriate for processing by [facility] for the following reason(s):        Outside of protocol    [facility] action(s):  Route      Medication Therapy Plan: increased to NAproxen 500mg BID (6/10/24); QDC      Appointments  past 12m or future 3m with PCP    Date Provider   Last Visit   6/10/2024 Davon Brock MD   Next Visit   12/10/2024 Davon Brock MD   ED visits in past 90 days: 0        Note composed:5:14 PM 08/23/2024

## 2024-08-24 RX ORDER — NAPROXEN 375 MG/1
375 TABLET ORAL 2 TIMES DAILY WITH MEALS
Qty: 100 TABLET | Refills: 5 | Status: SHIPPED | OUTPATIENT
Start: 2024-08-24

## 2024-09-24 RX ORDER — TRAZODONE HYDROCHLORIDE 100 MG/1
100 TABLET ORAL NIGHTLY
Qty: 90 TABLET | Refills: 1 | OUTPATIENT
Start: 2024-09-24

## 2024-09-24 RX ORDER — TRIAMCINOLONE ACETONIDE 1 MG/G
CREAM TOPICAL
Qty: 80 G | Refills: 1 | Status: SHIPPED | OUTPATIENT
Start: 2024-09-24

## 2024-09-24 NOTE — TELEPHONE ENCOUNTER
Refill Decision Note   Trina Collette  is requesting a refill authorization.  Brief Assessment and Rationale for Refill:  Quick Discontinue  Approve     Medication Therapy Plan:  The original prescription was discontinued on 6/10/2024 by Davon Brock MD.      Comments:     Note composed:6:39 PM 09/24/2024

## 2024-09-24 NOTE — TELEPHONE ENCOUNTER
No care due was identified.  [facility] Embedded Care Due Messages. Reference number: [id number].   9/24/2024 9:28:45 AM CDT

## 2024-10-10 DIAGNOSIS — R05.3 CHRONIC COUGH: ICD-10-CM

## 2024-10-10 DIAGNOSIS — J98.01 BRONCHOSPASM: ICD-10-CM

## 2024-10-10 DIAGNOSIS — J40 BRONCHITIS: ICD-10-CM

## 2024-10-10 DIAGNOSIS — E78.5 HYPERLIPIDEMIA, UNSPECIFIED HYPERLIPIDEMIA TYPE: ICD-10-CM

## 2024-10-10 DIAGNOSIS — E87.6 HYPOKALEMIA: ICD-10-CM

## 2024-10-10 RX ORDER — ROSUVASTATIN CALCIUM 5 MG/1
5 TABLET, COATED ORAL DAILY
Qty: 90 TABLET | Refills: 3 | Status: SHIPPED | OUTPATIENT
Start: 2024-10-10

## 2024-10-10 RX ORDER — TRIAMCINOLONE ACETONIDE 1 MG/G
CREAM TOPICAL
Qty: 80 G | Refills: 1 | Status: SHIPPED | OUTPATIENT
Start: 2024-10-10

## 2024-10-10 RX ORDER — HYDROCODONE BITARTRATE AND ACETAMINOPHEN 10; 325 MG/1; MG/1
TABLET ORAL
Qty: 90 TABLET | Refills: 0 | OUTPATIENT
Start: 2024-10-10

## 2024-10-10 RX ORDER — POTASSIUM CHLORIDE 750 MG/1
10 CAPSULE, EXTENDED RELEASE ORAL DAILY
Qty: 90 CAPSULE | Refills: 3 | Status: SHIPPED | OUTPATIENT
Start: 2024-10-10

## 2024-10-10 RX ORDER — SODIUM FLUORIDE 6 MG/ML
PASTE, DENTIFRICE DENTAL
OUTPATIENT
Start: 2024-10-10

## 2024-10-10 RX ORDER — BUDESONIDE AND FORMOTEROL FUMARATE DIHYDRATE 160; 4.5 UG/1; UG/1
2 AEROSOL RESPIRATORY (INHALATION) EVERY 12 HOURS
Qty: 10.2 G | Refills: 11 | OUTPATIENT
Start: 2024-10-10 | End: 2025-10-10

## 2024-10-10 RX ORDER — ZIPRASIDONE HYDROCHLORIDE 60 MG/1
60 CAPSULE ORAL 2 TIMES DAILY
Qty: 180 CAPSULE | Refills: 0 | OUTPATIENT
Start: 2024-10-10

## 2024-10-10 RX ORDER — ALBUTEROL SULFATE 0.83 MG/ML
2.5 SOLUTION RESPIRATORY (INHALATION) EVERY 4 HOURS PRN
Qty: 1080 ML | Refills: 3 | OUTPATIENT
Start: 2024-10-10

## 2024-10-10 RX ORDER — GABAPENTIN 800 MG/1
800 TABLET ORAL 3 TIMES DAILY
Qty: 270 TABLET | Refills: 1 | OUTPATIENT
Start: 2024-10-10

## 2024-10-10 RX ORDER — HYDROCODONE BITARTRATE AND ACETAMINOPHEN 10; 325 MG/1; MG/1
1 TABLET ORAL EVERY 12 HOURS PRN
Qty: 90 TABLET | Refills: 0 | OUTPATIENT
Start: 2024-10-10

## 2024-10-10 RX ORDER — BETAMETHASONE VALERATE 1 MG/G
CREAM TOPICAL 2 TIMES DAILY
Qty: 60 G | Refills: 2 | OUTPATIENT
Start: 2024-10-10

## 2024-10-10 RX ORDER — SERTRALINE HYDROCHLORIDE 100 MG/1
100 TABLET, FILM COATED ORAL DAILY
Qty: 90 TABLET | Refills: 3 | Status: SHIPPED | OUTPATIENT
Start: 2024-10-10

## 2024-10-10 RX ORDER — FAMOTIDINE 20 MG/1
20 TABLET, FILM COATED ORAL 2 TIMES DAILY
Qty: 180 TABLET | Refills: 3 | OUTPATIENT
Start: 2024-10-10

## 2024-10-10 NOTE — TELEPHONE ENCOUNTER
No care due was identified.  [facility] Embedded Care Due Messages. Reference number: [id number].   10/10/2024 1:21:28 PM CDT

## 2024-10-10 NOTE — TELEPHONE ENCOUNTER
No care due was identified.  [facility] Embedded Care Due Messages. Reference number: [id number].   10/10/2024 1:18:36 PM CDT

## 2024-10-10 NOTE — TELEPHONE ENCOUNTER
Unable to retrieve patient chart and identify care due.  [facility] Embedded Care Due Messages. Reference number: [id number].   10/10/2024 1:24:03 PM CDT

## 2024-10-10 NOTE — TELEPHONE ENCOUNTER
No care due was identified.  Health [facility] Embedded Care Due Messages. Reference number: [id number].   10/10/2024 1:20:18 PM CDT

## 2024-10-10 NOTE — TELEPHONE ENCOUNTER
Care Due:                  Date            Visit Type   Department     Provider  --------------------------------------------------------------------------------                                EP -                              PRIMARY [facility]  Last Visit: 06-      CARE ([facility])   PRIMARY CARE   Davon Brock                              EP - PRIMARY [facility]  Next Visit: 12-      CARE ([facility])   PRIMARY CARE   Davon Brock                                                            Last  Test          Frequency    Reason                     Performed    Due Date  --------------------------------------------------------------------------------    CBC.........  12 months..  naproxen.................  12- 12-    CMP.........  12 months..  famotidine, furosemide,    12- 12-                             losartan, naproxen,                             potassium, rosuvastatin..    [facility] [employer] Embedded Care Due Messages. Reference number: [id number].   10/10/2024 12:55:24 PM CDT

## 2024-10-10 NOTE — TELEPHONE ENCOUNTER
Please see the attached refill request. Next appt 12/10   safety/toxicity monitoring of intravenous opiates and/or  benzodiazepines in end stage disease process

## 2024-10-11 ENCOUNTER — PATIENT MESSAGE (OUTPATIENT)
Dept: PRIMARY CARE CLINIC | Facility: CLINIC | Age: 59
End: 2024-10-11
Payer: MEDICARE

## 2024-10-15 DIAGNOSIS — G47.00 INSOMNIA, UNSPECIFIED TYPE: ICD-10-CM

## 2024-10-15 RX ORDER — ZOLPIDEM TARTRATE 5 MG/1
TABLET ORAL
Qty: 30 TABLET | Refills: 2 | Status: SHIPPED | OUTPATIENT
Start: 2024-10-15

## 2024-10-15 NOTE — TELEPHONE ENCOUNTER
No care due was identified.  Health [facility] Embedded Care Due Messages. Reference number: [id number].   10/15/2024 12:23:40 PM CDT

## 2024-11-02 NOTE — TELEPHONE ENCOUNTER
No care due was identified.  Health [facility] Embedded Care Due Messages. Reference number: [id number].   11/02/2024 10:34:18 AM CDT

## 2024-11-04 ENCOUNTER — PATIENT MESSAGE (OUTPATIENT)
Dept: PRIMARY CARE CLINIC | Facility: CLINIC | Age: 59
End: 2024-11-04
Payer: MEDICARE

## 2024-11-04 RX ORDER — ZIPRASIDONE HYDROCHLORIDE 60 MG/1
60 CAPSULE ORAL 2 TIMES DAILY
Qty: 180 CAPSULE | Refills: 1 | OUTPATIENT
Start: 2024-11-04

## 2024-11-21 RX ORDER — ZIPRASIDONE HYDROCHLORIDE 60 MG/1
60 CAPSULE ORAL 2 TIMES DAILY
Qty: 180 CAPSULE | Refills: 0 | Status: SHIPPED | OUTPATIENT
Start: 2024-11-21

## 2024-11-21 NOTE — TELEPHONE ENCOUNTER
No care due was identified.  [facility] Embedded Care Due Messages. Reference number: [id number].   11/21/2024 1:54:35 AM CST

## 2024-11-25 ENCOUNTER — TELEPHONE (OUTPATIENT)
Dept: PRIMARY CARE CLINIC | Facility: CLINIC | Age: 59
End: 2024-11-25
Payer: MEDICARE

## 2024-11-25 NOTE — TELEPHONE ENCOUNTER
----- Message from Evelyn sent at 11/25/2024 12:55 PM CST -----  Contact: Ms. Lemon Phone# [phone], fax# [phone], Ref# [id number]  Ms. Rai [facility], said that she have faxed over a Medical Necessity Form over to you on November nineteenth, and she would like to know if you have received it?, if so Ms. Lemon would like to know if the form is complete.

## 2024-11-27 ENCOUNTER — TELEPHONE (OUTPATIENT)
Dept: PRIMARY CARE CLINIC | Facility: CLINIC | Age: 59
End: 2024-11-27
Payer: MEDICARE

## 2024-11-27 NOTE — TELEPHONE ENCOUNTER
----- Message from Ayesha sent at 11/27/2024  4:06 PM CST -----  Contact: [employer] [phone]  Would like a states on certificate of medical necessity for nebulizer equipment that was faxed 11/19/2024 to [phone]. Please fax back or call to discuss. Thanks      Fax # [phone]  Ref [id number]

## 2024-12-10 ENCOUNTER — OFFICE VISIT (OUTPATIENT)
Dept: PRIMARY CARE CLINIC | Facility: CLINIC | Age: 59
End: 2024-12-10
Payer: MEDICARE

## 2024-12-10 VITALS
HEIGHT: 66 IN | SYSTOLIC BLOOD PRESSURE: 136 MMHG | BODY MASS INDEX: 38.62 KG/M2 | HEART RATE: 70 BPM | RESPIRATION RATE: 18 BRPM | DIASTOLIC BLOOD PRESSURE: 80 MMHG | WEIGHT: 240.31 LBS | OXYGEN SATURATION: 94 %

## 2024-12-10 DIAGNOSIS — L65.9 HAIR LOSS: Primary | ICD-10-CM

## 2024-12-10 DIAGNOSIS — M25.562 CHRONIC PAIN OF LEFT KNEE: ICD-10-CM

## 2024-12-10 DIAGNOSIS — F20.9 SCHIZOPHRENIA, UNSPECIFIED TYPE: ICD-10-CM

## 2024-12-10 DIAGNOSIS — E55.9 VITAMIN D DEFICIENCY: ICD-10-CM

## 2024-12-10 DIAGNOSIS — G47.00 INSOMNIA, UNSPECIFIED TYPE: ICD-10-CM

## 2024-12-10 DIAGNOSIS — I10 HYPERTENSION, UNSPECIFIED TYPE: ICD-10-CM

## 2024-12-10 DIAGNOSIS — F31.9 BIPOLAR 1 DISORDER: ICD-10-CM

## 2024-12-10 DIAGNOSIS — E78.5 HYPERLIPIDEMIA, UNSPECIFIED HYPERLIPIDEMIA TYPE: ICD-10-CM

## 2024-12-10 DIAGNOSIS — J45.21 MILD INTERMITTENT ASTHMA WITH ACUTE EXACERBATION: ICD-10-CM

## 2024-12-10 DIAGNOSIS — J98.01 BRONCHOSPASM: ICD-10-CM

## 2024-12-10 DIAGNOSIS — G89.29 CHRONIC PAIN OF LEFT KNEE: ICD-10-CM

## 2024-12-10 DIAGNOSIS — Z72.0 TOBACCO ABUSE: ICD-10-CM

## 2024-12-10 DIAGNOSIS — E87.6 HYPOKALEMIA: ICD-10-CM

## 2024-12-10 DIAGNOSIS — Z12.31 ENCOUNTER FOR SCREENING MAMMOGRAM FOR MALIGNANT NEOPLASM OF BREAST: ICD-10-CM

## 2024-12-10 DIAGNOSIS — G89.4 CHRONIC PAIN SYNDROME: ICD-10-CM

## 2024-12-10 PROCEDURE — 99999 PR PBB SHADOW E&M-EST. PATIENT-LVL IV: CPT | Mod: PBBFAC,,, | Performed by: FAMILY MEDICINE

## 2024-12-10 PROCEDURE — 3079F DIAST BP 80-89 MM HG: CPT | Mod: CPTII,S$GLB,, | Performed by: FAMILY MEDICINE

## 2024-12-10 PROCEDURE — 3075F SYST BP GE 130 - 139MM HG: CPT | Mod: CPTII,S$GLB,, | Performed by: FAMILY MEDICINE

## 2024-12-10 PROCEDURE — 99214 OFFICE O/P EST MOD 30 MIN: CPT | Mod: S$GLB,,, | Performed by: FAMILY MEDICINE

## 2024-12-10 PROCEDURE — 3008F BODY MASS INDEX DOCD: CPT | Mod: CPTII,S$GLB,, | Performed by: FAMILY MEDICINE

## 2024-12-10 PROCEDURE — 1159F MED LIST DOCD IN RCRD: CPT | Mod: CPTII,S$GLB,, | Performed by: FAMILY MEDICINE

## 2024-12-10 PROCEDURE — 4010F ACE/ARB THERAPY RXD/TAKEN: CPT | Mod: CPTII,S$GLB,, | Performed by: FAMILY MEDICINE

## 2024-12-10 RX ORDER — ZOLPIDEM TARTRATE 5 MG/1
TABLET ORAL
Qty: 30 TABLET | Refills: 2 | Status: SHIPPED | OUTPATIENT
Start: 2024-12-10

## 2024-12-10 RX ORDER — GABAPENTIN 800 MG/1
800 TABLET ORAL 3 TIMES DAILY
Qty: 270 TABLET | Refills: 1 | Status: SHIPPED | OUTPATIENT
Start: 2024-12-10

## 2024-12-10 RX ORDER — MONTELUKAST SODIUM 10 MG/1
10 TABLET ORAL DAILY
Qty: 90 TABLET | Refills: 3 | Status: SHIPPED | OUTPATIENT
Start: 2024-12-10

## 2024-12-10 RX ORDER — SERTRALINE HYDROCHLORIDE 100 MG/1
100 TABLET, FILM COATED ORAL DAILY
Qty: 90 TABLET | Refills: 3 | Status: SHIPPED | OUTPATIENT
Start: 2024-12-10

## 2024-12-10 RX ORDER — AMLODIPINE BESYLATE 5 MG/1
5 TABLET ORAL DAILY
Qty: 90 TABLET | Refills: 3 | Status: SHIPPED | OUTPATIENT
Start: 2024-12-10

## 2024-12-10 RX ORDER — PANTOPRAZOLE SODIUM 40 MG/1
40 TABLET, DELAYED RELEASE ORAL DAILY
Qty: 90 TABLET | Refills: 3 | Status: SHIPPED | OUTPATIENT
Start: 2024-12-10

## 2024-12-10 RX ORDER — FUROSEMIDE 40 MG/1
40 TABLET ORAL DAILY
Qty: 90 TABLET | Refills: 3 | Status: SHIPPED | OUTPATIENT
Start: 2024-12-10

## 2024-12-10 RX ORDER — HYDROXYZINE PAMOATE 25 MG/1
CAPSULE ORAL
Qty: 360 CAPSULE | Refills: 1 | Status: SHIPPED | OUTPATIENT
Start: 2024-12-10

## 2024-12-10 RX ORDER — LOSARTAN POTASSIUM 100 MG/1
100 TABLET ORAL DAILY
Qty: 90 TABLET | Refills: 3 | Status: SHIPPED | OUTPATIENT
Start: 2024-12-10

## 2024-12-10 RX ORDER — ROSUVASTATIN CALCIUM 5 MG/1
5 TABLET, COATED ORAL DAILY
Qty: 90 TABLET | Refills: 3 | Status: SHIPPED | OUTPATIENT
Start: 2024-12-10

## 2024-12-10 RX ORDER — BUSPIRONE HYDROCHLORIDE 30 MG/1
30 TABLET ORAL 2 TIMES DAILY
Qty: 180 TABLET | Refills: 3 | Status: SHIPPED | OUTPATIENT
Start: 2024-12-10

## 2024-12-10 RX ORDER — POTASSIUM CHLORIDE 750 MG/1
10 CAPSULE, EXTENDED RELEASE ORAL DAILY
Qty: 90 CAPSULE | Refills: 3 | Status: SHIPPED | OUTPATIENT
Start: 2024-12-10 | End: 2024-12-10

## 2024-12-10 RX ORDER — LEVOCETIRIZINE DIHYDROCHLORIDE 5 MG/1
5 TABLET, FILM COATED ORAL NIGHTLY
Qty: 90 TABLET | Refills: 1 | Status: SHIPPED | OUTPATIENT
Start: 2024-12-10

## 2024-12-10 NOTE — PROGRESS NOTES
Subjective:       Patient ID: Trina Marie Collette is a 59 y.o. female.    Chief Complaint:   HPI:60 yo BF  in for six-month check-- eating well-- +BM-- ambulating well --left knee3 pain   Hair loss knots in head   HTN -- blood pressure 136/80 on amlodipine and losartan   Peripheral edema takes Lasix p.r.n.   History G  GERD doing well-- on Pepcid or famotidine p.r.n.   Hyperlipidemia Crestor   Pain in lumbar spine on hydrocodone gabapentin and Naprosyn 5   Depression on Zoloft   History bronchospasm uses albuterol and Symbicort p.r.n.  Hearing loss-hearing aid  Tobacco abuse--3/4 ppd   Tried Ozempic may patient sick            --ROS-  Skin: no psoriasis, eczema, skin cancer--  HEENT no  headache, no ocular pain, blurred vision, diplopia, epistaxis, hoarseness change in voice, thyroid trouble--history of tinnitus/chronic sinus problem/nasal septal deviation/hearing loss has hearing aid  Lung: No pneumonia, asthma, Tb, wheezing, SOB, smoking 1/4 ppd trying to quit  --history of bronchospasm in the past uses albuterol--had chest x-ray which is normal  Heart: No chest pain, ankle edema, +occas palpitations, MI, river murmur, +hypertension blood pressure   + hyperlipidemia-no stent bypass arrhythmia  Abdomen: No nausea, vomiting, diarrhea, constipation, ulcers, hepatitis, gallbladder disease, melena, hematochezia, hematemesis patient complaining of heartburn had EGD colonoscopy hx diarrhea immodium Hx Gerd on protonix comes and goes  : no UTI, renal disease, stones-  GYN last menstrual.  Years ago has mammogram yearly--refuses PAP smear --I don't fool with that    MS: no fractures, O/A, lupus, rheumatoid, gout--history of chronic pain the back lumbar spine, right knee  right shoulder, history DDD lumbar history cervical spinal stenosis  Neuro: No dizziness, LOC, seizures   No diabetes, no anemia, no anxiety, no depression patient with history of bipolar schizophrenia--doing well with medication goes to Mental Health    Single, one child, disabled, lives alone     Objective:   Physical Exam:  No physical exam was done this was a virtual visit the visit below is from a prior office visit  General: Well nourished, well developed, no acute distress + morbid obesity  Skin: No lesions  HEENT--eyes PERRLA EOM intact nose clear , throat nonerythematous ears TMs clear  NECK: Supple, no bruits, No JVD, no nodes  Lungs: Clear, no rales, rhonchi, wheezing   Heart: Regular rate and rhythm, no murmurs, gallops, or rubs  Abdomen: flat, bowel sounds positive, no tenderness, or organomegaly  MS:  pain left leg griselda though bilaterally --gets swelling knee medial and lateral aspect --pain with ambulation squatting and arising getting up on exam table  Neuro: Alert, CN intact, oriented X 3 still with some anxiety and depression of issues--Romberg's negative heel-toe intact  Extremities: No cyanosis, clubbing, or edema         Assessment:       1. Hair loss    2. Encounter for screening mammogram for malignant neoplasm of breast    3. Hypokalemia    4. Hyperlipidemia, unspecified hyperlipidemia type    5. Insomnia, unspecified type    6. Hypertension, unspecified type    7. Chronic pain of left knee    8. Bronchospasm    9. Bipolar 1 disorder    10. Chronic pain syndrome    11. Mild intermittent asthma with acute exacerbation    12. Schizophrenia, unspecified type    13. Tobacco abuse    14. Vitamin D deficiency                Plan:       Hair loss  -     ESTRADIOL; Future; Expected date: 12/10/2024  -     Luteinizing Hormone; Future; Expected date: 12/10/2024  -     Follicle Stimulating Hormone; Future; Expected date: 12/10/2024  -     Ambulatory referral/consult to Dermatology; Future; Expected date: 12/17/2024    Encounter for screening mammogram for malignant neoplasm of breast  -     Mammo Digital Screening Bilat w/ Moy; Future; Expected date: 12/10/2024    Hypokalemia  -     potassium chloride (MICRO-K) 10 MEQ CpSR; Take 1 capsule (10 mEq  total) by mouth once daily.  Dispense: 90 capsule; Refill: 3    Hyperlipidemia, unspecified hyperlipidemia type  -     rosuvastatin (CRESTOR) 5 MG tablet; Take 1 tablet (5 mg total) by mouth once daily.  Dispense: 90 tablet; Refill: 3    Insomnia, unspecified type  -     zolpidem (AMBIEN) 5 MG Tab; TAKE 1 TABLET BY MOUTH AT BEDTIME AS NEEDED FOR SLEEP  Dispense: 30 tablet; Refill: 2    Hypertension, unspecified type    Chronic pain of left knee    Bronchospasm    Bipolar 1 disorder    Chronic pain syndrome    Mild intermittent asthma with acute exacerbation    Schizophrenia, unspecified type    Tobacco abuse    Vitamin D deficiency    Other orders  -     amLODIPine (NORVASC) 5 MG tablet; Take 1 tablet (5 mg total) by mouth once daily.  Dispense: 90 tablet; Refill: 3  -     busPIRone (BUSPAR) 30 MG Tab; Take 1 tablet (30 mg total) by mouth 2 (two) times daily.  Dispense: 180 tablet; Refill: 3  -     furosemide (LASIX) 40 MG tablet; Take 1 tablet (40 mg total) by mouth once daily.  Dispense: 90 tablet; Refill: 3  -     gabapentin (NEURONTIN) 800 MG tablet; Take 1 tablet (800 mg total) by mouth 3 (three) times daily.  Dispense: 270 tablet; Refill: 1  -     hydrOXYzine pamoate (VISTARIL) 25 MG Cap; TAKE 1 CAPSULE BY MOUTH EVERY 6 HOURS AS NEEDED FOR NAUSEA/VOMITING/CRAMPS/ OR ITCH  Dispense: 360 capsule; Refill: 1  -     levocetirizine (XYZAL) 5 MG tablet; Take 1 tablet (5 mg total) by mouth every evening.  Dispense: 90 tablet; Refill: 1  -     losartan (COZAAR) 100 MG tablet; Take 1 tablet (100 mg total) by mouth once daily.  Dispense: 90 tablet; Refill: 3  -     montelukast (SINGULAIR) 10 mg tablet; Take 1 tablet (10 mg total) by mouth once daily.  Dispense: 90 tablet; Refill: 3  -     pantoprazole (PROTONIX) 40 MG tablet; Take 1 tablet (40 mg total) by mouth once daily.  Dispense: 90 tablet; Refill: 3  -     sertraline (ZOLOFT) 100 MG tablet; Take 1 tablet (100 mg total) by mouth once daily.  Dispense: 90 tablet;  Refill: 3                Main Reason here    Virtual visit  Hair loss--will check hormones estradiol LH FSH--patient's see dermatology  Left knee pain--goes to pain management for that and bone on bone--they wanted to do a total knee replacement but patient states not ready for Norco/gabapentin/Naprosyn  Chronic cough needs  Singulair  Chronic pain syndrome--bilateral leg pain--left much greater than right---saw orthopedist--no treatment given--patient is on Norco 10 from pain clinic/Mobic/Flexeril/gabapentin 800 t.i.d.--pain mainly in back and left leg--has appt Dr Narayan June 13th --MRI results chondromalacia with subchondral cysts verse low-grade osteochondral defect in the medial trochlea.  4 cm popliteal cyst with debris.  Small joint effusion with suprapatellar plica  Back pain x years unchanged   Hypertension patient on Cozaar 100 Lasix 40 amlodipine 5 mg blood pressure --/80  Peripheral edema told can increase Lasix from 40 mg to 40 in the morning 20 afternoon if needed  Sees Dr. Senior for pain medication--DDD cervical and lumbar spine  Hyperlipidemia on Crestor  Vitamin D deficiency on vitamin-D  COPD on Symbicort and albuterol  Anxiety depression bipolar schizophrenia on Ambien Ativan  Zoloft trazodone Geodon--goes to mental health  Lab cbc cmp lipid TSH HGBA1C serum estradiol LH and FSH

## 2025-01-05 NOTE — TELEPHONE ENCOUNTER
Care Due:                  Date            Visit Type   Department     Provider  --------------------------------------------------------------------------------                                EP -                              PRIMARY      [facility]  Last Visit: 12-      CARE ([facility])   PRIMARY CARE   Davon Brock                              [facility]                              PRIMARY      [facility]  Next Visit: 06-      CARE ([facility])   PRIMARY CARE   Davon Brock                                                            Last  Test          Frequency    Reason                     Performed    Due Date  --------------------------------------------------------------------------------    CBC.........  12 months..  naproxen.................  12- 12-    CMP.........  12 months..  famotidine, furosemide,    12- 12-                             losartan, naproxen,                             rosuvastatin.............    HBA1C.......  6 months...  OZEMPIC..................  04-   10-    Lipid Panel.  12 months..  rosuvastatin.............  04- 04-    Health [facility] Embedded Care Due Messages. Reference number: [id number].   1/05/2025 7:17:12 AM CST

## 2025-01-06 RX ORDER — FAMOTIDINE 20 MG/1
20 TABLET, FILM COATED ORAL 2 TIMES DAILY
Qty: 180 TABLET | Refills: 3 | Status: SHIPPED | OUTPATIENT
Start: 2025-01-06

## 2025-01-06 NOTE — TELEPHONE ENCOUNTER
Refill Routing Note   Medication(s) are not appropriate for processing by [facility] for the following reason(s):        Required labs outdated    [facility] action(s):  Defer   Requires labs : Yes      Medication Therapy Plan: FOVS    Alert overridden per protocol: Yes     Appointments  past 12m or future 3m with PCP    Date Provider   Last Visit   12/10/2024 Davon Brock MD   Next Visit   6/10/2025 Davon Brock MD   ED visits in past 90 days: 0        Note composed:6:13 AM 01/06/2025

## 2025-01-23 ENCOUNTER — OFFICE VISIT (OUTPATIENT)
Dept: INTERNAL MEDICINE | Facility: CLINIC | Age: 60
End: 2025-01-23
Payer: MEDICARE

## 2025-01-23 DIAGNOSIS — F20.9 SCHIZOPHRENIA, UNSPECIFIED TYPE: ICD-10-CM

## 2025-01-23 DIAGNOSIS — E66.01 MORBID (SEVERE) OBESITY DUE TO EXCESS CALORIES: ICD-10-CM

## 2025-01-23 DIAGNOSIS — K04.7 DENTAL ABSCESS: Primary | ICD-10-CM

## 2025-01-23 DIAGNOSIS — F11.20 OPIOID DEPENDENCE, UNCOMPLICATED: ICD-10-CM

## 2025-01-23 DIAGNOSIS — J44.9 CHRONIC OBSTRUCTIVE PULMONARY DISEASE, UNSPECIFIED COPD TYPE: ICD-10-CM

## 2025-01-23 RX ORDER — AMOXICILLIN 875 MG/1
875 TABLET, FILM COATED ORAL EVERY 12 HOURS
Qty: 14 TABLET | Refills: 0 | Status: SHIPPED | OUTPATIENT
Start: 2025-01-23 | End: 2025-01-30

## 2025-01-23 NOTE — PROGRESS NOTES
INTERNAL MEDICINE URGENT CARE NOTE    CHIEF COMPLAINT     Chief Complaint   Patient presents with    Dental Pain    Oral Swelling       HPI     Trina Marie Collette is a 60 y.o. female who presents for an urgent visit today.    The patient location is: [place], [place]   The chief complaint leading to consultation is: dental pain and swelling     Visit type: audiovisual    Face to Face time with patient: 10   minutes of total time spent on the encounter, which includes face to face time and non-face to face time preparing to see the patient (eg, review of tests), Obtaining and/or reviewing separately obtained history, Documenting clinical information in the electronic or other health record, Independently interpreting results (not separately reported) and communicating results to the patient/family/caregiver, or Care coordination (not separately reported).         Each patient to whom he or she provides medical services by telemedicine is:  (1) informed of the relationship between the physician and patient and the respective role of any other health care provider with respect to management of the patient; and (2) notified that he or she may decline to receive medical services by telemedicine and may withdraw from such care at any time.    Notes:      Pt presents with 2 days of worsening tooth pain to the bottom right teeth and gum line. No fever. No headaches   Has tried otc  medications without improvement     Past Medical History:  Past Medical History:   Diagnosis Date    Allergic rhinitis     Anxiety     Bipolar 1 disorder     Chronic cough     Depression     Hypertension     Laryngopharyngeal reflux (LPR)     Lower back pain     Tinnitus of right ear        Home Medications:  Prior to Admission medications    Medication Sig Start Date End Date Taking? Authorizing Provider   albuterol (PROVENTIL) 2.5 mg /3 mL (0.083 %) nebulizer solution Take 3 mLs (2.5 mg total) by nebulization every 4 (four) hours as needed for  Wheezing or Shortness of Breath. Rescue 12/6/23   Davon Brock MD   amLODIPine (NORVASC) 5 MG tablet Take 1 tablet (5 mg total) by mouth once daily. 12/10/24   Davon Brock MD   betamethasone valerate 0.1% (VALISONE) 0.1 % Crea Apply topically 2 (two) times daily. 12/6/23   Davon Brock MD   budesonide-formoterol 160-4.5 mcg (SYMBICORT) 160-4.5 mcg/actuation HFAA Inhale 2 puffs into the lungs every 12 (twelve) hours. Controller. Rinse mouth after using. 12/6/23 12/10/24  Davon Brock MD   busPIRone (BUSPAR) 30 MG Tab Take 1 tablet (30 mg total) by mouth 2 (two) times daily. 12/10/24   Davon Brock MD   clotrimazole-betamethasone 1-0.05% (LOTRISONE) cream APPLY TOPICALLY 2 TIMES DAILY AS NEEDED. 6/10/24   Davon Brock MD   famotidine (PEPCID) 20 MG tablet TAKE 1 TABLET BY MOUTH TWICE A DAY 1/6/25   Davon Brock MD   fluoride, sodium, (PREVIDENT 5000) 1.1 % Pste Take by mouth. 12/28/22   Provider, Historical   furosemide (LASIX) 40 MG tablet Take 1 tablet (40 mg total) by mouth once daily. 12/10/24   Davon Brock MD   gabapentin (NEURONTIN) 800 MG tablet Take 1 tablet (800 mg total) by mouth 3 (three) times daily. 12/10/24   Davon Brock MD   HYDROcodone-acetaminophen (NORCO)  mg per tablet Take one tablet by mouth every 8 to 12 hours as needed for severe pain 11/2/23      HYDROcodone-acetaminophen (NORCO)  mg per tablet Take 1 tablet by mouth every 8 to 12 (twelve) hours as needed for severe pain 12/14/23   Lion Adkins MD   HYDROcodone-acetaminophen (NORCO)  mg per tablet Take one tablet by mouth every 9 hours as needed for pain 3/14/24      hydrOXYzine pamoate (VISTARIL) 25 MG Cap TAKE 1 CAPSULE BY MOUTH EVERY 6 HOURS AS NEEDED FOR NAUSEA/VOMITING/CRAMPS/ OR ITCH 12/10/24   Davon Brock MD   ipratropium (ATROVENT) 21 mcg (0.03 %) nasal spray SPRAY 2 SPRAYS INTO EACH NOSTRIL TWICE A DAY AS NEEDED MAY BE USED MORE OFTEN IF  NEEDED 6/10/24   Davon Brock MD   levocetirizine (XYZAL) 5 MG tablet Take 1 tablet (5 mg total) by mouth every evening. 12/10/24   Davon Brock MD   losartan (COZAAR) 100 MG tablet Take 1 tablet (100 mg total) by mouth once daily. 12/10/24   Davon Brock MD   montelukast (SINGULAIR) 10 mg tablet Take 1 tablet (10 mg total) by mouth once daily. 12/10/24   Davon Brock MD   mupirocin (BACTROBAN) 2 % ointment Apply to nose 3 times daily on a Q-tip for crusting or sores inside the nose 6/10/24   Davon Brock MD   naproxen (NAPROSYN) 375 MG tablet TAKE 1 TABLET BY MOUTH 2 TIMES DAILY WITH MEALS. 8/24/24   Davon Brock MD   OZEMPIC 0.25 mg or 0.5 mg (2 mg/3 mL) pen injector INJECT 0.25 MG ONCE WEEKLY X 4 THEN 0.5 MG ONCE WEEKLY 8/13/24   Davon Brock MD   pantoprazole (PROTONIX) 40 MG tablet Take 1 tablet (40 mg total) by mouth once daily. 12/10/24   Davon Brock MD   rosuvastatin (CRESTOR) 5 MG tablet Take 1 tablet (5 mg total) by mouth once daily. 12/10/24   Davon Brock MD   sertraline (ZOLOFT) 100 MG tablet Take 1 tablet (100 mg total) by mouth once daily. 12/10/24   Davon Brock MD   triamcinolone acetonide 0.1% (KENALOG) 0.1 % cream APPLY TO AFFECTED AREA TOPICALLY TWICE A DAY 10/10/24   Davon Brock MD   ziprasidone (GEODON) 60 MG Cap TAKE 1 CAPSULE BY MOUTH 2 TIMES DAILY. 11/21/24   Davon Brock MD   ziprasidone (GEODON) 80 MG capsule Take 1 capsule (80 mg total) by mouth once daily. 6/10/24   Davno Brock MD   zolpidem (AMBIEN) 5 MG Tab TAKE 1 TABLET BY MOUTH EVERY DAY AT BEDTIME AS NEEDED FOR SLEEP 10/15/24   Davon Brock MD   zolpidem (AMBIEN) 5 MG Tab TAKE 1 TABLET BY MOUTH AT BEDTIME AS NEEDED FOR SLEEP 12/10/24   Davon Brock MD       Review of Systems:  Review of Systems   Constitutional:  Positive for unexpected weight change. Negative for activity change.   HENT:  Positive for hearing loss and  rhinorrhea. Negative for trouble swallowing.    Eyes:  Negative for discharge and visual disturbance.   Respiratory:  Positive for wheezing. Negative for chest tightness.    Cardiovascular:  Negative for chest pain and palpitations.   Gastrointestinal:  Negative for blood in stool, constipation, diarrhea and vomiting.   Endocrine: Negative for polydipsia and polyuria.   Genitourinary:  Negative for difficulty urinating, dysuria, hematuria and menstrual problem.   Musculoskeletal:  Positive for arthralgias and neck pain. Negative for joint swelling.   Neurological:  Positive for headaches. Negative for weakness.   Psychiatric/Behavioral:  Negative for confusion and dysphoric mood.        Health Maintainence:   Immunizations:  Health Maintenance         Date Due Completion Date    TETANUS VACCINE Never done ---    Cervical Cancer Screening 06/20/2014 6/20/2013    Shingles Vaccine (1 of 2) Never done ---    Mammogram 02/28/2024 2/28/2023    Hemoglobin A1c (Diabetic Prevention Screening) 04/09/2024 4/9/2021    Influenza Vaccine (1) 09/01/2024 12/8/2020 (Declined)    Override on 12/8/2020: Declined    COVID-19 Vaccine (7 - 2024-25 season) 09/01/2024 12/2/2021    Lipid Panel 04/28/2027 4/28/2022    Colorectal Cancer Screening 01/12/2031 1/12/2021    RSV Vaccine (Age 60+ and Pregnant patients) (1 - 1-dose 75+ series) 01/09/2042 ---             PHYSICAL EXAM     LMP 09/25/2017 (Exact Date)     Physical Exam  HENT:      Mouth/Throat:           LABS     Lab Results   Component Value Date    LABA1C 5.6 08/09/2017    HGBA1C 5.8 (H) 04/09/2021     CMP  Sodium   Date Value Ref Range Status   12/29/2023 144 136 - 145 mmol/L Final     Potassium   Date Value Ref Range Status   12/29/2023 4.0 3.5 - 5.1 mmol/L Final     Chloride   Date Value Ref Range Status   12/29/2023 107 95 - 110 mmol/L Final     CO2   Date Value Ref Range Status   12/29/2023 25 23 - 29 mmol/L Final     Glucose   Date Value Ref Range Status   12/29/2023 122 (H) 70  - 110 mg/dL Final     BUN   Date Value Ref Range Status   12/29/2023 11 6 - 20 mg/dL Final     Creatinine   Date Value Ref Range Status   12/29/2023 0.8 0.5 - 1.4 mg/dL Final     Calcium   Date Value Ref Range Status   12/29/2023 9.1 8.7 - 10.5 mg/dL Final     Total Protein   Date Value Ref Range Status   12/29/2023 6.7 6.0 - 8.4 g/dL Final     Albumin   Date Value Ref Range Status   12/29/2023 3.5 3.5 - 5.2 g/dL Final     Total Bilirubin   Date Value Ref Range Status   12/29/2023 0.3 0.1 - 1.0 mg/dL Final     Comment:     For infants and newborns, interpretation of results should be based  on gestational age, weight and in agreement with clinical  observations.    Premature Infant recommended reference ranges:  Up to 24 hours.............<8.0 mg/dL  Up to 48 hours............<12.0 mg/dL  3-5 days..................<15.0 mg/dL  6-29 days.................<15.0 mg/dL       Alkaline Phosphatase   Date Value Ref Range Status   12/29/2023 105 55 - 135 U/L Final     AST   Date Value Ref Range Status   12/29/2023 16 10 - 40 U/L Final     ALT   Date Value Ref Range Status   12/29/2023 17 10 - 44 U/L Final     Anion Gap   Date Value Ref Range Status   12/29/2023 12 8 - 16 mmol/L Final     eGFR if    Date Value Ref Range Status   04/28/2022 >60.0 >60 mL/min/1.73 m^2 Final     eGFR if non    Date Value Ref Range Status   04/28/2022 >60.0 >60 mL/min/1.73 m^2 Final     Comment:     Calculation used to obtain the estimated glomerular filtration  rate (eGFR) is the CKD-EPI equation.        Lab Results   Component Value Date    WBC 7.78 12/29/2023    HGB 12.3 12/29/2023    HCT 37.7 12/29/2023    MCV 85 12/29/2023     12/29/2023     Lab Results   Component Value Date    CHOL 164 04/28/2022    CHOL 138 12/15/2021    CHOL 154 07/15/2021     Lab Results   Component Value Date    HDL 48 04/28/2022    HDL 48 12/15/2021    HDL 45 07/15/2021     Lab Results   Component Value Date    LDLCALC 102.2  04/28/2022    LDLCALC 79.0 12/15/2021    LDLCALC 95.4 07/15/2021     Lab Results   Component Value Date    TRIG 69 04/28/2022    TRIG 55 12/15/2021    TRIG 68 07/15/2021     Lab Results   Component Value Date    CHOLHDL 29.3 04/28/2022    CHOLHDL 34.8 12/15/2021    CHOLHDL 29.2 07/15/2021     Lab Results   Component Value Date    TSH 1.337 04/28/2022       ASSESSMENT/PLAN     Trina Marie Collette is a 60 y.o. female     Dental abscess- new problem, will start amoxicillin and refer to dentist   -     amoxicillin (AMOXIL) 875 MG tablet; Take 1 tablet (875 mg total) by mouth every 12 (twelve) hours. for 7 days  Dispense: 14 tablet; Refill: 0    Morbid (severe) obesity due to excess calories- stable. Will cont diet and exercise. Monitor weight     Opioid dependence, uncomplicated- stable taking norco. No pain medication prescribed at visit      Schizophrenia, unspecified type- stable. Will cont geodon, buspar and zoloft and f/u with psychiatry     Chronic obstructive pulmonary disease, unspecified COPD type- stable. Will cont symbicort       Follow up with PCP     This includes face to face time and non-face to face time preparing to see the patient (eg, review of tests), obtaining and/or reviewing separately obtained history, documenting clinical information in the electronic or other health record, independently interpreting results and communicating results to the patient/family/caregiver, or care coordinator.     Patient education provided from [facility]ette. Patient was counseled on when and how to seek emergent care.       Monica ROBERTSON, DAWNA, FNP-c   [facility] Eyad Brewer  8:10 AM

## 2025-02-01 NOTE — TELEPHONE ENCOUNTER
No care due was identified.  Health [facility] Embedded Care Due Messages. Reference number: [id number].   2/01/2025 8:29:16 AM CST

## 2025-02-02 RX ORDER — TRIAMCINOLONE ACETONIDE 1 MG/G
CREAM TOPICAL
Qty: 80 G | Refills: 1 | Status: SHIPPED | OUTPATIENT
Start: 2025-02-02

## 2025-02-02 NOTE — TELEPHONE ENCOUNTER
Refill Decision Note   Trina Collette  is requesting a refill authorization.  Brief Assessment and Rationale for Refill:  Approve     Medication Therapy Plan:        Comments:     Note composed:1:17 PM 02/02/2025

## 2025-03-13 DIAGNOSIS — G47.00 INSOMNIA, UNSPECIFIED TYPE: ICD-10-CM

## 2025-03-13 NOTE — TELEPHONE ENCOUNTER
Care Due:                  Date            Visit Type   Department     Provider  --------------------------------------------------------------------------------                                EP -                              PRIMARY      [facility]  Last Visit: 12-      CARE ([facility])   PRIMARY CARE   Davon Brock                               -                              PRIMARY      [facility]  Next Visit: 06-      CARE ([facility])   PRIMARY CARE   Davon Brock                                                            Last  Test          Frequency    Reason                     Performed    Due Date  --------------------------------------------------------------------------------    CBC.........  12 months..  naproxen.................  12- 12-    CMP.........  12 months..  famotidine, furosemide,    12- 12-                             losartan, naproxen,                             rosuvastatin.............    HBA1C.......  6 months...  OZEMPIC..................  04-   10-    Lipid Panel.  12 months..  rosuvastatin.............  04- 04-    Health [facility] Embedded Care Due Messages. Reference number: [id number].   3/13/2025 5:26:40 PM CDT

## 2025-03-14 RX ORDER — ZOLPIDEM TARTRATE 5 MG/1
TABLET ORAL
Qty: 30 TABLET | Refills: 2 | Status: SHIPPED | OUTPATIENT
Start: 2025-03-14

## 2025-03-19 RX ORDER — TRAZODONE HYDROCHLORIDE 100 MG/1
100 TABLET ORAL NIGHTLY
Qty: 90 TABLET | Refills: 1 | OUTPATIENT
Start: 2025-03-19

## 2025-03-19 NOTE — TELEPHONE ENCOUNTER
Refill Decision Note  Hilary DC. Inappropriate Request     Trina Collette  is requesting a refill authorization.  Brief Assessment and Rationale for Refill:  Quick Discontinue     Medication Therapy Plan:  prescription was discontinued on 6/10/2024 by Davon Brock MD    Medication Reconciliation Completed: No   Comments:           Note composed:4:55 PM 03/19/2025

## 2025-03-19 NOTE — TELEPHONE ENCOUNTER
No care due was identified.  [facility] Embedded Care Due Messages. Reference number: [id number].   3/19/2025 2:17:56 PM CDT

## 2025-03-24 DIAGNOSIS — Z00.00 ENCOUNTER FOR MEDICARE ANNUAL WELLNESS EXAM: ICD-10-CM

## 2025-04-22 ENCOUNTER — RESULTS FOLLOW-UP (OUTPATIENT)
Dept: PRIMARY CARE CLINIC | Facility: CLINIC | Age: 60
End: 2025-04-22
Payer: MEDICARE

## 2025-04-24 NOTE — PROGRESS NOTES
Called and inform pt her lab results and the new medication and recommendations from Dr. Brock. Pt verbalized understanding and repeated I am to take Vitamin D once a week correct. Yes ma'am

## 2025-06-10 ENCOUNTER — OFFICE VISIT (OUTPATIENT)
Dept: PRIMARY CARE CLINIC | Facility: CLINIC | Age: 60
End: 2025-06-10
Payer: MEDICARE

## 2025-06-10 VITALS
BODY MASS INDEX: 39.65 KG/M2 | HEART RATE: 68 BPM | DIASTOLIC BLOOD PRESSURE: 84 MMHG | HEIGHT: 66 IN | OXYGEN SATURATION: 100 % | WEIGHT: 246.69 LBS | SYSTOLIC BLOOD PRESSURE: 120 MMHG | RESPIRATION RATE: 18 BRPM

## 2025-06-10 DIAGNOSIS — Z79.899 ENCOUNTER FOR LONG-TERM CURRENT USE OF MEDICATION: ICD-10-CM

## 2025-06-10 DIAGNOSIS — E66.9 OBESITY (BMI 35.0-39.9 WITHOUT COMORBIDITY): ICD-10-CM

## 2025-06-10 DIAGNOSIS — G89.4 CHRONIC PAIN SYNDROME: ICD-10-CM

## 2025-06-10 DIAGNOSIS — G89.29 CHRONIC PAIN OF BOTH KNEES: ICD-10-CM

## 2025-06-10 DIAGNOSIS — M25.562 CHRONIC PAIN OF BOTH KNEES: ICD-10-CM

## 2025-06-10 DIAGNOSIS — I10 HYPERTENSION, UNSPECIFIED TYPE: ICD-10-CM

## 2025-06-10 DIAGNOSIS — F20.9 SCHIZOPHRENIA, UNSPECIFIED TYPE: ICD-10-CM

## 2025-06-10 DIAGNOSIS — J45.21 MILD INTERMITTENT ASTHMA WITH ACUTE EXACERBATION: ICD-10-CM

## 2025-06-10 DIAGNOSIS — J40 BRONCHITIS: Primary | ICD-10-CM

## 2025-06-10 DIAGNOSIS — E55.9 VITAMIN D DEFICIENCY: ICD-10-CM

## 2025-06-10 DIAGNOSIS — F31.9 BIPOLAR 1 DISORDER: ICD-10-CM

## 2025-06-10 DIAGNOSIS — E78.5 HYPERLIPIDEMIA, UNSPECIFIED HYPERLIPIDEMIA TYPE: ICD-10-CM

## 2025-06-10 DIAGNOSIS — R05.3 CHRONIC COUGH: ICD-10-CM

## 2025-06-10 DIAGNOSIS — M50.30 DDD (DEGENERATIVE DISC DISEASE), CERVICAL: ICD-10-CM

## 2025-06-10 DIAGNOSIS — L65.9 HAIR LOSS: ICD-10-CM

## 2025-06-10 DIAGNOSIS — E23.6 EMPTY SELLA SYNDROME: ICD-10-CM

## 2025-06-10 DIAGNOSIS — J98.01 BRONCHOSPASM: ICD-10-CM

## 2025-06-10 DIAGNOSIS — G47.00 INSOMNIA, UNSPECIFIED TYPE: ICD-10-CM

## 2025-06-10 DIAGNOSIS — M25.561 CHRONIC PAIN OF BOTH KNEES: ICD-10-CM

## 2025-06-10 DIAGNOSIS — Z72.0 TOBACCO ABUSE: ICD-10-CM

## 2025-06-10 PROCEDURE — 99999 PR PBB SHADOW E&M-EST. PATIENT-LVL III: CPT | Mod: PBBFAC,HCNC,, | Performed by: FAMILY MEDICINE

## 2025-06-10 RX ORDER — LEVOCETIRIZINE DIHYDROCHLORIDE 5 MG/1
5 TABLET, FILM COATED ORAL NIGHTLY
Qty: 90 TABLET | Refills: 1 | Status: SHIPPED | OUTPATIENT
Start: 2025-06-10

## 2025-06-10 RX ORDER — CLOTRIMAZOLE AND BETAMETHASONE DIPROPIONATE 10; .64 MG/G; MG/G
CREAM TOPICAL
Qty: 15 G | Refills: 1 | Status: SHIPPED | OUTPATIENT
Start: 2025-06-10

## 2025-06-10 RX ORDER — ALBUTEROL SULFATE 0.83 MG/ML
2.5 SOLUTION RESPIRATORY (INHALATION) EVERY 4 HOURS PRN
Qty: 1080 ML | Refills: 3 | Status: SHIPPED | OUTPATIENT
Start: 2025-06-10

## 2025-06-10 RX ORDER — TIZANIDINE 4 MG/1
4 TABLET ORAL EVERY 8 HOURS
Qty: 30 TABLET | Refills: 5 | Status: SHIPPED | OUTPATIENT
Start: 2025-06-10

## 2025-06-10 RX ORDER — OMEPRAZOLE 40 MG/1
40 CAPSULE, DELAYED RELEASE ORAL DAILY
Qty: 30 CAPSULE | Refills: 5 | Status: SHIPPED | OUTPATIENT
Start: 2025-06-10 | End: 2026-06-10

## 2025-06-10 RX ORDER — IPRATROPIUM BROMIDE 21 UG/1
SPRAY, METERED NASAL
Qty: 30 ML | Refills: 5 | Status: SHIPPED | OUTPATIENT
Start: 2025-06-10

## 2025-06-10 RX ORDER — PREDNISONE 20 MG/1
20 TABLET ORAL DAILY
Qty: 7 TABLET | Refills: 0 | Status: SHIPPED | OUTPATIENT
Start: 2025-06-10 | End: 2025-06-17

## 2025-06-10 RX ORDER — GABAPENTIN 800 MG/1
800 TABLET ORAL 3 TIMES DAILY
Qty: 270 TABLET | Refills: 1 | Status: CANCELLED | OUTPATIENT
Start: 2025-06-10

## 2025-06-10 RX ORDER — SERTRALINE HYDROCHLORIDE 100 MG/1
100 TABLET, FILM COATED ORAL DAILY
Qty: 90 TABLET | Refills: 3 | Status: SHIPPED | OUTPATIENT
Start: 2025-06-10

## 2025-06-10 RX ORDER — LORAZEPAM 1 MG/1
TABLET ORAL
Qty: 30 TABLET | Refills: 2 | Status: SHIPPED | OUTPATIENT
Start: 2025-06-10

## 2025-06-10 RX ORDER — ZOLPIDEM TARTRATE 5 MG/1
TABLET ORAL
Qty: 30 TABLET | Refills: 2 | Status: SHIPPED | OUTPATIENT
Start: 2025-06-10

## 2025-06-10 RX ORDER — CEFDINIR 300 MG/1
300 CAPSULE ORAL 2 TIMES DAILY
Qty: 20 CAPSULE | Refills: 0 | Status: SHIPPED | OUTPATIENT
Start: 2025-06-10 | End: 2025-06-20

## 2025-06-10 RX ORDER — AMLODIPINE BESYLATE 5 MG/1
5 TABLET ORAL DAILY
Qty: 90 TABLET | Refills: 3 | Status: SHIPPED | OUTPATIENT
Start: 2025-06-10

## 2025-06-10 RX ORDER — FUROSEMIDE 40 MG/1
40 TABLET ORAL DAILY
Qty: 90 TABLET | Refills: 3 | Status: SHIPPED | OUTPATIENT
Start: 2025-06-10

## 2025-06-10 RX ORDER — ZIPRASIDONE HYDROCHLORIDE 60 MG/1
60 CAPSULE ORAL 2 TIMES DAILY
Qty: 180 CAPSULE | Refills: 0 | Status: CANCELLED | OUTPATIENT
Start: 2025-06-10

## 2025-06-10 RX ORDER — HYDROXYZINE PAMOATE 25 MG/1
CAPSULE ORAL
Qty: 360 CAPSULE | Refills: 1 | Status: SHIPPED | OUTPATIENT
Start: 2025-06-10

## 2025-06-10 RX ORDER — HYDROCODONE BITARTRATE AND ACETAMINOPHEN 10; 325 MG/1; MG/1
TABLET ORAL
Qty: 90 TABLET | Refills: 0 | Status: CANCELLED | OUTPATIENT
Start: 2025-06-10

## 2025-06-10 RX ORDER — ZIPRASIDONE HYDROCHLORIDE 80 MG/1
80 CAPSULE ORAL DAILY
Qty: 30 CAPSULE | Refills: 5 | Status: SHIPPED | OUTPATIENT
Start: 2025-06-10

## 2025-06-10 RX ORDER — BUDESONIDE AND FORMOTEROL FUMARATE DIHYDRATE 160; 4.5 UG/1; UG/1
2 AEROSOL RESPIRATORY (INHALATION) EVERY 12 HOURS
Qty: 10.2 G | Refills: 11 | Status: SHIPPED | OUTPATIENT
Start: 2025-06-10 | End: 2026-06-10

## 2025-06-10 RX ORDER — BUSPIRONE HYDROCHLORIDE 30 MG/1
30 TABLET ORAL 2 TIMES DAILY
Qty: 180 TABLET | Refills: 3 | Status: SHIPPED | OUTPATIENT
Start: 2025-06-10

## 2025-06-10 RX ORDER — NAPROXEN 375 MG/1
375 TABLET ORAL 2 TIMES DAILY WITH MEALS
Qty: 100 TABLET | Refills: 5 | Status: SHIPPED | OUTPATIENT
Start: 2025-06-10

## 2025-06-10 RX ORDER — LOSARTAN POTASSIUM 100 MG/1
100 TABLET ORAL DAILY
Qty: 90 TABLET | Refills: 3 | Status: SHIPPED | OUTPATIENT
Start: 2025-06-10

## 2025-06-10 RX ORDER — FAMOTIDINE 20 MG/1
20 TABLET, FILM COATED ORAL 2 TIMES DAILY
Qty: 180 TABLET | Refills: 3 | Status: CANCELLED | OUTPATIENT
Start: 2025-06-10

## 2025-06-10 RX ORDER — MUPIROCIN 20 MG/G
OINTMENT TOPICAL
Qty: 22 G | Refills: 3 | Status: SHIPPED | OUTPATIENT
Start: 2025-06-10

## 2025-06-10 RX ORDER — BETAMETHASONE VALERATE 1 MG/G
CREAM TOPICAL 2 TIMES DAILY
Qty: 60 G | Refills: 2 | Status: SHIPPED | OUTPATIENT
Start: 2025-06-10

## 2025-06-10 RX ORDER — PANTOPRAZOLE SODIUM 40 MG/1
40 TABLET, DELAYED RELEASE ORAL DAILY
Qty: 90 TABLET | Refills: 3 | Status: SHIPPED | OUTPATIENT
Start: 2025-06-10

## 2025-06-10 RX ORDER — MONTELUKAST SODIUM 10 MG/1
10 TABLET ORAL DAILY
Qty: 90 TABLET | Refills: 3 | Status: SHIPPED | OUTPATIENT
Start: 2025-06-10

## 2025-06-10 RX ORDER — ROSUVASTATIN CALCIUM 5 MG/1
5 TABLET, COATED ORAL DAILY
Qty: 90 TABLET | Refills: 3 | Status: CANCELLED | OUTPATIENT
Start: 2025-06-10

## 2025-06-10 RX ORDER — TRIAMCINOLONE ACETONIDE 1 MG/G
CREAM TOPICAL
Qty: 80 G | Refills: 1 | Status: SHIPPED | OUTPATIENT
Start: 2025-06-10

## 2025-06-10 RX ORDER — PROMETHAZINE HYDROCHLORIDE AND DEXTROMETHORPHAN HYDROBROMIDE 6.25; 15 MG/5ML; MG/5ML
5 SYRUP ORAL EVERY 6 HOURS PRN
Qty: 180 ML | Refills: 1 | Status: SHIPPED | OUTPATIENT
Start: 2025-06-10

## 2025-06-10 RX ORDER — ATORVASTATIN CALCIUM 10 MG/1
10 TABLET, FILM COATED ORAL DAILY
Qty: 90 TABLET | Refills: 3 | Status: SHIPPED | OUTPATIENT
Start: 2025-06-10 | End: 2026-06-10

## 2025-06-10 NOTE — PROGRESS NOTES
Subjective:       Patient ID: Trina Marie Collette is a 60 y.o. female.    Chief Complaint:   HPI:59 yo BF  in for six-month check-- eating well-- +BM-- ambulating well --left knee pain--goes pain management gets injections in the knee   Hair loss knots in head   Hyperlipidemia Patient wants to switch rosuvastatin because having leg cramps--will try atorvastatin 10 mg  HTN -- blood pressure 120/84 on amlodipine and losartan   Peripheral edema takes Lasix p.r.n.--OK recently    History GERD minimal relief with Pepcid will try omeprazole   Pain in lumbar spine on hydrocodone gabapentin and Naprosyn   Insomnia on Ambien wants increased to 10 mg --does not want trazadone    Depression on Zoloft   History bronchospasm uses albuterol and Symbicort p.r.n.  Hearing loss-hearing aid  Tobacco abuse--1/2  ppd   Tried Ozempic may patient sick  Sinus issues---no fever--+runny stuffy nose--postnasal drip--+cough--phlegm clear--would like antibiotics and prednisone            --ROS-  Skin: no psoriasis, eczema, skin cancer--  HEENT no  headache, no ocular pain, blurred vision, diplopia, epistaxis, hoarseness change in voice, thyroid trouble--history of tinnitus/chronic sinus problem/nasal septal deviation/hearing loss has hearing aid  Lung: No pneumonia, asthma, Tb, wheezing, SOB, smoking 1/2  ppd trying to quit  --history of bronchospasm in the past uses albuterol--had chest x-ray which is normal  Heart: No chest pain, ankle edema, +occas palpitations, MI, river murmur, +hypertension blood pressure   + hyperlipidemia-no stent bypass arrhythmia  Abdomen: No nausea, vomiting, diarrhea, constipation, ulcers, hepatitis, gallbladder disease, melena, hematochezia, hematemesis patient complaining of heartburn had EGD colonoscopy hx diarrhea immodium Hx Gerd on protonix comes and goes  : no UTI, renal disease, stones-  GYN last menstrual.  Years ago has mammogram yearly--refuses PAP smear --I don't fool with that    MS: no fractures,  O/A, lupus, rheumatoid, gout--history of chronic pain the back lumbar spine, right knee  right shoulder, history DDD lumbar history cervical spinal stenosis  Neuro: No dizziness, LOC, seizures   No diabetes, no anemia, no anxiety, no depression patient with history of bipolar schizophrenia--doing well with medication goes to Mental Health   Single, one child, disabled, lives alone     Objective:   Physical Exam:  General: Well nourished, well developed, no acute distress + morbid obesity  Skin: No lesions  HEENT--eyes PERRLA EOM intact nose clear discharge, throat nonerythematous ears TMs clear  NECK: Supple, no bruits, No JVD, no nodes  Lungs: Clear, no rales, rhonchi, coarse cough--occasional wheeze  Heart: Regular rate and rhythm, no murmurs, gallops, or rubs  Abdomen: flat, bowel sounds positive, no tenderness, or organomegaly  MS:  pain left leg griselda though bilaterally --gets swelling knee medial and lateral aspect --pain with ambulation squatting and arising getting up on exam table  Neuro: Alert, CN intact, oriented X 3 still with some anxiety and depression of issues--Romberg's negative heel-toe intact  Extremities: No cyanosis, clubbing, or edema         Assessment:       1. Bronchitis    2. Chronic cough    3. Bronchospasm    4. Hyperlipidemia, unspecified hyperlipidemia type    5. Insomnia, unspecified type    6. Bipolar 1 disorder    7. Chronic pain of both knees    8. Chronic pain syndrome    9. DDD (degenerative disc disease), cervical    10. Empty sella syndrome    11. Hair loss    12. Hypertension, unspecified type    13. Mild intermittent asthma with acute exacerbation    14. Obesity (BMI 35.0-39.9 without comorbidity)    15. Schizophrenia, unspecified type    16. Tobacco abuse    17. Vitamin D deficiency                Plan:       Bronchitis    Chronic cough    Bronchospasm    Hyperlipidemia, unspecified hyperlipidemia type    Insomnia, unspecified type    Bipolar 1 disorder    Chronic pain of both  knees    Chronic pain syndrome    DDD (degenerative disc disease), cervical    Empty sella syndrome    Hair loss    Hypertension, unspecified type    Mild intermittent asthma with acute exacerbation    Obesity (BMI 35.0-39.9 without comorbidity)    Schizophrenia, unspecified type    Tobacco abuse    Vitamin D deficiency                Main Reason here    Virtual visit  Bronchitis and sinusitis--Omnicef--prednisone 20 mg q.d. x7 days--Phenergan DM  Insomnia--alternate Ambien with Ativan q.h.s.  Hyperlipidemia--muscle cramps with rosuvastatin will try atorvastatin  Hair loss----patient's see dermatology  Left knee pain--goes to pain management for that and bone on bone--they wanted to do a total knee replacement but patient states not ready for Norco/gabapentin/Naprosyn  Chronic cough needs  Singulair  Chronic pain syndrome--bilateral leg pain--left much greater than right---saw orthopedist--no treatment given--patient is on Norco 10 from pain clinic/Mobic/Flexeril/gabapentin 800 t.i.d.--pain mainly in back and left leg--has appt Dr Narayan June 13th --MRI results chondromalacia with subchondral cysts verse low-grade osteochondral defect in the medial trochlea.  4 cm popliteal cyst with debris.   Gets Norco and gabapentin from pain doctor  Back pain x years unchanged   Hypertension patient on Cozaar 100 Lasix 40 amlodipine 5 mg blood pressure --/84   Peripheral edema told can increase Lasix from 40 mg to 40 in the morning 20 afternoon if needed  Sees Dr. Senior for pain medication--DDD cervical and lumbar spine  Vitamin D deficiency on vitamin-D  COPD on Symbicort and albuterol  Anxiety depression bipolar schizophrenia on Ambien Ativan  Zoloft trazodone Geodon--goes to mental health  Lab cbc cmp lipid TSH HGBA1C   Patient to bring in list of all medication needs to start decreasing number medicine seems to have polypharmacy

## 2025-06-17 ENCOUNTER — TELEPHONE (OUTPATIENT)
Dept: PRIMARY CARE CLINIC | Facility: CLINIC | Age: 60
End: 2025-06-17
Payer: MEDICARE

## 2025-06-17 DIAGNOSIS — M25.561 CHRONIC PAIN OF BOTH KNEES: ICD-10-CM

## 2025-06-17 DIAGNOSIS — E66.01 MORBID (SEVERE) OBESITY DUE TO EXCESS CALORIES: Primary | ICD-10-CM

## 2025-06-17 DIAGNOSIS — G89.29 CHRONIC PAIN OF BOTH KNEES: ICD-10-CM

## 2025-06-17 DIAGNOSIS — M25.562 CHRONIC PAIN OF BOTH KNEES: ICD-10-CM

## 2025-06-17 NOTE — TELEPHONE ENCOUNTER
Copied from CRM #[id number]. Topic: General Inquiry - Patient Advice  >> Jun 17, 2025 10:52 AM Filomena wrote:  .1MEDICALADVICE     Patient is calling for Medical Advice regarding: [facility] is calling about a PA for scooter and the Dr. Frank ph. [phone]    How long has patient had these symptoms:    Pharmacy name and phone#:    Patient wants a call back or thru [facility], provide patient's call back phone number: [phone]    Comments:    Please advise patient replies from provider may take up to 48 hours.    See note regarding PA for scooter and letting you know dr. Frank phone # unsure what this pertains too

## 2025-06-18 NOTE — TELEPHONE ENCOUNTER
Called and spoke with patient. Requesting an order for a motorized scooter to be sent over to [facility]. Pending order.

## 2025-06-19 ENCOUNTER — OFFICE VISIT (OUTPATIENT)
Dept: HOME HEALTH SERVICES | Facility: CLINIC | Age: 60
End: 2025-06-19
Payer: MEDICARE

## 2025-06-19 ENCOUNTER — TELEPHONE (OUTPATIENT)
Dept: ADMINISTRATIVE | Facility: CLINIC | Age: 60
End: 2025-06-19
Payer: MEDICARE

## 2025-06-19 VITALS — HEIGHT: 66 IN | BODY MASS INDEX: 39.53 KG/M2 | WEIGHT: 246 LBS

## 2025-06-19 DIAGNOSIS — I10 HYPERTENSION, UNSPECIFIED TYPE: ICD-10-CM

## 2025-06-19 DIAGNOSIS — Z79.891 CHRONIC PRESCRIPTION OPIATE USE: ICD-10-CM

## 2025-06-19 DIAGNOSIS — G89.4 CHRONIC PAIN SYNDROME: ICD-10-CM

## 2025-06-19 DIAGNOSIS — E66.01 SEVERE OBESITY (BMI 35.0-39.9) WITH COMORBIDITY: ICD-10-CM

## 2025-06-19 DIAGNOSIS — J44.9 CHRONIC OBSTRUCTIVE PULMONARY DISEASE, UNSPECIFIED COPD TYPE: ICD-10-CM

## 2025-06-19 DIAGNOSIS — Z99.89 DEPENDENCE ON OTHER ENABLING MACHINES AND DEVICES: ICD-10-CM

## 2025-06-19 DIAGNOSIS — R26.9 ABNORMALITY OF GAIT AND MOBILITY: ICD-10-CM

## 2025-06-19 DIAGNOSIS — Z12.31 SCREENING MAMMOGRAM, ENCOUNTER FOR: ICD-10-CM

## 2025-06-19 DIAGNOSIS — Z00.00 ENCOUNTER FOR MEDICARE ANNUAL WELLNESS EXAM: Primary | ICD-10-CM

## 2025-06-19 DIAGNOSIS — E78.5 HYPERLIPIDEMIA, UNSPECIFIED HYPERLIPIDEMIA TYPE: ICD-10-CM

## 2025-06-19 DIAGNOSIS — M47.816 FACET ARTHROPATHY, LUMBAR: ICD-10-CM

## 2025-06-19 DIAGNOSIS — F20.3 UNDIFFERENTIATED SCHIZOPHRENIA: ICD-10-CM

## 2025-06-19 DIAGNOSIS — F31.9 BIPOLAR 1 DISORDER: ICD-10-CM

## 2025-06-19 RX ORDER — POTASSIUM CHLORIDE 750 MG/1
10 TABLET, EXTENDED RELEASE ORAL ONCE
COMMUNITY

## 2025-06-19 NOTE — PROGRESS NOTES
The patient location is: [place]  The chief complaint leading to consultation is: Medicare AWV    Visit type: audiovisual    Face to Face time with patient: 45  55 minutes of total time spent on the encounter, which includes face to face time and non-face to face time preparing to see the patient (eg, review of tests), Obtaining and/or reviewing separately obtained history, Documenting clinical information in the electronic or other health record, Independently interpreting results (not separately reported) and communicating results to the patient/family/caregiver, or Care coordination (not separately reported).         Each patient to whom he or she provides medical services by telemedicine is:  (1) informed of the relationship between the physician and patient and the respective role of any other health care provider with respect to management of the patient; and (2) notified that he or she may decline to receive medical services by telemedicine and may withdraw from such care at any time.    Notes:                         Trina Collette presented for a follow-up Medicare AWV today. The following components were reviewed and updated:    Medical history  Family History  Social history  Allergies and Current Medications  Health Risk Assessment  Health Maintenance  Care Team    **See Completed Assessments for Annual Wellness visit with in the encounter summary    The following assessments were completed:  Depression Screening  Cognitive function Screening  Timed Get Up Test  Whisper Test      Opioid documentation:      Patient does have a current opioid prescription.      Patient accepted further discussion regarding opioid medication use.      Patient is currently taking hydrocodone narcotic for back and knee pain.        Pain level today is 6/10.       In addition to narcotic pain medications, patient is also using NSAIDs for pain control.       Patient is followed by a specialist currently for their pain and  "will not be referred today.       Patient's opioid risk potential based on ORT-OUD tool:       Chris each box that applies   No   Yes     Family history of substance abuse   Alcohol [x] []   Illegal drugs [x] []   Rx drugs [x] []     Personal history of substance abuse   Alcohol [x] []   Illegal drugs [x] []   Rx drugs [x] []     Age between 16-45 years   [x]   []     Patient with ADD, OCD, Bipolar disorder, schizoprenia   []   [x]     Patient with depression   []   [x]                         Scoring total                                                   2              Non-opioid treatment options have been discussed today and added to the patient's after visit summary.          Vitals:    06/19/25 0958   Weight: 111.6 kg (246 lb)   Height: 5' 6" (1.676 m)     Body mass index is 39.71 kg/m².       Physical Exam  Constitutional:       Appearance: Normal appearance. She is obese.   HENT:      Head: Normocephalic and atraumatic.   Eyes:      General: No scleral icterus.  Neurological:      Mental Status: She is alert.   Psychiatric:         Mood and Affect: Mood normal.         Behavior: Behavior normal.           Diagnoses and health risks identified today and associated recommendations/orders:  1. Encounter for Medicare annual wellness exam  - Screenings performed, as noted above. Personal preventative testing needs reviewed.   - Referral to Enhanced Annual Wellness Visit (eAWV) W+1    2. Facet arthropathy, lumbar  - Stable, followed by pain Dr. Luis chong    3. Chronic pain syndrome   Stable, followed by pain Dr. Luis chong    4. Chronic prescription opiate use   Stable, followed by pain Dr. Luis chong    5. Undifferentiated schizophrenia  - Controlled, currently managed by PCP    6. Bipolar 1 disorder  - Controlled, managed by PCP    7. Chronic obstructive pulmonary disease, unspecified COPD type  - Stable, on symbicort and singulair. Followed by PCP    8. Hypertension, unspecified type  - Controlled, on " amlodipine, lasix, and losartan. Followed by PCP    9. Hyperlipidemia, unspecified hyperlipidemia type  - Controlled, on amlodipine    10. Severe obesity (BMI 35.0-39.9) with comorbidity  - weight is stable    11. Abnormality of gait and mobility  - 2/2 knee and back pain.     12. Dependence on other enabling machines and devices  - Continue to use cane    13. Screening mammogram, encounter for  - Mammo Digital Screening Bilat; Future      Provided Elizabeth with a 5-10 year written screening schedule and personal prevention plan. Recommendations were developed using the USPSTF age appropriate recommendations. Education, counseling, and referrals were provided as needed.  After Visit Summary printed and given to patient which includes a list of additional screenings\tests needed.    Follow up in about 1 year (around 6/19/2026) for your next annual wellness visit.      Heidi Adler, SIMONE      I offered to discuss advanced care planning, including how to pick a person who would make decisions for you if you were unable to make them for yourself, called a health care power of , and what kind of decisions you might make such as use of life sustaining treatments such as ventilators and tube feeding when faced with a life limiting illness recorded on a living will that they will need to know. (How you want to be cared for as you near the end of your natural life)     X  Patient is unwilling to engage in a discussion regarding advance directives at this time.

## 2025-06-19 NOTE — PATIENT INSTRUCTIONS
Counseling and Referral of Other Preventative  (Italic type indicates deductible and co-insurance are waived)    Patient Name: Trina Collette  Today's Date: 6/19/2025    Health Maintenance       Date Due Completion Date    TETANUS VACCINE Never done ---    Cervical Cancer Screening 06/20/2014 6/20/2013    Shingles Vaccine (1 of 2) Never done ---    Mammogram 02/28/2024 2/28/2023    COVID-19 Vaccine (7 - 2024-25 season) 09/01/2024 12/2/2021    Influenza Vaccine (Season Ended) 09/01/2025 12/8/2020 (Declined)    Override on 12/8/2020: Declined    Hemoglobin A1c (Diabetic Prevention Screening) 04/22/2028 4/22/2025    Lipid Panel 04/22/2030 4/22/2025    Colorectal Cancer Screening 01/12/2031 1/12/2021    RSV Vaccine (Age 60+ and Pregnant patients) (1 - 1-dose 75+ series) 01/09/2042 ---        Orders Placed This Encounter   Procedures    Mammo Digital Screening Bilat     The following information is provided to all patients.  This information is to help you find resources for any of the problems found today that may be affecting your health:                  Living healthy guide: www.[facility].[place].gov      Understanding Diabetes: www.diabetes.org      Eating healthy: www.cdc.gov/healthyweight      CDC home safety checklist: www.cdc.gov/steadi/patient.html      Agency on Aging: www.goea.[place].[facility]      Alcoholics anonymous (AA): www.aa.org      Physical Activity: www.lenny.nih.gov/va5lnsy      Tobacco use: www.quitwithusla.org

## 2025-06-23 NOTE — TELEPHONE ENCOUNTER
Called and inform pt that an order for motorized wheelchair has been put in. However if you need paperwork filled out you will have to call to make an appt. Pt verbalized understanding.

## 2025-07-06 NOTE — TELEPHONE ENCOUNTER
No care due was identified.  [facility] Embedded Care Due Messages. Reference number: [id number].   7/06/2025 6:57:24 AM CDT

## 2025-07-07 RX ORDER — NAPROXEN 500 MG/1
500 TABLET ORAL 2 TIMES DAILY WITH MEALS
Qty: 60 TABLET | Refills: 11 | OUTPATIENT
Start: 2025-07-07

## 2025-07-07 NOTE — TELEPHONE ENCOUNTER
Refill Decision Note   Trina Collette  is requesting a refill authorization.  Brief Assessment and Rationale for Refill:  Quick Discontinue     Medication Therapy Plan:         Comments:     Note composed:9:39 AM 07/07/2025             Appointments     Last Visit   6/10/2025 Davon Brock MD   Next Visit   12/10/2025 Davon Brock MD

## 2025-07-08 RX ORDER — LEVOCETIRIZINE DIHYDROCHLORIDE 5 MG/1
5 TABLET, FILM COATED ORAL NIGHTLY
Qty: 90 TABLET | Refills: 3 | Status: SHIPPED | OUTPATIENT
Start: 2025-07-08

## 2025-07-14 DIAGNOSIS — I10 HYPERTENSION, UNSPECIFIED TYPE: ICD-10-CM

## 2025-07-14 RX ORDER — AMLODIPINE BESYLATE 5 MG/1
5 TABLET ORAL
Qty: 90 TABLET | Refills: 3 | Status: SHIPPED | OUTPATIENT
Start: 2025-07-14

## 2025-07-14 NOTE — TELEPHONE ENCOUNTER
No care due was identified.  [facility] Embedded Care Due Messages. Reference number: [id number].   7/14/2025 11:03:12 AM CDT

## 2025-07-15 NOTE — TELEPHONE ENCOUNTER
Refill Decision Note   Trina Collette  is requesting a refill authorization.  Brief Assessment and Rationale for Refill:  Approve     Medication Therapy Plan:         Comments:     Note composed:10:30 PM 07/14/2025

## 2025-08-12 ENCOUNTER — PATIENT MESSAGE (OUTPATIENT)
Dept: INTERNAL MEDICINE | Facility: CLINIC | Age: 60
End: 2025-08-12
Payer: MEDICARE

## (undated) DEVICE — DRESSING TRANS 2X2 TEGADERM

## (undated) DEVICE — SEE MEDLINE ITEM 156934

## (undated) DEVICE — NDL HYPODERMIC 25GX2IN

## (undated) DEVICE — POSITIONER HEAD DONUT 9IN FOAM

## (undated) DEVICE — SOL NACL 0.9% INJ 500ML BG

## (undated) DEVICE — CONTAINER SPECIMEN STRL 4OZ

## (undated) DEVICE — SYR 30CC LUER LOCK

## (undated) DEVICE — SUT SILK 2-0 BLK BR FSL 18

## (undated) DEVICE — SOL 9P NACL IRR PIC IL

## (undated) DEVICE — SKIN MARKER DEVON 160

## (undated) DEVICE — SEE MEDLINE ITEM 157110

## (undated) DEVICE — TRACKER PATIENT NON INVASIVE

## (undated) DEVICE — MATRIX HEMOSTATIC FLOSEAL 5ML

## (undated) DEVICE — SUT PLN GUT 4-0 SC-1SC-1 1

## (undated) DEVICE — POSITIONER IV ARMBOARD FOAM

## (undated) DEVICE — SEE MEDLINE ITEM 152622

## (undated) DEVICE — SINU-FOAM STAMMBERGER

## (undated) DEVICE — TRACKER ENT INSTRUMENT